# Patient Record
Sex: MALE | Race: BLACK OR AFRICAN AMERICAN | NOT HISPANIC OR LATINO | Employment: FULL TIME | URBAN - METROPOLITAN AREA
[De-identification: names, ages, dates, MRNs, and addresses within clinical notes are randomized per-mention and may not be internally consistent; named-entity substitution may affect disease eponyms.]

---

## 2018-06-05 ENCOUNTER — HOSPITAL ENCOUNTER (EMERGENCY)
Facility: OTHER | Age: 48
Discharge: HOME | End: 2018-06-05
Attending: EMERGENCY MEDICINE
Payer: COMMERCIAL

## 2018-06-05 VITALS
HEART RATE: 82 BPM | SYSTOLIC BLOOD PRESSURE: 139 MMHG | DIASTOLIC BLOOD PRESSURE: 67 MMHG | WEIGHT: 235 LBS | BODY MASS INDEX: 32.9 KG/M2 | OXYGEN SATURATION: 99 % | TEMPERATURE: 98 F | HEIGHT: 71 IN | RESPIRATION RATE: 23 BRPM

## 2018-06-05 DIAGNOSIS — R55 NEAR SYNCOPE: Primary | ICD-10-CM

## 2018-06-05 DIAGNOSIS — R51.9 GENERALIZED HEADACHES: ICD-10-CM

## 2018-06-05 DIAGNOSIS — R79.89 LOW TSH LEVEL: ICD-10-CM

## 2018-06-05 DIAGNOSIS — R31.9 HEMATURIA, UNSPECIFIED TYPE: ICD-10-CM

## 2018-06-05 DIAGNOSIS — R90.89 ABNORMAL BRAIN CT: ICD-10-CM

## 2018-06-05 LAB
ALBUMIN SERPL BCP-MCNC: 3.8 G/DL
ALP SERPL-CCNC: 57 U/L
ALT SERPL W/O P-5'-P-CCNC: 25 U/L
AMPHET+METHAMPHET UR QL: NEGATIVE
ANION GAP SERPL CALC-SCNC: 12 MMOL/L
AST SERPL-CCNC: 14 U/L
BARBITURATES UR QL SCN>200 NG/ML: NEGATIVE
BASOPHILS # BLD AUTO: 0.03 K/UL
BASOPHILS NFR BLD: 0.2 %
BENZODIAZ UR QL SCN>200 NG/ML: NEGATIVE
BILIRUB SERPL-MCNC: 1 MG/DL
BILIRUB UR QL STRIP: NEGATIVE
BNP SERPL-MCNC: 15 PG/ML
BUN SERPL-MCNC: 15 MG/DL
BZE UR QL SCN: NEGATIVE
CALCIUM SERPL-MCNC: 9.4 MG/DL
CANNABINOIDS UR QL SCN: NEGATIVE
CHLORIDE SERPL-SCNC: 101 MMOL/L
CLARITY UR: CLEAR
CO2 SERPL-SCNC: 26 MMOL/L
COLOR UR: YELLOW
CREAT SERPL-MCNC: 1.2 MG/DL
CREAT UR-MCNC: 79.6 MG/DL
DIFFERENTIAL METHOD: ABNORMAL
EOSINOPHIL # BLD AUTO: 0 K/UL
EOSINOPHIL NFR BLD: 0.1 %
ERYTHROCYTE [DISTWIDTH] IN BLOOD BY AUTOMATED COUNT: 13.5 %
EST. GFR  (AFRICAN AMERICAN): >60 ML/MIN/1.73 M^2
EST. GFR  (NON AFRICAN AMERICAN): >60 ML/MIN/1.73 M^2
GLUCOSE SERPL-MCNC: 94 MG/DL
GLUCOSE UR QL STRIP: NEGATIVE
HCT VFR BLD AUTO: 42.6 %
HGB BLD-MCNC: 13.9 G/DL
HGB UR QL STRIP: ABNORMAL
INR PPP: 1
KETONES UR QL STRIP: NEGATIVE
LEUKOCYTE ESTERASE UR QL STRIP: NEGATIVE
LYMPHOCYTES # BLD AUTO: 1.3 K/UL
LYMPHOCYTES NFR BLD: 9.1 %
MCH RBC QN AUTO: 28 PG
MCHC RBC AUTO-ENTMCNC: 32.6 G/DL
MCV RBC AUTO: 86 FL
METHADONE UR QL SCN>300 NG/ML: NEGATIVE
MONOCYTES # BLD AUTO: 1.4 K/UL
MONOCYTES NFR BLD: 10.2 %
NEUTROPHILS # BLD AUTO: 11.3 K/UL
NEUTROPHILS NFR BLD: 80.1 %
NITRITE UR QL STRIP: NEGATIVE
OPIATES UR QL SCN: NEGATIVE
PCP UR QL SCN>25 NG/ML: NEGATIVE
PH UR STRIP: 6 [PH] (ref 5–8)
PLATELET # BLD AUTO: 203 K/UL
PMV BLD AUTO: 10.1 FL
POCT GLUCOSE: 95 MG/DL (ref 70–110)
POTASSIUM SERPL-SCNC: 3.6 MMOL/L
PROT SERPL-MCNC: 7.8 G/DL
PROT UR QL STRIP: NEGATIVE
PROTHROMBIN TIME: 10.7 SEC
RBC # BLD AUTO: 4.96 M/UL
SODIUM SERPL-SCNC: 139 MMOL/L
SP GR UR STRIP: 1.01 (ref 1–1.03)
T4 FREE SERPL-MCNC: 0.94 NG/DL
TOXICOLOGY INFORMATION: NORMAL
TROPONIN I SERPL DL<=0.01 NG/ML-MCNC: <0.006 NG/ML
TSH SERPL DL<=0.005 MIU/L-ACNC: 0.27 UIU/ML
URN SPEC COLLECT METH UR: ABNORMAL
UROBILINOGEN UR STRIP-ACNC: NEGATIVE EU/DL
WBC # BLD AUTO: 14.09 K/UL

## 2018-06-05 PROCEDURE — 96361 HYDRATE IV INFUSION ADD-ON: CPT

## 2018-06-05 PROCEDURE — 85025 COMPLETE CBC W/AUTO DIFF WBC: CPT

## 2018-06-05 PROCEDURE — 96360 HYDRATION IV INFUSION INIT: CPT

## 2018-06-05 PROCEDURE — 80053 COMPREHEN METABOLIC PANEL: CPT

## 2018-06-05 PROCEDURE — 82962 GLUCOSE BLOOD TEST: CPT

## 2018-06-05 PROCEDURE — 85610 PROTHROMBIN TIME: CPT

## 2018-06-05 PROCEDURE — 93010 ELECTROCARDIOGRAM REPORT: CPT | Mod: ,,, | Performed by: INTERNAL MEDICINE

## 2018-06-05 PROCEDURE — 93005 ELECTROCARDIOGRAM TRACING: CPT | Mod: 59

## 2018-06-05 PROCEDURE — 81003 URINALYSIS AUTO W/O SCOPE: CPT | Mod: 59

## 2018-06-05 PROCEDURE — 99284 EMERGENCY DEPT VISIT MOD MDM: CPT | Mod: 25

## 2018-06-05 PROCEDURE — 83880 ASSAY OF NATRIURETIC PEPTIDE: CPT

## 2018-06-05 PROCEDURE — 80307 DRUG TEST PRSMV CHEM ANLYZR: CPT

## 2018-06-05 PROCEDURE — 84439 ASSAY OF FREE THYROXINE: CPT

## 2018-06-05 PROCEDURE — 84443 ASSAY THYROID STIM HORMONE: CPT

## 2018-06-05 PROCEDURE — 25000003 PHARM REV CODE 250: Performed by: EMERGENCY MEDICINE

## 2018-06-05 PROCEDURE — 84484 ASSAY OF TROPONIN QUANT: CPT

## 2018-06-05 RX ORDER — BUTALBITAL, ACETAMINOPHEN AND CAFFEINE 50; 325; 40 MG/1; MG/1; MG/1
1 TABLET ORAL
Status: COMPLETED | OUTPATIENT
Start: 2018-06-05 | End: 2018-06-05

## 2018-06-05 RX ORDER — LISINOPRIL 20 MG/1
20 TABLET ORAL DAILY
COMMUNITY
End: 2018-06-15

## 2018-06-05 RX ORDER — BUTALBITAL, ASPIRIN, AND CAFFEINE 325; 50; 40 MG/1; MG/1; MG/1
1 CAPSULE ORAL EVERY 6 HOURS PRN
Qty: 12 CAPSULE | Refills: 0 | Status: SHIPPED | OUTPATIENT
Start: 2018-06-05 | End: 2018-07-05

## 2018-06-05 RX ADMIN — SODIUM CHLORIDE 1000 ML: 0.9 INJECTION, SOLUTION INTRAVENOUS at 10:06

## 2018-06-05 RX ADMIN — BUTALBITAL, ACETAMINOPHEN AND CAFFEINE 1 TABLET: 50; 325; 40 TABLET ORAL at 11:06

## 2018-06-05 NOTE — ED NOTES
Rounding on the patient is completed. Patient is resting comfortably in stretcher, respirations are even and unlabored. Pt is connected to cardia monitor, pulse ox and NIBP. Patient skin is warm and dry. Patient in no acute distress. The patient has been updated on plan of care and current status. Pain was accessed and is currently a 4/10. Comfort postioning and restroom needs were addressed. Pt personal items placed at bedside. The call bell remains at bedside, bed in lowest position and side up x 2. Will continue to monitor patient.

## 2018-06-05 NOTE — ED NOTES
Pt updated on POC and pt verbalized udnerstanding. Pt ambulated wtia steady gait to room 10. Pt placed on cont cardiac, bp and spo2 monitors. Bed is locked and in lowest position with side rails up x2. Call light is within reach. Visitor at the bedside.

## 2018-06-05 NOTE — ED NOTES
Pt updated on POC and verbalized understanding. Pt is AAOx4. Pt does not appear to be in acute distress. RR are even and unlabored. Bed is locked and in lowest position with side rails up x2. Call light is within reach. Visitor at the bedside. Will continue to monitor.

## 2018-06-05 NOTE — ED NOTES
LOC: The patient is awake, alert and aware of environment with an appropriate affect, the patient is oriented x 3 and speaking appropriately.    APPEARANCE: Patient resting comfortably and in no acute distress, patient is clean and well groomed, patient's clothing properly fastened.    SKIN: The skin is warm and dry, color consistent with ethnicity, patient has normal skin turgor and moist mucus membranes, skin intact, no breakdown or brusing noted.    MUSKULOSKELETAL: Patient moving all extremities well, no obvious swelling or deformities noted.    RESPIRATORY: Airway is open and patent, respirations are spontaneous, patient has a normal effort and rate, no accessory muscle use noted.    CARDIAC: Patient has a normal rate and rhythm, no peripheral edema noted, capillary refill < 3 seconds.    ABDOMEN: Soft and non tender to palpation, no distention noted.    NEUROLOGIC: PERRL, 3mm bilaterally, eyes open spontaneously, behavior appropriate to situation, follows commands, facial expression symmetrical, bilateral hand grasp equal and even, purposeful motor response noted, normal sensation in all extremities when touched with a finger.Headache 6/10

## 2018-06-05 NOTE — ED PROVIDER NOTES
"Encounter Date: 6/5/2018    SCRIBE #1 NOTE: I, Naman Centeno, am scribing for, and in the presence of, Dr. Stahl.       History     Chief Complaint   Patient presents with    Headache     Pt c/o headache and weakness. Pt states he feels like he got overheated on sunday and hasing been drinking gatorade with minimal relief.      Seen by provider: 8:55 AM    Patient is a 47 y.o. male with a history of HTN who presents to the ED with complaint of headache, which began two days ago. Patient reports a near-syncopal episode two days ago with a pesistent headache since. He states "I was at the pool and I got overheated, my whole body felt extremely hot." He reports becoming "really weak and lightheaded" at that time and states he felt close to losing consciousness. He also reports experiencing chest pain at that time. He states "my chest felt unusual, I had never felt that before, it was hurting a little bit." He described the pain as "heaviness." He went into air conditioning and placed cold water on his neck with some improvement at that time and was able to drive home afterwards, but states he has felt fatigued has had a headache and decreased appetite since. He states "I've been out of it and was in bed all day yesterday." He also notes recent sinus congestion. He denies vomiting, abdominal pain, blood in stool, or dark stools. Patient reports experiencing episodes of syncope for several years (>3 years). He states "I can feel it coming on." He reports becoming weak and lightheaded prior to losing consciousness. He states these episodes seem to occur spontaneously. He states "I can't take a hot shower because I will pass out." He reports these episodes have been occurring more frequently recently. He reports being evaluated by his PCP for this, but states a diagnosis has not been found. He reports having a negative stress test a few years ago. He reports taking lisinopril for about 2.5 years, but states the " episodes have been occurring much longer. He denies a family history of CAD, but notes his father  of prostate cancer at 71 and his older brother had a stroke a few years ago. He admits to use of alcohol, but denies use of tobacco or illicit drugs.      The history is provided by the patient and a friend (girlfriend).     Review of patient's allergies indicates:  No Known Allergies  Past Medical History:   Diagnosis Date    Hypertension      History reviewed. No pertinent surgical history.  History reviewed. No pertinent family history.  Social History   Substance Use Topics    Smoking status: Never Smoker    Smokeless tobacco: Not on file    Alcohol use No     Review of Systems   Constitutional: Positive for appetite change (decreased). Negative for fever.   HENT: Positive for congestion. Negative for sore throat.    Eyes: Negative for photophobia and redness.   Respiratory: Negative for cough and shortness of breath.    Cardiovascular: Positive for chest pain.   Gastrointestinal: Negative for abdominal pain, blood in stool, nausea and vomiting.   Genitourinary: Negative for dysuria.   Musculoskeletal: Negative for back pain.   Skin: Negative for rash.   Neurological: Positive for weakness (generalized), light-headedness and headaches. Negative for syncope.   Psychiatric/Behavioral: Negative for confusion.       Physical Exam     Initial Vitals [18 0822]   BP Pulse Resp Temp SpO2   139/77 86 18 98.4 °F (36.9 °C) 96 %      MAP       97.67         Physical Exam    Nursing note and vitals reviewed.  Constitutional: He appears well-developed and well-nourished. He is not diaphoretic. No distress.   HENT:   Head: Normocephalic and atraumatic.   Mouth/Throat: Oropharynx is clear and moist.   Eyes: Conjunctivae and EOM are normal. Pupils are equal, round, and reactive to light.   Neck: Normal range of motion. Neck supple.   Cardiovascular: Normal rate, regular rhythm, S1 normal, S2 normal and normal heart  sounds. Exam reveals no gallop and no friction rub.    No murmur heard.  Pulmonary/Chest: Breath sounds normal. No respiratory distress. He has no wheezes. He has no rhonchi. He has no rales.   Abdominal: Soft. Bowel sounds are normal. There is no tenderness. There is no rebound and no guarding.   Musculoskeletal: Normal range of motion. He exhibits no edema or tenderness.   No lower extremity edema.    Lymphadenopathy:     He has no cervical adenopathy.   Neurological: He is alert and oriented to person, place, and time. He has normal strength. He displays normal reflexes. No cranial nerve deficit or sensory deficit.   Cranial nerves II-XII intact. Strength 5/5 throughout. Reflexes 2+ throughout. Good finger to nose task ability. Negative pronator drift. Neurovascularly intact throughout.   Skin: Skin is warm and dry. Capillary refill takes less than 2 seconds. No rash noted. No pallor.   Psychiatric: He has a normal mood and affect. His behavior is normal.         ED Course   Procedures  Labs Reviewed   CBC W/ AUTO DIFFERENTIAL - Abnormal; Notable for the following:        Result Value    WBC 14.09 (*)     Hemoglobin 13.9 (*)     Gran # (ANC) 11.3 (*)     Mono # 1.4 (*)     Gran% 80.1 (*)     Lymph% 9.1 (*)     All other components within normal limits   URINALYSIS - Abnormal; Notable for the following:     Occult Blood UA Trace (*)     All other components within normal limits   TSH - Abnormal; Notable for the following:     TSH 0.270 (*)     All other components within normal limits   COMPREHENSIVE METABOLIC PANEL   PROTIME-INR   DRUG SCREEN PANEL, URINE EMERGENCY   TROPONIN I   B-TYPE NATRIURETIC PEPTIDE   T4, FREE   POCT GLUCOSE      Imaging Results          MRI Brain Without Contrast (Final result)  Result time 06/05/18 13:07:51    Final result by Mitchell Vaz DO (06/05/18 13:07:51)                 Impression:      Prominent perivascular space right posterior centrum semiovale corresponds to hypodensity  seen on CT.    No evidence for acute or recent infarction.    Innumerable subcentimeter T2 FLAIR signal hyperintensities supratentorial subcortical white matter which are nonspecific and may represent sequela migraine headaches or age advanced chronic ischemic change.    Incidental left maxillary antral mucous retention cyst.      Electronically signed by: Mitchell Vaz DO  Date:    06/05/2018  Time:    13:07             Narrative:    EXAMINATION:  MRI BRAIN WITHOUT CONTRAST    CLINICAL HISTORY:  Syncope Episodes;  Other abnormal findings on diagnostic imaging of central nervous system    TECHNIQUE:  Sagittal and axial T1, axial T2, axial FLAIR, axial gradient and axial diffusion imaging of the whole brain without contrast.    COMPARISON:  None    FINDINGS:  There is a focal fluid signal collection within the right parietal subcortical white matter which corresponds to hypodensity seen on CT within linear internal component most compatible with prominent perivascular space with relative isointensity to CSF on all sequences without diffusion restriction.    There is no restricted diffusion to suggest acute infarction.    Innumerable subcentimeter scattered foci of T2 FLAIR signal hyperintensity throughout the supratentorial white matter which are nonspecific and may represent sequela of migraine headaches with differential to include age advanced chronic ischemic change    The major intracranial T2 flow voids are present.  The ventricles are normal without hydrocephalus.  No abnormal parenchymal susceptibility to suggest parenchymal hemorrhage.                               CT Head Without Contrast (Final result)  Result time 06/05/18 10:15:19    Final result by Mitchell Vaz DO (06/05/18 10:15:19)                 Impression:      8 mm fluid density oval-shaped region within the right posterior centrum semiovale which may represent prominent perivascular space versus age indeterminate lacunar type  infarct.    Otherwise unremarkable noncontrast CT head specifically without evidence for acute intracranial hemorrhage or sulcal effacement to suggest large territory recent infarction.  Clinical correlation and further evaluation with MRI as warranted if patient compatible      Electronically signed by: Mitchell Vaz DO  Date:    06/05/2018  Time:    10:15             Narrative:    EXAMINATION:  CT HEAD WITHOUT CONTRAST    CLINICAL HISTORY:  headaches;    TECHNIQUE:  Multiple sequential 5 mm axial images of the head without contrast.  Coronal and sagittal reformatted imaging from the axial acquisition.    COMPARISON:  None    FINDINGS:  There is no evidence for acute intracranial hemorrhage or sulcal effacement.  The ventricles are normal in size without hydrocephalus.  There is a small oval-shaped hypodensity within the right centrum semiovale posteriorly which is nonspecific and may represent age indeterminate lacunar type infarct with prominent perivascular space to be considered.  There is no midline shift or mass effect.  Visualized paranasal sinuses and mastoid air cells are clear.                               X-Ray Chest PA And Lateral (Final result)  Result time 06/05/18 09:34:27    Final result by Leonardo Pillai Jr., MD (06/05/18 09:34:27)                 Impression:      No acute abnormality identified.      Electronically signed by: Leonardo Pillai MD  Date:    06/05/2018  Time:    09:34             Narrative:    EXAMINATION:  XR CHEST PA AND LATERAL    CLINICAL HISTORY:  Chest Pain;    TECHNIQUE:  PA and lateral views of the chest were performed.    COMPARISON:  None    FINDINGS:  Heart size pulmonary vessels are.  The lungs are well aerated and clear.  No pleural fluid.  Bones are intact.                                EKG Readings: (Independently Interpreted)   Sinus rhythm. Rate of 80. No STEMI.          Medical Decision Making:   Independently Interpreted Test(s):   I have ordered and independently  interpreted EKG Reading(s) - see prior notes  Clinical Tests:   Lab Tests: Ordered and Reviewed  Radiological Study: Reviewed and Ordered  Medical Tests: Reviewed and Ordered            Scribe Attestation:   Scribe #1: I performed the above scribed service and the documentation accurately describes the services I performed. I attest to the accuracy of the note.    Attending Attestation:           Physician Attestation for Scribe:  Physician Attestation Statement for Scribe #1: I, Dr. Stahl, reviewed documentation, as scribed by Naman Centeno in my presence, and it is both accurate and complete.         Attending ED Notes:   Emergent evaluation of 47-year-old male with near syncopal episode of feeling overheated.  Patient is afebrile, nontoxic-appearing with stable vital signs.  No acute findings on drug screen.  No acute findings and urinary analysis except for trace blood.  White blood cell count of 14,000.  H&H 13.9 and 42.6.  No acute findings on CMP.  No elevation in troponin.  TSH is 0.270.  Free T4 0.94.  EKG reveals normal sinus rhythm.  No acute signs on chest x-ray.  CT the head reveals 8 mm fluid density oval-shaped region within the right posterior centrum semiovale.  MRI reveals prominent perivascular'sright posterior centrum semiovale.  No evidence of acute or recent infarction.  Patient has innumerable subcentimeter T2 flare.  The patient is extensive the counseled on his diagnosis and treatment including all diagnostic, laboratory and physical exam findings.  Patient is discharged good condition and directed follow-up with his primary care physician and neurology in the next 24-48 hours.             Clinical Impression:     1. Near syncope    2. Abnormal brain CT    3. Generalized headaches    4. Hematuria, unspecified type    5. Low TSH level               Tawanda Stahl MD  06/14/18 [id number]

## 2018-06-08 DIAGNOSIS — R55 SYNCOPE, UNSPECIFIED SYNCOPE TYPE: Primary | ICD-10-CM

## 2018-06-15 ENCOUNTER — HOSPITAL ENCOUNTER (OUTPATIENT)
Dept: CARDIOLOGY | Facility: CLINIC | Age: 48
Discharge: HOME | End: 2018-06-15
Payer: COMMERCIAL

## 2018-06-15 ENCOUNTER — CLINICAL SUPPORT (OUTPATIENT)
Dept: ELECTROPHYSIOLOGY | Facility: CLINIC | Age: 48
End: 2018-06-15
Attending: INTERNAL MEDICINE
Payer: COMMERCIAL

## 2018-06-15 ENCOUNTER — INITIAL CONSULT (OUTPATIENT)
Dept: ELECTROPHYSIOLOGY | Facility: CLINIC | Age: 48
End: 2018-06-15
Payer: COMMERCIAL

## 2018-06-15 VITALS
HEART RATE: 72 BPM | DIASTOLIC BLOOD PRESSURE: 75 MMHG | BODY MASS INDEX: 32.62 KG/M2 | SYSTOLIC BLOOD PRESSURE: 132 MMHG | HEIGHT: 71 IN | WEIGHT: 233 LBS

## 2018-06-15 DIAGNOSIS — R55 SYNCOPE, UNSPECIFIED SYNCOPE TYPE: ICD-10-CM

## 2018-06-15 DIAGNOSIS — R55 VASOVAGAL EPISODE: ICD-10-CM

## 2018-06-15 PROCEDURE — 93000 ELECTROCARDIOGRAM COMPLETE: CPT | Mod: S$GLB,,, | Performed by: INTERNAL MEDICINE

## 2018-06-15 PROCEDURE — 93268 ECG RECORD/REVIEW: CPT | Mod: S$GLB,,, | Performed by: INTERNAL MEDICINE

## 2018-06-15 PROCEDURE — 99205 OFFICE O/P NEW HI 60 MIN: CPT | Mod: S$GLB,,, | Performed by: INTERNAL MEDICINE

## 2018-06-15 PROCEDURE — 99999 PR PBB SHADOW E&M-EST. PATIENT-LVL III: CPT | Mod: PBBFAC,,, | Performed by: INTERNAL MEDICINE

## 2018-06-15 RX ORDER — AMLODIPINE BESYLATE 10 MG/1
10 TABLET ORAL DAILY
Qty: 30 TABLET | Refills: 11 | Status: SHIPPED | OUTPATIENT
Start: 2018-06-15 | End: 2018-12-20 | Stop reason: SDUPTHER

## 2018-06-15 NOTE — LETTER
Lona 15, 2018      Tawanda Martell MD  [address]  [facility] [address]           [facility]  [address]  [facility] [address]  Phone: [phone]  Fax: [phone]          Patient: Bhaskar Stiles   MR Number: [id number]   YOB: 1970   Date of Visit: 6/15/2018       Dear Dr. Tawanda Martell:    Thank you for referring Bhaskar Stiles to me for evaluation. Attached you will find relevant portions of my assessment and plan of care.    If you have questions, please do not hesitate to call me. I look forward to following Bhaskar Stiles along with you.    Sincerely,    Leonardo Whitfield MD    Enclosure  CC:  No Recipients    If you would like to receive this communication electronically, please contact [email or web address] or [phone] to request more information on [employer] access.    For providers and/or their staff who would like to refer a patient to [facility], please contact us through our one-stop-shop provider referral line, [facility], at [phone].    If you feel you have received this communication in error or would no longer like to receive these types of communications, please e-mail [email or web address]

## 2018-06-15 NOTE — PROGRESS NOTES
Subjective:    Patient ID:  Bhaskar Stiles is a 47 y.o. male who presents for evaluation of Loss of Consciousness      HPI   47 y.o. M  HTN on meds (lisinopril >1 year)    Has been having episodes of presyncope/syncope for years. Occurs most often when overheated (e.g., hot shower or mowing lawn) but also at other times. Gets a long prodrome, to which he responds by getting a cool towel. Feels better after cooling off. After episodes, often feels weak/LH for hours or all day. It's been >1 year since actual syncope.  Episodes have also happened on waking from sleep, as well. Ex-wife observed pt to look like his prodrome; she helped by getting cool towel, etc.  Hot showers are particularly terrible. Able to work out lightly, but feels awful after heavy work (e.g., lifting weights).    Went to ER for such an episode recently. There, CT head showed an 8mm oval abnormality. MRI showed innumerable T2 flare abnormalities; pending neuro c/s for that next week.    My interpretation of today's ECG is NSR    Review of Systems   Constitution: Negative. Negative for weakness and malaise/fatigue.   HENT: Negative.  Negative for ear pain and tinnitus.    Eyes: Negative for blurred vision.   Cardiovascular: Positive for near-syncope. Negative for chest pain, dyspnea on exertion, palpitations and syncope.   Respiratory: Negative.  Negative for shortness of breath.    Endocrine: Negative.  Negative for polyuria.   Hematologic/Lymphatic: Does not bruise/bleed easily.   Skin: Negative.  Negative for rash.   Musculoskeletal: Negative.  Negative for joint pain and muscle weakness.   Gastrointestinal: Negative.  Negative for abdominal pain and change in bowel habit.   Genitourinary: Negative for frequency.   Neurological: Negative.  Negative for dizziness.   Psychiatric/Behavioral: Negative.  Negative for depression. The patient is not nervous/anxious.    Allergic/Immunologic: Negative for environmental allergies.        Objective:     Physical Exam   Constitutional: He is oriented to person, place, and time. He appears well-developed and well-nourished.   HENT:   Head: Normocephalic and atraumatic.   Eyes: Conjunctivae, EOM and lids are normal. No scleral icterus.   Neck: Normal range of motion. No JVD present. No tracheal deviation present. No thyromegaly present.   Cardiovascular: Normal rate, regular rhythm, normal heart sounds and intact distal pulses.   No extrasystoles are present. PMI is not displaced.  Exam reveals no gallop and no friction rub.    No murmur heard.  Pulses:       Radial pulses are 2+ on the right side, and 2+ on the left side.   Pulmonary/Chest: Effort normal and breath sounds normal. No accessory muscle usage. No tachypnea. No respiratory distress. He has no wheezes. He has no rales.   Abdominal: Soft. Bowel sounds are normal. He exhibits no distension. There is no hepatosplenomegaly. There is no tenderness.   Musculoskeletal: Normal range of motion. He exhibits no edema.   Neurological: He is alert and oriented to person, place, and time. He has normal reflexes. He exhibits normal muscle tone.   Skin: Skin is warm and dry. No rash noted.   Psychiatric: He has a normal mood and affect. His behavior is normal.   Nursing note and vitals reviewed.        Assessment:       1. Vasovagal episode    Syncope, presyncope     Plan:       Discussed with patient orthostatic/vasovagal mediated hypotension and the resultant decreased level of consciousness that can result. Discussed counteractive measures.    Change ACE to norvasc (treats HTN but doesn't have orthostasis as KAIN).  Echo  TTT  30 day monitor  Neuro c/s already pending    f/u 6 wk or earlier prn.

## 2018-06-19 NOTE — PROGRESS NOTES
TILT TABLE TEST EDUCATION CHECKLIST    07-09-18 @ 7:30 AM  REPORT TO CARDIOLOGY WAITING ROOM ON 3RD FLOOR OF HOSPITAL  (DO NOT REPORT TO CLINIC)    If you park in the Parking Garage:  Take elevators to the 2nd floor  Walk up ramp and turn right by [employer] Elevators  Take elevator to the 3rd floor  Upon exiting the elevator, turn away from the clinic areas  Walk long lerma around to front of hospital to area with windows overlooking [facility]  Check in at Reception Desk  OR  If family is dropping you off:  Have them drop you off at the front of the Hospital  (Near the ER, where all the flags are hung).  Take the E elevators to the 3rd floor.  Check in at the Reception Desk in the waiting room.        DO NOT EAT OR DRINK ANYTHING AFTER MIDNIGHT ON THE NIGHT BEFORE YOUR TEST    YOU SHOULD TAKE YOUR USUAL MORNING MEDICATIONS WITH A SIP OF WATER    YOU WILL NEED SOMEONE TO DRIVE YOU HOME AFTER YOUR TEST    THE ABOVE INSTRUCTIONS WERE GIVEN TO THE PATIENT VERBALLY AND THEY VERBALIZED UNDERSTANDING. THEY DO NOT REQUIRE ANY SPECIAL NEEDS AND DO NOT HAVE ANY LEARNING BARRIERS.     Any need to reschedule or cancel procedures, or any questions regarding your procedures should be addressed directly with the Arrhythmia Department Nurses at the following phone number: [phone]

## 2018-06-20 ENCOUNTER — OFFICE VISIT (OUTPATIENT)
Dept: NEUROLOGY | Facility: CLINIC | Age: 48
End: 2018-06-20
Payer: COMMERCIAL

## 2018-06-20 VITALS
WEIGHT: 235.88 LBS | BODY MASS INDEX: 33.02 KG/M2 | HEART RATE: 80 BPM | HEIGHT: 71 IN | DIASTOLIC BLOOD PRESSURE: 85 MMHG | SYSTOLIC BLOOD PRESSURE: 150 MMHG

## 2018-06-20 DIAGNOSIS — R90.89 ABNORMAL FINDING ON MRI OF BRAIN: ICD-10-CM

## 2018-06-20 DIAGNOSIS — R55 VASOVAGAL SYNCOPE: Primary | ICD-10-CM

## 2018-06-20 PROCEDURE — 99205 OFFICE O/P NEW HI 60 MIN: CPT | Mod: S$GLB,,, | Performed by: NEUROMUSCULOSKELETAL MEDICINE & OMM

## 2018-06-20 PROCEDURE — 99999 PR PBB SHADOW E&M-EST. PATIENT-LVL III: CPT | Mod: PBBFAC,,, | Performed by: NEUROMUSCULOSKELETAL MEDICINE & OMM

## 2018-06-20 NOTE — PROGRESS NOTES
Bhaskar Stiles  1970  Review of patient's allergies indicates:  No Known Allergies  [unfilled]    Past Medical History:   Diagnosis Date    Hypertension      Social History     Social History    Marital status: Single     Spouse name: N/A    Number of children: N/A    Years of education: N/A     Occupational History    Not on file.     Social History Main Topics    Smoking status: Never Smoker    Smokeless tobacco: Not on file    Alcohol use No    Drug use: No    Sexual activity: Not on file     Other Topics Concern    Not on file     Social History Narrative    No narrative on file     No family history on file.    Review of systems:  Constitutional-negative  Eyes-negative  ENT, mouth-negative  Cardiovascular-negative  Respiratory-negative  GI-negative  - negative  Musculoskeletal-negative  Skin-negative  Neurologic-negative  Psychiatric-negative  Endocrine-negative  Hematology/lymph nodes-negative  Allergies/immunology-negative  Bhaskar Stiles  1970  Review of patient's allergies indicates:  No Known Allergies  [unfilled]    Past Medical History:   Diagnosis Date    Hypertension      Social History     Social History    Marital status: Single     Spouse name: N/A    Number of children: N/A    Years of education: N/A     Occupational History    Not on file.     Social History Main Topics    Smoking status: Never Smoker    Smokeless tobacco: Not on file    Alcohol use No    Drug use: No    Sexual activity: Not on file     Other Topics Concern    Not on file     Social History Narrative    No narrative on file     No family history on file.    Review of systems:  Constitutional-negative  Eyes-negative  ENT, mouth-negative  Cardiovascular-negative  Respiratory-negative  GI-negative  - negative  Musculoskeletal-negative  Skin-negative  Neurologic-negative  Psychiatric-negative  Endocrine-negative  Hematology/lymph nodes-negative  Allergies/immunology-negative  Gen.  Appearance: Well-developed with no obvious deformities  Carotid arteries symmetrical pulses  Peripheral vascular shows symmetrical pulses with no obvious edema or tenderness  Social History : Patient works as a  for [employer]; he is accompanied by his girlfriend.  Present history:   This is a 47-year-old -American male who presents with a history of syncope episodes, abnormal MRI of the brain, and headaches.  Patient was by the pool side about one week ago when he became overheated and went to sit in the air conditioning car.  He has had multiple spells of near blackouts which she relates as far back as 18 years old.  He is presently having a cardiac workup with a cardiac monitor in place.  He has had headaches off and on encompassing the whole head described as a throbbing 8/10 pain.  He has had no significant nausea or vomiting however does state that with the headache he loses his appetite.  He was recently started on hypertensive medicines.  He has a brother that is diabetic.  He has an abnormal MRI scan of the brain: See below    Impression       Prominent perivascular space right posterior centrum semiovale corresponds to hypodensity seen on CT.    No evidence for acute or recent infarction.    Innumerable subcentimeter T2 FLAIR signal hyperintensities supratentorial subcortical white matter which are nonspecific and may represent sequela migraine headaches or age advanced chronic ischemic change.    Incidental left maxillary antral mucous retention cyst.       Neurological Exam:  Mental status-alert and oriented to person, place, and time; attention span and concentration is good. Fund of knowledge-patient is aware of current events and able to give detailed history of the current problem.recent and remote memory seems intact. Language function is normal with no evidence of aphasia  Cranial nerves:Visual acuity to hand chart -normal; visual fields to confrontation normal;pupils were equal  and reactive to light ;no evidence of ptosis ;  funduscopic examination was normal with sharp disc margins. external ocular movements were full with no nystagmus. Facial sensation to pinprick : normal ; corneal reflexes intact; Facial muscles were symmetrical. Hearing is unimpaired symmetrical finger rub; Tongue movements - normal ; palate movements - normal ;Swallowing unimpaired. Shoulder shrug was intact with good strength Speech was normal  Motor examination: Upper : normal                                      Lower extremities - Normal;muscle tone was normal ;                  Right-handed  Sensory examination:   Upper; normal pinprick and soft touch ;   Lower extremities - normal and symmetrical.   Vibration sense: 15-20 seconds @ toes  Deep tendon reflexes: upper extremities :1-2+ symmetrical ;     lower extremities KJ- 1-2 +; AJ - 1-2+ Both plantar responses were flexor  Cerebellar examination upper: Normal finger to nose and rapid alternating movements  Gait: Steady with no ataxia;      heel and toe walk normal  Romberg test: negative       Tandem gait: Normal    Involuntary movements: Negative  TMJ - no tenderness  Cervical examination: Full range of motion with no pain Cervical tenderness :negative  Lumbar examination: Low back tenderness-negative                  Sciatic notchtenderness-negative            Straight leg raising : negative    Impression: Syncopal spells;  Rule out seizure ; abnormal MRI particularly with small vessel ischemic disease versus demyelinating disease    Recommendations/Plan : Long discussion with the patient in reference to differential diagnosis of the abnormal MRI spots.  These spots could be related to untreated hypertension for several years.  He is now on medications.  Would suggest repeating the MRI in about 6 months to see if there is any changes.  EEG sleep deprived to rule out seizure

## 2018-06-22 ENCOUNTER — TELEPHONE (OUTPATIENT)
Dept: NEUROLOGY | Facility: CLINIC | Age: 48
End: 2018-06-22

## 2018-06-22 NOTE — TELEPHONE ENCOUNTER
----- Message from Oliva Espinal sent at 6/22/2018  2:54 PM CDT -----  Contact: Maricel () @ [phone]  Calling to speak with someone in Dr. Krishnamurthy's office regarding scheduling an EEG for the patient. Please call.

## 2018-07-02 ENCOUNTER — DOCUMENTATION ONLY (OUTPATIENT)
Dept: NEUROLOGY | Facility: CLINIC | Age: 48
End: 2018-07-02

## 2018-07-02 NOTE — PROGRESS NOTES
[facility] Info for NAREN Antibody with Index: STRATIFY JCV Antibody (with Index) with Reflex to Inhibition Assay  Test Code  61857    CPT Code(s)  91800

## 2018-07-05 ENCOUNTER — HOSPITAL ENCOUNTER (OUTPATIENT)
Dept: NEUROLOGY | Facility: CLINIC | Age: 48
Discharge: HOME | End: 2018-07-05
Payer: COMMERCIAL

## 2018-07-05 DIAGNOSIS — R55 VASOVAGAL SYNCOPE: ICD-10-CM

## 2018-07-05 PROCEDURE — 95816 PR EEG,W/AWAKE & DROWSY RECORD: ICD-10-PCS | Mod: S$GLB,,, | Performed by: PSYCHIATRY & NEUROLOGY

## 2018-07-05 PROCEDURE — 95816 EEG AWAKE AND DROWSY: CPT | Mod: S$GLB,,, | Performed by: PSYCHIATRY & NEUROLOGY

## 2018-07-05 NOTE — PROCEDURES
DATE OF SERVICE:  07/05/2018    EEG NUMBER:  OC     REQUESTING PHYSICIAN:  Dr. Krishnamurthy.    ELECTROENCEPHALOGRAM REPORT     METHODOLOGY:  Electroencephalographic (EEG) recording is recorded with   electrodes placed according to the International 10-20 placement system.  Thirty   two (32) channels of digital signal (sampling rate of 512/sec), including T1   and T2, were simultaneously recorded from the scalp and may include EKG, EMG,   and/or eye monitors.  Recording band pass was 0.1 to 512 Hz.  Digital video   recording of the patient is simultaneously recorded with the EEG.  The patient   is instructed to report clinical symptoms which may occur during the recording   session.  EEG and video recording are stored and archived in digital format.    Activation procedures, which include photic stimulation, hyperventilation and   instructing patients to perform simple tasks, are done in selected patients.    The EEG is displayed on a monitor screen and can be reviewed using different   montages.  Computer assisted-analysis is employed to detect spike and   electrographic seizure activity.   The entire record is submitted for computer   analysis.  The entire recording is visually reviewed, and the times identified   by computer analysis as being spikes or seizures are reviewed again.    Compressed spectral analysis (CSA) is also performed on the activity recorded   from each individual channel.  This is displayed as a power display of   frequencies from 0 to 30 Hz over time.   The CSA is reviewed looking for   asymmetries in power between homologous areas of the scalp, then compared with   the original EEG recording.    My[employer] software was also utilized in the review of this study.  This software   suite analyzes the EEG recording in multiple domains.  Coherence and rhythmicity   are computed to identify EEG sections which may contain organized seizures.    Each channel undergoes analysis to detect the presence  of spike and sharp waves   which have special and morphological characteristics of epileptic activity.  The   routine EEG recording is converted from special into frequency domain.  This is   then displayed comparing homologous areas to identify areas of significant   asymmetry.  Algorithm to identify non-cortically generated artifact is used to   separate artifact from the EEG.      EEG FINDINGS:  The patient was awake at the beginning of the recording.    Background in general was low amplitude.  There is rhythmic 11 Hz posterior   dominant rhythm seen in the occipital, parietal, and posterior temporal regions   bilaterally.  Low voltage midrange beta was seen diffusely.  During sleep, the   posterior dominant rhythm was replaced by generalized mixture of midrange theta   with superimposed beta.  Sleep spindles and V waves were recorded and stage II   sleep was achieved.  Waking and sleeping background was symmetrical and there   were no lateralized or focal changes and no spike or sharp wave activity seen.    After awakening, the patient was asked questions and correctly answered her   location, the date, the year, name of the president and the sum of a nickel dime   and irene.  Intermittent photic stimulation was then carried out, which   produced a mild driving response without provoking pathological discharges.    Hyperventilation was then carried out for 3 minutes, which did not significantly   alter the recording.    IMPRESSION:  Normal waking and sleeping EEG.    CLINICAL CORRELATION:  The patient is a 47-year-old right-handed male who has   episodes of losing consciousness.  Clinical question whether he is having   syncopal episodes or seizures.  This recording, however, does not show any   evidence of cortical dysfunction nor an irritative process.      RR/HN  dd: 07/05/2018 14:00:21 (CDT)  td: 07/05/2018 14:28:05 (CDT)  Doc ID   #[id number]  Job ID #[id number]    CC:

## 2018-07-06 ENCOUNTER — TELEPHONE (OUTPATIENT)
Dept: ELECTROPHYSIOLOGY | Facility: CLINIC | Age: 48
End: 2018-07-06

## 2018-07-06 NOTE — TELEPHONE ENCOUNTER
Contacted Pt to confirm procedure for Monday. Reviewed pre op instructions with Pt. NPO after midnight and take morning medications with small sip of water. Pt voiced understanding and stated he would be here.

## 2018-07-09 ENCOUNTER — SURGERY (OUTPATIENT)
Age: 48
End: 2018-07-09

## 2018-07-09 ENCOUNTER — HOSPITAL ENCOUNTER (OUTPATIENT)
Facility: HOSPITAL | Age: 48
Discharge: HOME | End: 2018-07-09
Attending: INTERNAL MEDICINE | Admitting: INTERNAL MEDICINE
Payer: COMMERCIAL

## 2018-07-09 DIAGNOSIS — R55 SYNCOPE: ICD-10-CM

## 2018-07-09 DIAGNOSIS — R55 VASOVAGAL EPISODE: Primary | ICD-10-CM

## 2018-07-09 PROCEDURE — 93660 TILT TABLE EVALUATION: CPT

## 2018-07-09 PROCEDURE — 93660 TILT TABLE EVALUATION: CPT | Mod: 26,,, | Performed by: INTERNAL MEDICINE

## 2018-07-09 RX ORDER — SODIUM CHLORIDE 9 MG/ML
INJECTION, SOLUTION INTRAVENOUS CONTINUOUS
Status: DISCONTINUED | OUTPATIENT
Start: 2018-07-09 | End: 2018-07-09 | Stop reason: HOSPADM

## 2018-07-10 ENCOUNTER — TELEPHONE (OUTPATIENT)
Dept: NEUROLOGY | Facility: CLINIC | Age: 48
End: 2018-07-10

## 2018-07-10 NOTE — TELEPHONE ENCOUNTER
----- Message from Leo Krishnamurthy MD sent at 7/9/2018  5:29 PM CDT -----  EEG normal  ----- Message -----  From: Interface, Transcription Incoming  Sent: 7/5/2018   2:28 PM  To: Leo Krishnamutrhy MD

## 2018-07-10 NOTE — TELEPHONE ENCOUNTER
----- Message from Mulugeta Baxter sent at 7/10/2018 11:38 AM CDT -----  Patient Returning Call from [facility]    Who Left Message for Patient: Margarette  Communication Preference: Maricel @ [phone]  Additional Information:

## 2018-07-10 NOTE — TELEPHONE ENCOUNTER
----- Message from Abrahan Mcfarland sent at 7/10/2018 11:17 AM CDT -----  Contact: Patient @  [phone]  Patient is returning a missed call, pls return call

## 2018-07-13 ENCOUNTER — HOSPITAL ENCOUNTER (OUTPATIENT)
Dept: CARDIOLOGY | Facility: CLINIC | Age: 48
Discharge: HOME | End: 2018-07-13
Attending: INTERNAL MEDICINE
Payer: COMMERCIAL

## 2018-07-13 LAB
DIASTOLIC DYSFUNCTION: NO
ESTIMATED PA SYSTOLIC PRESSURE: 22.54
RETIRED EF AND QEF - SEE NOTES: 65 (ref 55–65)
TRICUSPID VALVE REGURGITATION: NORMAL

## 2018-07-13 PROCEDURE — 93306 TTE W/DOPPLER COMPLETE: CPT | Mod: S$GLB,,, | Performed by: INTERNAL MEDICINE

## 2018-07-31 ENCOUNTER — PATIENT MESSAGE (OUTPATIENT)
Dept: NEUROLOGY | Facility: CLINIC | Age: 48
End: 2018-07-31

## 2018-08-01 ENCOUNTER — HOSPITAL ENCOUNTER (OUTPATIENT)
Dept: CARDIOLOGY | Facility: CLINIC | Age: 48
Discharge: HOME | End: 2018-08-01
Payer: COMMERCIAL

## 2018-08-01 ENCOUNTER — OFFICE VISIT (OUTPATIENT)
Dept: ELECTROPHYSIOLOGY | Facility: CLINIC | Age: 48
End: 2018-08-01
Payer: COMMERCIAL

## 2018-08-01 VITALS
HEART RATE: 68 BPM | HEIGHT: 71 IN | OXYGEN SATURATION: 95 % | DIASTOLIC BLOOD PRESSURE: 62 MMHG | WEIGHT: 242.31 LBS | BODY MASS INDEX: 33.92 KG/M2 | SYSTOLIC BLOOD PRESSURE: 138 MMHG

## 2018-08-01 DIAGNOSIS — R55 SYNCOPE, UNSPECIFIED SYNCOPE TYPE: ICD-10-CM

## 2018-08-01 DIAGNOSIS — R90.89 ABNORMAL FINDING ON MRI OF BRAIN: Primary | ICD-10-CM

## 2018-08-01 DIAGNOSIS — R55 VASOVAGAL SYNCOPE: ICD-10-CM

## 2018-08-01 PROCEDURE — 99215 OFFICE O/P EST HI 40 MIN: CPT | Mod: S$GLB,,, | Performed by: INTERNAL MEDICINE

## 2018-08-01 PROCEDURE — 99999 PR PBB SHADOW E&M-EST. PATIENT-LVL III: CPT | Mod: PBBFAC,,, | Performed by: INTERNAL MEDICINE

## 2018-08-01 PROCEDURE — 93000 ELECTROCARDIOGRAM COMPLETE: CPT | Mod: S$GLB,,, | Performed by: INTERNAL MEDICINE

## 2018-08-01 NOTE — PROGRESS NOTES
Subjective:    Patient ID:  Bhaskar Stiles is a 47 y.o. male who presents for evaluation of Loss of Consciousness      Loss of Consciousness   Pertinent negatives include no abdominal pain, chest pain, dizziness, malaise/fatigue, palpitations or weakness.      47 y.o. M  HTN on meds (lisinopril >1 year -> norvasc in mid 2018)    Has been having episodes of presyncope/syncope for years. Occurs most often when overheated (e.g., hot shower or mowing lawn) but also at other times. Gets a long prodrome, to which he responds by getting a cool towel. Feels better after cooling off. After episodes, often feels weak/LH for hours or all day. It's been >1 year since actual syncope.  Episodes have also happened on waking from sleep, as well. Ex-wife observed pt to look like his prodrome; she helped by getting cool towel, etc.  Hot showers are particularly terrible. Able to work out lightly, but feels awful after heavy work (e.g., lifting weights).    Went to ER for such an episode recently. There, CT head showed an 8mm oval abnormality. MRI showed innumerable T2 flare abnormalities; pending neuro c/s for that next week.    Following first visit with me 6/2018, echo showed normal LVEF, TTT was normal, and 30-day monitor showed SR/ST (however there was no syncope event).  Neuro opinion was that brain lesions may be due to HTN. EEG was normal. Recommended repeat MRI in  6mos.    My interpretation of today's ECG is NSR 61 bpm    Review of Systems   Constitution: Negative. Negative for weakness and malaise/fatigue.   HENT: Negative.  Negative for ear pain and tinnitus.    Eyes: Negative for blurred vision.   Cardiovascular: Positive for near-syncope and syncope. Negative for chest pain, dyspnea on exertion and palpitations.   Respiratory: Negative.  Negative for shortness of breath.    Endocrine: Negative.  Negative for polyuria.   Hematologic/Lymphatic: Does not bruise/bleed easily.   Skin: Negative.  Negative for rash.    Musculoskeletal: Negative.  Negative for joint pain and muscle weakness.   Gastrointestinal: Negative.  Negative for abdominal pain and change in bowel habit.   Genitourinary: Negative for frequency.   Neurological: Negative for dizziness.   Psychiatric/Behavioral: Negative.  Negative for depression. The patient is not nervous/anxious.    Allergic/Immunologic: Negative for environmental allergies.        Objective:    Physical Exam   Constitutional: He is oriented to person, place, and time. He appears well-developed and well-nourished.   HENT:   Head: Normocephalic and atraumatic.   Eyes: Conjunctivae, EOM and lids are normal. No scleral icterus.   Neck: Normal range of motion. No JVD present. No tracheal deviation present. No thyromegaly present.   Cardiovascular: Normal rate, regular rhythm, normal heart sounds and intact distal pulses.   No extrasystoles are present. PMI is not displaced.  Exam reveals no gallop and no friction rub.    No murmur heard.  Pulses:       Radial pulses are 2+ on the right side, and 2+ on the left side.   Pulmonary/Chest: Effort normal and breath sounds normal. No accessory muscle usage. No tachypnea. No respiratory distress. He has no wheezes. He has no rales.   Abdominal: Soft. Bowel sounds are normal. He exhibits no distension. There is no hepatosplenomegaly. There is no tenderness.   Musculoskeletal: Normal range of motion. He exhibits no edema.   Neurological: He is alert and oriented to person, place, and time. He has normal reflexes. He exhibits normal muscle tone.   Skin: Skin is warm and dry. No rash noted.   Psychiatric: He has a normal mood and affect. His behavior is normal.   Nursing note and vitals reviewed.        Assessment:       1. Abnormal finding on MRI of brain    2. Vasovagal syncope    Syncope, presyncope     Plan:       Discussed with patient orthostatic/vasovagal mediated hypotension and the resultant decreased level of consciousness that can result.  Discussed counteractive measures.    Pt and significant other describe heavy breathing, snoring during sleep. ? LAMONT. Will refer to sleep clinic for evaluation.  Given no episode during 30-day monitor, offered ILR placement for longer monitoring.  I discussed with the patient the risks and benefits of ILR placement. Our discussion of risks included (but was not limited to) the possibility of infection, bleeding, medication reaction, scar.  Patient will think about whether he wants an ILR. We'll call him later this week.    If ILR, f/u after that. If no ILR, f/u prn.

## 2018-08-17 ENCOUNTER — TELEPHONE (OUTPATIENT)
Dept: SLEEP MEDICINE | Facility: OTHER | Age: 48
End: 2018-08-17

## 2018-09-05 ENCOUNTER — TELEPHONE (OUTPATIENT)
Dept: SLEEP MEDICINE | Facility: CLINIC | Age: 48
End: 2018-09-05

## 2018-09-06 ENCOUNTER — OFFICE VISIT (OUTPATIENT)
Dept: SLEEP MEDICINE | Facility: CLINIC | Age: 48
End: 2018-09-06
Payer: COMMERCIAL

## 2018-09-06 VITALS
WEIGHT: 243.94 LBS | HEART RATE: 66 BPM | DIASTOLIC BLOOD PRESSURE: 77 MMHG | BODY MASS INDEX: 34.15 KG/M2 | HEIGHT: 71 IN | SYSTOLIC BLOOD PRESSURE: 120 MMHG

## 2018-09-06 DIAGNOSIS — G47.30 SLEEP APNEA, UNSPECIFIED TYPE: Primary | ICD-10-CM

## 2018-09-06 DIAGNOSIS — E66.9 OBESITY (BMI 30.0-34.9): ICD-10-CM

## 2018-09-06 DIAGNOSIS — G47.26 SHIFT WORK SLEEP DISORDER: ICD-10-CM

## 2018-09-06 PROCEDURE — 99999 PR PBB SHADOW E&M-EST. PATIENT-LVL III: CPT | Mod: PBBFAC,,, | Performed by: INTERNAL MEDICINE

## 2018-09-06 PROCEDURE — 99213 OFFICE O/P EST LOW 20 MIN: CPT | Mod: S$GLB,,, | Performed by: INTERNAL MEDICINE

## 2018-09-06 NOTE — PROGRESS NOTES
This 47 y.o. male patient presents for the evaluation of possible obstructive sleep apnea.    Pt does snore for the past 10 years and reported by his GF and daughter and possible apnea. He does have morning headache on and off and not lasting long. He feels tired on waking up, he does wake up in the night to the urinate x 2 times a night . He goes back to sleep right away. He does drink tea and does work night three times a week  SAT, Monday and Tues  Works 11 PM- 7 AM. (7: 30 11- Am sleep time  on over night shift days).   He does nap twice a week for an hour. He feels some time naps are refreshing. He denies any leg kicking, he sleeps on the side and on the stomach. He denies creepy crawling sensation, night mare, night terrors, dream enactment behavior. He denies any cataplexy. He feels the mode is good. He is sleeping 5 hrs.    EPWORTH SLEEPINESS SCALE 9/6/2018   Sitting and reading 3   Watching TV 3   Sitting, inactive in a public place (e.g. a theatre or a meeting) 1   As a passenger in a car for an hour without a break 2   Lying down to rest in the afternoon when circumstances permit 3   Sitting and talking to someone 0   Sitting quietly after a lunch without alcohol 3   In a car, while stopped for a few minutes in traffic 0   Total score 15     Sleep Clinic New Patient 9/6/2018   What time do you go to bed on a week day? (Give a range) 10pm- 11pm   What time do you go to bed on a day off? (Give a range) 10pm - 11pm   How long does it take you to fall asleep? (Give a range) 5-10 minutes   On average, how many times per night do you wake up? 1   How long does it take you to fall back into sleep? (Give a range) couple of minutes   What time do you wake up to start your day on a week day? (Give a range) 5:30am- 6:30am   What time do you wake up to start your day on a day off? (Give a range) 5:30am - 6:30am   What time do you get out of bed? (Give a range) as soon as I get up   On average, how many hours do you  "sleep? on a good time may get 5 hours   On average, how many naps do you take per day? 0   Rate your sleep quality from 0 to 5 (0-poor, 5-great). 3   Have you experienced:  Weight gain?   How much weight have you lost or gained (in lbs.) in the last year? 6lbs   On average, how many times per night do you go to the bathroom?  maybe 2-3 time per night   Have you ever had a sleep study/CPAP machine/surgery for sleep apnea? No   Have you ever had a CPAP machine for sleep apnea? No   Have you ever had surgery for sleep apnea? No     Sleep Clinic ROS  9/6/2018   Difficulty breathing through the nose?  No   Sore throat or dry mouth in the morning? Yes   Irregular or very fast heart beat?  No   Shortness of breath?  No   Acid reflux? No   Body aches and pains?  No   Morning headaches? Sometimes   Dizziness? No   Mood changes?  No   Do you exercise?  Sometimes   Do you feel like moving your legs a lot?  No       SLEEP ROUTINE:  Bed partner: yes with a partner Daytime naps: yes     Past Medical History:   Diagnosis Date    Hypertension     Syncope and collapse        History reviewed. No pertinent surgical history.    History reviewed. No pertinent family history.    Social History     Tobacco Use    Smoking status: Never Smoker   Substance Use Topics    Alcohol use: No    Drug use: No       ALLERGIES: Reviewed in EPIC    CURRENT MEDICATIONS: Reviewed in EPIC    REVIEW OF SYSTEMS:  Sleep related symptoms as per HPI;    Denies weight gain;    Denies sinus problems;    Denies dyspnea;    Denies palpitations;    Denies acid reflux;    Denies polyuria;    Denies headaches;    Denies mood disturbance;    Denies anemia;    Otherwise, a balance of systems reviewed is negative.    PHYSICAL EXAM:  /77   Pulse 66   Ht 5' 11" (1.803 m)   Wt 110.6 kg (243 lb 15 oz)   BMI 34.02 kg/m²   GENERAL: Well groomed; Normally developed;  HEENT: Conjunctivae are non-erythematous; Pupils equal, round, and reactive to light;    Nose " is symmetrical; Nasal mucosa is pink and moist; Septum is midline;    Turbinates are normal; Nasal airflow is normal;    Posterior pharynx is pink; Posterior palate is normal; Modified Mallampati: IV   Uvula is normal; Tongue is large; Tonsils +1;    Dentition is fair; No TMJ tenderness;    Jaw opening and protrusion without click and without discomfort.  NECK: Supple. No thyromegaly. No palpable nodes. Neck circumference in inches is 18.5 inch  SKIN: On face and neck: No abrasions, no rashes, no lesions.     No subcutaneous nodules are palpable.  RESPIRATORY: Chest is clear to auscultation.     Normal chest expansion and non-labored breathing at rest.  CARDIOVASCULAR: Normal S1, S2.  No murmurs, gallops or rubs.   No carotid bruits bilaterally.  EXTREMITIES: No clubbing. No cyanosis. Edema is absent.   NEURO: Oriented to time, place and person.    Normal attention span and concentration.  Station normal. Gait normal.  PSYCH: Affect is full. Mood is normal.        ASSESSMENT:    1. Sleep Apnea, unspecified. The patient symptomatically has snoring and excessive daytime sleepiness with exam findings of a crowded oral airway with medical co-morbidities of hypertension and obesity. This warrants further investigation for possible obstructive sleep apnea.    Sleep apnea, unspecified type  -     Home Sleep Studies; Future    Shift work sleep disorder    Obesity (BMI 30.0-34.9)           PLAN:    Diagnostic: Home sleep study. The nature of this procedure and its indication was discussed with the patient.    Education: During our discussion today, we talked about the etiology of obstructive sleep apnea as well as the potential ramifications of untreated sleep apnea, which could include daytime sleepiness, hypertension, heart disease and/or stroke.  We discussed potential treatment options, which could include weight loss, body positioning, use of an oral appliance, continuous positive airway pressure (CPAP), EPAP, or  referral for surgical consideration.    Shift work sleep disorder:    Sleep scheduling -- A regular sleep schedule that can be followed even during off-work periods is encouraged in order to promote stability in circadian entrainment.     While a consolidated seven to nine hours of sleep is recommended, daytime sleep can be  into two bouts in order to accommodate the need for flexibility:  better sleep (ie, sleep hygiene).Sleep hygiene -- The sleep environment should be modified to promote consolidated daytime sleep, with particular attention to light, temperature, and noise level  Light-blocking shades should be used to reduce as much sunlight as possible, with cool ambient temperature (generally between 60 to 75°F). If ambient noise cannot be controlled, a white noise machine may help reduce arousals that are due to variability in environmental noise.    Obesity:    General weight loss/lifestyle modification strategies discussed (elicit support from others; identify saboteurs; non-food rewards, etc).  Diet interventions: low calorie (1000 kCal/d) deficit diet.  Informal exercise measures discussed, e.g. taking stairs instead of elevator.    Precautions: The patient was advised to abstain from driving should they feel sleepy or drowsy.    Follow up: I will see the patient back after the sleep study has been completed. At this time, we can review the data and discuss potential treatment options. MD/NP

## 2018-09-06 NOTE — PATIENT INSTRUCTIONS
Yaquelin or Fredy will contact you to schedule your sleep study. Their number is [phone] (ext 2). The [facility] is located on 7th floor of the [facility].    We will call you when the sleep study results are ready - if you have not heard from us by 2 weeks from the date of the study, please call [phone] (ext 1).    You are advised to abstain from driving should you feel sleepy or drowsy.    Sleep Hygiene Practices    1. Try going to bed only when you are drowsy.    ?    2. If you are unable to fall asleep or stay asleep, leave your bedroom and engage in a quiet activity elsewhere. Do not permit yourself to fall asleep outside the bedroom. Return to bed when and only when you are sleepy. Repeat this process as often as necessary throughout night.    3. Maintain regular wake-up time, even on days off work & weekends    4. Use your bedroom for sleep and sex    5. Avoid napping during the daytime. If daytime sleepiness becomes overwhelming, limit nap time to a single nap of less than 1hr, no later than 3pm.    6. Distract your mind. Avoid clock watching. Lying in bed unable to sleep and frustrated needs to be avoided. Try reading or watching a videotape or listening to books on tape. It may be necessary to go into another room to do these.    7. Avoid caffeine within 4-6hrs of bedtime    8. Avoid use of nicotine close to bedtime    9. do not drink alcoholic beverages within 4-6hrs of bedtime    10. While a light snack before bedtime can help promote sound sleep, avoid large meals.    11. Obtain regular exercise, but avoid strenuous exercise within 4hrs of bedtime    12. Minimize light, noise, and extremes in temperature in the bedroom.    13. Precautions: The patient was advised to abstain from driving should they feel sleepy or drowsy.      Please use nasal saline every day one - two squirt each nostril if that does not work use Flonase  Over the counter at bed time one squirt to each nostril.      * This information is provided had been directly obtained from the American Academy of Sleep Medicine wellness booklet on sleep hygiene. ([facility], [address] [place] [address]

## 2018-09-06 NOTE — LETTER
September 6, 2018      Leonardo Whitfield MD  [address]  [facility] [address]           [facility], [place], operated by [facility]  [address]  [facility] [address]  Phone: [phone]          Patient: Bhaskar Stiles   MR Number: [id number]   YOB: 1970   Date of Visit: 9/6/2018       Dear Dr. Leonardo Whitfield:    Thank you for referring Bhaskar Stiles to me for evaluation. Attached you will find relevant portions of my assessment and plan of care.    If you have questions, please do not hesitate to call me. I look forward to following Bhaskar Stiles along with you.    Sincerely,    Grecia Graham MD    Enclosure  CC:  No Recipients    If you would like to receive this communication electronically, please contact [email or web address] or [phone] to request more information on [employer] Link access.    For providers and/or their staff who would like to refer a patient to [facility], please contact us through our one-stop-shop provider referral line, [facility], at [phone].    If you feel you have received this communication in error or would no longer like to receive these types of communications, please e-mail [email or web address]

## 2018-10-01 ENCOUNTER — TELEPHONE (OUTPATIENT)
Dept: SLEEP MEDICINE | Facility: OTHER | Age: 48
End: 2018-10-01

## 2018-10-04 ENCOUNTER — TELEPHONE (OUTPATIENT)
Dept: SLEEP MEDICINE | Facility: OTHER | Age: 48
End: 2018-10-04

## 2018-10-05 ENCOUNTER — HOSPITAL ENCOUNTER (OUTPATIENT)
Dept: SLEEP MEDICINE | Facility: OTHER | Age: 48
Discharge: HOME | End: 2018-10-05
Attending: INTERNAL MEDICINE
Payer: COMMERCIAL

## 2018-10-05 DIAGNOSIS — G47.30 SLEEP APNEA, UNSPECIFIED TYPE: ICD-10-CM

## 2018-10-05 DIAGNOSIS — G47.33 OSA (OBSTRUCTIVE SLEEP APNEA): ICD-10-CM

## 2018-10-05 PROCEDURE — 95806 SLEEP STUDY UNATT&RESP EFFT: CPT | Mod: 26,,, | Performed by: INTERNAL MEDICINE

## 2018-10-05 PROCEDURE — 95800 SLP STDY UNATTENDED: CPT

## 2018-10-15 ENCOUNTER — TELEPHONE (OUTPATIENT)
Dept: SLEEP MEDICINE | Facility: CLINIC | Age: 48
End: 2018-10-15

## 2018-10-15 NOTE — TELEPHONE ENCOUNTER
----- Message from Poppy Ku MA sent at 10/12/2018  5:13 PM CDT -----  Call and offer patient earlier appointment

## 2018-10-15 NOTE — TELEPHONE ENCOUNTER
Santos wanted to see the patient earlier so an earlier appointment slot was offered. Patient scheduled for 10/18.

## 2018-10-18 ENCOUNTER — OFFICE VISIT (OUTPATIENT)
Dept: SLEEP MEDICINE | Facility: CLINIC | Age: 48
End: 2018-10-18
Payer: COMMERCIAL

## 2018-10-18 VITALS
WEIGHT: 246.69 LBS | DIASTOLIC BLOOD PRESSURE: 73 MMHG | BODY MASS INDEX: 34.41 KG/M2 | SYSTOLIC BLOOD PRESSURE: 120 MMHG | HEART RATE: 76 BPM

## 2018-10-18 DIAGNOSIS — G47.33 OSA (OBSTRUCTIVE SLEEP APNEA): Primary | ICD-10-CM

## 2018-10-18 DIAGNOSIS — G47.26 SHIFT WORK SLEEP DISORDER: ICD-10-CM

## 2018-10-18 DIAGNOSIS — E66.9 OBESITY, UNSPECIFIED CLASSIFICATION, UNSPECIFIED OBESITY TYPE, UNSPECIFIED WHETHER SERIOUS COMORBIDITY PRESENT: ICD-10-CM

## 2018-10-18 PROCEDURE — 99214 OFFICE O/P EST MOD 30 MIN: CPT | Mod: S$GLB,,, | Performed by: INTERNAL MEDICINE

## 2018-10-18 PROCEDURE — 99999 PR PBB SHADOW E&M-EST. PATIENT-LVL III: CPT | Mod: PBBFAC,,, | Performed by: INTERNAL MEDICINE

## 2018-10-18 NOTE — PROGRESS NOTES
10/18/2018      Pt is coming here for the follow up for the shantal eval in view of his HST showing mild SHANTAL AHI 10. He still snore and has morning headache, denies  drowsy driving, he has gained weight  And continues to work shift work and sleeping only 4 hrs on days he does shift work. He is taking a nap during the day and goes back normal schedule on weekends sleeping 9- 10 pm and waking up 6 AM.     10/05/2018 HST  Mild SHANTAL AHI 10,RDI: 15, < 90 Spo2: 0.2%, Mean Spo2: 94.8%, time snorin.2%      EPWORTH SLEEPINESS SCALE 10/18/2018   Sitting and reading 0   Watching TV 3   Sitting, inactive in a public place (e.g. a theatre or a meeting) 2   As a passenger in a car for an hour without a break 3   Lying down to rest in the afternoon when circumstances permit 3   Sitting and talking to someone 1   Sitting quietly after a lunch without alcohol 2   In a car, while stopped for a few minutes in traffic 0   Total score 14                 2018    This 48 y.o. male patient presents for the evaluation of possible obstructive sleep apnea.    Pt does snore for the past 10 years and reported by his GF and daughter and possible apnea. He does have morning headache on and off and not lasting long. He feels tired on waking up, he does wake up in the night to the urinate x 2 times a night . He goes back to sleep right away. He does drink tea and does work night three times a week  SAT, Monday and Tues  Works 11 PM- 7 AM. (7: 30 11- Am sleep time  on over night shift days).   He does nap twice a week for an hour. He feels some time naps are refreshing. He denies any leg kicking, he sleeps on the side and on the stomach. He denies creepy crawling sensation, night mare, night terrors, dream enactment behavior. He denies any cataplexy. He feels the mode is good. He is sleeping 5 hrs.    EPWORTH SLEEPINESS SCALE 2018   Sitting and reading 3   Watching TV 3   Sitting, inactive in a public place (e.g. a theatre or a meeting) 1    As a passenger in a car for an hour without a break 2   Lying down to rest in the afternoon when circumstances permit 3   Sitting and talking to someone 0   Sitting quietly after a lunch without alcohol 3   In a car, while stopped for a few minutes in traffic 0   Total score 15     Sleep Clinic New Patient 9/6/2018   What time do you go to bed on a week day? (Give a range) 10pm- 11pm   What time do you go to bed on a day off? (Give a range) 10pm - 11pm   How long does it take you to fall asleep? (Give a range) 5-10 minutes   On average, how many times per night do you wake up? 1   How long does it take you to fall back into sleep? (Give a range) couple of minutes   What time do you wake up to start your day on a week day? (Give a range) 5:30am- 6:30am   What time do you wake up to start your day on a day off? (Give a range) 5:30am - 6:30am   What time do you get out of bed? (Give a range) as soon as I get up   On average, how many hours do you sleep? on a good time may get 5 hours   On average, how many naps do you take per day? 0   Rate your sleep quality from 0 to 5 (0-poor, 5-great). 3   Have you experienced:  Weight gain?   How much weight have you lost or gained (in lbs.) in the last year? 6lbs   On average, how many times per night do you go to the bathroom?  maybe 2-3 time per night   Have you ever had a sleep study/CPAP machine/surgery for sleep apnea? No   Have you ever had a CPAP machine for sleep apnea? No   Have you ever had surgery for sleep apnea? No     Sleep Clinic ROS  9/6/2018   Difficulty breathing through the nose?  No   Sore throat or dry mouth in the morning? Yes   Irregular or very fast heart beat?  No   Shortness of breath?  No   Acid reflux? No   Body aches and pains?  No   Morning headaches? Sometimes   Dizziness? No   Mood changes?  No   Do you exercise?  Sometimes   Do you feel like moving your legs a lot?  No       SLEEP ROUTINE:  Bed partner: yes with a partner Daytime naps: yes      Past Medical History:   Diagnosis Date    Hypertension     Syncope and collapse        Past Surgical History:   Procedure Laterality Date    TILT TABLE TEST N/A 7/9/2018    Procedure: TILT TABLE TEST;  Surgeon: Leonardo Whitfield MD;  Location: [facility] CATH LAB;  Service: Cardiology;  Laterality: N/A;  Syncope, HUT, DM, 3 PREP    TILT TABLE TEST N/A 7/9/2018    Performed by Leonardo Whitfield MD at [facility] CATH LAB       No family history on file.    Social History     Tobacco Use    Smoking status: Never Smoker   Substance Use Topics    Alcohol use: No    Drug use: No       ALLERGIES: Reviewed in EPIC    CURRENT MEDICATIONS: Reviewed in EPIC        PHYSICAL EXAM:  /73 (BP Location: Right arm, Patient Position: Sitting)   Pulse 76   Wt 111.9 kg (246 lb 11.1 oz)   BMI 34.41 kg/m²      GENERAL: Well groomed; Normally developed;  Physical Exam  Neck size: 18 inch  Oral: jones IV, high arch, mild over bite.  Nose: Significant  nasal congestion b/l  CVS: S1+S2 ,negative murmur/ gallop, regularly regular  Lungs: CTA B/L  Abdomen: BS+, no guarding, - rigidity.  Ext: negative pedal edema B/L LE           ASSESSMENT:    1. Sleep Apnea, The patient symptomatically has snoring and excessive daytime sleepiness with exam findings of a crowded oral airway with medical co-morbidities of hypertension and obesity underwent HST and found to have AHI 10 . This warrants further investigation for possible obstructive sleep apnea.    LAMONT (obstructive sleep apnea)  -     CPAP FOR HOME USE    Shift work sleep disorder    Obesity, unspecified classification, unspecified obesity type, unspecified whether serious comorbidity present           PLAN:  LAMONT:  PAP therapy: Discussed the result of the PSG and started on Auto CPAP 5- 20 cm H20, referred to the [facility] for th formal mask fitting and advised avoid drowsy driving. . Suggested the importance of compliance > 4 hrs  Informed about the opportunity to change mask  With in first 30  days .      Will advise to start using nasal saline every day one - two squirt each nostril if that does not work  Use Flonase 1-2 sprays in each nostril at bedtime to help with nasal congestion. You need to use every day for at least a month to determine if it will be beneficial for you. Watch for nose bleeds. If this occurs, stop the medication and call the office.      Education: During our discussion today, we talked about the etiology of obstructive sleep apnea as well as the potential ramifications of untreated sleep apnea, which could include daytime sleepiness, hypertension, heart disease and/or stroke.  We discussed potential treatment options, which could include weight loss, body positioning, use of an oral appliance, continuous positive airway pressure (CPAP), EPAP, or referral for surgical consideration.    Shift work sleep disorder:  - Suggested to have fixed schedule and sleep at least 8 hrs a day.  - Suggested to have good Sleep hygiene  - Suggested to avoid any bright light requested to wear dark shades on returning from work in AM.     Obesity:    General weight loss/lifestyle modification strategies discussed (elicit support from others; identify saboteurs; non-food rewards, etc).  Diet interventions: low calorie (1000 kCal/d) deficit diet.  Informal exercise measures discussed, e.g. taking stairs instead of elevator.    Precautions: The patient was advised to abstain from driving should they feel sleepy or drowsy.    Follow up: 3 months . MD/NP

## 2018-12-10 ENCOUNTER — TELEPHONE (OUTPATIENT)
Dept: SLEEP MEDICINE | Facility: CLINIC | Age: 48
End: 2018-12-10

## 2018-12-10 ENCOUNTER — PATIENT MESSAGE (OUTPATIENT)
Dept: SLEEP MEDICINE | Facility: CLINIC | Age: 48
End: 2018-12-10

## 2018-12-11 ENCOUNTER — OFFICE VISIT (OUTPATIENT)
Dept: SLEEP MEDICINE | Facility: CLINIC | Age: 48
End: 2018-12-11
Payer: COMMERCIAL

## 2018-12-11 VITALS
HEIGHT: 71 IN | SYSTOLIC BLOOD PRESSURE: 127 MMHG | BODY MASS INDEX: 44.1 KG/M2 | HEART RATE: 62 BPM | WEIGHT: 315 LBS | DIASTOLIC BLOOD PRESSURE: 71 MMHG

## 2018-12-11 DIAGNOSIS — G47.33 OSA (OBSTRUCTIVE SLEEP APNEA): Primary | ICD-10-CM

## 2018-12-11 PROCEDURE — 99213 OFFICE O/P EST LOW 20 MIN: CPT | Mod: S$GLB,,, | Performed by: NURSE PRACTITIONER

## 2018-12-11 PROCEDURE — 99999 PR PBB SHADOW E&M-EST. PATIENT-LVL III: CPT | Mod: PBBFAC,,, | Performed by: NURSE PRACTITIONER

## 2018-12-11 RX ORDER — ATORVASTATIN CALCIUM 20 MG/1
20 TABLET, FILM COATED ORAL DAILY
Refills: 5 | COMMUNITY
Start: 2018-11-10 | End: 2021-06-16

## 2018-12-11 NOTE — PROGRESS NOTES
"Bhaskar Stiles returns for management of obstructive sleep apnea and CPAP equipment check. Last seen in clinic by Dr. Graham 10/18/2018. This is his initial visit with me.     Since last clinic visit, pt has gotten set up with APAP on 10/25/2018 at [facility].   Symptoms of snoring, witnessed apneas, morning headaches, unrefreshing sleep, and excessive daytime sleepiness continues  Sleep is worse with CPAP due to uncomfortable interface and pressure intolerance " I feel like my chest hurts from the air"   Despite little CPAP use pt reports nasal congestion when CPAP used.   He does not like FFM. Did not bring today.  Feels like FFM fits well, "it's just the feeling of it on my face"    ESS 14    CPAP Interrogation: Dreamstation APAP 5 - 20 cm  Compliance Summary   Days with Device Usage: 19 days Percentage of Days >=4 Hours: 26.7% Average Usage (Days Used): 4 hrs. 5 mins. 36 secs. Average Usage (All Days): 2 hrs. 35 mins. 32 secs.  Apnea Indices Average AHI: 1.2 Average OA Index: 0.2 Average CA Index: 0.0   Large Leak Average Time in Large Leak: 28 mins. 32 secs. Average % of Night in Large Leak: 11.6%   Periodic Breathing Average % of Night in PB: 0.2%  90%tile pressure: 9.6 cm      Baseline Sleep Study: 10/05/2018  lb. The overall AHI was 10 and overall RDI was 15. The oxygen max was 88.7 % and % time < 90% SpO2 was 0.2 %.      Review of Systems:   Sleep related symptoms as per HPI.  Otherwise, a balance of 10 systems reviewed is negative.    Physical Exam:   /71   Pulse 62   Ht 5' 11" (1.803 m)   Wt (!) 155.4 kg (342 lb 9.5 oz)   BMI 47.78 kg/m²     GENERAL: Well groomed    Assessment:     Obstructive sleep apnea, mild by AHI criteria,  with prior symptoms of snoring, witnessed apneas, morning headaches, unrefreshing sleep, and excessive daytime sleepiness. The patient has not yet met 70% compliance immediately after 10/25/2018 set up due to uncomfortable FFM, nasal congestion, and pressure " intolerance.  . Medical co-mobidities: obesity    Shift work    Plan:     Treatment:  -continue APAP 5 - 20 cm  -Live demo Ramp and adjusting humidity  -Trial OTC Flonase 2 sprays in each nostril once daily  -Discussed paying out of pocket for nasal mask but must be coupled with chinstrap, for option for smaller mask. Pt not interested in this this.  -Alternatives discussed : BPAP vs OA discussed in detail, pt would like to continue APAP for now  - Reviewed insurance guidelines for adherence to therapy defined as use of PAP = 4 hours per night on 70% of nights during a consecutive 30-day period anytime during the first 3 months of initial usage.   -RTC 4 - 5 weeks, no later than 01/24/2019 for compliance f/u    Education: During our discussion today, we talked about the etiology of obstructive sleep apnea as well as the potential ramifications of untreated sleep apnea, which could include daytime sleepiness, hypertension, heart disease and/or stroke. We discussed potential treatment options, which could include weight loss, body positioning, continuous positive airway pressure (CPAP), or referral for surgical consideration.     Behavior modification which includes losing weight, exercising, changing the sleep position, abstaining from alcohol, and avoiding certain medications    Precautions: The patient was advised to abstain from driving should they feel sleepy or drowsy

## 2018-12-20 RX ORDER — AMLODIPINE BESYLATE 10 MG/1
10 TABLET ORAL DAILY
Qty: 90 TABLET | Refills: 3 | Status: SHIPPED | OUTPATIENT
Start: 2018-12-20 | End: 2020-01-31

## 2019-01-18 ENCOUNTER — PATIENT MESSAGE (OUTPATIENT)
Dept: SLEEP MEDICINE | Facility: CLINIC | Age: 49
End: 2019-01-18

## 2019-01-21 ENCOUNTER — OFFICE VISIT (OUTPATIENT)
Dept: SLEEP MEDICINE | Facility: CLINIC | Age: 49
End: 2019-01-21
Payer: COMMERCIAL

## 2019-01-21 VITALS
BODY MASS INDEX: 35.39 KG/M2 | SYSTOLIC BLOOD PRESSURE: 128 MMHG | DIASTOLIC BLOOD PRESSURE: 78 MMHG | WEIGHT: 253.75 LBS

## 2019-01-21 DIAGNOSIS — G47.33 OSA (OBSTRUCTIVE SLEEP APNEA): Primary | ICD-10-CM

## 2019-01-21 DIAGNOSIS — G47.26 SHIFT WORK SLEEP DISORDER: ICD-10-CM

## 2019-01-21 PROCEDURE — 99999 PR PBB SHADOW E&M-EST. PATIENT-LVL III: CPT | Mod: PBBFAC,,, | Performed by: INTERNAL MEDICINE

## 2019-01-21 PROCEDURE — 99214 PR OFFICE/OUTPT VISIT, EST, LEVL IV, 30-39 MIN: ICD-10-PCS | Mod: S$GLB,,, | Performed by: INTERNAL MEDICINE

## 2019-01-21 PROCEDURE — 99999 PR PBB SHADOW E&M-EST. PATIENT-LVL III: ICD-10-PCS | Mod: PBBFAC,,, | Performed by: INTERNAL MEDICINE

## 2019-01-21 PROCEDURE — 99214 OFFICE O/P EST MOD 30 MIN: CPT | Mod: S$GLB,,, | Performed by: INTERNAL MEDICINE

## 2019-01-21 NOTE — PROGRESS NOTES
"1/21/2019    Pt is following in regards to the LAMONT and the shift work disorder. He feels the pressure over his chest when using and feels the nasal congestion and feels some one is really smothering him. He does feels sinus issues. He still working night shift work keeping the sleep pattern and not sticking to the same schedule every day. Pt has stopped the PAP therapy last 2 weeks due to the nasal issues and pressure sensation over the chest.         EPWORTH SLEEPINESS SCALE 12/11/2018   Sitting and reading 2   Watching TV 1   Sitting, inactive in a public place (e.g. a theatre or a meeting) 3   As a passenger in a car for an hour without a break 3   Lying down to rest in the afternoon when circumstances permit 3   Sitting and talking to someone 1   Sitting quietly after a lunch without alcohol 1   In a car, while stopped for a few minutes in traffic 0   Total score 14       12/11/2018  Bhaskar Stiles returns for management of obstructive sleep apnea and CPAP equipment check. Last seen in clinic by Dr. Graham 10/18/2018. This is his initial visit with me.     Since last clinic visit, pt has gotten set up with APAP on 10/25/2018 at [facility].   Symptoms of snoring, witnessed apneas, morning headaches, unrefreshing sleep, and excessive daytime sleepiness continues  Sleep is worse with CPAP due to uncomfortable interface and pressure intolerance " I feel like my chest hurts from the air"   Despite little CPAP use pt reports nasal congestion when CPAP used.   He does not like FFM. Did not bring today.  Feels like FFM fits well, "it's just the feeling of it on my face"    ESS 14    CPAP Interrogation: Dreamstation APAP 5 - 20 cm  Compliance Summary   Days with Device Usage: 19 days Percentage of Days >=4 Hours: 26.7% Average Usage (Days Used): 4 hrs. 5 mins. 36 secs. Average Usage (All Days): 2 hrs. 35 mins. 32 secs.  Apnea Indices Average AHI: 1.2 Average OA Index: 0.2 Average CA Index: 0.0   Large Leak Average Time in Large " Leak: 28 mins. 32 secs. Average % of Night in Large Leak: 11.6%   Periodic Breathing Average % of Night in PB: 0.2%  90%tile pressure: 9.6 cm      Baseline Sleep Study: 10/05/2018  lb. The overall AHI was 10 and overall RDI was 15. The oxygen max was 88.7 % and % time < 90% SpO2 was 0.2 %.      Review of Systems:   Sleep related symptoms as per HPI.  Otherwise, a balance of 10 systems reviewed is negative.    Physical Exam:   There were no vitals taken for this visit.     GENERAL: Well groomed; Normally developed;  Physical Exam  Neck size: 18 inch  Oral: jones IV, high arch, mild over bite.  Nose: mild nasal congestion b/l  CVS: S1+S2 ,negative murmur/ gallop, regularly regular  Lungs: CTA B/L  Abdomen: BS+, no guarding, - rigidity.  Ext: negative pedal edema B/L LE         Assessment:     Obstructive sleep apnea,:   Mild by AHI criteria,  with prior symptoms of snoring, witnessed apneas, morning headaches, unrefreshing sleep, and excessive daytime sleepiness. Having issues with the mask  Due to nasal congestion, and pressure intolerance. Leading to non compliance will suggest following: . Medical co-mobidities: obesity      Plan:   LAMONT:  Discussed with the patient about the pressure intolerance reduced  the APAP to 5- 12 cm H20 and suggested to switch the mask to nasal with chin strap and suggested to have the acclimation for the mask and to use the nasal saline and Flonase.      Education: During our discussion today, we talked about the etiology of obstructive sleep apnea as well as the potential ramifications of untreated sleep apnea, which could include daytime sleepiness, hypertension, heart disease and/or stroke. We discussed potential treatment options, which could include weight loss, body positioning, continuous positive airway pressure (CPAP), or referral for surgical consideration.     Behavior modification which includes losing weight, exercising, changing the sleep position, abstaining  from alcohol, and avoiding certain medications    Shift work disorder:  Sleep scheduling -- A regular sleep schedule that can be followed even during off-work periods is encouraged in order to promote stability in circadian entrainment.   While a consolidated seven to nine hours of sleep is recommended, daytime sleep can be  into two bouts in order to accommodate the need for flexibility:   better sleep (ie, sleep hygiene). Sleep hygiene -- The sleep environment should be modified to promote consolidated daytime sleep, with particular attention to light, temperature, and noise level.  Light-blocking shades should be used to reduce as much sunlight as possible, with cool ambient temperature (generally between 60 to 75°F). If ambient noise cannot be controlled, a white noise machine may help reduce arousals that are due to variability in environmental noise.  If despite of high compliance of the PAP therapy and if sleep schedule becomes fixed and  patient continues to have excessive day time sleepiness will start on stimulant for the shift work disorder.         Precautions: The patient was advised to abstain from driving should they feel sleepy or drowsy      Follow up : 4 week with me.

## 2019-01-21 NOTE — PATIENT INSTRUCTIONS
Sleep Hygiene Practices    1. Try going to bed only when you are drowsy.    2. If you are unable to fall asleep or stay asleep, leave your bedroom and engage in a quiet activity elsewhere. Do not permit yourself to fall asleep outside the bedroom. Return to bed when and only when you are sleepy. Repeat this process as often as necessary throughout night.    3. Maintain regular wake-up time, even on days off work & weekends    4. Use your bedroom for sleep and sex    5. Avoid napping during the daytime. If daytime sleepiness becomes overwhelming, limit nap time to a single nap of less than 1hr, no later than 3pm.    6. Distract your mind. Avoid clock watching. Lying in bed unable to sleep and frustrated needs to be avoided. Try reading or watching a videotape or listening to books on tape. It may be necessary to go into another room to do these.    7. Avoid caffeine within 4-6hrs of bedtime    8. Avoid use of nicotine close to bedtime    9. do not drink alcoholic beverages within 4-6hrs of bedtime    10. While a light snack before bedtime can help promote sound sleep, avoid large meals.    11. Obtain regular exercise, but avoid strenuous exercise within 4hrs of bedtime    12. Minimize light, noise, and extremes in temperature in the bedroom.    13. Precautions: The patient was advised to abstain from driving should they feel sleepy or drowsy.  ?      Will advise to start using nasal saline every day one - two squirt each nostril if that does not work  Use Flonase 1-2 sprays in each nostril at bedtime to help with nasal congestion. You need to use every day for at least a month to determine if it will be beneficial for you. Watch for nose bleeds. If this occurs, stop the medication and call the office.      * This information is provided had been directly obtained from the American Academy of Sleep Medicine wellness booklet on sleep hygiene. ([facility], [address] [place] [address]

## 2019-02-21 NOTE — PROGRESS NOTES
Bhaskar Stiles, a 48 y.o. male, presents today for evaluation of his left knee.      History of Present Illness (HPI)  Location: anterior medial knee, patellofemoral  Onset: insidious onset x 1 month , chronic  Palliative:    Relative rest   Copper sleeve   Bathing with rubbing alcohol  Provocative:    ADLS   Prolonged ambulation   Laying on left side at night  Prior: none  Progression: discomfort unchanged  Quality:    Constant, aching pain  Radiation: none  Severity: per nursing documentation  Timing: intermittent w/ use  Trauma: none    Review of Systems (ROS)  A 10+ review of systems was performed with pertinent positives and negatives noted above in the history of present illness. Other systems were negative unless otherwise specified.    Physical Examination (PE)  General:  The patient is alert and oriented x 3. Mood is pleasant. Observation of ears, eyes and nose reveal no gross abnormalities. HEENT: NCAT, sclera anicteric.   Lungs: Respirations are equal and unlabored.  Gait is coordinated. Patient can toe walk and heel walk without difficulty.    LEFT KNEE EXAMINATION    Observation/Inspection  Gait:   Nonantalgic   Alignment:  Neutral   Scars:   None   Muscle atrophy: Mild  Effusion:  None   Warmth:  None   Discoloration:   none     Tenderness / Crepitus (T / C):         T / C      T / C  Patella   - / -   Lateral joint line   - / -     Peripatellar medial  -  Medial joint line    + / -  Peripatellar lateral -  Medial plica   - / -  Patellar tendon -   Popliteal fossa   - / -  Quad tendon   -   Gastrocnemius   -  Prepatellar Bursa - / -   Quadricep   -  Tibial tubercle  -  Thigh/hamstring  -  Pes anserine/HS -  Fibula    -  ITB   - / -  Tibia     -  Tib/fib joint  - / -  LCL    -    MFC   - / -   MCL: Proximal  -    LFC   - / -   Distal    -          ROM: (* = pain)  PASSIVE   ACTIVE    Left :   5 / 0 / 145   5 / 0 / 145     Right :    5 / 0 / 145   5 / 0 / 145    Patellofemoral examination:  See above  noted areas of tenderness.   Patella position    Subluxation / dislocation: Centered        Sup. / Inf;   Normal   Crepitus (PF):    Absent   Patellar Mobility:       Medial-lateral:   Normal    Superior-inferior:  Normal    Inferior tilt   Normal    Patellar tendon:  Normal   Lateral tilt:    Normal   J-sign:     None   Patellofemoral grind:   No pain     Meniscal Signs:     Pain on terminal extension:  +  Pain on terminal flexion:  +  Rays maneuver:  +*  Squat     NT    Ligament Examination:  ACL / Lachman:  WNL  PCL-Post.  drawer: normal 0 to 2mm  MCL- Valgus:  normal 0 to 2mm  LCL- Varus:    normal 0 to 2mm  Pivot shift:  guarding   Dial Test:   difference c/w other side   At 30° flexion: normal (< 5°)    At 90° flexion: normal (< 5°)   Reverse Pivot Shift:   normal (Equal)     Strength: (* = with pain) Painful Side  Quadriceps   5/5  Hamstrin/5    Extremity Neuro-vascular Examination:   Sensation:  Grossly intact to light touch all dermatomal regions.   Motor Function:  Fully intact motor function at hip, knee, foot and ankle    DTRs;  quadriceps and  achilles 2+.  No clonus and downgoing Babinski.    Vascular status:  DP and PT pulses 2+, brisk capillary refill, symmetric.     Other Findings:    ASSESSMENT & PLAN  Assessment:   #1 Kellgren-Anthony Grade II osteoarthritis of knee, juan carlos ant compartment, left    No evidence of neurologic pathology  No evidence of vascular pathology    Imaging studies reviewed:   X-ray knee, bilateral 18.07    Plan:    We discussed the importance of appropriate diet, weight, and regular exercise including quadriceps strengthening     We discussed options including:  #1 watchful waiting  #2 physical therapy aimed at:   Core stability   RoM knee   Strengthening quadriceps   Gait training   #3 injection therapy:   CSI iaknee     Right,     Left,    VSI iaknee    Right,     Left,    Orthobiologics   #4 consultation      The patient chooses #2 and #3 csi iaknee  left    Pain management: handout given  Bracing:   Physical therapy: fPT, @ [facility], begin as above   Activity (e.g. sports, work) restrictions: as tolerated   school/vocation: [employer]     Follow up in 12 w  How is L ankle pain?  A/e fPT, a/e csi iaknee   Ineffective-->consider MRI knee vs. vsi  Should symptoms worsen or fail to resolve, consider:  Revisiting the above options

## 2019-02-22 ENCOUNTER — HOSPITAL ENCOUNTER (OUTPATIENT)
Dept: RADIOLOGY | Facility: HOSPITAL | Age: 49
Discharge: HOME | End: 2019-02-22
Attending: FAMILY MEDICINE
Payer: COMMERCIAL

## 2019-02-22 ENCOUNTER — OFFICE VISIT (OUTPATIENT)
Dept: SPORTS MEDICINE | Facility: CLINIC | Age: 49
End: 2019-02-22
Payer: COMMERCIAL

## 2019-02-22 VITALS — BODY MASS INDEX: 35.42 KG/M2 | HEIGHT: 71 IN | TEMPERATURE: 98 F | WEIGHT: 253 LBS

## 2019-02-22 DIAGNOSIS — M25.562 LEFT KNEE PAIN, UNSPECIFIED CHRONICITY: ICD-10-CM

## 2019-02-22 DIAGNOSIS — M17.12 PRIMARY OSTEOARTHRITIS OF LEFT KNEE: Primary | ICD-10-CM

## 2019-02-22 PROCEDURE — 73562 X-RAY EXAM OF KNEE 3: CPT | Mod: TC,FY,PO,LT

## 2019-02-22 PROCEDURE — 73562 XR KNEE ORTHO LEFT WITH FLEXION: ICD-10-PCS | Mod: 26,59,RT, | Performed by: RADIOLOGY

## 2019-02-22 PROCEDURE — 99204 OFFICE O/P NEW MOD 45 MIN: CPT | Mod: 25,S$GLB,, | Performed by: FAMILY MEDICINE

## 2019-02-22 PROCEDURE — 99999 PR PBB SHADOW E&M-EST. PATIENT-LVL III: ICD-10-PCS | Mod: PBBFAC,,, | Performed by: FAMILY MEDICINE

## 2019-02-22 PROCEDURE — 73564 X-RAY EXAM KNEE 4 OR MORE: CPT | Mod: 26,LT,, | Performed by: RADIOLOGY

## 2019-02-22 PROCEDURE — 99999 PR PBB SHADOW E&M-EST. PATIENT-LVL III: CPT | Mod: PBBFAC,,, | Performed by: FAMILY MEDICINE

## 2019-02-22 PROCEDURE — 20611 DRAIN/INJ JOINT/BURSA W/US: CPT | Mod: LT,S$GLB,, | Performed by: FAMILY MEDICINE

## 2019-02-22 PROCEDURE — 73564 XR KNEE ORTHO LEFT WITH FLEXION: ICD-10-PCS | Mod: 26,LT,, | Performed by: RADIOLOGY

## 2019-02-22 PROCEDURE — 20611 LARGE JOINT ASPIRATION/INJECTION: L KNEE: ICD-10-PCS | Mod: LT,S$GLB,, | Performed by: FAMILY MEDICINE

## 2019-02-22 PROCEDURE — 73562 X-RAY EXAM OF KNEE 3: CPT | Mod: 26,59,RT, | Performed by: RADIOLOGY

## 2019-02-22 PROCEDURE — 99204 PR OFFICE/OUTPT VISIT, NEW, LEVL IV, 45-59 MIN: ICD-10-PCS | Mod: 25,S$GLB,, | Performed by: FAMILY MEDICINE

## 2019-02-22 RX ORDER — TRIAMCINOLONE ACETONIDE 40 MG/ML
40 INJECTION, SUSPENSION INTRA-ARTICULAR; INTRAMUSCULAR
Status: DISCONTINUED | OUTPATIENT
Start: 2019-02-22 | End: 2019-02-22 | Stop reason: HOSPADM

## 2019-02-22 RX ADMIN — TRIAMCINOLONE ACETONIDE 40 MG: 40 INJECTION, SUSPENSION INTRA-ARTICULAR; INTRAMUSCULAR at 09:02

## 2019-02-22 NOTE — PROCEDURES
"Large Joint Aspiration/Injection: L knee  Date/Time: 2/22/2019 9:21 AM  Performed by: Chinedu Wells MD  Authorized by: Chinedu Wells MD     Consent Done?:  Yes (Verbal)  Indications:  Pain  Procedure site marked: Yes    Timeout: Prior to procedure the correct patient, procedure, and site was verified      Location:  Knee  Site:  L knee  Prep: Patient was prepped and draped in usual sterile fashion    Ultrasonic Guidance for needle placement: Yes  Images are saved and documented.  Needle size:  20 G  Approach:  Lateral  Medications:  40 mg triamcinolone acetonide 40 mg/mL  Patient tolerance:  Patient tolerated the procedure well with no immediate complications    Additional Comments: Description of ultrasound utilization for needle guidance:   Ultrasound guidance used for needle localization. Images saved and stored for documentation. The knee joint was visualized. Dynamic visualization of the 20g x 3.5" needle was continuous throughout the procedure.      "

## 2019-02-25 ENCOUNTER — PATIENT MESSAGE (OUTPATIENT)
Dept: SLEEP MEDICINE | Facility: CLINIC | Age: 49
End: 2019-02-25

## 2019-03-27 ENCOUNTER — CLINICAL SUPPORT (OUTPATIENT)
Dept: REHABILITATION | Facility: HOSPITAL | Age: 49
End: 2019-03-27
Attending: FAMILY MEDICINE
Payer: COMMERCIAL

## 2019-03-27 DIAGNOSIS — M25.562 CHRONIC PAIN OF LEFT KNEE: ICD-10-CM

## 2019-03-27 DIAGNOSIS — G89.29 CHRONIC PAIN OF LEFT KNEE: ICD-10-CM

## 2019-03-27 PROCEDURE — 97161 PT EVAL LOW COMPLEX 20 MIN: CPT

## 2019-03-27 PROCEDURE — 97110 THERAPEUTIC EXERCISES: CPT

## 2019-03-27 NOTE — PLAN OF CARE
[facility]  Physical Therapy Initial Evaluation    Name: Bhaskar Stiles  Clinic Number: [id number]    Therapy Diagnosis:   Encounter Diagnosis   Name Primary?    Chronic pain of left knee      Physician: Chinedu Wells, *    Physician Orders: PT Eval and Treat   Medical Diagnosis from Referral: M17.12 (ICD-10-CM) - Primary osteoarthritis of left knee  Evaluation Date: 3/27/2019  Authorization Period Expiration: 12/31/19  Plan of Care Expiration: 5/22/19  Visit # / Visits authorized: 1/ 60    Time In: 900 am  Time Out: 1000 am  Total Billable Time: 60 minutes    Precautions: Standard    Subjective   Date of onset: 2 mos prior  History of current condition - Bhaskar reports: he has been noticing L knee pain with insidious onset. His pain is better with more movement and mobility and normally in the morning he feels more tight and stiff. He recently had a knee injection and states he feels much better. Patient has been referred to PT to improve knee strength and ROM.       Past Medical History:   Diagnosis Date    Hypertension     Syncope and collapse      Bhaskar Stiles  has a past surgical history that includes Tilt table test (N/A, 7/9/2018).    Bhaskar has a current medication list which includes the following prescription(s): amlodipine and atorvastatin.    Review of patient's allergies indicates:  No Known Allergies     Imaging, X-rays: arthritis    Prior Therapy: yes  Social History: he lives with their family  Occupation: [employer]   Prior Level of Function: independent  Current Level of Function: limited with knee strength    Pain:  Current 0/10, worst 8/10, best 0/10   Location: left knee   Description: Aching, Dull and Sharp  Aggravating Factors: Morning  Easing Factors: movement    Pts goals: to get stronger in his knee    Objective       Observation: patient appears stated age, pleasant      Posture: B pronated feet, increased lumbar lordosis      Gait: increased pronation,  hip drop B during swing    Range of Motion: (PROM):    Knee Left Right   Extension  (0) degrees  (0) degrees   Flexion  (120) degrees  (120) degrees           Strength:  Hip Left Right   Flexion 5/5 5/5   Abduction 3+/5 4/5   Adduction 3+/5 4/5   Extension 4+/5 4+/5   External Rot 5/5 5/5     Knee Left Right   Extension 5/5 5/5   Flexion 5/5 5/5         Special Tests:  Hip Left Right   BASILIA Positive Positive   FADIR Negative Negative   Jeovanny Positive Positive   Bridge test Positive Positive   SLR Positive Positive   Scour Negative Negative         Joint Mobility: will assess next session in L patella    Palpation: no tenderness noted upon palpation    Sensation: WNL BLE    Flexibility: tight HS, hip flexors and piriformis B      CMS Impairment/Limitation/Restriction for FOTO Knee Survey    Therapist reviewed FOTO scores for Bhaskar Stiles on 3/27/2019.   FOTO documents entered into Group Therapy Records - see Media section.    Limitation Score: 11%  Category: Mobility    Current : CI = at least 1% but < 20% impaired, limited or restricted  Goal: CI = at least 1% but < 20% impaired, limited or restricted  Discharge: NA         TREATMENT   Treatment Time In: 940 am  Treatment Time Out: 955 am  Total Treatment time separate from Evaluation: 15 minutes    Bhaskar received therapeutic exercises to develop strength, endurance and core stabilization for 15 minutes including:  Clamshells gtb 2x15 B  Lateral walking ytb x1 lap  PPT on ball x30  Piriformis stretch x30 sec B      Home Exercises and Patient Education Provided    Education provided re: HEP to Go    Written Home Exercises Provided: yes.  Exercises were reviewed and Bhaskar was able to demonstrate them prior to the end of the session.   Pt received a written copy of exercises to perform at home. Bhaskar demonstrated good  understanding of the education provided.     See EMR under patient instructions for exercises given.   Assessment   Bhaskar is a 48 y.o. male referred to  outpatient Physical Therapy with a medical diagnosis of chronic left knee pain. Pt presents with decreased core and hip strength and stability increasing compressive forces through B knees with functional activities such as stairs, walking and squatting.    Pt prognosis is Good.   Pt will benefit from skilled outpatient Physical Therapy to address the deficits stated above and in the chart below, provide pt/family education, and to maximize pt's level of independence.     Plan of care discussed with patient: Yes  Pt's spiritual, cultural and educational needs considered and patient is agreeable to the plan of care and goals as stated below:     Anticipated Barriers for therapy: none    Medical Necessity is demonstrated by the following  History  Co-morbidities and personal factors that may impact the plan of care Co-morbidities:   HTN    Personal Factors:   no deficits     low   Examination  Body Structures and Functions, activity limitations and participation restrictions that may impact the plan of care Body Regions:   lower extremities    Body Systems:    ROM  strength  motor control    Participation Restrictions:   none    Activity limitations:   Learning and applying knowledge  no deficits    General Tasks and Commands  no deficits    Communication  no deficits    Mobility  walking    Self care  no deficits    Domestic Life  no deficits    Interactions/Relationships  no deficits    Life Areas  no deficits    Community and Social Life  no deficits         low   Clinical Presentation stable and uncomplicated low   Decision Making/ Complexity Score: low     Goals:  Short Term Goals: 4 weeks   - Pt will increase strength to 4/5 B hip abd to decrease compressive force through B knees  - Decrease Pain to 2/10 as worst with ascending 1 flight of stairs  - Pt to self correct posture with minimal cues to decreased lumbar lordosis  - Pt independent with HEP with progressions.     Long Term Goals (8 Weeks):  - Pt will  demonstrate a negative Bridge TEst B  - Pt will increase strength to 5/5 BLE to tolerate descending stairs painfree  - Decrease Pain to 0/10 with 1 full day of work duties  - Pt to return to 95% PLOF      Plan   Plan of care Certification: 3/27/2019 to 5/22/19.    Outpatient Physical Therapy 2 times weekly for 8 weeks to include the following interventions: Manual Therapy, Moist Heat/ Ice, Neuromuscular Re-ed and Therapeutic Exercise.     Gabbie Jarrett, PT, DPT, COMT

## 2019-04-01 ENCOUNTER — CLINICAL SUPPORT (OUTPATIENT)
Dept: REHABILITATION | Facility: HOSPITAL | Age: 49
End: 2019-04-01
Attending: FAMILY MEDICINE
Payer: COMMERCIAL

## 2019-04-01 DIAGNOSIS — M25.562 CHRONIC PAIN OF LEFT KNEE: ICD-10-CM

## 2019-04-01 DIAGNOSIS — G89.29 CHRONIC PAIN OF LEFT KNEE: ICD-10-CM

## 2019-04-01 PROCEDURE — 97110 THERAPEUTIC EXERCISES: CPT

## 2019-04-01 NOTE — PROGRESS NOTES
"                          Physical Therapy Daily Treatment Note     Name: Bhaskar Stiles  Clinic Number: [id number]    Therapy Diagnosis:   Encounter Diagnosis   Name Primary?    Chronic pain of left knee      Physician: Chinedu Wells, *    Visit Date: 4/1/2019  Physician Orders: PT Eval and Treat   Medical Diagnosis from Referral: M17.12 (ICD-10-CM) - Primary osteoarthritis of left knee  Evaluation Date: 3/27/2019  Authorization Period Expiration: 12/31/19  Plan of Care Expiration: 5/22/19  Visit # / Visits authorized: 2/ 60     Time In: 1000 am  Time Out: 1100 am  Total Billable Time: 60 minutes     Precautions: Standard        Subjective      Pt reports:he was noticing a sharp pain while he was walking around over the weekend, but once he was moving it went away.   he was compliant with home exercise program given last session.   Response to previous treatment:fair  Functional change: unable to assess this early    Pain: 0/10  Location: left knee      Objective     Bhaskar received therapeutic exercises to develop strength, endurance, ROM, flexibility and core stabilization for 55 minutes including:  Bike x10 min  Clamshells gtb 2x15 B  Bridges with PPT gtb 2x15  Lateral walking otb x1 lap  Monster walk otb x1 lap  PPT on ball 2x30  Heel taps 2" 2x10  Leg press dbl 140# 3x10  Ecc quad leg press 100# 2x15 L  Piriformis stretch x30 sec B  HS stretch x1 min B  GSS x2 min on wedge          Home Exercises Provided and Patient Education Provided     Education provided:   - HEP to Go    Written Home Exercises Provided: Patient instructed to cont prior HEP.  Exercises were reviewed and Bhaskar was able to demonstrate them prior to the end of the session.  Bhaskar demonstrated good  understanding of the education provided.     See EMR under Media for exercises provided {Blank single:80810::"4/1/2019","prior visit"    Assessment     Patient tolerated all new exercises well and demonstrated a good understanding of HEP " "review. He did not having any of the "sharp" symptoms he mentioned during his subjective today during exercises. He would benefit from continued PT intervention to progress toward functional goals.  Bhaskar is progressing well towards his goals.   Pt prognosis is Good.     Pt will continue to benefit from skilled outpatient physical therapy to address the deficits listed in the problem list box on initial evaluation, provide pt/family education and to maximize pt's level of independence in the home and community environment.     Pt's spiritual, cultural and educational needs considered and pt agreeable to plan of care and goals.    Anticipated barriers to physical therapy: none    Goals:   Short Term Goals: 4 weeks   - Pt will increase strength to 4/5 B hip abd to decrease compressive force through B knees (Progressing, not met)  - Decrease Pain to 2/10 as worst with ascending 1 flight of stairs (Progressing, not met)  - Pt to self correct posture with minimal cues to decreased lumbar lordosis (Progressing, not met)  - Pt independent with HEP with progressions. (Progressing, not met)     Long Term Goals (8 Weeks):  - Pt will demonstrate a negative Bridge TEst B (Progressing, not met)  - Pt will increase strength to 5/5 BLE to tolerate descending stairs painfree (Progressing, not met)  - Decrease Pain to 0/10 with 1 full day of work duties (Progressing, not met)  - Pt to return to 95% PLOF (Progressing, not met)      Plan     Continue POC per patient tolerance progressing with hip and quad strengthening and stability.    Gabbie Jarrett, PT     "

## 2019-04-11 ENCOUNTER — PATIENT MESSAGE (OUTPATIENT)
Dept: SPORTS MEDICINE | Facility: CLINIC | Age: 49
End: 2019-04-11

## 2019-04-11 ENCOUNTER — OFFICE VISIT (OUTPATIENT)
Dept: SPORTS MEDICINE | Facility: CLINIC | Age: 49
End: 2019-04-11
Payer: COMMERCIAL

## 2019-04-11 VITALS
DIASTOLIC BLOOD PRESSURE: 77 MMHG | BODY MASS INDEX: 35.42 KG/M2 | HEIGHT: 71 IN | HEART RATE: 91 BPM | WEIGHT: 253 LBS | SYSTOLIC BLOOD PRESSURE: 134 MMHG

## 2019-04-11 DIAGNOSIS — G89.29 CHRONIC PAIN OF LEFT KNEE: Primary | ICD-10-CM

## 2019-04-11 DIAGNOSIS — M17.12 PRIMARY OSTEOARTHRITIS OF LEFT KNEE: ICD-10-CM

## 2019-04-11 DIAGNOSIS — M25.562 CHRONIC PAIN OF LEFT KNEE: Primary | ICD-10-CM

## 2019-04-11 PROCEDURE — 99214 OFFICE O/P EST MOD 30 MIN: CPT | Mod: S$GLB,,, | Performed by: PHYSICIAN ASSISTANT

## 2019-04-11 PROCEDURE — 99214 PR OFFICE/OUTPT VISIT, EST, LEVL IV, 30-39 MIN: ICD-10-PCS | Mod: S$GLB,,, | Performed by: PHYSICIAN ASSISTANT

## 2019-04-11 PROCEDURE — 99999 PR PBB SHADOW E&M-EST. PATIENT-LVL III: CPT | Mod: PBBFAC,,, | Performed by: PHYSICIAN ASSISTANT

## 2019-04-11 PROCEDURE — 99999 PR PBB SHADOW E&M-EST. PATIENT-LVL III: ICD-10-PCS | Mod: PBBFAC,,, | Performed by: PHYSICIAN ASSISTANT

## 2019-04-11 RX ORDER — MELOXICAM 15 MG/1
15 TABLET ORAL DAILY
Qty: 20 TABLET | Refills: 0 | Status: SHIPPED | OUTPATIENT
Start: 2019-04-11 | End: 2019-09-22

## 2019-04-12 NOTE — PROGRESS NOTES
CC: Left knee pain    Bhaskar Stiles is a 48 y.o. male, who works for [employer] who presents today for evaluation of his left knee that has continued bothering him for the last few months and worsening.     Pain is worse with weightbearing and walking. He has failed steroid injection previous which has only given him 4-6 weeks of pain relief. He has failed OTC NSAIDs and activity modifications x2 months.        History of Present Illness (HPI)  Location: anterior medial knee, patellofemoral  Onset: insidious onset x 3 month , chronic  Palliative:    Relative rest   Copper sleeve   Bathing with rubbing alcohol  Provocative:    ADLS   Prolonged ambulation   Laying on left side at night  Prior: none  Progression: discomfort unchanged  Quality:    Constant, aching pain  Radiation: none  Severity: per nursing documentation  Timing: intermittent w/ use  Trauma: none    Review of Systems (ROS)  A 10+ review of systems was performed with pertinent positives and negatives noted above in the history of present illness. Other systems were negative unless otherwise specified.    Physical Examination (PE)  General:  The patient is alert and oriented x 3. Mood is pleasant. Observation of ears, eyes and nose reveal no gross abnormalities. HEENT: NCAT, sclera anicteric.   Lungs: Respirations are equal and unlabored.  Gait is coordinated. Patient can toe walk and heel walk without difficulty.    LEFT KNEE EXAMINATION    Observation/Inspection  Gait:   Nonantalgic   Alignment:  Neutral   Scars:   None   Muscle atrophy: Mild  Effusion:  mild   Warmth:  None   Discoloration:   none     Tenderness / Crepitus (T / C):         T / C      T / C  Patella   - / -   Lateral joint line   - / -     Peripatellar medial  -  Medial joint line    + / -  Peripatellar lateral -  Medial plica   - / -  Patellar tendon -   Popliteal fossa   - / -  Quad tendon   -   Gastrocnemius   -  Prepatellar Bursa - / -   Quadricep   -  Tibial  tubercle  -  Thigh/hamstring  -  Pes anserine/HS -  Fibula    -  ITB   - / -  Tibia     -  Tib/fib joint  - / -  LCL    -    MFC   + anterior / -   MCL: Proximal  -    LFC   - / -   Distal    -          ROM: (* = pain)  PASSIVE   ACTIVE    Left :   0/ 0 / 145   0/ 0 / 145     Right :    2 / 0 / 140   2 / 0 / 140    Patellofemoral examination:  See above noted areas of tenderness.   Patella position    Subluxation / dislocation: Centered        Sup. / Inf;   Normal   Crepitus (PF):    Absent   Patellar Mobility:       Medial-lateral:   Normal    Superior-inferior:  Normal    Inferior tilt   Normal    Patellar tendon:  Normal   Lateral tilt:    Normal   J-sign:     None   Patellofemoral grind:   No pain     Meniscal Signs:     Pain on terminal extension:  +  Pain on terminal flexion:  +  Rays maneuver:  +*  Squat     NT    Ligament Examination:  ACL / Lachman:  WNL  PCL-Post.  drawer: normal 0 to 2mm  MCL- Valgus:  normal 0 to 2mm  LCL- Varus:    normal 0 to 2mm  Pivot shift:  guarding   Dial Test:   difference c/w other side   At 30° flexion: normal (< 5°)    At 90° flexion: normal (< 5°)   Reverse Pivot Shift:   normal (Equal)     Strength: (* = with pain) Painful Side  Quadriceps   5/5  Hamstrin/5    Extremity Neuro-vascular Examination:   Sensation:  Grossly intact to light touch all dermatomal regions.   Motor Function:  Fully intact motor function at hip, knee, foot and ankle    DTRs;  quadriceps and  achilles 2+.  No clonus and downgoing Babinski.    Vascular status:  DP and PT pulses 2+, brisk capillary refill, symmetric.     Other Findings:    ASSESSMENT & PLAN  Assessment:   #1 Kellgren-Anthony Grade II osteoarthritis of knee, juan carlos ant compartment, left but also noted tricompartmental    No evidence of neurologic pathology  No evidence of vascular pathology    Imaging studies reviewed:   X-ray knee, bilateral 18.07    Plan:    We discussed the importance of appropriate diet, weight, and  regular exercise including quadriceps strengthening     -MRI of left knee to evaluate for likely medial meniscus tear.  -Mobic daily. Do not take with OTC NSAIDs as discussed.   -Given knee compression sleeve today.   -ice compresses to knee.   -RTC to discuss MRI results and determine treatment plan.     All patients questions were answered. Patient was advised to call us with any concerns or questions.

## 2019-04-18 ENCOUNTER — HOSPITAL ENCOUNTER (OUTPATIENT)
Dept: RADIOLOGY | Facility: HOSPITAL | Age: 49
Discharge: HOME | End: 2019-04-18
Attending: PHYSICIAN ASSISTANT
Payer: COMMERCIAL

## 2019-04-18 DIAGNOSIS — G89.29 CHRONIC PAIN OF LEFT KNEE: ICD-10-CM

## 2019-04-18 DIAGNOSIS — M25.562 CHRONIC PAIN OF LEFT KNEE: ICD-10-CM

## 2019-04-18 PROCEDURE — 73721 MRI JNT OF LWR EXTRE W/O DYE: CPT | Mod: 26,LT,, | Performed by: RADIOLOGY

## 2019-04-18 PROCEDURE — 73721 MRI JNT OF LWR EXTRE W/O DYE: CPT | Mod: TC,LT

## 2019-04-18 PROCEDURE — 73721 MRI KNEE WITHOUT CONTRAST LEFT: ICD-10-PCS | Mod: 26,LT,, | Performed by: RADIOLOGY

## 2019-04-23 ENCOUNTER — OFFICE VISIT (OUTPATIENT)
Dept: SPORTS MEDICINE | Facility: CLINIC | Age: 49
End: 2019-04-23
Payer: COMMERCIAL

## 2019-04-23 VITALS
HEIGHT: 71 IN | WEIGHT: 253 LBS | DIASTOLIC BLOOD PRESSURE: 77 MMHG | SYSTOLIC BLOOD PRESSURE: 135 MMHG | BODY MASS INDEX: 35.42 KG/M2 | HEART RATE: 102 BPM

## 2019-04-23 DIAGNOSIS — G89.29 CHRONIC PAIN OF LEFT KNEE: ICD-10-CM

## 2019-04-23 DIAGNOSIS — M17.12 OSTEOARTHRITIS OF LEFT KNEE, UNSPECIFIED OSTEOARTHRITIS TYPE: ICD-10-CM

## 2019-04-23 DIAGNOSIS — M25.562 CHRONIC PAIN OF LEFT KNEE: ICD-10-CM

## 2019-04-23 DIAGNOSIS — S83.242A TEAR OF MEDIAL MENISCUS OF LEFT KNEE, UNSPECIFIED TEAR TYPE, UNSPECIFIED WHETHER OLD OR CURRENT TEAR, INITIAL ENCOUNTER: Primary | ICD-10-CM

## 2019-04-23 PROCEDURE — 99214 OFFICE O/P EST MOD 30 MIN: CPT | Mod: S$GLB,,, | Performed by: PHYSICIAN ASSISTANT

## 2019-04-23 PROCEDURE — 99999 PR PBB SHADOW E&M-EST. PATIENT-LVL III: CPT | Mod: PBBFAC,,, | Performed by: PHYSICIAN ASSISTANT

## 2019-04-23 PROCEDURE — 99999 PR PBB SHADOW E&M-EST. PATIENT-LVL III: ICD-10-PCS | Mod: PBBFAC,,, | Performed by: PHYSICIAN ASSISTANT

## 2019-04-23 PROCEDURE — 99214 PR OFFICE/OUTPT VISIT, EST, LEVL IV, 30-39 MIN: ICD-10-PCS | Mod: S$GLB,,, | Performed by: PHYSICIAN ASSISTANT

## 2019-04-23 NOTE — PATIENT INSTRUCTIONS
Understanding Meniscal Tears    The meniscus is a tough cushion of fibrous tissue called cartilage in the knee joint. It cushions the knee. It absorbs shock and helps spread weight across the knee joint. It also works with other parts of the knee to help keep the joint stable. Injury or aging can cause the meniscus to tear and lead to pain and problems using the knee.   What causes meniscal tears?  A sudden injury can tear the meniscus. This is often because of planting the foot and twisting the knee. Sports such as soccer, football, and basketball are often involved. Repeated actions, such as squatting, may also lead to a tear. Breakdown of the meniscus because of aging can also lead to tears.  Symptoms of meniscal tears  These can include:  · Knee pain  · Knee swelling  · Catching of the knee or inability to straighten the knee  · Unstable feeling in the knee  Treatment for meniscal tears  A tear is unlikely to heal on its own. You often will need surgery to repair a tear. In many cases, your healthcare provider will first try treatments to help relieve symptoms. These may include:  · Rest the knee. This means avoiding any activity that puts stress on the knee joint. These include kneeling, squatting, jogging, and climbing stairs. In some cases, you may need to use crutches for a time to keep body weight off of the knee joint.  · Cold pack. Putting a cold pack on the knee helps reduce pain and swelling.  · Knee brace. Bracing the knee helps support it.  · Medicine. Prescription and over-the-counter pain medicines can help relieve swelling and pain.  · Exercises. Exercises help strengthen the muscles of the leg to help support the knee joint.  If these treatments dont help relieve symptoms or the injury is severe, you may need surgery. This can repair the meniscus to relieve symptoms and restore movement.     When to call your healthcare provider  Call your healthcare provider right away if you have any of  these:  · Fever of 100.4°F (38°C) or higher, or as directed  · Pain or swelling that gets worse, including pain in the calf  · Numbness or tingling in leg or foot  · You suddenly cant put any weight on your leg  · Your knee locks   Date Last Reviewed: 3/10/2016  © 8738-4795 [employer]. [address] [place] [address]. All rights reserved. This information is not intended as a substitute for professional medical care. Always follow your healthcare professional's instructions.      Understanding Osteoarthritis of the Knee    A joint is a place where two bones meet. The knee is called a hinge joint. This joint is formed where the thighbone (femur) meets the shinbone (tibia). A healthy knee joint bends freely. Knee osteoarthritis is a condition where parts of the knee joint wear out. This can lead to pain, stiffness, and limited movement.   What is osteoarthritis?  Every joint contains a smooth tissue called cartilage. Cartilage cushions the ends of bones and helps bones in a joint glide smoothly against each other. Knee osteoarthritis occurs when cartilage in the knee joint begins to break down and wear away. Bones may become exposed and rub together. The cartilage may become irritated and rough. This prevents smooth movement of the joint and can lead to pain.  Causes of osteoarthritis of the knee  Causes can include:  · Wear and tear from normal use over time  · Overuse of the knee during sports or work activities  · Being overweight. This increases stress on the knee joint.  · Misalignment of the knee joint  · Injury to the knee  Symptoms of osteoarthritis of the knee  Common symptoms include:  · Pain and swelling around the joint. The pain and swelling get worse with activity and better with rest.  · Grinding sound when moving the knee  · Reduced knee movement  · Knee stiffness. This is often worse first thing in the morning.  Treating osteoarthritis of the knee  Osteoarthritis is a  long-term condition. Treatment usually focuses on managing symptoms. Treatment may include:  · Over-the-counter or prescription medicines taken by mouth to help relieve pain and swelling  · Injections of medicine into the joint to help relieve symptoms for a time  · A weight-loss plan for people who are overweight  · A plan of physical therapy and exercises to improve the strength and flexibility of the muscles around the knee  · Heat or cold therapy to help relieve pain and stiffness  · Assistive devices that help with movement, such as a cane or a walker  · Assistive devices that make activities of daily life easier, such as raised toilet seats or shower bars  If other treatments dont do enough to relieve symptoms, you may need surgery to replace the joint. During this surgery, the damaged joint is removed. An artificial knee joint is then put into place. This can help relieve pain and stiffness and restore movement of the knee.     When to call your healthcare provider  Call your healthcare provider right away if you have any of these:  · Fever of 100.4°F (38°C) or higher, or as directed  · Symptoms that dont get better with prescribed medicines or get worse  · New symptoms   Date Last Reviewed: 3/10/2016  © 0370-9246 [employer]. [address] [place] [address], [place] [address]. All rights reserved. This information is not intended as a substitute for professional medical care. Always follow your healthcare professional's instructions.

## 2019-04-24 NOTE — PROGRESS NOTES
CC: Left knee pain    Bhaskar Stiles is a 48 y.o. male, who works for [employer] who presents today for evaluation of his left knee that has continued bothering him for the last few months and worsening.     Pain is worse with weightbearing and walking. He has failed steroid injection previous which has only given him 4-6 weeks of pain relief. He has failed OTC NSAIDs and activity modifications x2 months.        History of Present Illness (HPI)  Location: anterior medial knee, patellofemoral  Onset: insidious onset x 3 month , chronic  Palliative:    Relative rest   Copper sleeve   Bathing with rubbing alcohol  Provocative:    ADLS   Prolonged ambulation   Laying on left side at night  Prior: none  Progression: discomfort unchanged  Quality:    Constant, aching pain  Radiation: none  Severity: per nursing documentation  Timing: intermittent w/ use  Trauma: none    Review of Systems (ROS)  A 10+ review of systems was performed with pertinent positives and negatives noted above in the history of present illness. Other systems were negative unless otherwise specified.    Physical Examination (PE)  General:  The patient is alert and oriented x 3. Mood is pleasant. Observation of ears, eyes and nose reveal no gross abnormalities. HEENT: NCAT, sclera anicteric.   Lungs: Respirations are equal and unlabored.  Gait is coordinated. Patient can toe walk and heel walk without difficulty.    LEFT KNEE EXAMINATION    Observation/Inspection  Gait:   Nonantalgic   Alignment:  Neutral   Scars:   None   Muscle atrophy: Mild  Effusion:  mild   Warmth:  None   Discoloration:   none     Tenderness / Crepitus (T / C):         T / C      T / C  Patella   - / -   Lateral joint line   - / -     Peripatellar medial  -  Medial joint line    + / -  Peripatellar lateral -  Medial plica   - / -  Patellar tendon -   Popliteal fossa   - / -  Quad tendon   -   Gastrocnemius   -  Prepatellar Bursa - / -   Quadricep   -  Tibial  tubercle  -  Thigh/hamstring  -  Pes anserine/HS -  Fibula    -  ITB   - / -  Tibia     -  Tib/fib joint  - / -  LCL    -    MFC   + anterior / -   MCL: Proximal  -    LFC   - / -   Distal    -          ROM: (* = pain)  PASSIVE   ACTIVE    Left :   0/ 0 / 145   0/ 0 / 145     Right :    2 / 0 / 140   2 / 0 / 140    Patellofemoral examination:  See above noted areas of tenderness.   Patella position    Subluxation / dislocation: Centered        Sup. / Inf;   Normal   Crepitus (PF):    Absent   Patellar Mobility:       Medial-lateral:   Normal    Superior-inferior:  Normal    Inferior tilt   Normal    Patellar tendon:  Normal   Lateral tilt:    Normal   J-sign:     None   Patellofemoral grind:   No pain     Meniscal Signs:     Pain on terminal extension:  +  Pain on terminal flexion:  +  Rays maneuver:  +*  Squat     NT    Ligament Examination:  ACL / Lachman:  WNL  PCL-Post.  drawer: normal 0 to 2mm  MCL- Valgus:  normal 0 to 2mm  LCL- Varus:    normal 0 to 2mm  Pivot shift:  guarding   Dial Test:   difference c/w other side   At 30° flexion: normal (< 5°)    At 90° flexion: normal (< 5°)   Reverse Pivot Shift:   normal (Equal)     Strength: (* = with pain) Painful Side  Quadriceps   5/5  Hamstrin/5    Extremity Neuro-vascular Examination:   Sensation:  Grossly intact to light touch all dermatomal regions.   Motor Function:  Fully intact motor function at hip, knee, foot and ankle    DTRs;  quadriceps and  achilles 2+.  No clonus and downgoing Babinski.    Vascular status:  DP and PT pulses 2+, brisk capillary refill, symmetric.     Other Findings:    MRI left knee from 19:  -Cartilaginous loss weight-bearing aspect medial femoral condyle approximately 1.1 x 0.9 cm with some subchondral edema of MFC.  - posterior horn medial meniscus irregularity/fraying, peripheral undersurface region.  -mild cartilage fissuring of the medial and lateral patella facets    ASSESSMENT & PLAN  Assessment:   #1  Kellgren-Anthony Grade II osteoarthritis of knee, juan carlos ant compartment, left but also noted tricompartmental    No evidence of neurologic pathology  No evidence of vascular pathology    Imaging studies reviewed:   X-ray knee, bilateral 18.07    Plan:    We discussed the importance of appropriate diet, weight, and regular exercise including quadriceps strengthening     -MRI results discussed at length. Due to patient not having mechanical symptoms or large meniscus tear, I feel pain may be more from osteoarthritis and edema.    -Mobic prn. Do not take with OTC NSAIDs as discussed.   -ice compresses to knee.         Treatment options were discussed with the patient about their injury today.  I reviewed the MRI images and relevant anatomy with the patient and what this means for the left knee     We discussed both non-operative and operative options for the patient's left knee and the risks and benefits of each.    I feel like he would benefit from trying euflexxa injection series first to see if this helps resolve his symptoms. Also discussed with his that a knee arthroscopy, chondroplasty, and menisectomy may help his symptoms also but me cannot guarantee the amount of pain relief he will get due to his underlying osteoarthritis.     After a discussion of specific risks and benefits, he has made the decision to think about things at home and will let us know what he wants to proceed with.    These risks include but are not limited to bleeding, infection, scarring, damage to neurovascular structures, damage to cartilage, stiffness, blood clots, pulmonary embolism, swelling, compartment syndrome, need for further surgery, and the risks of anesthesia when doing surgery.     We also had a detailed discussion of her expectation level for this procedure as well as any limitations at home or work and with exercising following surgery.         All patients questions were answered. Patient was advised to call us with any  concerns or questions.

## 2019-04-29 ENCOUNTER — TELEPHONE (OUTPATIENT)
Dept: SLEEP MEDICINE | Facility: CLINIC | Age: 49
End: 2019-04-29

## 2019-04-30 ENCOUNTER — TELEPHONE (OUTPATIENT)
Dept: SPORTS MEDICINE | Facility: CLINIC | Age: 49
End: 2019-04-30

## 2019-04-30 DIAGNOSIS — G89.29 CHRONIC PAIN OF LEFT KNEE: ICD-10-CM

## 2019-04-30 DIAGNOSIS — M25.562 CHRONIC PAIN OF LEFT KNEE: ICD-10-CM

## 2019-04-30 DIAGNOSIS — M17.12 OSTEOARTHRITIS OF LEFT KNEE, UNSPECIFIED OSTEOARTHRITIS TYPE: Primary | ICD-10-CM

## 2019-04-30 NOTE — TELEPHONE ENCOUNTER
----- Message from Deborah Mckeon MA sent at 4/29/2019  9:02 AM CDT -----  Patient needs a referral placed for Euflexxa injections. Left knee

## 2019-05-13 ENCOUNTER — OFFICE VISIT (OUTPATIENT)
Dept: SPORTS MEDICINE | Facility: CLINIC | Age: 49
End: 2019-05-13
Payer: COMMERCIAL

## 2019-05-13 VITALS
HEIGHT: 71 IN | BODY MASS INDEX: 35.42 KG/M2 | DIASTOLIC BLOOD PRESSURE: 76 MMHG | WEIGHT: 253 LBS | HEART RATE: 98 BPM | SYSTOLIC BLOOD PRESSURE: 128 MMHG

## 2019-05-13 DIAGNOSIS — M17.12 OSTEOARTHRITIS OF LEFT KNEE, UNSPECIFIED OSTEOARTHRITIS TYPE: Primary | ICD-10-CM

## 2019-05-13 DIAGNOSIS — M25.562 CHRONIC PAIN OF LEFT KNEE: ICD-10-CM

## 2019-05-13 DIAGNOSIS — G89.29 CHRONIC PAIN OF LEFT KNEE: ICD-10-CM

## 2019-05-13 PROCEDURE — 99999 PR PBB SHADOW E&M-EST. PATIENT-LVL III: CPT | Mod: PBBFAC,,, | Performed by: PHYSICIAN ASSISTANT

## 2019-05-13 PROCEDURE — 20610 PR DRAIN/INJECT LARGE JOINT/BURSA: ICD-10-PCS | Mod: LT,S$GLB,, | Performed by: PHYSICIAN ASSISTANT

## 2019-05-13 PROCEDURE — 99999 PR PBB SHADOW E&M-EST. PATIENT-LVL III: ICD-10-PCS | Mod: PBBFAC,,, | Performed by: PHYSICIAN ASSISTANT

## 2019-05-13 PROCEDURE — 99499 NO LOS: ICD-10-PCS | Mod: S$GLB,,, | Performed by: PHYSICIAN ASSISTANT

## 2019-05-13 PROCEDURE — 99499 UNLISTED E&M SERVICE: CPT | Mod: S$GLB,,, | Performed by: PHYSICIAN ASSISTANT

## 2019-05-13 PROCEDURE — 20610 DRAIN/INJ JOINT/BURSA W/O US: CPT | Mod: LT,S$GLB,, | Performed by: PHYSICIAN ASSISTANT

## 2019-05-14 NOTE — PROGRESS NOTES
Patient is here for follow up of knee arthritis. Pt is requesting Euflexxa injection #1.  PMFH reviewed per encounter record. Has failed other conservative modalities including NSAIDS, activity modification, weight loss.    He has not received these injections in the past.     The prior shots were tolerated well.    PHYSICAL EXAMINATION:     General: The patient is alert and oriented x 3. Mood is pleasant.   Observation of ears, eyes and nose reveals no gross abnormalities. No   labored breathing observed.     No signs of infection or adverse reaction to knee.        Euflexxa Injection Procedure #1    A time out was performed, including verification of patient ID, procedure, site and side, availability of information and equipment, review of safety issues, and agreement with consent, the procedure site was marked.    The patient was prepped aseptically with povidone-iodine swabsticks. A diagnostic and therapeutic injection of 2cc Euflexxa was given under sterile technique using a 21.5g x 1.5 needle from the anterolateral aspect of the left knee Joint with the patient in the seated position.     Bhaskar Stiles had no adverse reactions to the medication. Pain decreased. male was instructed to apply ice to the joint for 20 minutes and avoid strenuous activities for 24-36 hours following the injection. male was warned of possible blood pressure changes during that time. Following that time, male can resume activities as prior to the injection.    male was reminded to call the clinic immediately for any adverse side effects as explained in clinic today.    RTC in 1 week for injection #2  All patients questions were answered. Patient was advised to call us with any concerns or questions.

## 2019-05-20 ENCOUNTER — OFFICE VISIT (OUTPATIENT)
Dept: SPORTS MEDICINE | Facility: CLINIC | Age: 49
End: 2019-05-20
Payer: COMMERCIAL

## 2019-05-20 VITALS
HEART RATE: 103 BPM | DIASTOLIC BLOOD PRESSURE: 86 MMHG | WEIGHT: 253 LBS | BODY MASS INDEX: 35.42 KG/M2 | HEIGHT: 71 IN | SYSTOLIC BLOOD PRESSURE: 152 MMHG

## 2019-05-20 DIAGNOSIS — M17.12 OSTEOARTHRITIS OF LEFT KNEE, UNSPECIFIED OSTEOARTHRITIS TYPE: Primary | ICD-10-CM

## 2019-05-20 DIAGNOSIS — G89.29 CHRONIC PAIN OF LEFT KNEE: ICD-10-CM

## 2019-05-20 DIAGNOSIS — M25.562 CHRONIC PAIN OF LEFT KNEE: ICD-10-CM

## 2019-05-20 PROCEDURE — 99499 UNLISTED E&M SERVICE: CPT | Mod: S$GLB,,, | Performed by: PHYSICIAN ASSISTANT

## 2019-05-20 PROCEDURE — 99499 NO LOS: ICD-10-PCS | Mod: S$GLB,,, | Performed by: PHYSICIAN ASSISTANT

## 2019-05-20 PROCEDURE — 20610 PR DRAIN/INJECT LARGE JOINT/BURSA: ICD-10-PCS | Mod: LT,S$GLB,, | Performed by: PHYSICIAN ASSISTANT

## 2019-05-20 PROCEDURE — 99999 PR PBB SHADOW E&M-EST. PATIENT-LVL III: CPT | Mod: PBBFAC,,, | Performed by: PHYSICIAN ASSISTANT

## 2019-05-20 PROCEDURE — 99999 PR PBB SHADOW E&M-EST. PATIENT-LVL III: ICD-10-PCS | Mod: PBBFAC,,, | Performed by: PHYSICIAN ASSISTANT

## 2019-05-20 PROCEDURE — 20610 DRAIN/INJ JOINT/BURSA W/O US: CPT | Mod: LT,S$GLB,, | Performed by: PHYSICIAN ASSISTANT

## 2019-05-20 NOTE — PROGRESS NOTES
Patient is here for follow up of knee arthritis. Pt is requesting Euflexxa injection #2.  PMFH reviewed per encounter record. Has failed other conservative modalities including NSAIDS, activity modification, weight loss.    He has not received these injections in the past.     The prior shots were tolerated well.    PHYSICAL EXAMINATION:     General: The patient is alert and oriented x 3. Mood is pleasant.   Observation of ears, eyes and nose reveals no gross abnormalities. No   labored breathing observed.     No signs of infection or adverse reaction to knee.        Euflexxa Injection Procedure #2    A time out was performed, including verification of patient ID, procedure, site and side, availability of information and equipment, review of safety issues, and agreement with consent, the procedure site was marked.    The patient was prepped aseptically with povidone-iodine swabsticks. A diagnostic and therapeutic injection of 2cc Euflexxa was given under sterile technique using a 21.5g x 1.5 needle from the anterolateral aspect of the left knee Joint with the patient in the seated position.     Bhaskar Stiles had no adverse reactions to the medication. Pain decreased. male was instructed to apply ice to the joint for 20 minutes and avoid strenuous activities for 24-36 hours following the injection. male was warned of possible blood pressure changes during that time. Following that time, male can resume activities as prior to the injection.    male was reminded to call the clinic immediately for any adverse side effects as explained in clinic today.    RTC in 1 week for injection #3  All patients questions were answered. Patient was advised to call us with any concerns or questions.

## 2019-05-27 ENCOUNTER — OFFICE VISIT (OUTPATIENT)
Dept: SPORTS MEDICINE | Facility: CLINIC | Age: 49
End: 2019-05-27
Payer: COMMERCIAL

## 2019-05-27 VITALS
BODY MASS INDEX: 35.42 KG/M2 | HEART RATE: 103 BPM | DIASTOLIC BLOOD PRESSURE: 95 MMHG | SYSTOLIC BLOOD PRESSURE: 143 MMHG | HEIGHT: 71 IN | WEIGHT: 253 LBS

## 2019-05-27 DIAGNOSIS — M17.12 OSTEOARTHRITIS OF LEFT KNEE, UNSPECIFIED OSTEOARTHRITIS TYPE: Primary | ICD-10-CM

## 2019-05-27 PROCEDURE — 99499 UNLISTED E&M SERVICE: CPT | Mod: S$GLB,,, | Performed by: PHYSICIAN ASSISTANT

## 2019-05-27 PROCEDURE — 20610 DRAIN/INJ JOINT/BURSA W/O US: CPT | Mod: LT,S$GLB,, | Performed by: PHYSICIAN ASSISTANT

## 2019-05-27 PROCEDURE — 99999 PR PBB SHADOW E&M-EST. PATIENT-LVL III: CPT | Mod: PBBFAC,,, | Performed by: PHYSICIAN ASSISTANT

## 2019-05-27 PROCEDURE — 20610 PR DRAIN/INJECT LARGE JOINT/BURSA: ICD-10-PCS | Mod: LT,S$GLB,, | Performed by: PHYSICIAN ASSISTANT

## 2019-05-27 PROCEDURE — 99499 NO LOS: ICD-10-PCS | Mod: S$GLB,,, | Performed by: PHYSICIAN ASSISTANT

## 2019-05-27 PROCEDURE — 99999 PR PBB SHADOW E&M-EST. PATIENT-LVL III: ICD-10-PCS | Mod: PBBFAC,,, | Performed by: PHYSICIAN ASSISTANT

## 2019-05-27 NOTE — PROGRESS NOTES
Patient is here for follow up of knee arthritis. Pt is requesting Euflexxa injection #3.  PMFH reviewed per encounter record. Has failed other conservative modalities including NSAIDS, activity modification, weight loss.    He has not received these injections in the past.     The prior shots were tolerated well.    PHYSICAL EXAMINATION:     General: The patient is alert and oriented x 3. Mood is pleasant.   Observation of ears, eyes and nose reveals no gross abnormalities. No   labored breathing observed.     No signs of infection or adverse reaction to knee.        Euflexxa Injection Procedure #3    A time out was performed, including verification of patient ID, procedure, site and side, availability of information and equipment, review of safety issues, and agreement with consent, the procedure site was marked.    The patient was prepped aseptically with povidone-iodine swabsticks. A diagnostic and therapeutic injection of 2cc Euflexxa was given under sterile technique using a 21.5g x 1.5 needle from the anterolateral aspect of the left knee Joint with the patient in the seated position.     Bhaskar Stiles had no adverse reactions to the medication. Pain decreased. male was instructed to apply ice to the joint for 20 minutes and avoid strenuous activities for 24-36 hours following the injection. male was warned of possible blood pressure changes during that time. Following that time, male can resume activities as prior to the injection.    male was reminded to call the clinic immediately for any adverse side effects as explained in clinic today.    RTC in 6 weeks for recheck    Consider viscoskin knee brace if no significant improvement at that time.   Make sure he is doing HEP to stay strong.     All patients questions were answered. Patient was advised to call us with any concerns or questions.

## 2019-07-29 ENCOUNTER — OFFICE VISIT (OUTPATIENT)
Dept: SPORTS MEDICINE | Facility: CLINIC | Age: 49
End: 2019-07-29
Payer: COMMERCIAL

## 2019-07-29 VITALS
DIASTOLIC BLOOD PRESSURE: 87 MMHG | HEIGHT: 71 IN | BODY MASS INDEX: 35.42 KG/M2 | HEART RATE: 80 BPM | SYSTOLIC BLOOD PRESSURE: 138 MMHG | WEIGHT: 253 LBS

## 2019-07-29 DIAGNOSIS — M25.562 LEFT KNEE PAIN, UNSPECIFIED CHRONICITY: Primary | ICD-10-CM

## 2019-07-29 DIAGNOSIS — M25.562 CHRONIC PATELLOFEMORAL PAIN OF LEFT KNEE: ICD-10-CM

## 2019-07-29 DIAGNOSIS — G89.29 CHRONIC PATELLOFEMORAL PAIN OF LEFT KNEE: ICD-10-CM

## 2019-07-29 DIAGNOSIS — M17.12 OSTEOARTHRITIS OF LEFT KNEE, UNSPECIFIED OSTEOARTHRITIS TYPE: ICD-10-CM

## 2019-07-29 PROCEDURE — 99999 PR PBB SHADOW E&M-EST. PATIENT-LVL III: CPT | Mod: PBBFAC,,, | Performed by: PHYSICIAN ASSISTANT

## 2019-07-29 PROCEDURE — 99214 OFFICE O/P EST MOD 30 MIN: CPT | Mod: S$GLB,,, | Performed by: PHYSICIAN ASSISTANT

## 2019-07-29 PROCEDURE — 99214 PR OFFICE/OUTPT VISIT, EST, LEVL IV, 30-39 MIN: ICD-10-PCS | Mod: S$GLB,,, | Performed by: PHYSICIAN ASSISTANT

## 2019-07-29 PROCEDURE — 99999 PR PBB SHADOW E&M-EST. PATIENT-LVL III: ICD-10-PCS | Mod: PBBFAC,,, | Performed by: PHYSICIAN ASSISTANT

## 2019-07-31 NOTE — PROGRESS NOTES
CC: Left knee pain    Bhaskar Stiles is a 48 y.o. male, who works for [employer] who presents today for evaluation of his left knee that has continued bothering him for the 3-4 few months and worsening.     Pain is worse with weightbearing and walking. He reports today that his pain mostly occurs with bending his knee and it is located of the anterior medial knee next to the patella. He has failed steroid injection previous which has only given him 4-6 weeks of pain relief. He has failed OTC NSAIDs and activity modifications x2 months. He has failed euflexxa knee injection series.        History of Present Illness (HPI)  Location: anterior medial knee, patellofemoral  Onset: insidious onset x 4 month , chronic  Palliative:    Relative rest   Copper sleeve   Bathing with rubbing alcohol  Provocative:    ADLS   Prolonged ambulation   Laying on left side at night  Prior: none  Progression: discomfort unchanged  Quality:    Constant, aching pain  Radiation: none  Severity: per nursing documentation  Timing: intermittent w/ use  Trauma: none    Review of Systems (ROS)  A 10+ review of systems was performed with pertinent positives and negatives noted above in the history of present illness. Other systems were negative unless otherwise specified.    Physical Examination (PE)  General:  The patient is alert and oriented x 3. Mood is pleasant. Observation of ears, eyes and nose reveal no gross abnormalities. HEENT: NCAT, sclera anicteric.   Lungs: Respirations are equal and unlabored.  Gait is coordinated. Patient can toe walk and heel walk without difficulty.    LEFT KNEE EXAMINATION    Observation/Inspection  Gait:   Nonantalgic   Alignment:  Neutral   Scars:   None   Muscle atrophy: Mild  Effusion:  mild   Warmth:  None   Discoloration:   none     Tenderness / Crepitus (T / C):         T / C      T / C  Patella   - / -   Lateral joint line   - / -     Peripatellar medial  +  Medial joint line    Negative today and no  pain in area of MFC inflammation on MRI / -  Peripatellar lateral -  Medial plica   + / -  Patellar tendon -   Popliteal fossa   - / -  Quad tendon   -   Gastrocnemius   -  Prepatellar Bursa - / -   Quadricep   -  Tibial tubercle  -  Thigh/hamstring  -  Pes anserine/HS -  Fibula    -  ITB   - / -  Tibia     -  Tib/fib joint  - / -  LCL    -    MFC   - / -   MCL: Proximal  -    LFC   - / -   Distal    -          ROM: (* = pain)  PASSIVE   ACTIVE    Left :   0/ 0 / 145   0/ 0 / 145     Right :    2 / 0 / 140   2 / 0 / 140    Patellofemoral examination:  See above noted areas of tenderness.   Patella position    Subluxation / dislocation: Centered        Sup. / Inf;   Normal   Crepitus (PF):    Absent   Patellar Mobility:       Medial-lateral:   Normal    Superior-inferior:  Normal    Inferior tilt   Normal    Patellar tendon:  Normal   Lateral tilt:    Normal   J-sign:     None   Patellofemoral grind:   No pain     Meniscal Signs:     Pain on terminal extension:  -  Pain on terminal flexion:  +  Rays maneuver:  -  Squat     NT    Ligament Examination:  ACL / Lachman:  WNL  PCL-Post.  drawer: normal 0 to 2mm  MCL- Valgus:  normal 0 to 2mm  LCL- Varus:    normal 0 to 2mm  Pivot shift:  guarding   Dial Test:   difference c/w other side   At 30° flexion: normal (< 5°)    At 90° flexion: normal (< 5°)   Reverse Pivot Shift:   normal (Equal)     Strength: (* = with pain) Painful Side  Quadriceps   5/5  Hamstrin/5    Extremity Neuro-vascular Examination:   Sensation:  Grossly intact to light touch all dermatomal regions.   Motor Function:  Fully intact motor function at hip, knee, foot and ankle    DTRs;  quadriceps and  achilles 2+.  No clonus and downgoing Babinski.    Vascular status:  DP and PT pulses 2+, brisk capillary refill, symmetric.     Other Findings:    MRI left knee from 19:  -Cartilaginous loss weight-bearing aspect medial femoral condyle approximately 1.1 x 0.9 cm with some subchondral  edema of MFC.  - posterior horn medial meniscus irregularity/fraying, peripheral undersurface region.  -mild cartilage fissuring of the medial and lateral patella facets  -medial plica noted on MRI per Dr. Hughes    ASSESSMENT & PLAN  Assessment:   #1 Kellgren-Anthony Grade II osteoarthritis of knee, juan carlos ant compartment, left but also noted tricompartmental    No evidence of neurologic pathology  No evidence of vascular pathology    Imaging studies reviewed:   X-ray knee, bilateral 18.07    Plan:    We discussed the importance of appropriate diet, weight, and regular exercise including quadriceps strengthening     -MRI results discussed at length. Patient symptoms appear to be coming from medial knee patella area and plica.     -Mobic prn. Do not take with OTC NSAIDs as discussed.   -ice compresses to knee.     Dr. Hughes saw and discussed things with patient today.   Treatment options were discussed with the patient about their injury today.  I reviewed the MRI images and relevant anatomy with the patient and what this means for the left knee    We discussed both non-operative and operative options for the patient's left knee and the risks and benefits of each.    Dr. Hughes and I feel like he would benefit from Knee arthroscopy.   After a discussion of specific risks and benefits, he has made the decision to proceed with surgery.      These risks include but are not limited to bleeding, infection, scarring, damage to neurovascular structures, damage to cartilage, stiffness, blood clots, pulmonary embolism, swelling, compartment syndrome, need for further surgery, and the risks of anesthesia.    he verbalized her understanding of these risks and wished to proceed with surgery.    We also had a detailed discussion of her expectation level for this procedure as well as any limitations at home or work and with exercising following surgery.     The spectrum of treatment options were discussed with the patient, including  nonoperative and operative options.  After thorough discussion, the patient has elected to undergo surgical treatment to include:    Left knee arthroscopy with medial plica excision, possible loose body removal, possible chondroplasty, possible menisectomy, possibly loose body removal.     We will get the patient scheduled for this at the patient's convenience around their work schedule.    The patient was given the opportunity to ask questions and all questions were answered, pt will contact us for questions or concerns in the interim.    Patient has chondral damage to knee.  Therefore, i can offer no guarantee whatsoever to the results of a knee arthroscopic surgery.  I believe that arthroscopic surgery will benefit the patient.  I explained this in detail today and patient acknowledged understanding and wishes to proceed.      He will call us if he decides to proceed with surgery.         All patients questions were answered. Patient was advised to call us with any concerns or questions.

## 2019-08-08 ENCOUNTER — TELEPHONE (OUTPATIENT)
Dept: SPORTS MEDICINE | Facility: CLINIC | Age: 49
End: 2019-08-08

## 2019-08-08 NOTE — TELEPHONE ENCOUNTER
----- Message from Enedelia Sumner MA sent at 8/7/2019  2:50 PM CDT -----  Contact: patient   Please see below    Enedelia Sumner   Clinical assistant to Dr. Shelbi Hughes    ----- Message -----  From: Deborah Mckeon MA  Sent: 8/7/2019   2:10 PM  To: Ellen Mario Staff        ----- Message -----  From: Dillon Pineda  Sent: 8/7/2019   1:24 PM  To: Brien Rodriguez Staff    Please call pt at [phone]    Patient is ready to schedule his future knee scope procedure    Thank you

## 2019-08-13 DIAGNOSIS — S83.242A ACUTE MEDIAL MENISCUS TEAR OF LEFT KNEE, INITIAL ENCOUNTER: ICD-10-CM

## 2019-08-13 DIAGNOSIS — M67.50 PLICA SYNDROME: Primary | ICD-10-CM

## 2019-08-15 ENCOUNTER — TELEPHONE (OUTPATIENT)
Dept: SPORTS MEDICINE | Facility: CLINIC | Age: 49
End: 2019-08-15

## 2019-08-15 NOTE — TELEPHONE ENCOUNTER
----- Message from María Garcia sent at 8/15/2019  8:47 AM CDT -----  Contact: [email or web address]  Needs Advice    Reason for call:patient has questions about upcoming appointments please call patient.        Communication Preference:Home#    Additional Information:

## 2019-08-21 ENCOUNTER — ANESTHESIA EVENT (OUTPATIENT)
Dept: SURGERY | Facility: OTHER | Age: 49
End: 2019-08-21
Payer: COMMERCIAL

## 2019-08-21 ENCOUNTER — OFFICE VISIT (OUTPATIENT)
Dept: SPORTS MEDICINE | Facility: CLINIC | Age: 49
End: 2019-08-21
Payer: COMMERCIAL

## 2019-08-21 ENCOUNTER — HOSPITAL ENCOUNTER (OUTPATIENT)
Dept: PREADMISSION TESTING | Facility: OTHER | Age: 49
Discharge: HOME | End: 2019-08-21
Attending: ORTHOPAEDIC SURGERY
Payer: COMMERCIAL

## 2019-08-21 VITALS
HEART RATE: 68 BPM | SYSTOLIC BLOOD PRESSURE: 142 MMHG | HEIGHT: 71 IN | TEMPERATURE: 98 F | WEIGHT: 253 LBS | OXYGEN SATURATION: 96 % | BODY MASS INDEX: 35.42 KG/M2 | DIASTOLIC BLOOD PRESSURE: 84 MMHG

## 2019-08-21 VITALS
BODY MASS INDEX: 35.42 KG/M2 | HEART RATE: 88 BPM | SYSTOLIC BLOOD PRESSURE: 138 MMHG | DIASTOLIC BLOOD PRESSURE: 85 MMHG | WEIGHT: 253 LBS | HEIGHT: 71 IN

## 2019-08-21 DIAGNOSIS — M67.52 PLICA OF KNEE, LEFT: ICD-10-CM

## 2019-08-21 DIAGNOSIS — M17.12 OSTEOARTHRITIS OF LEFT KNEE, UNSPECIFIED OSTEOARTHRITIS TYPE: ICD-10-CM

## 2019-08-21 DIAGNOSIS — M67.50 PLICA SYNDROME: Primary | ICD-10-CM

## 2019-08-21 PROCEDURE — 99499 NO LOS: ICD-10-PCS | Mod: S$GLB,,, | Performed by: PHYSICIAN ASSISTANT

## 2019-08-21 PROCEDURE — 99499 UNLISTED E&M SERVICE: CPT | Mod: S$GLB,,, | Performed by: PHYSICIAN ASSISTANT

## 2019-08-21 PROCEDURE — 99999 PR PBB SHADOW E&M-EST. PATIENT-LVL III: CPT | Mod: PBBFAC,,, | Performed by: PHYSICIAN ASSISTANT

## 2019-08-21 PROCEDURE — 99999 PR PBB SHADOW E&M-EST. PATIENT-LVL III: ICD-10-PCS | Mod: PBBFAC,,, | Performed by: PHYSICIAN ASSISTANT

## 2019-08-21 RX ORDER — SODIUM CHLORIDE, SODIUM LACTATE, POTASSIUM CHLORIDE, CALCIUM CHLORIDE 600; 310; 30; 20 MG/100ML; MG/100ML; MG/100ML; MG/100ML
INJECTION, SOLUTION INTRAVENOUS CONTINUOUS
Status: CANCELLED | OUTPATIENT
Start: 2019-08-21

## 2019-08-21 RX ORDER — LIDOCAINE HYDROCHLORIDE 10 MG/ML
0.5 INJECTION, SOLUTION EPIDURAL; INFILTRATION; INTRACAUDAL; PERINEURAL ONCE
Status: CANCELLED | OUTPATIENT
Start: 2019-08-21 | End: 2019-08-21

## 2019-08-21 RX ORDER — CELECOXIB 200 MG/1
400 CAPSULE ORAL
Status: CANCELLED | OUTPATIENT
Start: 2019-08-21 | End: 2019-08-21

## 2019-08-21 RX ORDER — MIDAZOLAM HYDROCHLORIDE 1 MG/ML
2 INJECTION INTRAMUSCULAR; INTRAVENOUS
Status: CANCELLED | OUTPATIENT
Start: 2019-08-21 | End: 2019-08-21

## 2019-08-21 RX ORDER — HYDROCODONE BITARTRATE AND ACETAMINOPHEN 10; 325 MG/1; MG/1
TABLET ORAL
Qty: 20 TABLET | Refills: 0 | Status: SHIPPED | OUTPATIENT
Start: 2019-08-21 | End: 2020-12-04

## 2019-08-21 RX ORDER — ACETAMINOPHEN 500 MG
1000 TABLET ORAL
Status: CANCELLED | OUTPATIENT
Start: 2019-08-21 | End: 2019-08-21

## 2019-08-21 RX ORDER — PREGABALIN 75 MG/1
75 CAPSULE ORAL
Status: CANCELLED | OUTPATIENT
Start: 2019-08-21 | End: 2019-08-21

## 2019-08-21 RX ORDER — SODIUM CHLORIDE 9 MG/ML
INJECTION, SOLUTION INTRAVENOUS CONTINUOUS
Status: CANCELLED | OUTPATIENT
Start: 2019-08-21

## 2019-08-21 RX ORDER — PROMETHAZINE HYDROCHLORIDE 25 MG/1
25 TABLET ORAL EVERY 6 HOURS PRN
Qty: 14 TABLET | Refills: 0 | Status: SHIPPED | OUTPATIENT
Start: 2019-08-21 | End: 2020-12-04

## 2019-08-21 NOTE — DISCHARGE INSTRUCTIONS
PRE-ADMIT TESTING -  [phone]    [address]  [place] [facility]          Your surgery has been scheduled at [facility]. We are pleased to have the opportunity to serve you. For Further Information please call [phone].    On the day of surgery please report to the Information Desk on the 1st floor.    · CONTACT YOUR PHYSICIAN'S OFFICE THE DAY PRIOR TO YOUR SURGERY TO OBTAIN YOUR ARRIVAL TIME.     · The evening before surgery do not eat anything after 9 p.m. ( this includes hard candy, chewing gum and mints).  You may only have GATORADE, POWERADE AND WATER  from 9 p.m. until you leave your home.   DO NOT DRINK ANY LIQUIDS ON THE WAY TO THE HOSPITAL.      SPECIAL MEDICATION INSTRUCTIONS: TAKE medications checked off by the Anesthesiologist on your Medication List.    Angiogram Patients: Take medications as instructed by your physician, including aspirin.     Surgery Patients:    If you take ASPIRIN - Your PHYSICIAN/SURGEON will need to inform you IF/OR when you need to stop taking aspirin prior to your surgery.     Do Not take any medications containing IBUPROFEN.  Do Not Wear any make-up or dark nail polish   (especially eye make-up) to surgery. If you come to surgery with makeup on you will be required to remove the makeup or nail polish.    Do not shave your surgical area at least 5 days prior to your surgery. The surgical prep will be performed at the hospital according to Infection Control regulations.    Leave all valuables at home.   Do Not wear any jewelry or watches, including any metal in body piercings. Jewelry must be removed prior to coming to the hospital.  There is a possibility that rings that are unable to be removed may be cut off if they are on the surgical extremity.    Contact Lens must be removed before surgery. Either do not wear the contact lens or bring a case and solution for storage.  Please bring a container for eyeglasses or dentures as required.  Bring  any paperwork your physician has provided, such as consent forms,  history and physicals, doctor's orders, etc.   Bring comfortable clothes that are loose fitting to wear upon discharge. Take into consideration the type of surgery being performed.  Maintain your diet as advised per your physician the day prior to surgery.      Adequate rest the night before surgery is advised.   Park in the Parking lot behind the hospital or in the [facility] across the street from the parking lot. Parking is complimentary.  If you will be discharged the same day as your procedure, please arrange for a responsible adult to drive you home or to accompany you if traveling by taxi.   YOU WILL NOT BE PERMITTED TO DRIVE OR TO LEAVE THE HOSPITAL ALONE AFTER SURGERY.   It is strongly recommended that you arrange for someone to remain with you for the first 24 hrs following your surgery.    The Surgeon will speak to your family/visitor after your surgery regarding the outcome of your surgery and post op care.  The Surgeon may speak to you after your surgery, but there is a possibility you may not remember the details.  Please check with your family members regarding the conversation with the Surgeon.    We strongly recommend whoever is bringing you home be present for discharge instructions.  This will ensure a thorough understanding for your post op home care.      Thank you for your cooperation.  The Staff of [facility].                Bathing Instructions with Hibiclens     Shower the evening before and morning of your procedure with Hibiclens:   Wash your face with water and your regular face wash/soap   Apply Hibiclens directly on your skin or on a wet washcloth and wash gently. When showering: Move away from the shower stream when applying Hibiclens to avoid rinsing off too soon.   Rinse thoroughly with warm water   Do not dilute Hibiclens         Dry off as usual, do not use any deodorant,  powder, body lotions, perfume, after shave or cologne.

## 2019-08-21 NOTE — H&P (VIEW-ONLY)
Bhaskar Stiles  is here for a completion of his perioperative paperwork. he  Is scheduled to undergo      Left knee arthroscopy with medial plica excision, possible loose body removal, possible chondroplasty, possible menisectomy, possibly loose body removal on 8/22/19.      He is a healthy individual but does need clearance for this procedure from Dr. Fernandez.     PAST MEDICAL HISTORY:   Past Medical History:   Diagnosis Date    Hypertension     Syncope and collapse      PAST SURGICAL HISTORY:   Past Surgical History:   Procedure Laterality Date    TILT TABLE TEST N/A 7/9/2018    Performed by Leonardo Whitfield MD at [facility] CATH LAB     FAMILY HISTORY: History reviewed. No pertinent family history.  SOCIAL HISTORY:   Social History     Socioeconomic History    Marital status: Single     Spouse name: Not on file    Number of children: Not on file    Years of education: Not on file    Highest education level: Not on file   Occupational History    Not on file   Social Needs    Financial resource strain: Not on file    Food insecurity:     Worry: Not on file     Inability: Not on file    Transportation needs:     Medical: Not on file     Non-medical: Not on file   Tobacco Use    Smoking status: Never Smoker    Smokeless tobacco: Never Used   Substance and Sexual Activity    Alcohol use: No    Drug use: No    Sexual activity: Not on file   Lifestyle    Physical activity:     Days per week: Not on file     Minutes per session: Not on file    Stress: Not on file   Relationships    Social connections:     Talks on phone: Not on file     Gets together: Not on file     Attends Protestant service: Not on file     Active member of club or organization: Not on file     Attends meetings of clubs or organizations: Not on file     Relationship status: Not on file   Other Topics Concern    Not on file   Social History Narrative    Not on file       MEDICATIONS:   Current Outpatient Medications:     amLODIPine  "(NORVASC) 10 MG tablet, Take 1 tablet (10 mg total) by mouth once daily., Disp: 90 tablet, Rfl: 3    atorvastatin (LIPITOR) 20 MG tablet, Take 20 mg by mouth once daily., Disp: , Rfl: 5    meloxicam (MOBIC) 15 MG tablet, Take 1 tablet (15 mg total) by mouth once daily. With food., Disp: 20 tablet, Rfl: 0  ALLERGIES: Review of patient's allergies indicates:  No Known Allergies    VITAL SIGNS: /85   Pulse 88   Ht 5' 11" (1.803 m)   Wt 114.8 kg (253 lb)   BMI 35.29 kg/m²      Risks, indications and benefits of the surgical procedure were discussed with the patient. All questions with regard to surgery, rehab, expected return to functional activities, activities of daily living and recreational endeavors were answered to his satisfaction.    It was explained to the patient that there may be an increase in surgical risks if the patient has certain co-morbidities such as but not limited to: Obesity, Cardiovascular issues (CHF, CAD, Arrhythmias), chronic pulmonary issues, previous or current neurovascular/neurological issues, previous strokes, diabetes mellitus, previous wound healing issues, previous wound or skin infections, PVD, clotting disorders, if the patient uses chronic steroids, if the patient takes or has immune compromising medications or diseases, or has previously or currently used tobacco products.     The patient verbalized that he/she does not have any additional clotting, bleeding, or blood disorders, other than what is list in her chart on today's review.     Then a brief history and physical exam were performed.    Review of Systems   Constitution: Negative. Negative for chills, fever and night sweats.   HENT: Negative for congestion and headaches.    Eyes: Negative for blurred vision, left vision loss and right vision loss.   Cardiovascular: Negative for chest pain and syncope.   Respiratory: Negative for cough and shortness of breath.    Endocrine: Negative for polydipsia, polyphagia and " polyuria.   Hematologic/Lymphatic: Negative for bleeding problem. Does not bruise/bleed easily.   Skin: Negative for dry skin, itching and rash.   Musculoskeletal: Negative for falls and muscle weakness.   Gastrointestinal: Negative for abdominal pain and bowel incontinence.   Genitourinary: Negative for bladder incontinence and nocturia.   Neurological: Negative for disturbances in coordination, loss of balance and seizures.   Psychiatric/Behavioral: Negative for depression. The patient does not have insomnia.    Allergic/Immunologic: Negative for hives and persistent infections.     PHYSICAL EXAM:  GEN: A&Ox3, WD WN NAD  HEENT: WNL  CHEST: CTAB, no W/R/R  HEART: RRR, no M/R/G  ABD: Soft, NT ND, BS x4 QUADS  MS; See Epic  NEURO: CN II-XII intact       The surgical consent was then reviewed with the patient, who agreed with all the contents of the consent form and it was signed. he was then given the [facility] surgery packet to bring with him to [facility] for the anesthesia portion of his perioperative paperwork.   For all physicians except for Dr. Epps, we will email and possibly fax the consent forms and booking sheets to [facility] pre-admit.    The patient was given the opportunity to ask questions about the surgical plan and consent form, and once no other questions were asked, I proceeded with the pre-op appointment.    PHYSICAL THERAPY:  He was also instructed regarding physical therapy and will begin on  POD1. He was given a copy of the original prescription to schedule. Another copy of this prescription was also faxed to [facility].    POST OP CARE:instructions were reviewed including care of the wound and dressing after surgery and when he can shower.     PAIN MANAGEMENT: Bhaskar Stiles was also given his pain management regime, which includes the TENS unit given to him by geo along with the education required for its use. He was also instructed regarding the Polar ice unit that will be in  place after surgery and his postoperative pain medications.     PAIN MEDICATION:  Percocet 10/325mg 1 po q 4-6 hours prn pain  Ultram 50 mg Take 1-2 p.o. q.6 hours p.r.n. breakthrough pain,   Phenergan 25 mg one p.o. q.6 hours p.r.n. nausea and vomiting.    DVT prophylaxis was discussed with the patient today including risk factors for developing DVTs and history of DVTs. The patient was asked if any specific recommendations were given from the doctor/s that did pre-operative surgical clearance. The patient was then given an education sheet about DVTs and PE with warning signs and symptoms of both and steps to take if they suspect either of these.    This along with the Modified Caprini risk assessment model for VTE in general surgical patients was used to determine the patient's DVT risk.     From: Srinivas ARNOLD, Mu DA, Jennifer LYON, et al. Prevention of VTE in nonorthopedic surgical patients: antithrombotic therapy and prevention of thrombosis, 9th ed: American College of Chest Physicians evidence-based clinical practical guidelines. Chest 2012; 141:e227S. Copyright © 2012. Reproduced with permission from the American College of Chest Physicians.    The below listed DVT prophylaxis regimen along with bilateral SOPHIE compression stockings will be used post-op. Length of treatment has been determined to be 10-42 days post-op by the above noted Caprini assessment model.     The patient was instructed to buy and take:  Aspirin 81mg QD x 4 weeks for DVT prophylaxis starting on the morning after surgery.  Patient will also use bilateral TEDs on lower extremities, SCDs during surgery, and early ambulation post-op. If the patient was previously taking 81mg baby aspirin, they were told to not take it starting 5 days prior to surgery and to restart the 81mg aspirin after surgery.       Patient was also told to buy over the counter Prilosec medication if needed and take it once daily for GI protection as long as they are taking  NSAIDs or Aspirin.    Patient denies history of seizures.       The patient was told that narcotic pain medications may make them drowsy and instructions were given to not sign legal documents, drive or operate heavy machinery, cars, or equipment while under the influence of narcotic medications. The patient was told and understands that narcotic pain medications should only be used as needed to control pain and that other options of pain control include TENs unit and ice packs/unit.     As there were no other questions to be asked, he was given my business card along with Shelbi Hughes MD business card if he has any questions or concerns prior to surgery or in the postop period.     At the end of our encounter today, the patient was given the option and asked if they would like to see the surgeon regarding questions or concerns that they have about the consent form or the surgical procedure. The patient has seen Dr. Hughes previously to discuss surgery.

## 2019-08-21 NOTE — H&P
Bhaskar Stiles  is here for a completion of his perioperative paperwork. he  Is scheduled to undergo      Left knee arthroscopy with medial plica excision, possible loose body removal, possible chondroplasty, possible menisectomy, possibly loose body removal on 8/22/19.      He is a healthy individual but does need clearance for this procedure from Dr. Fernandez.     PAST MEDICAL HISTORY:   Past Medical History:   Diagnosis Date    Hypertension     Syncope and collapse      PAST SURGICAL HISTORY:   Past Surgical History:   Procedure Laterality Date    TILT TABLE TEST N/A 7/9/2018    Performed by Leonardo Whitfield MD at [facility] CATH LAB     FAMILY HISTORY: History reviewed. No pertinent family history.  SOCIAL HISTORY:   Social History     Socioeconomic History    Marital status: Single     Spouse name: Not on file    Number of children: Not on file    Years of education: Not on file    Highest education level: Not on file   Occupational History    Not on file   Social Needs    Financial resource strain: Not on file    Food insecurity:     Worry: Not on file     Inability: Not on file    Transportation needs:     Medical: Not on file     Non-medical: Not on file   Tobacco Use    Smoking status: Never Smoker    Smokeless tobacco: Never Used   Substance and Sexual Activity    Alcohol use: No    Drug use: No    Sexual activity: Not on file   Lifestyle    Physical activity:     Days per week: Not on file     Minutes per session: Not on file    Stress: Not on file   Relationships    Social connections:     Talks on phone: Not on file     Gets together: Not on file     Attends Evangelical service: Not on file     Active member of club or organization: Not on file     Attends meetings of clubs or organizations: Not on file     Relationship status: Not on file   Other Topics Concern    Not on file   Social History Narrative    Not on file       MEDICATIONS:   Current Outpatient Medications:     amLODIPine  "(NORVASC) 10 MG tablet, Take 1 tablet (10 mg total) by mouth once daily., Disp: 90 tablet, Rfl: 3    atorvastatin (LIPITOR) 20 MG tablet, Take 20 mg by mouth once daily., Disp: , Rfl: 5    meloxicam (MOBIC) 15 MG tablet, Take 1 tablet (15 mg total) by mouth once daily. With food., Disp: 20 tablet, Rfl: 0  ALLERGIES: Review of patient's allergies indicates:  No Known Allergies    VITAL SIGNS: /85   Pulse 88   Ht 5' 11" (1.803 m)   Wt 114.8 kg (253 lb)   BMI 35.29 kg/m²      Risks, indications and benefits of the surgical procedure were discussed with the patient. All questions with regard to surgery, rehab, expected return to functional activities, activities of daily living and recreational endeavors were answered to his satisfaction.    It was explained to the patient that there may be an increase in surgical risks if the patient has certain co-morbidities such as but not limited to: Obesity, Cardiovascular issues (CHF, CAD, Arrhythmias), chronic pulmonary issues, previous or current neurovascular/neurological issues, previous strokes, diabetes mellitus, previous wound healing issues, previous wound or skin infections, PVD, clotting disorders, if the patient uses chronic steroids, if the patient takes or has immune compromising medications or diseases, or has previously or currently used tobacco products.     The patient verbalized that he/she does not have any additional clotting, bleeding, or blood disorders, other than what is list in her chart on today's review.     Then a brief history and physical exam were performed.    Review of Systems   Constitution: Negative. Negative for chills, fever and night sweats.   HENT: Negative for congestion and headaches.    Eyes: Negative for blurred vision, left vision loss and right vision loss.   Cardiovascular: Negative for chest pain and syncope.   Respiratory: Negative for cough and shortness of breath.    Endocrine: Negative for polydipsia, polyphagia and " polyuria.   Hematologic/Lymphatic: Negative for bleeding problem. Does not bruise/bleed easily.   Skin: Negative for dry skin, itching and rash.   Musculoskeletal: Negative for falls and muscle weakness.   Gastrointestinal: Negative for abdominal pain and bowel incontinence.   Genitourinary: Negative for bladder incontinence and nocturia.   Neurological: Negative for disturbances in coordination, loss of balance and seizures.   Psychiatric/Behavioral: Negative for depression. The patient does not have insomnia.    Allergic/Immunologic: Negative for hives and persistent infections.     PHYSICAL EXAM:  GEN: A&Ox3, WD WN NAD  HEENT: WNL  CHEST: CTAB, no W/R/R  HEART: RRR, no M/R/G  ABD: Soft, NT ND, BS x4 QUADS  MS; See Epic  NEURO: CN II-XII intact       The surgical consent was then reviewed with the patient, who agreed with all the contents of the consent form and it was signed. he was then given the [facility] surgery packet to bring with him to [facility] for the anesthesia portion of his perioperative paperwork.   For all physicians except for Dr. Epps, we will email and possibly fax the consent forms and booking sheets to [facility] pre-admit.    The patient was given the opportunity to ask questions about the surgical plan and consent form, and once no other questions were asked, I proceeded with the pre-op appointment.    PHYSICAL THERAPY:  He was also instructed regarding physical therapy and will begin on  POD1. He was given a copy of the original prescription to schedule. Another copy of this prescription was also faxed to [facility].    POST OP CARE:instructions were reviewed including care of the wound and dressing after surgery and when he can shower.     PAIN MANAGEMENT: Bhaskar Stiles was also given his pain management regime, which includes the TENS unit given to him by geo along with the education required for its use. He was also instructed regarding the Polar ice unit that will be in  place after surgery and his postoperative pain medications.     PAIN MEDICATION:  Percocet 10/325mg 1 po q 4-6 hours prn pain  Ultram 50 mg Take 1-2 p.o. q.6 hours p.r.n. breakthrough pain,   Phenergan 25 mg one p.o. q.6 hours p.r.n. nausea and vomiting.    DVT prophylaxis was discussed with the patient today including risk factors for developing DVTs and history of DVTs. The patient was asked if any specific recommendations were given from the doctor/s that did pre-operative surgical clearance. The patient was then given an education sheet about DVTs and PE with warning signs and symptoms of both and steps to take if they suspect either of these.    This along with the Modified Caprini risk assessment model for VTE in general surgical patients was used to determine the patient's DVT risk.     From: Srinivas ARNOLD, Mu DA, Jennifer LYON, et al. Prevention of VTE in nonorthopedic surgical patients: antithrombotic therapy and prevention of thrombosis, 9th ed: American College of Chest Physicians evidence-based clinical practical guidelines. Chest 2012; 141:e227S. Copyright © 2012. Reproduced with permission from the American College of Chest Physicians.    The below listed DVT prophylaxis regimen along with bilateral SOPHIE compression stockings will be used post-op. Length of treatment has been determined to be 10-42 days post-op by the above noted Caprini assessment model.     The patient was instructed to buy and take:  Aspirin 81mg QD x 4 weeks for DVT prophylaxis starting on the morning after surgery.  Patient will also use bilateral TEDs on lower extremities, SCDs during surgery, and early ambulation post-op. If the patient was previously taking 81mg baby aspirin, they were told to not take it starting 5 days prior to surgery and to restart the 81mg aspirin after surgery.       Patient was also told to buy over the counter Prilosec medication if needed and take it once daily for GI protection as long as they are taking  NSAIDs or Aspirin.    Patient denies history of seizures.       The patient was told that narcotic pain medications may make them drowsy and instructions were given to not sign legal documents, drive or operate heavy machinery, cars, or equipment while under the influence of narcotic medications. The patient was told and understands that narcotic pain medications should only be used as needed to control pain and that other options of pain control include TENs unit and ice packs/unit.     As there were no other questions to be asked, he was given my business card along with Shelbi Hughes MD business card if he has any questions or concerns prior to surgery or in the postop period.     At the end of our encounter today, the patient was given the option and asked if they would like to see the surgeon regarding questions or concerns that they have about the consent form or the surgical procedure. The patient has seen Dr. Hughes previously to discuss surgery.

## 2019-08-21 NOTE — ANESTHESIA PREPROCEDURE EVALUATION
08/21/2019  Bhaskar Stiles is a 48 y.o., male.    Anesthesia Evaluation    I have reviewed the Patient Summary Reports.    I have reviewed the Nursing Notes.   I have reviewed the Medications.     Review of Systems  Anesthesia Hx:  No problems with previous Anesthesia  History of prior surgery of interest to airway management or planning: Denies Family Hx of Anesthesia complications.   Denies Personal Hx of Anesthesia complications.   Social:  Non-Smoker    Hematology/Oncology:  Hematology Normal   Oncology Normal     EENT/Dental:EENT/Dental Normal   Cardiovascular:   Hypertension, well controlled    Pulmonary:  Pulmonary Normal    Renal/:  Renal/ Normal     Hepatic/GI:  Hepatic/GI Normal    Musculoskeletal:  Musculoskeletal Normal    Neurological:  Neurology Normal    Endocrine:  Endocrine Normal    Dermatological:  Skin Normal    Psych:  Psychiatric Normal           Physical Exam  General:  Well nourished    Airway/Jaw/Neck:  Airway Findings: Mouth Opening: Normal Tongue: Normal  General Airway Assessment: Adult  Mallampati: II      Dental:  Dental Findings: In tact        Mental Status:  Mental Status Findings:  Cooperative         Anesthesia Plan  Type of Anesthesia, risks & benefits discussed:  Anesthesia Type:  general  Patient's Preference:   Intra-op Monitoring Plan: standard ASA monitors  Intra-op Monitoring Plan Comments:   Post Op Pain Control Plan: per primary service following discharge from PACU and multimodal analgesia  Post Op Pain Control Plan Comments:   Induction:   IV  Beta Blocker:         Informed Consent: Patient understands risks and agrees with Anesthesia plan.  Questions answered. Anesthesia consent signed with patient.  ASA Score: 2     Day of Surgery Review of History & Physical:    H&P update referred to the surgeon.         Ready For Surgery From Anesthesia Perspective.

## 2019-08-22 ENCOUNTER — ANESTHESIA (OUTPATIENT)
Dept: SURGERY | Facility: OTHER | Age: 49
End: 2019-08-22
Payer: COMMERCIAL

## 2019-08-22 ENCOUNTER — HOSPITAL ENCOUNTER (OUTPATIENT)
Facility: OTHER | Age: 49
Discharge: HOME | End: 2019-08-22
Attending: ORTHOPAEDIC SURGERY | Admitting: ORTHOPAEDIC SURGERY
Payer: COMMERCIAL

## 2019-08-22 VITALS
BODY MASS INDEX: 35.42 KG/M2 | DIASTOLIC BLOOD PRESSURE: 74 MMHG | SYSTOLIC BLOOD PRESSURE: 118 MMHG | HEART RATE: 78 BPM | TEMPERATURE: 98 F | WEIGHT: 253 LBS | HEIGHT: 71 IN | OXYGEN SATURATION: 97 % | RESPIRATION RATE: 18 BRPM

## 2019-08-22 DIAGNOSIS — M67.52 PLICA OF KNEE, LEFT: ICD-10-CM

## 2019-08-22 DIAGNOSIS — M67.50 PLICA SYNDROME: ICD-10-CM

## 2019-08-22 DIAGNOSIS — M17.12 OSTEOARTHRITIS OF LEFT KNEE, UNSPECIFIED OSTEOARTHRITIS TYPE: ICD-10-CM

## 2019-08-22 PROCEDURE — 63600175 PHARM REV CODE 636 W HCPCS: Performed by: ANESTHESIOLOGY

## 2019-08-22 PROCEDURE — 71000039 HC RECOVERY, EACH ADD'L HOUR: Performed by: ORTHOPAEDIC SURGERY

## 2019-08-22 PROCEDURE — 37000008 HC ANESTHESIA 1ST 15 MINUTES: Performed by: ORTHOPAEDIC SURGERY

## 2019-08-22 PROCEDURE — 25000003 PHARM REV CODE 250: Performed by: ORTHOPAEDIC SURGERY

## 2019-08-22 PROCEDURE — 25000003 PHARM REV CODE 250: Performed by: ANESTHESIOLOGY

## 2019-08-22 PROCEDURE — 36000711: Performed by: ORTHOPAEDIC SURGERY

## 2019-08-22 PROCEDURE — 29880 PR KNEE SCOPE MED/LAT MENISCECTOMY: ICD-10-PCS | Mod: LT,,, | Performed by: ORTHOPAEDIC SURGERY

## 2019-08-22 PROCEDURE — 71000015 HC POSTOP RECOV 1ST HR: Performed by: ORTHOPAEDIC SURGERY

## 2019-08-22 PROCEDURE — 71000033 HC RECOVERY, INTIAL HOUR: Performed by: ORTHOPAEDIC SURGERY

## 2019-08-22 PROCEDURE — 36000710: Performed by: ORTHOPAEDIC SURGERY

## 2019-08-22 PROCEDURE — 71000016 HC POSTOP RECOV ADDL HR: Performed by: ORTHOPAEDIC SURGERY

## 2019-08-22 PROCEDURE — 63600175 PHARM REV CODE 636 W HCPCS: Performed by: ORTHOPAEDIC SURGERY

## 2019-08-22 PROCEDURE — 25000003 PHARM REV CODE 250: Performed by: NURSE ANESTHETIST, CERTIFIED REGISTERED

## 2019-08-22 PROCEDURE — 63600175 PHARM REV CODE 636 W HCPCS: Performed by: PHYSICIAN ASSISTANT

## 2019-08-22 PROCEDURE — 27201423 OPTIME MED/SURG SUP & DEVICES STERILE SUPPLY: Performed by: ORTHOPAEDIC SURGERY

## 2019-08-22 PROCEDURE — 37000009 HC ANESTHESIA EA ADD 15 MINS: Performed by: ORTHOPAEDIC SURGERY

## 2019-08-22 PROCEDURE — 63600175 PHARM REV CODE 636 W HCPCS: Performed by: NURSE ANESTHETIST, CERTIFIED REGISTERED

## 2019-08-22 PROCEDURE — 29880 ARTHRS KNE SRG MNISECTMY M&L: CPT | Mod: LT,,, | Performed by: ORTHOPAEDIC SURGERY

## 2019-08-22 RX ORDER — SODIUM CHLORIDE, SODIUM LACTATE, POTASSIUM CHLORIDE, CALCIUM CHLORIDE 600; 310; 30; 20 MG/100ML; MG/100ML; MG/100ML; MG/100ML
INJECTION, SOLUTION INTRAVENOUS CONTINUOUS
Status: DISCONTINUED | OUTPATIENT
Start: 2019-08-22 | End: 2019-08-22 | Stop reason: HOSPADM

## 2019-08-22 RX ORDER — HYDROMORPHONE HYDROCHLORIDE 2 MG/ML
0.4 INJECTION, SOLUTION INTRAMUSCULAR; INTRAVENOUS; SUBCUTANEOUS EVERY 5 MIN PRN
Status: DISCONTINUED | OUTPATIENT
Start: 2019-08-22 | End: 2019-08-22 | Stop reason: HOSPADM

## 2019-08-22 RX ORDER — LIDOCAINE HCL/PF 100 MG/5ML
SYRINGE (ML) INTRAVENOUS
Status: DISCONTINUED | OUTPATIENT
Start: 2019-08-22 | End: 2019-08-22

## 2019-08-22 RX ORDER — MIDAZOLAM HYDROCHLORIDE 1 MG/ML
INJECTION, SOLUTION INTRAMUSCULAR; INTRAVENOUS
Status: DISCONTINUED | OUTPATIENT
Start: 2019-08-22 | End: 2019-08-22

## 2019-08-22 RX ORDER — KETAMINE HCL IN 0.9 % NACL 50 MG/5 ML
SYRINGE (ML) INTRAVENOUS
Status: DISCONTINUED | OUTPATIENT
Start: 2019-08-22 | End: 2019-08-22

## 2019-08-22 RX ORDER — OXYCODONE HYDROCHLORIDE 5 MG/1
5 TABLET ORAL
Status: DISCONTINUED | OUTPATIENT
Start: 2019-08-22 | End: 2019-08-22 | Stop reason: HOSPADM

## 2019-08-22 RX ORDER — OXYCODONE HYDROCHLORIDE 5 MG/1
10 TABLET ORAL EVERY 4 HOURS PRN
Status: CANCELLED | OUTPATIENT
Start: 2019-08-22

## 2019-08-22 RX ORDER — MEPERIDINE HYDROCHLORIDE 25 MG/ML
12.5 INJECTION INTRAMUSCULAR; INTRAVENOUS; SUBCUTANEOUS ONCE AS NEEDED
Status: DISCONTINUED | OUTPATIENT
Start: 2019-08-22 | End: 2019-08-22 | Stop reason: HOSPADM

## 2019-08-22 RX ORDER — ATROPINE SULFATE 0.1 MG/ML
0.4 INJECTION INTRAVENOUS ONCE
Status: COMPLETED | OUTPATIENT
Start: 2019-08-22 | End: 2019-08-22

## 2019-08-22 RX ORDER — KETOROLAC TROMETHAMINE 30 MG/ML
INJECTION, SOLUTION INTRAMUSCULAR; INTRAVENOUS
Status: DISCONTINUED | OUTPATIENT
Start: 2019-08-22 | End: 2019-08-22 | Stop reason: HOSPADM

## 2019-08-22 RX ORDER — PREGABALIN 75 MG/1
75 CAPSULE ORAL
Status: COMPLETED | OUTPATIENT
Start: 2019-08-22 | End: 2019-08-22

## 2019-08-22 RX ORDER — SODIUM CHLORIDE 9 MG/ML
INJECTION, SOLUTION INTRAVENOUS CONTINUOUS
Status: DISCONTINUED | OUTPATIENT
Start: 2019-08-22 | End: 2019-08-22 | Stop reason: HOSPADM

## 2019-08-22 RX ORDER — DEXAMETHASONE SODIUM PHOSPHATE 4 MG/ML
INJECTION, SOLUTION INTRA-ARTICULAR; INTRALESIONAL; INTRAMUSCULAR; INTRAVENOUS; SOFT TISSUE
Status: DISCONTINUED | OUTPATIENT
Start: 2019-08-22 | End: 2019-08-22

## 2019-08-22 RX ORDER — EPINEPHRINE 1 MG/ML
INJECTION, SOLUTION INTRACARDIAC; INTRAMUSCULAR; INTRAVENOUS; SUBCUTANEOUS
Status: DISCONTINUED | OUTPATIENT
Start: 2019-08-22 | End: 2019-08-22 | Stop reason: HOSPADM

## 2019-08-22 RX ORDER — CEFAZOLIN SODIUM 1 G/3ML
2 INJECTION, POWDER, FOR SOLUTION INTRAMUSCULAR; INTRAVENOUS
Status: COMPLETED | OUTPATIENT
Start: 2019-08-22 | End: 2019-08-22

## 2019-08-22 RX ORDER — ROPIVACAINE HYDROCHLORIDE 5 MG/ML
INJECTION, SOLUTION EPIDURAL; INFILTRATION; PERINEURAL
Status: DISCONTINUED | OUTPATIENT
Start: 2019-08-22 | End: 2019-08-22 | Stop reason: HOSPADM

## 2019-08-22 RX ORDER — FENTANYL CITRATE 50 UG/ML
INJECTION, SOLUTION INTRAMUSCULAR; INTRAVENOUS
Status: DISCONTINUED | OUTPATIENT
Start: 2019-08-22 | End: 2019-08-22

## 2019-08-22 RX ORDER — CELECOXIB 200 MG/1
400 CAPSULE ORAL
Status: COMPLETED | OUTPATIENT
Start: 2019-08-22 | End: 2019-08-22

## 2019-08-22 RX ORDER — PROMETHAZINE HYDROCHLORIDE 25 MG/1
25 TABLET ORAL EVERY 6 HOURS PRN
Status: CANCELLED | OUTPATIENT
Start: 2019-08-22

## 2019-08-22 RX ORDER — ONDANSETRON 2 MG/ML
4 INJECTION INTRAMUSCULAR; INTRAVENOUS DAILY PRN
Status: DISCONTINUED | OUTPATIENT
Start: 2019-08-22 | End: 2019-08-22 | Stop reason: HOSPADM

## 2019-08-22 RX ORDER — ONDANSETRON 2 MG/ML
4 INJECTION INTRAMUSCULAR; INTRAVENOUS EVERY 12 HOURS PRN
Status: CANCELLED | OUTPATIENT
Start: 2019-08-22

## 2019-08-22 RX ORDER — LIDOCAINE HYDROCHLORIDE 10 MG/ML
0.5 INJECTION, SOLUTION EPIDURAL; INFILTRATION; INTRACAUDAL; PERINEURAL ONCE
Status: DISCONTINUED | OUTPATIENT
Start: 2019-08-22 | End: 2019-08-22 | Stop reason: HOSPADM

## 2019-08-22 RX ORDER — ACETAMINOPHEN 500 MG
1000 TABLET ORAL
Status: COMPLETED | OUTPATIENT
Start: 2019-08-22 | End: 2019-08-22

## 2019-08-22 RX ORDER — PROPOFOL 10 MG/ML
VIAL (ML) INTRAVENOUS
Status: DISCONTINUED | OUTPATIENT
Start: 2019-08-22 | End: 2019-08-22

## 2019-08-22 RX ORDER — MORPHINE SULFATE 10 MG/ML
2 INJECTION INTRAMUSCULAR; INTRAVENOUS; SUBCUTANEOUS EVERY 10 MIN PRN
Status: CANCELLED | OUTPATIENT
Start: 2019-08-22

## 2019-08-22 RX ORDER — TRAMADOL HYDROCHLORIDE 50 MG/1
100 TABLET ORAL EVERY 6 HOURS PRN
Status: DISCONTINUED | OUTPATIENT
Start: 2019-08-22 | End: 2019-08-22 | Stop reason: HOSPADM

## 2019-08-22 RX ORDER — ONDANSETRON 2 MG/ML
INJECTION INTRAMUSCULAR; INTRAVENOUS
Status: DISCONTINUED | OUTPATIENT
Start: 2019-08-22 | End: 2019-08-22

## 2019-08-22 RX ORDER — ATROPINE SULFATE 0.1 MG/ML
INJECTION INTRAVENOUS
Status: DISCONTINUED
Start: 2019-08-22 | End: 2019-08-22 | Stop reason: HOSPADM

## 2019-08-22 RX ORDER — GLYCOPYRROLATE 0.2 MG/ML
INJECTION INTRAMUSCULAR; INTRAVENOUS
Status: DISCONTINUED | OUTPATIENT
Start: 2019-08-22 | End: 2019-08-22

## 2019-08-22 RX ORDER — SODIUM CHLORIDE 0.9 % (FLUSH) 0.9 %
3 SYRINGE (ML) INJECTION
Status: DISCONTINUED | OUTPATIENT
Start: 2019-08-22 | End: 2019-08-22 | Stop reason: HOSPADM

## 2019-08-22 RX ORDER — MIDAZOLAM HYDROCHLORIDE 1 MG/ML
2 INJECTION INTRAMUSCULAR; INTRAVENOUS
Status: COMPLETED | OUTPATIENT
Start: 2019-08-22 | End: 2019-08-22

## 2019-08-22 RX ORDER — KETAMINE HYDROCHLORIDE 50 MG/ML
INJECTION, SOLUTION INTRAMUSCULAR; INTRAVENOUS
Status: DISCONTINUED | OUTPATIENT
Start: 2019-08-22 | End: 2019-08-22 | Stop reason: HOSPADM

## 2019-08-22 RX ADMIN — PROPOFOL 250 MG: 10 INJECTION, EMULSION INTRAVENOUS at 07:08

## 2019-08-22 RX ADMIN — FENTANYL CITRATE 100 MCG: 50 INJECTION, SOLUTION INTRAMUSCULAR; INTRAVENOUS at 07:08

## 2019-08-22 RX ADMIN — GLYCOPYRROLATE 0.2 MG: 0.2 INJECTION, SOLUTION INTRAMUSCULAR; INTRAVENOUS at 07:08

## 2019-08-22 RX ADMIN — LIDOCAINE HYDROCHLORIDE 100 MG: 20 INJECTION, SOLUTION INTRAVENOUS at 07:08

## 2019-08-22 RX ADMIN — MIDAZOLAM 2 MG: 1 INJECTION INTRAMUSCULAR; INTRAVENOUS at 06:08

## 2019-08-22 RX ADMIN — DEXAMETHASONE SODIUM PHOSPHATE 8 MG: 4 INJECTION, SOLUTION INTRAMUSCULAR; INTRAVENOUS at 07:08

## 2019-08-22 RX ADMIN — PROPOFOL 40 MG: 10 INJECTION, EMULSION INTRAVENOUS at 08:08

## 2019-08-22 RX ADMIN — ONDANSETRON 4 MG: 2 INJECTION INTRAMUSCULAR; INTRAVENOUS at 08:08

## 2019-08-22 RX ADMIN — Medication 10 MG: at 07:08

## 2019-08-22 RX ADMIN — SODIUM CHLORIDE, SODIUM LACTATE, POTASSIUM CHLORIDE, AND CALCIUM CHLORIDE: 600; 310; 30; 20 INJECTION, SOLUTION INTRAVENOUS at 06:08

## 2019-08-22 RX ADMIN — MIDAZOLAM HYDROCHLORIDE 2 MG: 1 INJECTION, SOLUTION INTRAMUSCULAR; INTRAVENOUS at 05:08

## 2019-08-22 RX ADMIN — Medication 30 MG: at 07:08

## 2019-08-22 RX ADMIN — PREGABALIN 75 MG: 75 CAPSULE ORAL at 05:08

## 2019-08-22 RX ADMIN — FENTANYL CITRATE 50 MCG: 50 INJECTION, SOLUTION INTRAMUSCULAR; INTRAVENOUS at 07:08

## 2019-08-22 RX ADMIN — CEFAZOLIN 2 G: 330 INJECTION, POWDER, FOR SOLUTION INTRAMUSCULAR; INTRAVENOUS at 07:08

## 2019-08-22 RX ADMIN — CELECOXIB 400 MG: 200 CAPSULE ORAL at 05:08

## 2019-08-22 RX ADMIN — ATROPINE SULFATE 0.4 MG: 0.1 INJECTION PARENTERAL at 09:08

## 2019-08-22 RX ADMIN — ACETAMINOPHEN 1000 MG: 500 TABLET, FILM COATED ORAL at 05:08

## 2019-08-22 NOTE — OR NURSING
Pt blood pressure 79/46 pulse down to 38. Anesthesia Chandu notified. See new order rec'd/ med given.

## 2019-08-22 NOTE — OR NURSING
Discharge instructions, polar care instructions  and crutch instructions reviewed with pt and spouse.   Assisted to dress for discharge, tolerated well.

## 2019-08-22 NOTE — DISCHARGE SUMMARY
[facility]  Brief Operative Note     SUMMARY     Surgery Date: 8/22/2019     Surgeon(s) and Role:     * Shelbi Hughes MD - Primary    Assisting Surgeon: Dr. Elkin Rollins MD, PGY6    Pre-op Diagnosis:  Plica syndrome [M67.50]  Acute medial meniscus tear of left knee, initial encounter [S83.242A]    Post-op Diagnosis:  Post-Op Diagnosis Codes:     * Plica syndrome [M67.50]     * Acute medial meniscus tear of left knee, initial encounter [S83.242A]    Procedure(s) (LRB):  PARTIAL SYNOVECTOMY, KNEE (Left)  ARTHROSCOPY, KNEE, WITH MEDIAL AND LATERAL MENISCECTOMY (Left)  CHONDROPLASTY, KNEE (Left)  REMOVAL, FOREIGN BODY, LOOSE BODY,  LOWER EXTREMITY (Left)  EXCISION, MEDIAL PLICA, KNEE, ARTHROSCOPIC (Left)  [id number] (Left)    Anesthesia: General    Description of the findings of the procedure: left knee arthroscopy    Findings/Key Components: left knee arthroscopy    Estimated Blood Loss: minimal         Specimens:   Specimen (12h ago, onward)    None          Discharge Note    SUMMARY     Admit Date: 8/22/2019    Discharge Date and Time:  08/22/2019 8:34 AM    Hospital Course (synopsis of major diagnoses, care, treatment, and services provided during the course of the hospital stay): Patient underwent outpatient knee surgery and was transferred to PACU in stable condition.  In PACU, patient received appropriate post-operative care and discharged home with plans for physical therapy and follow-up with the operative surgeon.    Diet: Regular     Final Diagnosis: Post-Op Diagnosis Codes:     * Plica syndrome [M67.50]     * Acute medial meniscus tear of left knee, initial encounter [S83.242A]    Disposition: Home or Self Care    Follow Up/Patient Instructions:     Medications:  Reconciled Home Medications:      Medication List      CONTINUE taking these medications    amLODIPine 10 MG tablet  Commonly known as:  NORVASC  Take 1 tablet (10 mg total) by mouth once daily.     atorvastatin  "20 MG tablet  Commonly known as:  LIPITOR  Take 20 mg by mouth once daily.     HYDROcodone-acetaminophen  mg per tablet  Commonly known as:  NORCO  Take 1 tablet by mouth every 4-6 hours for pain.     meloxicam 15 MG tablet  Commonly known as:  MOBIC  Take 1 tablet (15 mg total) by mouth once daily. With food.     promethazine 25 MG tablet  Commonly known as:  PHENERGAN  Take 1 tablet (25 mg total) by mouth every 6 (six) hours as needed for Nausea.          Discharge Procedure Orders   CRUTCHES FOR HOME USE   Order Comments: Provide if needed     Order Specific Question Answer Comments   Type: Axillary    Height: 5' 11" (1.803 m)    Weight: 114.8 kg (253 lb)    Does patient have medical equipment at home? none    Length of need (1-99 months): 24      Diet general     Call MD for:  temperature >100.4     Call MD for:  persistent nausea and vomiting     Call MD for:  severe uncontrolled pain     Call MD for:  difficulty breathing, headache or visual disturbances     Call MD for:  redness, tenderness, or signs of infection (pain, swelling, redness, odor or green/yellow discharge around incision site)     Call MD for:  hives     Call MD for:  persistent dizziness or light-headedness     Ice to affected area     Keep surgical extremity elevated     No driving, operating heavy equipment or signing legal documents while taking pain medication     Remove dressing in 72 hours     Shower on day dressing removed (No bath)     Weight bearing as tolerated     Follow-up Information     Shelbi Hughes MD. Go in 2 weeks.    Specialties:  Sports Medicine, Orthopedic Surgery  Why:  For wound re-check  Contact information:  [address] [place] [address]  [phone]                 "

## 2019-08-22 NOTE — OR NURSING
C/o feeling slightly  dizzy after assisting to dress , assisted to lie back in bed for now, vs stable, wife at bedside

## 2019-08-22 NOTE — OR NURSING
States dizziness has subsided, assisted up to sit on bedside, tolerated well, iv discontinued, states ready for discharge.

## 2019-08-22 NOTE — INTERVAL H&P NOTE
The patient has been examined and the H&P has been reviewed:    I concur with the findings and no changes have occurred since H&P was written.    Anesthesia/Surgery risks, benefits and alternative options discussed and understood by patient/family.          Active Hospital Problems    Diagnosis  POA    Plica of knee, left [M67.52]  Yes      Resolved Hospital Problems   No resolved problems to display.

## 2019-08-22 NOTE — PLAN OF CARE
Bhaskar Stiles has met all discharge criteria from Phase II. Vital Signs are stable, ambulating  without difficulty. Discharge instructions given, patient verbalized understanding. Discharged from facility via wheelchair in stable condition.

## 2019-08-22 NOTE — OR NURSING
Pt arousable. Oriented sleepy. Pulse sustained in 70's for approx 50 minutes blood pressure sustained. Dr Schofield at bedside to evaluate patient. States ok to go to ACU. Pt has been off of oxygen for >30 minutes and has maintains O2 Sats >94% throughout that time. Pt without change from previous assessment. Continues without c/o pain at this time. prpepared for transfer to acu.

## 2019-08-22 NOTE — ANESTHESIA POSTPROCEDURE EVALUATION
Anesthesia Post Evaluation    Patient: Bhaskar Stiles    Procedure(s) Performed: Procedure(s) (LRB):  PARTIAL SYNOVECTOMY, KNEE (Left)  ARTHROSCOPY, KNEE, WITH MEDIAL AND LATERAL MENISCECTOMY (Left)  CHONDROPLASTY, KNEE (Left)  REMOVAL, FOREIGN BODY, LOOSE BODY,  LOWER EXTREMITY (Left)  EXCISION, MEDIAL PLICA, KNEE, ARTHROSCOPIC (Left)  [id number] (Left)    Final Anesthesia Type: general  Patient location during evaluation: PACU  Patient participation: Yes- Able to Participate  Level of consciousness: awake and alert  Post-procedure vital signs: reviewed and stable  Pain management: adequate  Airway patency: patent  PONV status at discharge: No PONV  Anesthetic complications: no      Cardiovascular status: blood pressure returned to baseline and stable (rec'd single dose atropine for bradycardia and relative hypotention (HR 40, SBP 79), good response )  Respiratory status: unassisted, spontaneous ventilation and room air  Hydration status: euvolemic  Follow-up not needed.          Vitals Value Taken Time   /58 8/22/2019 10:04 AM   Temp 36.4 °C (97.5 °F) 8/22/2019  8:30 AM   Pulse 75 8/22/2019 10:07 AM   Resp 16 8/22/2019  8:30 AM   SpO2 98 % 8/22/2019 10:07 AM   Vitals shown include unvalidated device data.      No case tracking events are documented in the log.      Pain/Betty Score: Pain Rating Prior to Med Admin: 0 (8/22/2019  5:51 AM)  Betty Score: 9 (8/22/2019 10:00 AM)

## 2019-08-22 NOTE — OP NOTE
DATE OF PROCEDURE: 08/22/2019    SURGEON:  Shelbi Hughes M.D    ASSISTANT:  DREW Rollins MD, PGY-6  ASSISTANT: ZULMA Tesfaye PA-C      PREOPERATIVE DIAGNOSES:   left  1. knee medial and lateral meniscus tear   2. knee plica.   3. knee possible chondromalacia  4. knee synovitis  5. Knee adhesions  6. Knee loose body    POSTOPERATIVE DIAGNOSES:   left  1. knee medial and lateral meniscus tear   2. knee plica.   3. knee chondromalacia  4. knee synovitis.   5. Knee adhesions  6. Knee loose body    PROCEDURE PERFORMED:   left  1. knee arthroscopic chondroplasty (CPT 14537)  2. knee arthroscopic medial and lateral (CPT 21818) meniscectomy   3. knee arthroscopic partial synovectomy/debridement (CPT 51595).   4. knee arthroscopic plica excision(CPT 76389).    5. Knee arthroscopic lysis of adhesions (CPT 54179)  7. Knee arthroscopic loose body removal (CPT 58223)    ANESTHESIA: General with local 0.5% ropivicaine 30ml (5mg/ml), 60 mg ketamine, 60mg toradol (2ml toradol (30mg/ml))    BLOOD LOSS: Minimal.     DRAINS: None.     TOURNIQUET TIME: None.     COMPLICATIONS: None.     CONDITION ON TRANSFER: The patient was extubated and moved to the recovery room in stable condition, with compartments soft and capillary refill less than a   second in all digits.     BRIEF INDICATION OF MEDICAL NECESSITY: The patient is a 48 y.o. year-old male who has history and physical examination findings consistent with the above. Nonoperative versus operative options were discussed. The risks and benefits were discussed with the patient. The patient acknowledged understanding and wished to proceed with operative intervention. Informed consent was obtained prior to the procedure. Details of the surgical procedure were explained, including incisions and probable rehabilitation course. The patient understands the likely length of convalescence after surgery; and we have explained the risks, benefits, and alternatives of surgery. Reasonable expectations  and potential complications were discussed and acknowledged, including but not limited to infection, bleeding, blood clots, (DVT and/or PE), nerve injury, retear, instability, continued pain and stiffness. It was also explained that there was a chance of failure which may require further management. The patient agreed and understood and wished to proceed.     EXAMINATION UNDER ANESTHESIA of the OPERATIVE left KNEE: ROM 0-135 degrees, negative Lachman, negative pivot shift, stable to varus-valgus stress testing, negative effusion.     EXAMINATION UNDER ANESTHESIA of the NON-OPERATED right KNEE: ROM 0-135 degrees, negative Lachman, negative pivot shift, stable to varus-valgus stress testing, negative effusion.     PROCEDURE IN DETAIL: The correct operative site was marked by the operative surgeon.  The patient was then taken to the operating room and placed supine on the operating room table. General anesthesia was administered by the anesthesia team. All pressure points were carefully padded and checked. Preoperative antibiotics were administered. A well-padded tourniquet was placed high on the operative thigh. Examination was begun with the above findings. The non-operative leg was then placed a well-padded well-leg suresh, in a comfortable position. The operative leg was placed in an arthroscopic leg suresh at the level of the tourniquet. The operative leg was prepped and draped in the usual sterile fashion. After prepping and draping, the appropriate landmarks were noted on the skin.  2cc skin and sub-cutaneous tissue was infilttrated with local anesthetic mixture superolaterally at needle insufflation site. The knee was insufflated supero-laterally with saline. 9cc skin wheal and sub-cutaneous tissue and fat pad was infilttrated with local anesthetic mixture at both portal sites; mid-lateral followed by infero-medial portals were created, and a systematic examination of the joint revealed the following:    There  was no evidence of any suprapatellar pouch adhesions or compartmentalization.  There was no evidence of any loose bodies in the medial or lateral gutters.     In the patellofemoral compartment, there was chondral damage to:  Trochlea 15 x 10 mm grade 3  Chondroplasty was performed using arthroscopic shaver.    There was chondral damage to:  Medial femoral condyle 15 x 20 mm grade 3  Chondroplasty was performed using arthroscopic shaver.    In the medial compartment there was no evidence of meniscal instability.   Posterior horn medial meniscus tear,  complex was debrided with arthroscopic shaver and biter.  33% was debrided over an area of 2 cm.  Root and hoop fibers remained intact.    Attention was then turned to the notch. The ACL and PCL were probed carefully and found to be stable.     There was a hypertrophic infrapatellar and medial plica.  This was debrided using arthroscopic biter and shaver.    There was some scar about the ACL.  This was debrided with thermal device and lysis of adhesions was performed.    In the lateral compartment there was no evidence of chondral damage.   In the lateral compartment there was no evidence of meniscal instability.   Posterior near root horn lateral meniscus tear,  parrot-beak was debrided with arthroscopic shaver and biter.  10% was debrided over an area of .5 cm.  Root and hoop fibers remained intact.    Loose body measuring 5 x 5 x 7 mm was removed from lateral compartment using arthroscopic grasper.    Synovitis was debrided in the knee as needed to the areas of concern in medial and lateral compartments.      The knee and incisions were then copiously irrigated and fluid was extravasated using suction.  The arthroscopic portal incisions were closed using inverted 4-0 Monocryl suture.  5cc skin and sub-cutaneous tissue and around portals were infilttrated with local anesthetic mixture at both portal sites.  Steri-Strips were placed with Mastisol. Sterile TENS unit  pads were placed which were medically necessary for pain relief. Wounds were dressed with Xeroform, 4x4s, and cast padding. SOPHIE hose was placed on the operative leg to match that of the SOPHIE hose placed preoperatively on the non-operative leg. Iceman was secured.  The patient was extubated and moved to the recovery room in stable condition with compartments soft and capillary refill less than a second in all digits.     POSTOPERATIVE PLAN: We will be following the arthroscopic partial meniscectomy guidelines with emphasis on patellar mobility.  This was discussed this with the patient's family after surgery.

## 2019-08-23 ENCOUNTER — CLINICAL SUPPORT (OUTPATIENT)
Dept: REHABILITATION | Facility: HOSPITAL | Age: 49
End: 2019-08-23
Payer: COMMERCIAL

## 2019-08-23 DIAGNOSIS — R26.2 DIFFICULTY WALKING: ICD-10-CM

## 2019-08-23 DIAGNOSIS — M25.662 STIFFNESS OF LEFT KNEE: ICD-10-CM

## 2019-08-23 PROCEDURE — 97112 NEUROMUSCULAR REEDUCATION: CPT | Performed by: PHYSICAL THERAPIST

## 2019-08-23 PROCEDURE — 97161 PT EVAL LOW COMPLEX 20 MIN: CPT | Performed by: PHYSICAL THERAPIST

## 2019-08-23 NOTE — PROGRESS NOTES
[facility]  Physical Therapy Initial Evaluation    Name: Bhaskar Stiles  Clinic Number: [id number]    Therapy Diagnosis:   Encounter Diagnoses   Name Primary?    Difficulty walking     Stiffness of left knee      Physician: Shelbi Hughes MD    Physician Orders: PT Eval and Treat   Medical Diagnosis from Referral:   M67.50 (ICD-10-CM) - Plica syndrome   M17.12 (ICD-10-CM) - Osteoarthritis of left knee, unspecified osteoarthritis type   M67.52 (ICD-10-CM) - Plica of knee, left     Evaluation Date: 8/23/2019  Authorization Period Expiration: 12/31/2019  Plan of Care Expiration: 11/29/2019  Visit # / Visits authorized: 1/ 20    Time In: 1100  Time Out: 1155  Total Billable Time: 55 minutes    Precautions: Standard,       POSTOPERATIVE PLAN: We will be following the arthroscopic partial meniscectomy guidelines with emphasis on patellar mobility.  This was discussed this with the patient's family after surgery.    Subjective   Date of onset: 8/22/2019  History of current condition - Bhaskar reports: He underwent a L knee partial meniscectomy yesterday. He was having trouble with chronic swelling in the knee causing pain. He is unsure the original cause of pain in the knee. He notes wanting to lose about 40 lbs due to inability to exercise. He is currently having difficulty walking with crutches due to lacking weightbearing and coordinating movements.        Past Medical History:   Diagnosis Date    Hyperlipidemia     Hypertension     Syncope and collapse      Bhaskar Stiles  has a past surgical history that includes Tilt table test (N/A, 7/9/2018); Hernia repair; Shoulder surgery; Lipoma resection; Synovectomy of knee (Left, 8/22/2019); Knee arthroscopy w/ meniscectomy (Left, 8/22/2019); Chondroplasty of knee (Left, 8/22/2019); Removal of foreign body from lower extremity (Left, 8/22/2019); and Knee arthroscopy w/ plica excision (Left, 8/22/2019).    Bhaskar has a current medication list which  includes the following prescription(s): amlodipine, atorvastatin, hydrocodone-acetaminophen, meloxicam, and promethazine.    Review of patient's allergies indicates:  No Known Allergies     Imaging, MRI studies: xray    Prior Therapy: Yes on the L knee   Social History: He lives with their spouse  Occupation: [employer]  Prior Level of Function: Independent  Current Level of Function: Modified independent with crutches    Pain:  Current 0/10, worst 8/10, best 0/10   Location: left knee   Description: Aching and Throbbing  Aggravating Factors: Standing, Bending, Walking, Extension, Flexing and Getting out of bed/chair  Easing Factors: pain medication, ice, rest and elevation    Pts goals: Lose weight, return to gym    Objective     Observation: Patient presents with the knee bandaged following surgery POD#1, ambulating with step to gait on crutches     Posture: Forward lean with crutches    Functional Tests:  Gait: 10% of weight through LLE per patient report, using bilateral crutches     Knee Passive Range of Motion:   Right  Left    Flexion 135 126   Extension 0 -4     Limited motion due to bandaging    Quad Set: Improved activation following Kittitian stem. No lag with SLR     Joint Mobility: Limited patellar mobility due to bandaging. Pain free with sup/inf glides, limited sensation felt     Palpation: No tenderness or pain reported with palpation    Sensation: Numb over incisions     Edema: Measurements limited due to bandaging     Girth Measurement Joint line 15 cm below 10 cm above   Right 39.8 cm 39.6 cm 51.6 cm   Left 47 cm 42 cm 55 cm       CMS Impairment/Limitation/Restriction for FOTO Knee Survey    Therapist reviewed FOTO scores for Bhaskar SMITH Go on 8/23/2019.   FOTO documents entered into [employer] - see Media section.    Limitation Score: 76%  Category: Mobility         TREATMENT   Treatment Time In: 1130  Treatment Time Out: 1155  Total Treatment time separate from Evaluation: 25 minutes    Bhaskar received  therapeutic exercises to develop ROM and flexibility for 10 minutes including:  Heel slides 5' with strap overpressure  SLR 2 x 10    Bhaskar participated in neuromuscular re-education activities to improve: Coordination and Sense for 15 minutes. The following activities were included:  Quad sets with russian stem 15'    Home Exercises and Patient Education Provided    Education provided re: Importance of improving quad activation, goals for therapy, role of therapy for care, exercises/HEP    Written Home Exercises Provided: .  Exercises were reviewed and Bhaskar was able to demonstrate them prior to the end of the session.   Pt received a written copy of exercises to perform at home. Bhaskar demonstrated good  understanding of the education provided.     See EMR under patient instructions for exercises given.   Assessment   Bhaskar is a 48 y.o. male referred to outpatient Physical Therapy with a medical diagnosis of partial meniscectomy. Pt presents with limitations in ROM, strength to LE, poor weightbearing tolerance, balance impairments, and limited ability to complete ADLs or exercise for health     Pt prognosis is Good.   Pt will benefit from skilled outpatient Physical Therapy to address the deficits stated above and in the chart below, provide pt/family education, and to maximize pt's level of independence.     Plan of care discussed with patient: Yes  Pt's spiritual, cultural and educational needs considered and patient is agreeable to the plan of care and goals as stated below:     Anticipated Barriers for therapy: Work schedule    Medical Necessity is demonstrated by the following  History  Co-morbidities and personal factors that may impact the plan of care Co-morbidities:   difficulty sleeping and high BMI    Personal Factors:   no deficits     low   Examination  Body Structures and Functions, activity limitations and participation restrictions that may impact the plan of care Body Regions:   lower  extremities    Body Systems:    ROM  strength  balance  gait  transfers  transitions  motor control  motor learning  edema  scar formation  skin integrity    Participation Restrictions:   Work schedule    Activity limitations:   Learning and applying knowledge  no deficits    General Tasks and Commands  no deficits    Communication  no deficits    Mobility  walking    Self care  no deficits    Domestic Life  shopping  cooking  doing house work (cleaning house, washing dishes, laundry)  assisting others    Interactions/Relationships  no deficits    Life Areas  no deficits    Community and Social Life  no deficits         moderate   Clinical Presentation stable and uncomplicated low   Decision Making/ Complexity Score: low     GOALS: Short Term Goals:  4 weeks  1.Report decreased L knee pain  < / =  5/10 worst  to increase tolerance for ambulation without assistive device   2. Increase knee ROM to equal L knee in order to be able to perform ADLs without difficulty.  3. Increase strength by 1/3 MMT grade in quads  to increase tolerance for ADL and work activities.  4. Pt to tolerate HEP to improve ROM and independence with ADL's    Long Term Goals: 12 weeks  1.Report decreased L knee pain < / = 0/10  to increase tolerance for working as [employer]  2.Patient goal: Return to the gym without knee pain  3.Increase strength to >/= 4+/5 in quads and gluts  to increase tolerance for ADL and work activities.  4. Pt will report at 42% limitation on FOTO knee to demonstrate increase in LE function with every day tasks.     Plan   Plan of care Certification: 8/23/2019 to 11/29/2019.    Outpatient Physical Therapy 2 times weekly for 10 weeks to include the following interventions: Electrical Stimulation Russian stem, Gait Training, Manual Therapy, Moist Heat/ Ice, Neuromuscular Re-ed, Patient Education, Self Care, Therapeutic Activites and Therapeutic Exercise.     Godwin Ortiz, PT DPT

## 2019-08-26 ENCOUNTER — CLINICAL SUPPORT (OUTPATIENT)
Dept: REHABILITATION | Facility: HOSPITAL | Age: 49
End: 2019-08-26
Payer: COMMERCIAL

## 2019-08-26 DIAGNOSIS — M25.662 STIFFNESS OF LEFT KNEE: ICD-10-CM

## 2019-08-26 DIAGNOSIS — G89.29 CHRONIC PAIN OF LEFT KNEE: ICD-10-CM

## 2019-08-26 DIAGNOSIS — R26.2 DIFFICULTY WALKING: ICD-10-CM

## 2019-08-26 DIAGNOSIS — M25.562 CHRONIC PAIN OF LEFT KNEE: ICD-10-CM

## 2019-08-26 PROCEDURE — 97110 THERAPEUTIC EXERCISES: CPT | Performed by: PHYSICAL THERAPIST

## 2019-08-26 NOTE — PROGRESS NOTES
Physical Therapy Daily Treatment Note     Name: Bhaskar Stiles  Clinic Number: [id number]    Therapy Diagnosis:   Encounter Diagnoses   Name Primary?   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  This was discussed this with the patient's family after surgery.    Subjective     Pt reports: He presents with 1 crutch but states he does not even need it to walk anymore. He notes being pain free in the knee, just missing some of the motion  He was compliant with home exercise program.  Response to previous treatment: Decrease knee pain  Functional change: Improved walking with 1 crutch    Pain: 0/10  Location: left knee      Objective     Bhaskar received therapeutic exercises to develop strength, endurance, ROM and flexibility for 10 minutes including:  Heel slides with strap 5'  SL Clams GTB 2 x 15    Bhaskar received the following manual therapy techniques: Joint mobilizations were applied to the: L knee for 6 minutes, including:  Patellar mobs all planes  Gr IV extension mobs  Gr III flexion mobs    Bhaskar participated in neuromuscular re-education activities to improve: Balance, Coordination and Proprioception for 15 minutes. The following activities were included:  Quad sets 7' with Russian stem  SLR 8' with Russian  "stem    Bhaskar participated in dynamic functional therapeutic activities to improve functional performance for 14  minutes, including:  GTB above the knee mini squats 2 x 15  Step up 3" 2  20    Bhaskar participated in gait training to improve functional mobility and safety for 15  minutes, including:  Aliya gait training 6 laps  Heel toe walking     Home Exercises Provided and Patient Education Provided     Education provided:   - Gait training without assistive device    Written Home Exercises Provided: Patient instructed to cont prior HEP.  Exercises were reviewed and Bhaskar was able to demonstrate them prior to the end of the session.  Bhaskar demonstrated good  understanding of the education provided.     See EMR under Patient Instructions for exercises provided prior visit.    Assessment     Bhaskar progressed with weightbearing tolerance and gait cycle. Measured at 108 deg of flexion with slide board. Remained pain free and improved quad strength with exercises. Cues in gait cycle for toe off and heel strike, lacking terminal knee extension without cues. Poor squat form with emphasis on hip hinge at the start  Bhaskar is progressing well towards his goals.   Pt prognosis is Excellent.     Pt will continue to benefit from skilled outpatient physical therapy to address the deficits listed in the problem list box on initial evaluation, provide pt/family education and to maximize pt's level of independence in the home and community environment.     Pt's spiritual, cultural and educational needs considered and pt agreeable to plan of care and goals.     Anticipated barriers to physical therapy: None    Progressing, has not met his goals    GOALS: Short Term Goals:  4 weeks  1.Report decreased L knee pain  < / =  5/10 worst  to increase tolerance for ambulation without assistive device   2. Increase knee ROM to equal L knee in order to be able to perform ADLs without difficulty.  3. Increase strength by 1/3 MMT grade " in quads  to increase tolerance for ADL and work activities.  4. Pt to tolerate HEP to improve ROM and independence with ADL's     Long Term Goals: 12 weeks  1.Report decreased L knee pain < / = 0/10  to increase tolerance for working as [employer]  2.Patient goal: Return to the gym without knee pain  3.Increase strength to >/= 4+/5 in quads and gluts  to increase tolerance for ADL and work activities.  4. Pt will report at 42% limitation on FOTO knee to demonstrate increase in LE function with every day tasks.     Plan     Plan of care Certification: 8/23/2019 to 11/29/2019.     Outpatient Physical Therapy 2 times weekly for 10 weeks to include the following interventions: Electrical Stimulation Russian stem, Gait Training, Manual Therapy, Moist Heat/ Ice, Neuromuscular Re-ed, Patient Education, Self Care, Therapeutic Activites and Therapeutic Exercise.     Gait training and quad strengthening    Godwin Ortiz, PT

## 2019-08-28 ENCOUNTER — CLINICAL SUPPORT (OUTPATIENT)
Dept: REHABILITATION | Facility: HOSPITAL | Age: 49
End: 2019-08-28
Payer: COMMERCIAL

## 2019-08-28 DIAGNOSIS — R26.2 DIFFICULTY WALKING: ICD-10-CM

## 2019-08-28 DIAGNOSIS — M25.662 STIFFNESS OF LEFT KNEE: ICD-10-CM

## 2019-08-28 DIAGNOSIS — M25.562 CHRONIC PAIN OF LEFT KNEE: ICD-10-CM

## 2019-08-28 DIAGNOSIS — G89.29 CHRONIC PAIN OF LEFT KNEE: ICD-10-CM

## 2019-08-28 PROCEDURE — 97116 GAIT TRAINING THERAPY: CPT

## 2019-08-28 PROCEDURE — 97110 THERAPEUTIC EXERCISES: CPT

## 2019-08-28 PROCEDURE — 97112 NEUROMUSCULAR REEDUCATION: CPT

## 2019-08-28 NOTE — PROGRESS NOTES
Physical Therapy Daily Treatment Note     Name: Bhaskar Stiles  Clinic Number: [id number]    Therapy Diagnosis:   Encounter Diagnoses   Name Primary?   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  This was discussed this with the patient's family after surgery.    Subjective     Pt reports: his knee is feeling great. He wants to get back to work 9/3- given note from Niels MURO for return at full duty as  for [employer]- walking, driving, carrying 5-70 lbs, but able to avoid heavy lifts/carries. Can take elevator over stairs. He states he can moderate his tasks at work. Next post-op f/u 9/4.     He was compliant with home exercise program.  Response to previous treatment: Decrease knee pain  Functional change: Improved walking no AD    Pain: 0/10  Location: left knee      Objective     Bhaskar received therapeutic exercises to develop strength, endurance, ROM and flexibility for 25 minutes including:  Heel slides with strap 3'  SL Clams GTB 2 x 15-NP  TKE purple pull up band x 30 x 5 sec  TKE ball on wall for HEP 20 x  Bike-upright x 8 min for ROM; lv 1  Quad sets heel prop 10 x 10 sec  ASLR with quad set from heel prop 2 x 10    Bhaskar received the following manual therapy techniques: Joint mobilizations  "were applied to the: L knee for 5 minutes, including:  Patellar mobs all planes gr 3  Gr IV extension mobs   Gr III flexion mobs     Bhaskar participated in neuromuscular re-education activities to improve: Balance, Coordination and Proprioception for 8 minutes. The following activities were included:  SLR 8' with Russian stim    Bhaskar participated in dynamic functional therapeutic activities to improve functional performance for 3  minutes, including:  GTB above the knee mini squats 2 x 15-NP  Step up 6" x20    Bhaskar participated in gait training to improve functional mobility and safety for 8  minutes, including:  Aliya gait training 6 laps- large  Heel toe walking     ICE x 10 min post session    Home Exercises Provided and Patient Education Provided     Education provided:   - Gait training without assistive device, return to work considerations/precautions    Written Home Exercises Provided: Patient instructed to cont prior HEP.  Exercises were reviewed and Bhaskar was able to demonstrate them prior to the end of the session.  Bhaskar demonstrated good  understanding of the education provided.     See EMR under Patient Instructions for exercises provided prior visit.    Assessment     Bhaskar progressed gait mechanics, terminal knee extension improving, but continues to have deficit. Knee ROM progressing to approx 120 deg flexion. Denies any pain with exercises, moved into TKE today. Squat mechanics improving. Pt reported/demonstrated no adverse response or exacerbation of symptoms during today's session.     Bhaskar is progressing well towards his goals.   Pt prognosis is Excellent.     Pt will continue to benefit from skilled outpatient physical therapy to address the deficits listed in the problem list box on initial evaluation, provide pt/family education and to maximize pt's level of independence in the home and community environment.     Pt's spiritual, cultural and educational needs considered and pt " agreeable to plan of care and goals.     Anticipated barriers to physical therapy: None    Progressing, has not met his goals    GOALS: Short Term Goals:  4 weeks  1.Report decreased L knee pain  < / =  5/10 worst  to increase tolerance for ambulation without assistive device   2. Increase knee ROM to equal L knee in order to be able to perform ADLs without difficulty.  3. Increase strength by 1/3 MMT grade in quads  to increase tolerance for ADL and work activities.  4. Pt to tolerate HEP to improve ROM and independence with ADL's     Long Term Goals: 12 weeks  1.Report decreased L knee pain < / = 0/10  to increase tolerance for working as [employer]  2.Patient goal: Return to the gym without knee pain  3.Increase strength to >/= 4+/5 in quads and gluts  to increase tolerance for ADL and work activities.  4. Pt will report at 42% limitation on FOTO knee to demonstrate increase in LE function with every day tasks.     Plan     Plan of care Certification: 8/23/2019 to 11/29/2019.     Outpatient Physical Therapy 2 times weekly for 10 weeks to include the following interventions: Electrical Stimulation Russian stem, Gait Training, Manual Therapy, Moist Heat/ Ice, Neuromuscular Re-ed, Patient Education, Self Care, Therapeutic Activites and Therapeutic Exercise.     Gait training and quad strengthening    Naman Recio, PT

## 2019-09-04 ENCOUNTER — OFFICE VISIT (OUTPATIENT)
Dept: SPORTS MEDICINE | Facility: CLINIC | Age: 49
End: 2019-09-04
Payer: COMMERCIAL

## 2019-09-04 VITALS
WEIGHT: 250 LBS | HEIGHT: 71 IN | BODY MASS INDEX: 35 KG/M2 | SYSTOLIC BLOOD PRESSURE: 137 MMHG | HEART RATE: 87 BPM | DIASTOLIC BLOOD PRESSURE: 78 MMHG

## 2019-09-04 DIAGNOSIS — M67.52 PLICA OF KNEE, LEFT: ICD-10-CM

## 2019-09-04 DIAGNOSIS — M17.12 OSTEOARTHRITIS OF LEFT KNEE, UNSPECIFIED OSTEOARTHRITIS TYPE: ICD-10-CM

## 2019-09-04 DIAGNOSIS — Z98.890 S/P ARTHROSCOPY OF LEFT KNEE: Primary | ICD-10-CM

## 2019-09-04 PROCEDURE — 99999 PR PBB SHADOW E&M-EST. PATIENT-LVL III: ICD-10-PCS | Mod: PBBFAC,,, | Performed by: PHYSICIAN ASSISTANT

## 2019-09-04 PROCEDURE — 99024 POSTOP FOLLOW-UP VISIT: CPT | Mod: S$GLB,,, | Performed by: PHYSICIAN ASSISTANT

## 2019-09-04 PROCEDURE — 99999 PR PBB SHADOW E&M-EST. PATIENT-LVL III: CPT | Mod: PBBFAC,,, | Performed by: PHYSICIAN ASSISTANT

## 2019-09-04 PROCEDURE — 99024 PR POST-OP FOLLOW-UP VISIT: ICD-10-PCS | Mod: S$GLB,,, | Performed by: PHYSICIAN ASSISTANT

## 2019-09-04 NOTE — PROGRESS NOTES
HISTORY OF PRESENT ILLNESS:   Pt is here today for first post-operative followup of knee arthroscopy.  he is doing well.  We have reviewed his findings and discussed plan of care and future treatment options.        Patient is feeling great with no pain and no longer taking pain medications. Doing PT and HEP.      DATE OF PROCEDURE: 08/22/2019     SURGEON:  Shelbi Hughes M.D     PROCEDURE PERFORMED:   left  1. knee arthroscopic chondroplasty (CPT 64359)  2. knee arthroscopic medial and lateral (CPT 95751) meniscectomy   3. knee arthroscopic partial synovectomy/debridement (CPT 97919).   4. knee arthroscopic plica excision(CPT 87898).    5. Knee arthroscopic lysis of adhesions (CPT 77438)  7. Knee arthroscopic loose body removal (CPT 42288)        In the patellofemoral compartment, there was chondral damage to:  Trochlea 15 x 10 mm grade 3  Chondroplasty was performed using arthroscopic shaver.     There was chondral damage to:  Medial femoral condyle 15 x 20 mm grade 3  Chondroplasty was performed using arthroscopic shaver.     Loose body measuring 5 x 5 x 7 mm was removed from lateral compartment using arthroscopic grasper.                                                                     PHYSICAL EXAMINATION:     Incision sites healed well  No evidence of any erythema, infection or induration  Range of motion 0-115 degrees compared to 130-135 on contralateral side.   Minimal effusion  2+ DP pulse  No swelling, no calf tenderness  - Urban's sign  Negative medial joint line tendernes  Moderate quad atrophy                                                                                 ASSESSMENT:                                                                                                                                               1. Status post above, doing well.                                                                                                                               PLAN:                                                                                                                                                      1. Continue with PT  2. Emphasized quad function.  3. I have discussed return to activity in detail.  4.Patient will see us back at 6 week post-op della.                                    5. All questions were answered and patient should contact us if he  has any questions or concerns in the interim.

## 2019-09-05 ENCOUNTER — CLINICAL SUPPORT (OUTPATIENT)
Dept: REHABILITATION | Facility: HOSPITAL | Age: 49
End: 2019-09-05
Payer: COMMERCIAL

## 2019-09-05 DIAGNOSIS — G89.29 CHRONIC PAIN OF LEFT KNEE: ICD-10-CM

## 2019-09-05 DIAGNOSIS — R26.2 DIFFICULTY WALKING: ICD-10-CM

## 2019-09-05 DIAGNOSIS — M25.562 CHRONIC PAIN OF LEFT KNEE: ICD-10-CM

## 2019-09-05 DIAGNOSIS — M25.662 STIFFNESS OF LEFT KNEE: ICD-10-CM

## 2019-09-05 PROCEDURE — 97530 THERAPEUTIC ACTIVITIES: CPT

## 2019-09-05 PROCEDURE — 97116 GAIT TRAINING THERAPY: CPT | Mod: 59

## 2019-09-05 PROCEDURE — 97150 GROUP THERAPEUTIC PROCEDURES: CPT

## 2019-09-05 PROCEDURE — 97110 THERAPEUTIC EXERCISES: CPT

## 2019-09-05 NOTE — PROGRESS NOTES
Physical Therapy Daily Treatment Note     Name: Bhaskar Stiles  Clinic Number: [id number]    Therapy Diagnosis:   Encounter Diagnoses   Name Primary?   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  This was discussed this with the patient's family after surgery.    Subjective     Pt reports: he is feeling good and is noticing improvements with his knee strength and ROM.  He was compliant with home exercise program.  Response to previous treatment: Decrease knee pain  Functional change: Improved walking no AD    Pain: 0/10  Location: left knee      Objective   Measurements this visit: 9/5/19  L Knee ROM  Flex: 120 deg  Ext: 0 deg      Bhaskar received therapeutic exercises to develop strength, endurance, ROM and flexibility for 35 minutes including:  Heel slides with strap 2x10 with 3 sec hold  SL Clams GTB 2 x 15-NP  TKE purple pull up band x 30 x 5 sec  TKE ball on wall for HEP 20 x-np  Bike-upright x 8 min for ROM; lv 4  Quad sets heel prop 3 x1 min  SLR with quad set 3x10  SL hip abd 3x10    Bhaskar received the following manual therapy techniques: Joint mobilizations were applied to the: L knee for 5 minutes, including:  Patellar mobs all planes gr 3  Gr IV extension mobs   Gr III flexion mobs     Bhaskar  "participated in neuromuscular re-education activities to improve: Balance, Coordination and Proprioception for 0 minutes. The following activities were included:  SLR 8' with Russian stim-np    Bhaskar participated in dynamic functional therapeutic activities to improve functional performance for 10 minutes, including:  GTB above the knee mini squats 2 x 15-NP  Step up 6" x30  Sit to stand red box 2x15    Bhaskar participated in gait training to improve functional mobility and safety for 10 minutes, including:  Aliya gait training 6 laps- large (forward and lateral) ea  Heel toe walking -np    ICE x 10 min post session    Home Exercises Provided and Patient Education Provided     Education provided:   - Gait training without assistive device, return to work considerations/precautions    Written Home Exercises Provided: Patient instructed to cont prior HEP.  Exercises were reviewed and Bhaskar was able to demonstrate them prior to the end of the session.  Bhaskar demonstrated good  understanding of the education provided.     See EMR under Patient Instructions for exercises provided prior visit.    Assessment     Bhaskar demonstrated improved endurance during quad sets by tolerating increased duration well. Patient performed good functional movement patterns with letting cuing for equal WB during sit to stand.  Pt reported/demonstrated no adverse response or exacerbation of symptoms during today's session.     Bhaskar is progressing well towards his goals.   Pt prognosis is Excellent.     Pt will continue to benefit from skilled outpatient physical therapy to address the deficits listed in the problem list box on initial evaluation, provide pt/family education and to maximize pt's level of independence in the home and community environment.     Pt's spiritual, cultural and educational needs considered and pt agreeable to plan of care and goals.     Anticipated barriers to physical therapy: None    Progressing, has not met " his goals    GOALS: Short Term Goals:  4 weeks  1.Report decreased L knee pain  < / =  5/10 worst  to increase tolerance for ambulation without assistive device   2. Increase knee ROM to equal L knee in order to be able to perform ADLs without difficulty.  3. Increase strength by 1/3 MMT grade in quads  to increase tolerance for ADL and work activities.  4. Pt to tolerate HEP to improve ROM and independence with ADL's     Long Term Goals: 12 weeks  1.Report decreased L knee pain < / = 0/10  to increase tolerance for working as [employer]  2.Patient goal: Return to the gym without knee pain  3.Increase strength to >/= 4+/5 in quads and gluts  to increase tolerance for ADL and work activities.  4. Pt will report at 42% limitation on FOTO knee to demonstrate increase in LE function with every day tasks.     Plan     Plan of care Certification: 8/23/2019 to 11/29/2019.     Outpatient Physical Therapy 2 times weekly for 10 weeks to include the following interventions: Electrical Stimulation Russian stem, Gait Training, Manual Therapy, Moist Heat/ Ice, Neuromuscular Re-ed, Patient Education, Self Care, Therapeutic Activites and Therapeutic Exercise.     Gait training and quad strengthening    Gabbie Jarrett, PT

## 2019-09-09 ENCOUNTER — CLINICAL SUPPORT (OUTPATIENT)
Dept: REHABILITATION | Facility: HOSPITAL | Age: 49
End: 2019-09-09
Payer: COMMERCIAL

## 2019-09-09 DIAGNOSIS — R26.2 DIFFICULTY WALKING: ICD-10-CM

## 2019-09-09 DIAGNOSIS — M25.562 CHRONIC PAIN OF LEFT KNEE: ICD-10-CM

## 2019-09-09 DIAGNOSIS — M25.662 STIFFNESS OF LEFT KNEE: ICD-10-CM

## 2019-09-09 DIAGNOSIS — G89.29 CHRONIC PAIN OF LEFT KNEE: ICD-10-CM

## 2019-09-09 PROCEDURE — 97110 THERAPEUTIC EXERCISES: CPT | Performed by: PHYSICAL THERAPIST

## 2019-09-09 PROCEDURE — 97112 NEUROMUSCULAR REEDUCATION: CPT | Performed by: PHYSICAL THERAPIST

## 2019-09-09 PROCEDURE — 97530 THERAPEUTIC ACTIVITIES: CPT | Mod: 59 | Performed by: PHYSICAL THERAPIST

## 2019-09-09 PROCEDURE — 97140 MANUAL THERAPY 1/> REGIONS: CPT | Performed by: PHYSICAL THERAPIST

## 2019-09-09 NOTE — PROGRESS NOTES
Physical Therapy Daily Treatment Note     Name: Bhaskar Stiles  Clinic Number: [id number]    Therapy Diagnosis:   Encounter Diagnoses   Name Primary?   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  This was discussed this with the patient's family after surgery.    Subjective     Pt reports:He has no complaints at all. So far so good at work. Notes stiff in the morning but once he gets going he feels better  He was compliant with home exercise program.  Response to previous treatment: Decrease knee pain  Functional change: Working no difficulty    Pain: 0/10  Location: left knee      Objective   Measurements this visit: 9/9/19  L Knee ROM  Flex: 125 deg  Ext: 0 deg    FOTO score 99      Bhaskar received therapeutic exercises to develop strength, endurance, ROM and flexibility for 25 minutes including:    TKE thick GTB x 30 x 5 sec  Bike-upright x 10 min for ROM; lv 4  Lateral walk with 8# med ball press 3 laps    Not today:  Quad sets heel prop 3 x1 min  SLR with quad set 3x10  SL hip abd 3x10  Heel slides with strap 2x10 with 3 sec hold  SL Clams GTB 2 x 15-NP  TKE ball on wall for HEP 20 x-np    Bhaskar received the following manual therapy techniques: Joint mobilizations were applied to the: L knee for 10  "minutes, including:  Patellar mobs all planes gr 3  Gr IV extension mobs   Gr III flexion mobs     Bhaskar participated in neuromuscular re-education activities to improve: Balance, Coordination and Proprioception for 10 minutes. The following activities were included:  SLR 8' with Russian stim-np  Rebounder med ball toss 3 x 15 B     Bhaskar participated in dynamic functional therapeutic activities to improve functional performance for 15 minutes, including:  Mini squat behind mat 2 x 15  Step up 6" x30-NP  Sit to stand blue box on L 2x15  TRX squats SL 15x    Bhaskar participated in gait training to improve functional mobility and safety for 0 minutes, including:  Aliya gait training 6 laps- large (forward and lateral) ea-NP  Heel toe walking -np    ICE x 10 min post session    Home Exercises Provided and Patient Education Provided     Education provided:   - Continue strengthen quads    Written Home Exercises Provided: Patient instructed to cont prior HEP.  Exercises were reviewed and Bhaskar was able to demonstrate them prior to the end of the session.  Bhaskar demonstrated good  understanding of the education provided.     See EMR under Patient Instructions for exercises provided prior visit.    Assessment     Bhaskar was progressed with strengthening to the quads in clinic. TRX was difficult for patient to perform correctly, increased trunk flexion. Rebounder very difficult for patient, notes the hardest thing he's done thus far.      Bhaskar is progressing well towards his goals.   Pt prognosis is Excellent.     Pt will continue to benefit from skilled outpatient physical therapy to address the deficits listed in the problem list box on initial evaluation, provide pt/family education and to maximize pt's level of independence in the home and community environment.     Pt's spiritual, cultural and educational needs considered and pt agreeable to plan of care and goals.     Anticipated barriers to physical therapy: " None    Progressing, has not met his goals    GOALS: Short Term Goals:  4 weeks  1.Report decreased L knee pain  < / =  5/10 worst  to increase tolerance for ambulation without assistive device   2. Increase knee ROM to equal L knee in order to be able to perform ADLs without difficulty.  3. Increase strength by 1/3 MMT grade in quads  to increase tolerance for ADL and work activities.  4. Pt to tolerate HEP to improve ROM and independence with ADL's     Long Term Goals: 12 weeks  1.Report decreased L knee pain < / = 0/10  to increase tolerance for working as [employer]  2.Patient goal: Return to the gym without knee pain  3.Increase strength to >/= 4+/5 in quads and gluts  to increase tolerance for ADL and work activities.  4. Pt will report at 42% limitation on FOTO knee to demonstrate increase in LE function with every day tasks.     Plan     Plan of care Certification: 8/23/2019 to 11/29/2019.     Outpatient Physical Therapy 2 times weekly for 10 weeks to include the following interventions: Electrical Stimulation Russian stem, Gait Training, Manual Therapy, Moist Heat/ Ice, Neuromuscular Re-ed, Patient Education, Self Care, Therapeutic Activites and Therapeutic Exercise.     Progress quad strengthening in closed chain    Godwin Ortiz, PT

## 2019-09-12 ENCOUNTER — CLINICAL SUPPORT (OUTPATIENT)
Dept: REHABILITATION | Facility: HOSPITAL | Age: 49
End: 2019-09-12
Payer: COMMERCIAL

## 2019-09-12 DIAGNOSIS — G89.29 CHRONIC PAIN OF LEFT KNEE: ICD-10-CM

## 2019-09-12 DIAGNOSIS — R26.2 DIFFICULTY WALKING: ICD-10-CM

## 2019-09-12 DIAGNOSIS — M25.562 CHRONIC PAIN OF LEFT KNEE: ICD-10-CM

## 2019-09-12 DIAGNOSIS — M25.662 STIFFNESS OF LEFT KNEE: ICD-10-CM

## 2019-09-12 PROCEDURE — 97110 THERAPEUTIC EXERCISES: CPT

## 2019-09-12 NOTE — PROGRESS NOTES
Physical Therapy Daily Treatment Note     Name: Bhaskar Stiles  Clinic Number: [id number]    Therapy Diagnosis:   Encounter Diagnoses   Name Primary?   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  This was discussed this with the patient's family after surgery.    Subjective     Pt reports:he has been feeling pretty good with no new pain or symptoms.  He was compliant with home exercise program.  Response to previous treatment: Decrease knee pain  Functional change: Working no difficulty    Pain: 0/10  Location: left knee      Objective   Measurements this visit: 9/9/19  L Knee ROM  Flex: 125 deg  Ext: 0 deg    FOTO score 99      Bhaskar received therapeutic exercises to develop strength, endurance, ROM and flexibility for 60 minutes including:    TKE thick GTB x 30 x 5 sec-np  Elliptical- x 10 min for ROM; lv 4  Lateral walk with 8# med ball press 3 laps-np  SLR with quad set 3x10 1#  SL hip abd 3x10 1#  Heel slides with strap 2x10 with 3 sec hold-np  SL Clams YTB 2 x 15  Dbl leg press 100# 3x10    Bhaskar received the following manual therapy techniques: Joint mobilizations were applied to the: L knee for 0 minutes, including:  Patellar mobs all planes gr 3  Gr IV extension mobs   Gr III flexion mobs     Bhaskar  "participated in neuromuscular re-education activities to improve: Balance, Coordination and Proprioception for 0 minutes. The following activities were included:  SLR 8' with Russian stim-np  Rebounder med ball toss 3 x 15 B     Bhaskar participated in dynamic functional therapeutic activities to improve functional performance for 0 minutes, including:  Mini squat behind mat 2 x 15  Step up 6" x30-NP  Sit to stand blue box on L 2x15  TRX squats SL 15x    Bhaskar participated in gait training to improve functional mobility and safety for 0 minutes, including:  Aliya gait training 6 laps- large (forward and lateral) ea-NP  Heel toe walking -np    ICE x 10 min post session    Home Exercises Provided and Patient Education Provided     Education provided:   - Continue strengthen quads    Written Home Exercises Provided: Patient instructed to cont prior HEP.  Exercises were reviewed and Bhaskar was able to demonstrate them prior to the end of the session.  Bhaskar demonstrated good  understanding of the education provided.     See EMR under Patient Instructions for exercises provided prior visit.    Assessment     Bhaskar was progressed with strengthening in quads on leg press this session. He tolerated exercise well and showed good quad control especially with eccentric lowering. Patient progressing well to functional goals.     Bhaskar is progressing well towards his goals.   Pt prognosis is Excellent.     Pt will continue to benefit from skilled outpatient physical therapy to address the deficits listed in the problem list box on initial evaluation, provide pt/family education and to maximize pt's level of independence in the home and community environment.     Pt's spiritual, cultural and educational needs considered and pt agreeable to plan of care and goals.     Anticipated barriers to physical therapy: None    Progressing, has not met his goals    GOALS: Short Term Goals:  4 weeks  1.Report decreased L knee pain  < / =  " 5/10 worst  to increase tolerance for ambulation without assistive device   2. Increase knee ROM to equal L knee in order to be able to perform ADLs without difficulty.  3. Increase strength by 1/3 MMT grade in quads  to increase tolerance for ADL and work activities.  4. Pt to tolerate HEP to improve ROM and independence with ADL's     Long Term Goals: 12 weeks  1.Report decreased L knee pain < / = 0/10  to increase tolerance for working as [employer]  2.Patient goal: Return to the gym without knee pain  3.Increase strength to >/= 4+/5 in quads and gluts  to increase tolerance for ADL and work activities.  4. Pt will report at 42% limitation on FOTO knee to demonstrate increase in LE function with every day tasks.     Plan     Plan of care Certification: 8/23/2019 to 11/29/2019.     Outpatient Physical Therapy 2 times weekly for 10 weeks to include the following interventions: Electrical Stimulation Russian stem, Gait Training, Manual Therapy, Moist Heat/ Ice, Neuromuscular Re-ed, Patient Education, Self Care, Therapeutic Activites and Therapeutic Exercise.     Progress quad strengthening in closed chain    Gabbie Jarrett, PT

## 2019-09-19 ENCOUNTER — CLINICAL SUPPORT (OUTPATIENT)
Dept: REHABILITATION | Facility: HOSPITAL | Age: 49
End: 2019-09-19
Payer: COMMERCIAL

## 2019-09-19 DIAGNOSIS — M25.662 STIFFNESS OF LEFT KNEE: ICD-10-CM

## 2019-09-19 DIAGNOSIS — R26.2 DIFFICULTY WALKING: ICD-10-CM

## 2019-09-19 DIAGNOSIS — M25.562 CHRONIC PAIN OF LEFT KNEE: ICD-10-CM

## 2019-09-19 DIAGNOSIS — G89.29 CHRONIC PAIN OF LEFT KNEE: ICD-10-CM

## 2019-09-19 PROCEDURE — 97110 THERAPEUTIC EXERCISES: CPT

## 2019-09-19 NOTE — PROGRESS NOTES
"  Physical Therapy Daily Treatment Note     Name: Bhaskar Stiles  Clinic Number: [id number]    Therapy Diagnosis:   Encounter Diagnoses   Name Primary?   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  This was discussed this with the patient's family after surgery.    Subjective     Pt reports:he has some "cramping" in his L hip flexor.  He was compliant with home exercise program.  Response to previous treatment: Decrease knee pain  Functional change: Working no difficulty    Pain: 0/10  Location: left knee      Objective   Measurements this visit: 9/9/19  L Knee ROM  Flex: 125 deg  Ext: 0 deg    FOTO score 99      Bhaskar received therapeutic exercises to develop strength, endurance, ROM and flexibility for 50 minutes including:    TKE thick PTB x 30 x 5 sec-  Elliptical- x 10 min for ROM; lv 4  Lateral walk with 8# med ball press 3 laps-np  SLR with quad set 3x10 2#  SL hip abd 3x10 2#  Heel slides with strap 2x10 with 3 sec hold-np  SL Clams YTB 2 x 15-np  Dbl leg press 105# 3x10  Leg press ecc quad 85# 2x15  Prone hip flexor stretch with strap x2 min x2  GSS x2 min on wedge  HS on steps x1 min B    Bhaskar received the following manual therapy techniques: Joint mobilizations were applied to the: L knee for 5 " "minutes, including:  Patellar mobs all planes gr 3  Gr IV extension mobs -np  Gr III flexion mobs -np  Hyper volt hip flexor x5 min KEYONNA Muro participated in neuromuscular re-education activities to improve: Balance, Coordination and Proprioception for 0 minutes. The following activities were included:  SLR 8' with Russian stim-np  Rebounder med ball toss 3 x 15 B     Bhaskar participated in dynamic functional therapeutic activities to improve functional performance for 5 minutes, including:  Mini squat behind mat 2 x 15-np  Step up 6" x30-NP  Sit to stand red  box 2x15  TRX squats SL 15x-np      ICE x 10 min post session-np    Home Exercises Provided and Patient Education Provided     Education provided:   - Continue strengthen quads    Written Home Exercises Provided: Patient instructed to cont prior HEP.  Exercises were reviewed and Bhaskar was able to demonstrate them prior to the end of the session.  Bhaskar demonstrated good  understanding of the education provided.     See EMR under Patient Instructions for exercises provided prior visit.    Assessment     Bhaskar was a little limited secondary to some pain in L hip flexor. He responded well to some manual therapy and stretching applied to decrease symptoms. He is progressing eccentric loading better to improve descending stairs. Patient progressing well to functional goals.     Bhaskar is progressing well towards his goals.   Pt prognosis is Excellent.     Pt will continue to benefit from skilled outpatient physical therapy to address the deficits listed in the problem list box on initial evaluation, provide pt/family education and to maximize pt's level of independence in the home and community environment.     Pt's spiritual, cultural and educational needs considered and pt agreeable to plan of care and goals.     Anticipated barriers to physical therapy: None    Progressing, has not met his goals    GOALS: Short Term Goals:  4 weeks  1.Report decreased L " knee pain  < / =  5/10 worst  to increase tolerance for ambulation without assistive device (Progressing, not met)  2. Increase knee ROM to equal L knee in order to be able to perform ADLs without difficulty. (Progressing, not met)  3. Increase strength by 1/3 MMT grade in quads  to increase tolerance for ADL and work activities. (Progressing, not met)  4. Pt to tolerate HEP to improve ROM and independence with ADL's (Progressing, not met)     Long Term Goals: 12 weeks  1.Report decreased L knee pain < / = 0/10  to increase tolerance for working as [employer] (Progressing, not met)  2.Patient goal: Return to the gym without knee pain (Progressing, not met)  3.Increase strength to >/= 4+/5 in quads and gluts  to increase tolerance for ADL and work activities. (Progressing, not met)  4. Pt will report at 42% limitation on FOTO knee to demonstrate increase in LE function with every day tasks. (Progressing, not met)    Plan     Plan of care Certification: 8/23/2019 to 11/29/2019.     Outpatient Physical Therapy 2 times weekly for 10 weeks to include the following interventions: Electrical Stimulation Russian stem, Gait Training, Manual Therapy, Moist Heat/ Ice, Neuromuscular Re-ed, Patient Education, Self Care, Therapeutic Activites and Therapeutic Exercise.     Progress quad strengthening in closed chain    Gabbie Jarrett, PT

## 2019-09-22 ENCOUNTER — HOSPITAL ENCOUNTER (EMERGENCY)
Facility: OTHER | Age: 49
Discharge: HOME | End: 2019-09-22
Attending: EMERGENCY MEDICINE
Payer: COMMERCIAL

## 2019-09-22 VITALS
HEART RATE: 71 BPM | DIASTOLIC BLOOD PRESSURE: 95 MMHG | WEIGHT: 264.75 LBS | HEIGHT: 71 IN | OXYGEN SATURATION: 95 % | BODY MASS INDEX: 37.06 KG/M2 | SYSTOLIC BLOOD PRESSURE: 161 MMHG | RESPIRATION RATE: 18 BRPM | TEMPERATURE: 98 F

## 2019-09-22 DIAGNOSIS — M79.606 LEG PAIN: ICD-10-CM

## 2019-09-22 PROCEDURE — 63600175 PHARM REV CODE 636 W HCPCS: Performed by: PHYSICIAN ASSISTANT

## 2019-09-22 PROCEDURE — 96372 THER/PROPH/DIAG INJ SC/IM: CPT

## 2019-09-22 PROCEDURE — 25000003 PHARM REV CODE 250: Performed by: PHYSICIAN ASSISTANT

## 2019-09-22 PROCEDURE — 99284 EMERGENCY DEPT VISIT MOD MDM: CPT | Mod: 25

## 2019-09-22 RX ORDER — KETOROLAC TROMETHAMINE 30 MG/ML
15 INJECTION, SOLUTION INTRAMUSCULAR; INTRAVENOUS
Status: COMPLETED | OUTPATIENT
Start: 2019-09-22 | End: 2019-09-22

## 2019-09-22 RX ORDER — MELOXICAM 15 MG/1
15 TABLET ORAL DAILY
Qty: 10 TABLET | Refills: 0 | Status: SHIPPED | OUTPATIENT
Start: 2019-09-22 | End: 2019-10-01

## 2019-09-22 RX ORDER — METHOCARBAMOL 500 MG/1
1000 TABLET, FILM COATED ORAL 3 TIMES DAILY
Qty: 30 TABLET | Refills: 0 | Status: SHIPPED | OUTPATIENT
Start: 2019-09-22 | End: 2019-09-27

## 2019-09-22 RX ORDER — DIAZEPAM 5 MG/1
5 TABLET ORAL
Status: COMPLETED | OUTPATIENT
Start: 2019-09-22 | End: 2019-09-22

## 2019-09-22 RX ADMIN — DIAZEPAM 5 MG: 5 TABLET ORAL at 09:09

## 2019-09-22 RX ADMIN — KETOROLAC TROMETHAMINE 15 MG: 30 INJECTION, SOLUTION INTRAMUSCULAR at 09:09

## 2019-09-23 ENCOUNTER — TELEPHONE (OUTPATIENT)
Dept: SPORTS MEDICINE | Facility: CLINIC | Age: 49
End: 2019-09-23

## 2019-09-23 NOTE — TELEPHONE ENCOUNTER
----- Message from Dillon Pineda sent at 9/23/2019  9:02 AM CDT -----  Contact: patient   Please call pt at [phone]    Patient has medical questions (refused to give details)    Thank you

## 2019-09-23 NOTE — ED PROVIDER NOTES
Encounter Date: 9/22/2019       History     Chief Complaint   Patient presents with    R thigh pain     since orthoscopic surgery on L knee and was on crutches since 8/22     Patient is 49-year-old male who presents with complaints of right thigh pain has been present for 2 days prior to arrival.  He reports approximately 3 weeks ago he underwent left laparoscopic knee surgery.  He reports that about 4 days ago he began having right thigh pain and admits that he underwent some extensive stretching at physical therapy on Thursday.  He reports starting Friday morning he had a dull aching sensation in his right thigh.  He reports that he went to an urgent care and was given Ultram with no relief in symptoms.  He reports that the pain is only progressively worsened and that there is some tingling sensation to the top of his thigh as well as the posterior aspect of his thigh.  He denies any weakness in his leg.  Reports a could be some swelling but he is not sure.  There is no overlying skin changes.  He reports no associated back pain. He denies bladder or bowel incontinence or saddle anesthesia.  He has not taken any medications currently to help with his symptoms as he admits Ultram is not working.  He reports his left knee is unremarkable at this time and feeling great.  He is currently accompanied by his wife who is at bedside.        Review of patient's allergies indicates:  No Known Allergies  Past Medical History:   Diagnosis Date    Hyperlipidemia     Hypertension     Syncope and collapse      Past Surgical History:   Procedure Laterality Date    ARTHROSCOPY, KNEE, WITH MEDIAL AND LATERAL MENISCECTOMY Left 8/22/2019    Performed [facility] at [facility] OR    CHONDROPLASTY, KNEE Left 8/22/2019    [facility]    EXCISION, MEDIAL PLICA, KNEE, ARTHROSCOPIC Left 8/22/2019    [facility] OR    HERNIA REPAIR      LIPOMA RESECTION      abdomen     [id number] Left 8/22/2019    Per[facility] OR    PARTIAL SYNOVECTOMY, KNEE Left 8/22/2019    [facility] OR    REMOVAL, FOREIGN BODY, LOOSE BODY,  LOWER EXTREMITY Left 8/22/2019    Performed by [facility] OR    SHOULDER SURGERY      left    TILT TABLE TEST N/A 7/9/2018    Performed [facility] at [facility] CATH LAB     History reviewed. No pertinent family history.  Social History     Tobacco Use    Smoking status: Never Smoker    Smokeless tobacco: Never Used   Substance Use Topics    Alcohol use: No    Drug use: No     Review of Systems   Constitutional: Negative for fever.   HENT: Negative for sore throat.    Respiratory: Negative for shortness of breath.    Cardiovascular: Negative for chest pain.   Gastrointestinal: Negative for nausea.   Genitourinary: Negative for dysuria.   Musculoskeletal: Negative for back pain.        Right thigh pain   Skin: Negative for rash.   Neurological: Negative for weakness.   Hematological: Does not bruise/bleed easily.       Physical Exam     Initial Vitals [09/22/19 1958]   BP Pulse Resp Temp SpO2   (!) 161/95 71 18 98 °F (36.7 °C) 95 %      MAP       --         Physical Exam    Nursing note and vitals reviewed.  Constitutional: He appears well-developed and well-nourished. He is not diaphoretic. No distress.   Healthy-appearing male in no acute distress or apparent pain. He makes good eye contact, speaks in clear full sentences and is seated in upright position on exam stretcher.  He is able to transition to upright position but has slightly antalgic gait 2nd to pain.   HENT:   Head: Normocephalic and atraumatic.   Eyes: Conjunctivae and EOM are normal. Pupils are equal, round, and reactive to light. Right eye exhibits no discharge. Left eye exhibits no discharge. No scleral icterus.   Neck: Normal range of motion.   Cardiovascular: Normal rate, regular rhythm, normal heart sounds and  intact distal pulses. Exam reveals no gallop and no friction rub.    No murmur heard.  Pulmonary/Chest: Breath sounds normal. He has no wheezes. He has no rhonchi. He has no rales.   Abdominal: Soft. Bowel sounds are normal. There is no tenderness. There is no rebound and no guarding.   Musculoskeletal: Normal range of motion. He exhibits tenderness. He exhibits no edema.   Lymphadenopathy:     He has no cervical adenopathy.   Neurological: He is alert and oriented to person, place, and time. He has normal strength. No cranial nerve deficit or sensory deficit. GCS score is 15. GCS eye subscore is 4. GCS verbal subscore is 5. GCS motor subscore is 6.   Right knee has no bony landmark abnormalities or range of motion deficits.  Right ankle is unremarkable  Normal DP and PT pulse on right lower extremity  No reproducible soft tissue tenderness to palpation  Somewhat worsening of symptoms with right straight leg raise    No C, T, L midline bony tenderness crepitus or step-offs    Left lower extremity unremarkable with well-healed scars over left knee   Skin: Skin is warm. Capillary refill takes less than 2 seconds. No rash and no abscess noted. No erythema.   Psychiatric: He has a normal mood and affect. His behavior is normal. Thought content normal.         ED Course   Procedures  Labs Reviewed - No data to display        Imaging Results          US Lower Extremity Veins Right (Final result)  Result time 09/22/19 22:34:12    Final result by Suellen Chairez MD (09/22/19 22:34:12)                 Impression:      No evidence of deep venous thrombosis in the right lower extremity.      Electronically signed by: Suellen Chairez  Date:    09/22/2019  Time:    22:34             Narrative:    EXAMINATION:  US LOWER EXTREMITY VEINS RIGHT    CLINICAL HISTORY:  Pain in leg, unspecified    TECHNIQUE:  Duplex and color flow Doppler evaluation and graded compression of the right lower extremity veins was  performed.    COMPARISON:  None    FINDINGS:  Right thigh veins: The common femoral, femoral, popliteal, upper greater saphenous, and deep femoral veins are patent and free of thrombus. The veins are normally compressible and have normal phasic flow and augmentation response.    Right calf veins: The visualized calf veins are patent.    Contralateral CFV: The contralateral (left) common femoral vein is patent and free of thrombus.    Miscellaneous: No fluid collection or mass.                                   Medical Decision Making:   ED Management:  Urgent evaluation of 49-year-old male who presents with complaints of right side pain likely related to sciatica.  He is afebrile, nontoxic appearing, hemodynamically stable. Physical exam outlined above and reveals no evidence of cellulitis or acute neurovascular compromise to the right lower extremity.  No bony landmark abnormality or range of motion deficit.  Venous ultrasound is unremarkable. Will give nonsteroidal anti-inflammatory and muscle relaxer for symptom management and will encourage follow-up with orthopedist in the outpatient setting.  He is educated on ED return precautions verbalized understanding.  He is stable for discharge.                      Clinical Impression:       ICD-10-CM ICD-9-CM   1. Leg pain M79.606 729.5                                Tierra Luevano PA-C  09/23/19 0024

## 2019-09-23 NOTE — TELEPHONE ENCOUNTER
Patient wanted to inform Niels of his visit to Urgent Care on Friday 9/20/19  Due to pain in his right thigh, he received injection to reduce pain also was given Tramadol.  Patient then visited  ER on yesterday 9/22/19 for same pain and was told he was having Muscle Spasms is right thigh, Pt stated he will keep scheduled appointment for surgical area which is the left knee, that he just wanted to inform Niels Tesfaye PA-C of this issue. Patient had no falls , no new injuries.

## 2019-09-23 NOTE — ED NOTES
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 Pt AAOx4 and appropriate at this time. Respirations even and unlabored. No acute distress noted.

## 2019-09-24 ENCOUNTER — TELEPHONE (OUTPATIENT)
Dept: SPORTS MEDICINE | Facility: CLINIC | Age: 49
End: 2019-09-24

## 2019-09-24 NOTE — TELEPHONE ENCOUNTER
----- Message from Deborah Mckeon MA sent at 9/23/2019  9:51 AM CDT -----  Contact: patient   Patient wanted to inform Niels of his visit to Urgent Care on Friday 9/20/19  Due to pain in his right thigh, he received injection to reduce pain also was given Tramadol.  Patient then visited  ER on yesterday 9/22/19 for same pain and was told he was having Muscle Spasms is right thigh, Pt stated he will keep scheduled appointment for surgical area which is the left knee, that he just wanted to inform Niels Tesfaye PA-C of this issue. Patient had no falls , no new injuries.    just sending this to you as a heads up   ----- Message -----  From: Dillon Pineda  Sent: 9/23/2019   9:02 AM CDT  To: [facility] Staff    Please call pt at [phone]    Patient has medical questions (refused to give details)    Thank you

## 2019-09-25 ENCOUNTER — OFFICE VISIT (OUTPATIENT)
Dept: SPORTS MEDICINE | Facility: CLINIC | Age: 49
End: 2019-09-25
Payer: COMMERCIAL

## 2019-09-25 VITALS
HEIGHT: 71 IN | BODY MASS INDEX: 36.96 KG/M2 | DIASTOLIC BLOOD PRESSURE: 91 MMHG | HEART RATE: 101 BPM | WEIGHT: 264 LBS | SYSTOLIC BLOOD PRESSURE: 158 MMHG

## 2019-09-25 DIAGNOSIS — M79.651 ACUTE PAIN OF RIGHT THIGH: Primary | ICD-10-CM

## 2019-09-25 DIAGNOSIS — M54.16 ACUTE RIGHT LUMBAR RADICULOPATHY: ICD-10-CM

## 2019-09-25 PROCEDURE — 99214 OFFICE O/P EST MOD 30 MIN: CPT | Mod: 24,S$GLB,, | Performed by: PHYSICIAN ASSISTANT

## 2019-09-25 PROCEDURE — 99214 PR OFFICE/OUTPT VISIT, EST, LEVL IV, 30-39 MIN: ICD-10-PCS | Mod: 24,S$GLB,, | Performed by: PHYSICIAN ASSISTANT

## 2019-09-25 PROCEDURE — 99999 PR PBB SHADOW E&M-EST. PATIENT-LVL III: CPT | Mod: PBBFAC,,, | Performed by: PHYSICIAN ASSISTANT

## 2019-09-25 PROCEDURE — 99999 PR PBB SHADOW E&M-EST. PATIENT-LVL III: ICD-10-PCS | Mod: PBBFAC,,, | Performed by: PHYSICIAN ASSISTANT

## 2019-09-25 RX ORDER — METHYLPREDNISOLONE 4 MG/1
TABLET ORAL
Qty: 1 PACKAGE | Refills: 0 | Status: SHIPPED | OUTPATIENT
Start: 2019-09-25 | End: 2020-12-04

## 2019-09-25 RX ORDER — GABAPENTIN 300 MG/1
300 CAPSULE ORAL EVERY 8 HOURS
Qty: 60 CAPSULE | Refills: 0 | Status: SHIPPED | OUTPATIENT
Start: 2019-09-25 | End: 2020-12-04

## 2019-09-26 NOTE — PROGRESS NOTES
"CC: right thigh pain    HPI:   Bhaskar Stiles is a pleasant 49 y.o. patient who reports to clinic with right thigh pain. No trauma, no mech sxs/instabilty. He reports severe medial, lateral, and posterior thigh pain that began around 9/11/19 and has continue since with no improvement. He describes the pain as sharp "bone aching" pain that is constant but fluctuates in intensity depending on his position. He cannot find a comfortable position to be in that helps his pain. The pain is worse with laying flat. His pain is so bad that he went to the ED on 9/22 with negative right lower extremity ultrasound that did not show DVT. He has tried ice compresses, heat compresses, muscle relaxers, pain medication, NSAIDs, tylenol with no pain relief. His pain has left him unable to work as a . He denies bowel or bladders changes, genital paresthesias, weakness, numbness, tingling, fever, chills, extremity color change.     Today the patient rates pain at a 10/10 on visual analog scale.      Affecting ADLs and exercising    Laying flat and walking activity makes pain worse    Review of Systems   Constitution: Negative. Negative for chills, fever and night sweats.   HENT: Negative for congestion and headaches.    Eyes: Negative for blurred vision, left vision loss and right vision loss.   Cardiovascular: Negative for chest pain and syncope.   Respiratory: Negative for cough and shortness of breath.    Endocrine: Negative for polydipsia, polyphagia and polyuria.   Hematologic/Lymphatic: Negative for bleeding problem. Does not bruise/bleed easily.   Skin: Negative for dry skin, itching and rash.   Musculoskeletal: Negative for falls and muscle weakness.   Gastrointestinal: Negative for abdominal pain and bowel incontinence.   Genitourinary: Negative for bladder incontinence and nocturia.   Neurological: Negative for disturbances in coordination, loss of balance and seizures.   Psychiatric/Behavioral: Negative for depression. " The patient does not have insomnia.    Allergic/Immunologic: Negative for hives and persistent infections.   All other systems negative.    PAST MEDICAL HISTORY:   Past Medical History:   Diagnosis Date    Hyperlipidemia     Hypertension     Syncope and collapse      PAST SURGICAL HISTORY:   Past Surgical History:   Procedure Laterality Date    CHONDROPLASTY OF KNEE Left 8/22/2019    Procedure: CHONDROPLASTY, KNEE;  Surgeon: Shelbi Hughes MD;  Location: [facility];  Service: Orthopedics;  Laterality: Left;    HERNIA REPAIR      KNEE ARTHROSCOPY W/ MENISCECTOMY Left 8/22/2019    Procedure: ARTHROSCOPY, KNEE, WITH MEDIAL AND LATERAL MENISCECTOMY;  Surgeon: Shelbi Hughes MD;  Location: [facility];  Service: Orthopedics;  Laterality: Left;    KNEE ARTHROSCOPY W/ PLICA EXCISION Left 8/22/2019    Procedure: EXCISION, MEDIAL PLICA, KNEE, ARTHROSCOPIC;  Surgeon: Shelbi Hughes MD;  Location: [facility];  Service: Orthopedics;  Laterality: Left;    LIPOMA RESECTION      abdomen    REMOVAL OF FOREIGN BODY FROM LOWER EXTREMITY Left 8/22/2019    Procedure: REMOVAL, FOREIGN BODY, LOOSE BODY,  LOWER EXTREMITY;  Surgeon: Shelbi Hughes MD;  Location: [facility];  Service: Orthopedics;  Laterality: Left;    SHOULDER SURGERY      left    SYNOVECTOMY OF KNEE Left 8/22/2019    Procedure: PARTIAL SYNOVECTOMY, KNEE;  Surgeon: Shelbi Hughes MD;  Location: [facility];  Service: Orthopedics;  Laterality: Left;    TILT TABLE TEST N/A 7/9/2018    Procedure: TILT TABLE TEST;  Surgeon: Leonardo Whitfield MD;  Location: [facility] CATH LAB;  Service: Cardiology;  Laterality: N/A;  Syncope, HUT, DM, 3 PREP     FAMILY HISTORY: History reviewed. No pertinent family history.  SOCIAL HISTORY:   Social History     Socioeconomic History    Marital status: Single     Spouse name: Not on file    Number of children: Not on file    Years of education: Not on file    Highest education level: Not on file   Occupational History    Not on file   Social Needs    Financial  resource strain: Not on file    Food insecurity:     Worry: Not on file     Inability: Not on file    Transportation needs:     Medical: Not on file     Non-medical: Not on file   Tobacco Use    Smoking status: Never Smoker    Smokeless tobacco: Never Used   Substance and Sexual Activity    Alcohol use: No    Drug use: No    Sexual activity: Not on file   Lifestyle    Physical activity:     Days per week: Not on file     Minutes per session: Not on file    Stress: Not on file   Relationships    Social connections:     Talks on phone: Not on file     Gets together: Not on file     Attends Islam service: Not on file     Active member of club or organization: Not on file     Attends meetings of clubs or organizations: Not on file     Relationship status: Not on file   Other Topics Concern    Not on file   Social History Narrative    Not on file       MEDICATIONS:   Current Outpatient Medications:     amLODIPine (NORVASC) 10 MG tablet, Take 1 tablet (10 mg total) by mouth once daily., Disp: 90 tablet, Rfl: 3    atorvastatin (LIPITOR) 20 MG tablet, Take 20 mg by mouth once daily., Disp: , Rfl: 5    gabapentin (NEURONTIN) 300 MG capsule, Take 1 capsule (300 mg total) by mouth every 8 (eight) hours., Disp: 60 capsule, Rfl: 0    HYDROcodone-acetaminophen (NORCO)  mg per tablet, Take 1 tablet by mouth every 4-6 hours for pain., Disp: 20 tablet, Rfl: 0    meloxicam (MOBIC) 15 MG tablet, Take 1 tablet (15 mg total) by mouth once daily. for 10 days, Disp: 10 tablet, Rfl: 0    methocarbamol (ROBAXIN) 500 MG Tab, Take 2 tablets (1,000 mg total) by mouth 3 (three) times daily. for 5 days, Disp: 30 tablet, Rfl: 0    methylPREDNISolone (MEDROL DOSEPACK) 4 mg tablet, Use as directed per package directions., Disp: 1 Package, Rfl: 0    promethazine (PHENERGAN) 25 MG tablet, Take 1 tablet (25 mg total) by mouth every 6 (six) hours as needed for Nausea., Disp: 14 tablet, Rfl: 0  ALLERGIES: Review of  "patient's allergies indicates:  No Known Allergies    VITAL SIGNS: BP (!) 158/91   Pulse 101   Ht 5' 11" (1.803 m)   Wt 119.7 kg (264 lb)   BMI 36.82 kg/m²        PHYSICAL EXAM /  HIP  PHYSICAL EXAMINATION  General:  The patient is alert and oriented x 3.  Mood is pleasant.  Observation of ears, eyes and nose reveal no gross abnormalities.  HEENT: NCAT, sclera nonicteric  Lungs: Respirations are equal and unlabored..    right HIP EXAMINATION     OBSERVATION / INSPECTION  Gait:   Nonantalgic   Alignment:  Neutral   Scars:   None   Muscle atrophy: None   Effusion:  None   Warmth:  None   Discoloration:   None   Leg lengths:   Equal   Pelvis:   Level     TENDERNESS / CREPITUS (T/C):      T / C  Trochanteric bursa   - / -  Piriformis    - / -  SI joint    - / -  Psoas tendon   - / -  Rectus insertion  - / -  Adductor insertion  - / -  Pubic symphysis  - / -  IT band                                   - / -  Gluteus tendons                     - / -    ROM: (* = pain)    Flexion:    120 degrees  External rotation: 40 degrees  Internal rotation with axial load: 30 degrees  Internal rotation without axial load: 40 degrees  Abduction:  45 degrees  Adduction:   20 degrees    SPECIAL TESTS:  Pain w/ forced internal rotation (FADIR): -   Pain w/ forced external rotation (BASILIA): Absent   Circumduction test:    -  Stinchfield test:    Negative   Log roll:      Negative   Snapping hip (internal):   Negative   Sit-up pain:     Negative   Resisted sit-up pain:    Negative   Resisted sit-up with adductor contraction pain:  Negative       Back EXAM:      General: The patient is a healthy 49 y.o. male in no apparent distress, the patient is oriented to person, place and time.  Psych: Normal mood and affect  HEENT: Vision grossly intact, hearing intact to the spoken word.  Lungs: Respirations unlabored.  Gait: Normal station and gait, no difficulty with toe or heel walk.   Skin: Dorsal lumbar skin negative for rashes, lesions, " hairy patches and surgical scars. There is no lumbar tenderness to palpation.  Range of motion: Lumbar range of motion is acceptable.  Spinal Balance: Global saggital and coronal spinal balance acceptable, not significant for scoliosis and kyphosis.  Musculoskeletal: No pain with the range of motion of the bilateral hips. No trochanteric tenderness to palpation.  Vascular: Bilateral lower extremities warm and well perfused, dorsalis pedis pulses 2+ bilaterally.  Neurological: Normal strength and tone in all major motor groups in the bilateral lower extremities. Normal sensation to light touch in the L2-S1 dermatomes bilaterally.  Deep tendon reflexes symmetric intact in the bilateral lower extremities.  Negative Babinski bilaterally. Straight leg raise negative bilaterally.    Other:  Bilateral straight leg raise test positive today at 40 degrees on the right    Normal knee exam and lower leg exam today. No TTP.    EXTREMITY NEURO-VASCULAR EXAMINATION:   Sensation:  Grossly intact to light touch all dermatomal regions.   Motor Function:  Fully intact motor function at hip, knee, foot and ankle    DTRs;  quadriceps and  achilles 2+.  No clonus and downgoing Babinski.    Vascular status:  DP and PT pulses 2+, brisk capillary refill, symmetric.    Skin:  intact, compartments soft.        ASSESSMENT:    1. right thigh pain, acute  2. Lumbar radiculopathy   hip abd/core weakness    PLAN:  1. No signs of post-op complication, dvt or compartment syndrome. No signs of emergent conditions  2. Start medrol dose margarita  3. Gabapentin 300mg TID. Gave patient instructions on how to titrate up medication.   4. Take Norco 10mg night to help pain to sleep  5. Lumbar spine MRI due to severe pain and most likely cause of symptoms being radicular nerve pain.   6. Go to ED if things worsen or new symptoms occur.     All questions were answered, pt will contact us for questions or concerns in the interim.

## 2019-09-28 ENCOUNTER — HOSPITAL ENCOUNTER (OUTPATIENT)
Dept: RADIOLOGY | Facility: HOSPITAL | Age: 49
Discharge: HOME | End: 2019-09-28
Attending: PHYSICIAN ASSISTANT
Payer: COMMERCIAL

## 2019-09-28 DIAGNOSIS — M54.16 ACUTE RIGHT LUMBAR RADICULOPATHY: ICD-10-CM

## 2019-09-28 DIAGNOSIS — M79.651 ACUTE PAIN OF RIGHT THIGH: ICD-10-CM

## 2019-09-28 PROCEDURE — 72148 MRI LUMBAR SPINE W/O DYE: CPT | Mod: 26,,, | Performed by: RADIOLOGY

## 2019-09-28 PROCEDURE — 72148 MRI LUMBAR SPINE W/O DYE: CPT | Mod: TC

## 2019-09-28 PROCEDURE — 72148 MRI LUMBAR SPINE WITHOUT CONTRAST: ICD-10-PCS | Mod: 26,,, | Performed by: RADIOLOGY

## 2019-10-01 ENCOUNTER — TELEPHONE (OUTPATIENT)
Dept: ORTHOPEDICS | Facility: CLINIC | Age: 49
End: 2019-10-01

## 2019-10-01 ENCOUNTER — INITIAL CONSULT (OUTPATIENT)
Dept: SPINE | Facility: CLINIC | Age: 49
End: 2019-10-01
Payer: COMMERCIAL

## 2019-10-01 ENCOUNTER — TELEPHONE (OUTPATIENT)
Dept: SPORTS MEDICINE | Facility: CLINIC | Age: 49
End: 2019-10-01

## 2019-10-01 ENCOUNTER — APPOINTMENT (OUTPATIENT)
Dept: RADIOLOGY | Facility: OTHER | Age: 49
End: 2019-10-01
Attending: ORTHOPAEDIC SURGERY
Payer: COMMERCIAL

## 2019-10-01 VITALS — WEIGHT: 264 LBS | HEIGHT: 71 IN | BODY MASS INDEX: 36.96 KG/M2

## 2019-10-01 DIAGNOSIS — M79.651 ACUTE PAIN OF RIGHT THIGH: ICD-10-CM

## 2019-10-01 DIAGNOSIS — M54.16 LUMBAR RADICULOPATHY: Primary | ICD-10-CM

## 2019-10-01 DIAGNOSIS — M54.16 ACUTE RIGHT LUMBAR RADICULOPATHY: Primary | ICD-10-CM

## 2019-10-01 DIAGNOSIS — M54.16 LUMBAR RADICULOPATHY: ICD-10-CM

## 2019-10-01 PROCEDURE — 99244 OFF/OP CNSLTJ NEW/EST MOD 40: CPT | Mod: S$GLB,,, | Performed by: PHYSICIAN ASSISTANT

## 2019-10-01 PROCEDURE — 72120 X-RAY BEND ONLY L-S SPINE: CPT | Mod: 26,,, | Performed by: RADIOLOGY

## 2019-10-01 PROCEDURE — 72100 X-RAY EXAM L-S SPINE 2/3 VWS: CPT | Mod: 26,,, | Performed by: RADIOLOGY

## 2019-10-01 PROCEDURE — 72100 X-RAY EXAM L-S SPINE 2/3 VWS: CPT | Mod: TC

## 2019-10-01 PROCEDURE — 99999 PR PBB SHADOW E&M-EST. PATIENT-LVL III: ICD-10-PCS | Mod: PBBFAC,,, | Performed by: PHYSICIAN ASSISTANT

## 2019-10-01 PROCEDURE — 72100 XR LUMBAR SPINE AP AND LAT WITH FLEX/EXT: ICD-10-PCS | Mod: 26,,, | Performed by: RADIOLOGY

## 2019-10-01 PROCEDURE — 99999 PR PBB SHADOW E&M-EST. PATIENT-LVL III: CPT | Mod: PBBFAC,,, | Performed by: PHYSICIAN ASSISTANT

## 2019-10-01 PROCEDURE — 99244 PR OFFICE CONSULTATION,LEVEL IV: ICD-10-PCS | Mod: S$GLB,,, | Performed by: PHYSICIAN ASSISTANT

## 2019-10-01 PROCEDURE — 72120 XR LUMBAR SPINE AP AND LAT WITH FLEX/EXT: ICD-10-PCS | Mod: 26,,, | Performed by: RADIOLOGY

## 2019-10-01 RX ORDER — DICLOFENAC SODIUM 75 MG/1
75 TABLET, DELAYED RELEASE ORAL 2 TIMES DAILY
Qty: 60 TABLET | Refills: 0 | Status: SHIPPED | OUTPATIENT
Start: 2019-10-01 | End: 2019-10-28 | Stop reason: SDUPTHER

## 2019-10-01 RX ORDER — METHOCARBAMOL 750 MG/1
750 TABLET, FILM COATED ORAL 3 TIMES DAILY
Qty: 60 TABLET | Refills: 0 | Status: SHIPPED | OUTPATIENT
Start: 2019-10-01 | End: 2019-12-30

## 2019-10-01 NOTE — TELEPHONE ENCOUNTER
Spoke to patient about his MRI results of his lumbar back after he presented to clinic with right radicular pain down his leg that was severe.     MRI was limited due to him not being able to lay flat due to pain.     He was started on medrol dose margarita and gabapentin which has helped his pain some but not resolved it.     MRI lumbar spine shows finding that could be contributing to his symptoms.     Will try to get him into back and spine clinic with ALEX Valencia this week due to his continued pain limiting work.     MRI lumbar spine :  Limited examination shows multiple mild diffuse disc bulges from L2 through S1.  Lumbar vertebral body heights are maintained.  No acute fractures or traumatic subluxations.  Mild bilateral neural foraminal narrowing at L3-L4 and L4-L5.  No greater than mild spinal canal stenosis at L4-L5.  Moderate to severe bilateral neural foraminal narrowing at L5-S1.  Consider repeat examination when clinically appropriate.    Explained all of the above to him.   Will place referral now.

## 2019-10-01 NOTE — H&P (VIEW-ONLY)
DATE: 10/1/2019  PATIENT: Bhaskar Stiles    Supervising Physician: Antwan Dugan M.D.    CHIEF COMPLAINT: right leg pain    HISTORY:  Bhaskar Stiles is a 49 y.o. male referred by Niels Tesfaye PA-C here for initial evaluation of right leg pain (Back - 0, Leg - 9).  The pain has been present for 2 weeks.  He went to the ED 9/22 and saw Niels Dias PA-C 9/25 for the same symptoms.  The patient describes the pain as sharp.  The pain is worse with lying down flat and walking and improved by heat. There is associated numbness and tingling. There is no subjective weakness. Prior treatments have included ibuprofen, a medrol dose pack and PT, but no ESIs or surgery.    The patient denies myelopathic symptoms such as handwriting changes or difficulty with buttons/coins/keys. Denies perineal paresthesias, bowel/bladder dysfunction.    PAST MEDICAL/SURGICAL HISTORY:  Past Medical History:   Diagnosis Date    Hyperlipidemia     Hypertension     Syncope and collapse      Past Surgical History:   Procedure Laterality Date    CHONDROPLASTY OF KNEE Left 8/22/2019    Procedure: CHONDROPLASTY, KNEE;  Surgeon: Shelbi Hughes MD;  Location: [facility];  Service: Orthopedics;  Laterality: Left;    HERNIA REPAIR      KNEE ARTHROSCOPY W/ MENISCECTOMY Left 8/22/2019    Procedure: ARTHROSCOPY, KNEE, WITH MEDIAL AND LATERAL MENISCECTOMY;  Surgeon: Shelbi Hughes MD;  Location: [facility] OR;  Service: Orthopedics;  Laterality: Left;    KNEE ARTHROSCOPY W/ PLICA EXCISION Left 8/22/2019    Procedure: EXCISION, MEDIAL PLICA, KNEE, ARTHROSCOPIC;  Surgeon: Shelbi Hughes MD;  Location: [facility] OR;  Service: Orthopedics;  Laterality: Left;    LIPOMA RESECTION      abdomen    REMOVAL OF FOREIGN BODY FROM LOWER EXTREMITY Left 8/22/2019    Procedure: REMOVAL, FOREIGN BODY, LOOSE BODY,  LOWER EXTREMITY;  Surgeon: Shelbi Hughes MD;  Location: [facility] OR;  Service: Orthopedics;  Laterality: Left;    SHOULDER SURGERY      left    SYNOVECTOMY OF KNEE Left  8/22/2019    Procedure: PARTIAL SYNOVECTOMY, KNEE;  Surgeon: Shelbi Hughes MD;  Location: [facility] OR;  Service: Orthopedics;  Laterality: Left;    TILT TABLE TEST N/A 7/9/2018    Procedure: TILT TABLE TEST;  Surgeon: Leonardo Whitfield MD;  [facility] CATH LAB;  Service: Cardiology;  Laterality: N/A;  Syncope, HUT, DM, 3 PREP       Medications:   Current Outpatient Medications on File Prior to Visit   Medication Sig Dispense Refill    amLODIPine (NORVASC) 10 MG tablet Take 1 tablet (10 mg total) by mouth once daily. 90 tablet 3    atorvastatin (LIPITOR) 20 MG tablet Take 20 mg by mouth once daily.  5    gabapentin (NEURONTIN) 300 MG capsule Take 1 capsule (300 mg total) by mouth every 8 (eight) hours. 60 capsule 0    HYDROcodone-acetaminophen (NORCO)  mg per tablet Take 1 tablet by mouth every 4-6 hours for pain. 20 tablet 0    promethazine (PHENERGAN) 25 MG tablet Take 1 tablet (25 mg total) by mouth every 6 (six) hours as needed for Nausea. 14 tablet 0    methylPREDNISolone (MEDROL DOSEPACK) 4 mg tablet Use as directed per package directions. 1 Package 0    [DISCONTINUED] meloxicam (MOBIC) 15 MG tablet Take 1 tablet (15 mg total) by mouth once daily. for 10 days 10 tablet 0     No current facility-administered medications on file prior to visit.        Social History:   Social History     Socioeconomic History    Marital status: Single     Spouse name: Not on file    Number of children: Not on file    Years of education: Not on file    Highest education level: Not on file   Occupational History    Not on file   Social Needs    Financial resource strain: Not on file    Food insecurity:     Worry: Not on file     Inability: Not on file    Transportation needs:     Medical: Not on file     Non-medical: Not on file   Tobacco Use    Smoking status: Never Smoker    Smokeless tobacco: Never Used   Substance and Sexual Activity    Alcohol use: No    Drug use: No    Sexual activity: Not on file  "  Lifestyle    Physical activity:     Days per week: Not on file     Minutes per session: Not on file    Stress: Not on file   Relationships    Social connections:     Talks on phone: Not on file     Gets together: Not on file     Attends Tenriism service: Not on file     Active member of club or organization: Not on file     Attends meetings of clubs or organizations: Not on file     Relationship status: Not on file   Other Topics Concern    Not on file   Social History Narrative    Not on file       REVIEW OF SYSTEMS:  Constitution: Negative. Negative for chills, fever and night sweats.   Cardiovascular: Negative for chest pain and syncope.   Respiratory: Negative for cough and shortness of breath.   Gastrointestinal: See HPI. Negative for nausea/vomiting. Negative for abdominal pain.  Genitourinary: See HPI. Negative for discoloration or dysuria.  Skin: Negative for dry skin, itching and rash.   Hematologic/Lymphatic: Negative for bleeding problem. Does not bruise/bleed easily.   Musculoskeletal: Negative for falls and muscle weakness.   Neurological: See HPI. No seizures.   Endocrine: Negative for polydipsia, polyphagia and polyuria.   Allergic/Immunologic: Negative for hives and persistent infections.     EXAM:  Ht 5' 11" (1.803 m)   Wt 119.7 kg (264 lb)   BMI 36.82 kg/m²     General: The patient is a very pleasant 49 y.o. male in no apparent distress, the patient is oriented to person, place and time.  Psych: Normal mood and affect  HEENT: Vision grossly intact, hearing intact to the spoken word.  Lungs: Respirations unlabored.  Gait: Normal station and gait, no difficulty with toe or heel walk.   Skin: Dorsal lumbar skin negative for rashes, lesions, hairy patches and surgical scars. There is mild lumbar tenderness to palpation.  Range of motion: Lumbar range of motion is acceptable.  Spinal Balance: Global saggital and coronal spinal balance acceptable, not significant for scoliosis and " kyphosis.  Musculoskeletal: No pain with the range of motion of the bilateral hips. No trochanteric tenderness to palpation.  Vascular: Bilateral lower extremities warm and well perfused, dorsalis pedis pulses 2+ bilaterally.  Neurological: Normal strength and tone in all major motor groups in the bilateral lower extremities. Normal sensation to light touch in the L2-S1 dermatomes bilaterally with the exception of mildly decreased sensation in the L5 dermatome on the right.  Deep tendon reflexes symmetric 2+ in the bilateral lower extremities.  Negative Babinski bilaterally. Straight leg raise negative bilaterally.    IMAGING:      Today I personally reviewed AP, Lat and Flex/Ex  upright L-spine films that demonstrate mild L4/5 disc space narrowing.     MRI lumbar spine from 9/28/2019 shows neural foraminal narrowing at L5/S1.  Exam is limited due to patient's pain, he had to terminate the exam.        Body mass index is 36.82 kg/m².    No results found for: HGBA1C        ASSESSMENT/PLAN:    Diagnoses and all orders for this visit:    Lumbar radiculopathy  -     Procedure Order to [facility]; Future    Other orders  -     methocarbamol (ROBAXIN) 750 MG Tab; Take 1 tablet (750 mg total) by mouth 3 (three) times daily. As needed for muscle spasms for 60 doses  -     diclofenac (VOLTAREN) 75 MG EC tablet; Take 1 tablet (75 mg total) by mouth 2 (two) times daily.         The patient is having right lower extremity radiculopathy that has been present for about 2 weeks.  He did not have significant relief with a medrol dose pack.     Today we discussed at length all of the different treatment options including anti-inflammatories, acetaminophen, rest, ice, heat, physical therapy including strengthening and stretching exercises, home exercises, ROM, aerobic conditioning, aqua therapy, other modalities including ultrasound, massage, and dry needling, epidural steroid injections and finally surgical  intervention.      The patient would like to try an injection.  Orders placed today for an NEIDA at [facility].  Follow up 2 weeks after injection if symptoms persist.     Follow up if symptoms worsen or fail to improve.     This patient was referred by Michael Tesfaye PA-C for consult.  A copy of this report will be sent electronically.

## 2019-10-01 NOTE — PROGRESS NOTES
DATE: 10/1/2019  PATIENT: Bhaskar Stiles    Supervising Physician: Antwan Dugan M.D.    CHIEF COMPLAINT: right leg pain    HISTORY:  Bhaskar Stiles is a 49 y.o. male referred by Niels Tesfaye PA-C here for initial evaluation of right leg pain (Back - 0, Leg - 9).  The pain has been present for 2 weeks.  He went to the ED 9/22 and saw Niels Dias PA-C 9/25 for the same symptoms.  The patient describes the pain as sharp.  The pain is worse with lying down flat and walking and improved by heat. There is associated numbness and tingling. There is no subjective weakness. Prior treatments have included ibuprofen, a medrol dose pack and PT, but no ESIs or surgery.    The patient denies myelopathic symptoms such as handwriting changes or difficulty with buttons/coins/keys. Denies perineal paresthesias, bowel/bladder dysfunction.    PAST MEDICAL/SURGICAL HISTORY:  Past Medical History:   Diagnosis Date    Hyperlipidemia     Hypertension     Syncope and collapse      Past Surgical History:   Procedure Laterality Date    CHONDROPLASTY OF KNEE Left 8/22/2019    Procedure: CHONDROPLASTY, KNEE;  Surgeon: Shelbi Hughes MD;  Location: [facility];  Service: Orthopedics;  Laterality: Left;    HERNIA REPAIR      KNEE ARTHROSCOPY W/ MENISCECTOMY Left 8/22/2019    Procedure: ARTHROSCOPY, KNEE, WITH MEDIAL AND LATERAL MENISCECTOMY;  Surgeon: Shelbi Hughes MD;  Location: [facility] OR;  Service: Orthopedics;  Laterality: Left;    KNEE ARTHROSCOPY W/ PLICA EXCISION Left 8/22/2019    Procedure: EXCISION, MEDIAL PLICA, KNEE, ARTHROSCOPIC;  Surgeon: Shelbi Hughes MD;  Location: [facility] OR;  Service: Orthopedics;  Laterality: Left;    LIPOMA RESECTION      abdomen    REMOVAL OF FOREIGN BODY FROM LOWER EXTREMITY Left 8/22/2019    Procedure: REMOVAL, FOREIGN BODY, LOOSE BODY,  LOWER EXTREMITY;  Surgeon: Shelbi Hughes MD;  Location: [facility] OR;  Service: Orthopedics;  Laterality: Left;    SHOULDER SURGERY      left    SYNOVECTOMY OF KNEE Left  8/22/2019    Procedure: PARTIAL SYNOVECTOMY, KNEE;  Surgeon: Shelbi Hughes MD;  Location: [facility] OR;  Service: Orthopedics;  Laterality: Left;    TILT TABLE TEST N/A 7/9/2018    Procedure: TILT TABLE TEST;  Surgeon: Leonardo Whitfield MD;  Location: [facility] CATH LAB;  Service: Cardiology;  Laterality: N/A;  Syncope, HUT, DM, 3 PREP       Medications:   Current Outpatient Medications on File Prior to Visit   Medication Sig Dispense Refill    amLODIPine (NORVASC) 10 MG tablet Take 1 tablet (10 mg total) by mouth once daily. 90 tablet 3    atorvastatin (LIPITOR) 20 MG tablet Take 20 mg by mouth once daily.  5    gabapentin (NEURONTIN) 300 MG capsule Take 1 capsule (300 mg total) by mouth every 8 (eight) hours. 60 capsule 0    HYDROcodone-acetaminophen (NORCO)  mg per tablet Take 1 tablet by mouth every 4-6 hours for pain. 20 tablet 0    promethazine (PHENERGAN) 25 MG tablet Take 1 tablet (25 mg total) by mouth every 6 (six) hours as needed for Nausea. 14 tablet 0    methylPREDNISolone (MEDROL DOSEPACK) 4 mg tablet Use as directed per package directions. 1 Package 0    [DISCONTINUED] meloxicam (MOBIC) 15 MG tablet Take 1 tablet (15 mg total) by mouth once daily. for 10 days 10 tablet 0     No current facility-administered medications on file prior to visit.        Social History:   Social History     Socioeconomic History    Marital status: Single     Spouse name: Not on file    Number of children: Not on file    Years of education: Not on file    Highest education level: Not on file   Occupational History    Not on file   Social Needs    Financial resource strain: Not on file    Food insecurity:     Worry: Not on file     Inability: Not on file    Transportation needs:     Medical: Not on file     Non-medical: Not on file   Tobacco Use    Smoking status: Never Smoker    Smokeless tobacco: Never Used   Substance and Sexual Activity    Alcohol use: No    Drug use: No    Sexual activity: Not on file  "  Lifestyle    Physical activity:     Days per week: Not on file     Minutes per session: Not on file    Stress: Not on file   Relationships    Social connections:     Talks on phone: Not on file     Gets together: Not on file     Attends [employer] service: Not on file     Active member of club or organization: Not on file     Attends meetings of clubs or organizations: Not on file     Relationship status: Not on file   Other Topics Concern    Not on file   Social History Narrative    Not on file       REVIEW OF SYSTEMS:  Constitution: Negative. Negative for chills, fever and night sweats.   Cardiovascular: Negative for chest pain and syncope.   Respiratory: Negative for cough and shortness of breath.   Gastrointestinal: See HPI. Negative for nausea/vomiting. Negative for abdominal pain.  Genitourinary: See HPI. Negative for discoloration or dysuria.  Skin: Negative for dry skin, itching and rash.   Hematologic/Lymphatic: Negative for bleeding problem. Does not bruise/bleed easily.   Musculoskeletal: Negative for falls and muscle weakness.   Neurological: See HPI. No seizures.   Endocrine: Negative for polydipsia, polyphagia and polyuria.   Allergic/Immunologic: Negative for hives and persistent infections.     EXAM:  Ht 5' 11" (1.803 m)   Wt 119.7 kg (264 lb)   BMI 36.82 kg/m²     General: The patient is a very pleasant 49 y.o. male in no apparent distress, the patient is oriented to person, place and time.  Psych: Normal mood and affect  HEENT: Vision grossly intact, hearing intact to the spoken word.  Lungs: Respirations unlabored.  Gait: Normal station and gait, no difficulty with toe or heel walk.   Skin: Dorsal lumbar skin negative for rashes, lesions, hairy patches and surgical scars. There is mild lumbar tenderness to palpation.  Range of motion: Lumbar range of motion is acceptable.  Spinal Balance: Global saggital and coronal spinal balance acceptable, not significant for scoliosis and " kyphosis.  Musculoskeletal: No pain with the range of motion of the bilateral hips. No trochanteric tenderness to palpation.  Vascular: Bilateral lower extremities warm and well perfused, dorsalis pedis pulses 2+ bilaterally.  Neurological: Normal strength and tone in all major motor groups in the bilateral lower extremities. Normal sensation to light touch in the L2-S1 dermatomes bilaterally with the exception of mildly decreased sensation in the L5 dermatome on the right.  Deep tendon reflexes symmetric 2+ in the bilateral lower extremities.  Negative Babinski bilaterally. Straight leg raise negative bilaterally.    IMAGING:      Today I personally reviewed AP, Lat and Flex/Ex  upright L-spine films that demonstrate mild L4/5 disc space narrowing.     MRI lumbar spine from 9/28/2019 shows neural foraminal narrowing at L5/S1.  Exam is limited due to patient's pain, he had to terminate the exam.        Body mass index is 36.82 kg/m².    No results found for: HGBA1C        ASSESSMENT/PLAN:    Diagnoses and all orders for this visit:    Lumbar radiculopathy  -     Procedure Order to [facility]; Future    Other orders  -     methocarbamol (ROBAXIN) 750 MG Tab; Take 1 tablet (750 mg total) by mouth 3 (three) times daily. As needed for muscle spasms for 60 doses  -     diclofenac (VOLTAREN) 75 MG EC tablet; Take 1 tablet (75 mg total) by mouth 2 (two) times daily.         The patient is having right lower extremity radiculopathy that has been present for about 2 weeks.  He did not have significant relief with a medrol dose pack.     Today we discussed at length all of the different treatment options including anti-inflammatories, acetaminophen, rest, ice, heat, physical therapy including strengthening and stretching exercises, home exercises, ROM, aerobic conditioning, aqua therapy, other modalities including ultrasound, massage, and dry needling, epidural steroid injections and finally surgical  intervention.      The patient would like to try an injection.  Orders placed today for an NEIDA at [facility], [place].  Follow up 2 weeks after injection if symptoms persist.     Follow up if symptoms worsen or fail to improve.     This patient was referred by Michael Tesfaye PA-C for consult.  A copy of this report will be sent electronically.

## 2019-10-01 NOTE — LETTER
October 1, 2019      Michael Tesfaye III, PA-C  [address]  [facility] [address]           [place] [facility]  [address] [address]  Phone: [phone]  Fax: [phone]          Patient: Bhaskar Stiles   MR Number: [id number]   YOB: 1970   Date of Visit: 10/1/2019       Dear Michael Tesfaye III:    Thank you for referring Bhaskar Stiles to me for evaluation. Attached you will find relevant portions of my assessment and plan of care.    If you have questions, please do not hesitate to call me. I look forward to following Bhaskar Stiles along with you.    Sincerely,    Shauna Valencia PA-C    Enclosure  CC:  No Recipients    If you would like to receive this communication electronically, please contact [email or web address] or [phone] to request more information on [employer] access.    For providers and/or their staff who would like to refer a patient to [facility], please contact us through our one-stop-shop provider referral line, [facility], at [phone].    If you feel you have received this communication in error or would no longer like to receive these types of communications, please e-mail [email or web address]

## 2019-10-02 ENCOUNTER — TELEPHONE (OUTPATIENT)
Dept: PAIN MEDICINE | Facility: CLINIC | Age: 49
End: 2019-10-02

## 2019-10-02 DIAGNOSIS — M54.16 LUMBAR RADICULOPATHY: Primary | ICD-10-CM

## 2019-10-09 ENCOUNTER — HOSPITAL ENCOUNTER (OUTPATIENT)
Facility: OTHER | Age: 49
Discharge: HOME | End: 2019-10-09
Attending: ANESTHESIOLOGY | Admitting: ANESTHESIOLOGY
Payer: COMMERCIAL

## 2019-10-09 VITALS
SYSTOLIC BLOOD PRESSURE: 146 MMHG | TEMPERATURE: 99 F | HEIGHT: 71 IN | DIASTOLIC BLOOD PRESSURE: 92 MMHG | BODY MASS INDEX: 35 KG/M2 | WEIGHT: 250 LBS | RESPIRATION RATE: 18 BRPM | HEART RATE: 86 BPM | OXYGEN SATURATION: 96 %

## 2019-10-09 DIAGNOSIS — G89.29 CHRONIC PAIN: ICD-10-CM

## 2019-10-09 DIAGNOSIS — G89.4 CHRONIC PAIN SYNDROME: Primary | ICD-10-CM

## 2019-10-09 DIAGNOSIS — M51.36 DDD (DEGENERATIVE DISC DISEASE), LUMBAR: ICD-10-CM

## 2019-10-09 PROCEDURE — 25000003 PHARM REV CODE 250: Performed by: ANESTHESIOLOGY

## 2019-10-09 PROCEDURE — 99152 MOD SED SAME PHYS/QHP 5/>YRS: CPT | Mod: ,,, | Performed by: ANESTHESIOLOGY

## 2019-10-09 PROCEDURE — 25500020 PHARM REV CODE 255: Performed by: ANESTHESIOLOGY

## 2019-10-09 PROCEDURE — 64483 NJX AA&/STRD TFRM EPI L/S 1: CPT | Performed by: ANESTHESIOLOGY

## 2019-10-09 PROCEDURE — 64483 NJX AA&/STRD TFRM EPI L/S 1: CPT | Mod: RT,,, | Performed by: ANESTHESIOLOGY

## 2019-10-09 PROCEDURE — 63600175 PHARM REV CODE 636 W HCPCS: Performed by: STUDENT IN AN ORGANIZED HEALTH CARE EDUCATION/TRAINING PROGRAM

## 2019-10-09 PROCEDURE — 99152 PR MOD CONSCIOUS SEDATION, SAME PHYS, 5+ YRS, FIRST 15 MIN: ICD-10-PCS | Mod: ,,, | Performed by: ANESTHESIOLOGY

## 2019-10-09 PROCEDURE — 64483 PR EPIDURAL INJ, ANES/STEROID, TRANSFORAMINAL, LUMB/SACR, SNGL LEVL: ICD-10-PCS | Mod: RT,,, | Performed by: ANESTHESIOLOGY

## 2019-10-09 PROCEDURE — 63600175 PHARM REV CODE 636 W HCPCS: Performed by: ANESTHESIOLOGY

## 2019-10-09 RX ORDER — LIDOCAINE HYDROCHLORIDE 10 MG/ML
INJECTION INFILTRATION; PERINEURAL
Status: DISCONTINUED | OUTPATIENT
Start: 2019-10-09 | End: 2019-10-09 | Stop reason: HOSPADM

## 2019-10-09 RX ORDER — DEXAMETHASONE SODIUM PHOSPHATE 4 MG/ML
INJECTION, SOLUTION INTRA-ARTICULAR; INTRALESIONAL; INTRAMUSCULAR; INTRAVENOUS; SOFT TISSUE
Status: DISCONTINUED | OUTPATIENT
Start: 2019-10-09 | End: 2019-10-09 | Stop reason: HOSPADM

## 2019-10-09 RX ORDER — SODIUM CHLORIDE 9 MG/ML
500 INJECTION, SOLUTION INTRAVENOUS CONTINUOUS
Status: DISCONTINUED | OUTPATIENT
Start: 2019-10-09 | End: 2019-10-09 | Stop reason: HOSPADM

## 2019-10-09 RX ORDER — MIDAZOLAM HYDROCHLORIDE 1 MG/ML
INJECTION INTRAMUSCULAR; INTRAVENOUS
Status: DISCONTINUED | OUTPATIENT
Start: 2019-10-09 | End: 2019-10-09 | Stop reason: HOSPADM

## 2019-10-09 RX ORDER — LIDOCAINE HYDROCHLORIDE 5 MG/ML
INJECTION, SOLUTION INFILTRATION; INTRAVENOUS
Status: DISCONTINUED | OUTPATIENT
Start: 2019-10-09 | End: 2019-10-09 | Stop reason: HOSPADM

## 2019-10-09 RX ORDER — FENTANYL CITRATE 50 UG/ML
INJECTION, SOLUTION INTRAMUSCULAR; INTRAVENOUS
Status: DISCONTINUED | OUTPATIENT
Start: 2019-10-09 | End: 2019-10-09 | Stop reason: HOSPADM

## 2019-10-09 RX ADMIN — SODIUM CHLORIDE 500 ML: 0.9 INJECTION, SOLUTION INTRAVENOUS at 02:10

## 2019-10-09 NOTE — DISCHARGE SUMMARY
Discharge Note  Short Stay      SUMMARY     Admit Date: 10/9/2019    Attending Physician: Anthony Schaefer      Discharge Physician: Anthony Schaefer      Discharge Date: 10/9/2019 3:02 PM    Procedure(s) (LRB):  LUMBAR TRANSFORAMINAL RIGHT L5/S1 (Right)    Final Diagnosis: Lumbar radiculopathy [M54.16]    Disposition: Home or self care    Patient Instructions:   Current Discharge Medication List      CONTINUE these medications which have NOT CHANGED    Details   amLODIPine (NORVASC) 10 MG tablet Take 1 tablet (10 mg total) by mouth once daily.  Qty: 90 tablet, Refills: 3      atorvastatin (LIPITOR) 20 MG tablet Take 20 mg by mouth once daily.  Refills: 5      diclofenac (VOLTAREN) 75 MG EC tablet Take 1 tablet (75 mg total) by mouth 2 (two) times daily.  Qty: 60 tablet, Refills: 0      gabapentin (NEURONTIN) 300 MG capsule Take 1 capsule (300 mg total) by mouth every 8 (eight) hours.  Qty: 60 capsule, Refills: 0    Associated Diagnoses: Acute pain of right thigh; Acute right lumbar radiculopathy      HYDROcodone-acetaminophen (NORCO)  mg per tablet Take 1 tablet by mouth every 4-6 hours for pain.  Qty: 20 tablet, Refills: 0    Associated Diagnoses: Plica syndrome; Osteoarthritis of left knee, unspecified osteoarthritis type; Plica of knee, left      methocarbamol (ROBAXIN) 750 MG Tab Take 1 tablet (750 mg total) by mouth 3 (three) times daily. As needed for muscle spasms for 60 doses  Qty: 60 tablet, Refills: 0      methylPREDNISolone (MEDROL DOSEPACK) 4 mg tablet Use as directed per package directions.  Qty: 1 Package, Refills: 0    Associated Diagnoses: Acute pain of right thigh; Acute right lumbar radiculopathy      promethazine (PHENERGAN) 25 MG tablet Take 1 tablet (25 mg total) by mouth every 6 (six) hours as needed for Nausea.  Qty: 14 tablet, Refills: 0    Associated Diagnoses: Plica syndrome; Osteoarthritis of left knee, unspecified osteoarthritis type; Plica of knee, left                 Discharge  Diagnosis: Lumbar radiculopathy [M54.16]  Condition on Discharge: Stable with no complications to procedure   Diet on Discharge: Same as before.  Activity: as per instruction sheet.  Discharge to: Home with a responsible adult.  Follow up: 2-4 weeks    Please call my office or pager at [phone] if experienced any weakness or loss of sensation, fever > 101.5, pain uncontrolled with oral medications, persistent nausea/vomiting/or diarrhea, redness or drainage from the incisions, or any other worrisome concerns. If physician on call was not reached or could not communicate with our office for any reason please go to the nearest emergency department

## 2019-10-09 NOTE — OP NOTE
Patient Name: Bhaskar Stiles  MRN: [id number]    INFORMED CONSENT: The procedure, risks, benefits and options were discussed with patient. There are no contraindications to the procedure. The patient expressed understanding and agreed to proceed. The personnel performing the procedure was discussed. I verify that I personally obtained Bhaskar's consent prior to the start of the procedure and the signed consent can be found on the patient's chart.    Procedure Date: 10/09/2019    Anesthesia: Topical    Pre Procedure diagnosis: Lumbar radiculopathy [M54.16]  1. Chronic pain syndrome    2. DDD (degenerative disc disease), lumbar    3. Chronic pain      Post-Procedure diagnosis: SAME      Sedation: Yes - Fentanyl 100 mcg and Midazolam 2 mg    PROCEDURE:Right L5/S1 TRANSFORAMINAL EPIDURAL STEROID INJECTION        DESCRIPTION OF PROCEDURE: The patient was brought to the procedure room. After performing time out IV access was obtained prior to the procedure. The patient was positioned prone on the fluoroscopy table. Continuous hemodynamic monitoring was initiated including blood pressure, EKG, and pulse oximetry. . The skin was prepped with chlorhexidine three times and draped in a sterile fashion. Skin anesthesia was achieved using 3 mL of lidocaine 1% over the respective injection site.     An oblique fluoroscopic view was obtained, with the superior articular process of the inferior vertebral body aligned with the pedicle. The tip of a 22-gauge 3.5-inch Quincke-type spinal needle was advanced toward the 6 oclock position of the pedicle under intermittent fluoroscopic guidance. Confirmation of proper needle position was made with AP, oblique, and lateral fluoroscopic views. Negative aspiration for blood or CSF was confirmed. 2 mL of Omnipaque 300 was injected. Live fluoroscopic imaging revealed a clear outline of the spinal nerve with proximal spread of agent through the neural foramen into the anterior epidural space. A  total combination of 3 mL of Lidocaine 0.5% and 10 mg decadron was injected at each level. Contrast spread was noted from L5 to S1 level. There was no pain on injection. The needle was removed and bleeding was nil.  A sterile dressing was applied. Bhaskar was taken back to the recovery room for further observation.     Blood Loss: Nill  Specimen: None    Anthony Schaefer MD

## 2019-10-09 NOTE — DISCHARGE INSTRUCTIONS

## 2019-10-14 ENCOUNTER — OFFICE VISIT (OUTPATIENT)
Dept: ORTHOPEDICS | Facility: CLINIC | Age: 49
End: 2019-10-14
Payer: COMMERCIAL

## 2019-10-14 VITALS — WEIGHT: 250 LBS | BODY MASS INDEX: 35 KG/M2 | HEIGHT: 71 IN

## 2019-10-14 DIAGNOSIS — M54.16 LUMBAR RADICULOPATHY: Primary | ICD-10-CM

## 2019-10-14 PROCEDURE — 99999 PR PBB SHADOW E&M-EST. PATIENT-LVL III: CPT | Mod: PBBFAC,,, | Performed by: PHYSICIAN ASSISTANT

## 2019-10-14 PROCEDURE — 99999 PR PBB SHADOW E&M-EST. PATIENT-LVL III: ICD-10-PCS | Mod: PBBFAC,,, | Performed by: PHYSICIAN ASSISTANT

## 2019-10-14 PROCEDURE — 99213 PR OFFICE/OUTPT VISIT, EST, LEVL III, 20-29 MIN: ICD-10-PCS | Mod: S$GLB,,, | Performed by: PHYSICIAN ASSISTANT

## 2019-10-14 PROCEDURE — 99213 OFFICE O/P EST LOW 20 MIN: CPT | Mod: S$GLB,,, | Performed by: PHYSICIAN ASSISTANT

## 2019-10-14 NOTE — PROGRESS NOTES
"DATE: 10/14/2019  PATIENT: Bhaskar Stiles    Attending Physician: Antwan Dugan M.D.    HISTORY:  Bhaskar Stiles is a 49 y.o. male who returns to me today for follow up of low back and right leg pain.  He was last seen by me 10/1/2019.  Today he is doing well but notes he had an NEIDA 10/9 and feels much better.  He rates his pain 3/10.  He is pleased.    The Patient denies myelopathic symptoms such as handwriting changes or difficulty with buttons/coins/keys. Denies perineal paresthesias, bowel/bladder dysfunction.    PMH/PSH/FamHx/SocHx:  Unchanged from prior visit    ROS:  REVIEW OF SYSTEMS:  Constitution: Negative. Negative for chills, fever and night sweats.   HENT: Negative for congestion and headaches.    Eyes: Negative for blurred vision, left vision loss and right vision loss.   Cardiovascular: Negative for chest pain and syncope.   Respiratory: Negative for cough and shortness of breath.    Endocrine: Negative for polydipsia, polyphagia and polyuria.   Hematologic/Lymphatic: Negative for bleeding problem. Does not bruise/bleed easily.   Skin: Negative for dry skin, itching and rash.   Musculoskeletal: Negative for falls and muscle weakness.   Gastrointestinal: Negative for abdominal pain and bowel incontinence.   Allergic/Immunologic: Negative for hives and persistent infections.  Genitourinary: Negative for urinary retention/incontinence and nocturia.   Neurological: Negative for disturbances in coordination, no myelopathic symptoms such as handwriting changes or difficulty with buttons, coins, keys or small objects. No loss of balance and seizures.   Psychiatric/Behavioral: Negative for depression. The patient does not have insomnia.   Denies perineal paresthesias, bowel or bladder incontinence    EXAM:  Ht 5' 11" (1.803 m)   Wt 113.4 kg (250 lb)   BMI 34.87 kg/m²     Physical exam stable.  Neuro exam stable.       IMAGING:  No new imaging today.    Today I personally re- reviewed AP, Lat and Flex/Ex "  upright L-spine that demonstrate mild L4/5 disc space narrowing.      MRI lumbar spine from 9/28/2019 shows neural foraminal narrowing at L5/S1.  Exam is limited due to patient's pain, he had to terminate the exam.       Body mass index is 34.87 kg/m².    No results found for: HGBA1C      ASSESSMENT/PLAN:    Bhaskar was seen today for follow-up.    Diagnoses and all orders for this visit:    Lumbar radiculopathy  -     Ambulatory Referral to Physical/Occupational Therapy        The patient's pain is improved since having an injection last week.  We will get him into some physical therapy as well.  Referral for PT placed at [place].  Follow up as needed.     Follow up if symptoms worsen or fail to improve.     18

## 2019-10-28 RX ORDER — DICLOFENAC SODIUM 75 MG/1
TABLET, DELAYED RELEASE ORAL
Qty: 60 TABLET | Refills: 0 | Status: SHIPPED | OUTPATIENT
Start: 2019-10-28 | End: 2020-10-09

## 2019-12-30 RX ORDER — METHOCARBAMOL 750 MG/1
TABLET, FILM COATED ORAL
Qty: 60 TABLET | Refills: 0 | Status: SHIPPED | OUTPATIENT
Start: 2019-12-30 | End: 2020-12-04

## 2020-01-31 RX ORDER — AMLODIPINE BESYLATE 10 MG/1
TABLET ORAL
Qty: 90 TABLET | Refills: 3 | Status: SHIPPED | OUTPATIENT
Start: 2020-01-31 | End: 2021-02-15

## 2020-03-27 ENCOUNTER — OFFICE VISIT (OUTPATIENT)
Dept: SLEEP MEDICINE | Facility: CLINIC | Age: 50
End: 2020-03-27
Payer: COMMERCIAL

## 2020-03-27 VITALS
DIASTOLIC BLOOD PRESSURE: 87 MMHG | BODY MASS INDEX: 36.73 KG/M2 | HEART RATE: 78 BPM | HEIGHT: 71 IN | SYSTOLIC BLOOD PRESSURE: 144 MMHG | WEIGHT: 262.38 LBS

## 2020-03-27 DIAGNOSIS — G47.33 OSA (OBSTRUCTIVE SLEEP APNEA): Primary | ICD-10-CM

## 2020-03-27 PROCEDURE — 99213 OFFICE O/P EST LOW 20 MIN: CPT | Mod: S$GLB,,, | Performed by: INTERNAL MEDICINE

## 2020-03-27 PROCEDURE — 99213 PR OFFICE/OUTPT VISIT, EST, LEVL III, 20-29 MIN: ICD-10-PCS | Mod: S$GLB,,, | Performed by: INTERNAL MEDICINE

## 2020-03-27 PROCEDURE — 99999 PR PBB SHADOW E&M-EST. PATIENT-LVL III: CPT | Mod: PBBFAC,,, | Performed by: INTERNAL MEDICINE

## 2020-03-27 PROCEDURE — 99999 PR PBB SHADOW E&M-EST. PATIENT-LVL III: ICD-10-PCS | Mod: PBBFAC,,, | Performed by: INTERNAL MEDICINE

## 2020-03-30 ENCOUNTER — PATIENT MESSAGE (OUTPATIENT)
Dept: SLEEP MEDICINE | Facility: CLINIC | Age: 50
End: 2020-03-30

## 2020-03-31 DIAGNOSIS — G47.33 OSA (OBSTRUCTIVE SLEEP APNEA): Primary | ICD-10-CM

## 2020-04-01 NOTE — PROGRESS NOTES
Subjective:       Patient ID: Bhaskar Stiles is a 49 y.o. male.    Chief Complaint: No chief complaint on file.    HPI     Patient has not used CPAP for some time.   He needs supplies.     Review of Systems    Objective:      Physical Exam    Assessment:       1. LAMONT (obstructive sleep apnea)        Plan:       Reviewed risks of untreated LAMONT at length.   Reviewed prior issues with CPAP.   Replacement supplies ordered.   Follow up 3 months.   16-minute visit. >50% spent counseling patient and coordination of care.

## 2020-10-01 ENCOUNTER — TELEPHONE (OUTPATIENT)
Dept: SLEEP MEDICINE | Facility: CLINIC | Age: 50
End: 2020-10-01

## 2020-10-01 NOTE — TELEPHONE ENCOUNTER
Patient's supply prescription is good until 4/2021.   LM for patient.   Follow up with PCP for sinus issues.

## 2020-10-05 ENCOUNTER — HOSPITAL ENCOUNTER (OUTPATIENT)
Dept: RADIOLOGY | Facility: HOSPITAL | Age: 50
Discharge: HOME | End: 2020-10-05
Attending: PHYSICIAN ASSISTANT
Payer: COMMERCIAL

## 2020-10-05 ENCOUNTER — OFFICE VISIT (OUTPATIENT)
Dept: SPORTS MEDICINE | Facility: CLINIC | Age: 50
End: 2020-10-05
Payer: COMMERCIAL

## 2020-10-05 VITALS
HEIGHT: 71 IN | HEART RATE: 95 BPM | BODY MASS INDEX: 36.54 KG/M2 | WEIGHT: 261 LBS | DIASTOLIC BLOOD PRESSURE: 75 MMHG | SYSTOLIC BLOOD PRESSURE: 120 MMHG

## 2020-10-05 DIAGNOSIS — M25.561 ACUTE PAIN OF RIGHT KNEE: Primary | ICD-10-CM

## 2020-10-05 DIAGNOSIS — M17.11 OSTEOARTHRITIS OF RIGHT KNEE, UNSPECIFIED OSTEOARTHRITIS TYPE: ICD-10-CM

## 2020-10-05 DIAGNOSIS — M25.562 PAIN IN BOTH KNEES, UNSPECIFIED CHRONICITY: ICD-10-CM

## 2020-10-05 DIAGNOSIS — M25.561 PAIN IN BOTH KNEES, UNSPECIFIED CHRONICITY: ICD-10-CM

## 2020-10-05 PROCEDURE — 99999 PR PBB SHADOW E&M-EST. PATIENT-LVL IV: ICD-10-PCS | Mod: PBBFAC,,, | Performed by: PHYSICIAN ASSISTANT

## 2020-10-05 PROCEDURE — 99214 OFFICE O/P EST MOD 30 MIN: CPT | Mod: 25,S$GLB,, | Performed by: PHYSICIAN ASSISTANT

## 2020-10-05 PROCEDURE — 73564 XR KNEE ORTHO BILAT WITH FLEXION: ICD-10-PCS | Mod: 26,50,, | Performed by: RADIOLOGY

## 2020-10-05 PROCEDURE — 20610 DRAIN/INJ JOINT/BURSA W/O US: CPT | Mod: RT,S$GLB,, | Performed by: PHYSICIAN ASSISTANT

## 2020-10-05 PROCEDURE — 20610 PR DRAIN/INJECT LARGE JOINT/BURSA: ICD-10-PCS | Mod: RT,S$GLB,, | Performed by: PHYSICIAN ASSISTANT

## 2020-10-05 PROCEDURE — 99214 PR OFFICE/OUTPT VISIT, EST, LEVL IV, 30-39 MIN: ICD-10-PCS | Mod: 25,S$GLB,, | Performed by: PHYSICIAN ASSISTANT

## 2020-10-05 PROCEDURE — 99999 PR PBB SHADOW E&M-EST. PATIENT-LVL IV: CPT | Mod: PBBFAC,,, | Performed by: PHYSICIAN ASSISTANT

## 2020-10-05 PROCEDURE — 73564 X-RAY EXAM KNEE 4 OR MORE: CPT | Mod: 26,50,, | Performed by: RADIOLOGY

## 2020-10-05 PROCEDURE — 73564 X-RAY EXAM KNEE 4 OR MORE: CPT | Mod: TC,50

## 2020-10-05 RX ORDER — BUPIVACAINE HYDROCHLORIDE 2.5 MG/ML
2 INJECTION, SOLUTION INFILTRATION; PERINEURAL
Status: COMPLETED | OUTPATIENT
Start: 2020-10-05 | End: 2020-10-05

## 2020-10-05 RX ORDER — TRIAMCINOLONE ACETONIDE 40 MG/ML
80 INJECTION, SUSPENSION INTRA-ARTICULAR; INTRAMUSCULAR
Status: COMPLETED | OUTPATIENT
Start: 2020-10-05 | End: 2020-10-05

## 2020-10-05 RX ORDER — LIDOCAINE HYDROCHLORIDE 10 MG/ML
2 INJECTION INFILTRATION; PERINEURAL
Status: COMPLETED | OUTPATIENT
Start: 2020-10-05 | End: 2020-10-05

## 2020-10-05 RX ADMIN — LIDOCAINE HYDROCHLORIDE 2 ML: 10 INJECTION INFILTRATION; PERINEURAL at 03:10

## 2020-10-05 RX ADMIN — TRIAMCINOLONE ACETONIDE 80 MG: 40 INJECTION, SUSPENSION INTRA-ARTICULAR; INTRAMUSCULAR at 03:10

## 2020-10-05 RX ADMIN — BUPIVACAINE HYDROCHLORIDE 5 MG: 2.5 INJECTION, SOLUTION INFILTRATION; PERINEURAL at 03:10

## 2020-10-06 NOTE — PROGRESS NOTES
CC: right knee pain    50 y.o. Male Kristan Penn, who reports mostly posterior but also anterior knee pain refractory to conservative mgmt.    Pain began 2 days ago with no inciting injury or trauma. Pain is aching and mostly bothers him when getting up from sitting for long periods. Pain is improved some with moving around and walking.     Rates his pain at a 7/10 on VAS and SANE of 80    Is affecting ADLs.     No mechanical symptoms, no instability    Previous seen him for right lumbar radiculopathy treated and resolved by back injection with pain management.     Left knee pain. Resolved with knee arthroscopy. He reports that knee still feels great.  DATE OF PROCEDURE: 08/22/2019     SURGEON:  Shelbi Hughes M.D     PROCEDURE PERFORMED:   left  1. knee arthroscopic chondroplasty (CPT 69089)  2. knee arthroscopic medial and lateral (CPT 69224) meniscectomy   3. knee arthroscopic partial synovectomy/debridement (CPT 03167).   4. knee arthroscopic plica excision(CPT 93850).    5. Knee arthroscopic lysis of adhesions (CPT 84918)  7. Knee arthroscopic loose body removal (CPT 07915)       Review of Systems   Constitution: Negative. Negative for chills, fever and night sweats.   HENT: Negative for congestion and headaches.    Eyes: Negative for blurred vision, left vision loss and right vision loss.   Cardiovascular: Negative for chest pain and syncope.   Respiratory: Negative for cough and shortness of breath.    Endocrine: Negative for polydipsia, polyphagia and polyuria.   Hematologic/Lymphatic: Negative for bleeding problem. Does not bruise/bleed easily.   Skin: Negative for dry skin, itching and rash.   Musculoskeletal: Negative for falls and muscle weakness.   Gastrointestinal: Negative for abdominal pain and bowel incontinence.   Genitourinary: Negative for bladder incontinence and nocturia.   Neurological: Negative for disturbances in coordination, loss of balance and seizures.    Psychiatric/Behavioral: Negative for depression. The patient does not have insomnia.    Allergic/Immunologic: Negative for hives and persistent infections.   All other systems negative      PAST MEDICAL HISTORY:   Past Medical History:   Diagnosis Date    Hyperlipidemia     Hypertension     Syncope and collapse      PAST SURGICAL HISTORY:   Past Surgical History:   Procedure Laterality Date    CHONDROPLASTY OF KNEE Left 8/22/2019    Procedure: CHONDROPLASTY, KNEE;  Surgeon: Shelbi Hughes MD;  Location: [facility];  Service: Orthopedics;  Laterality: Left;    HERNIA REPAIR      KNEE ARTHROSCOPY W/ MENISCECTOMY Left 8/22/2019    Procedure: ARTHROSCOPY, KNEE, WITH MEDIAL AND LATERAL MENISCECTOMY;  Surgeon: Shelbi Hughes MD;  Location: [facility];  Service: Orthopedics;  Laterality: Left;    KNEE ARTHROSCOPY W/ PLICA EXCISION Left 8/22/2019    Procedure: EXCISION, MEDIAL PLICA, KNEE, ARTHROSCOPIC;  Surgeon: Shelbi Hughes MD;  Location: [facility];  Service: Orthopedics;  Laterality: Left;    LIPOMA RESECTION      abdomen    REMOVAL OF FOREIGN BODY FROM LOWER EXTREMITY Left 8/22/2019    Procedure: REMOVAL, FOREIGN BODY, LOOSE BODY,  LOWER EXTREMITY;  Surgeon: Shelbi Hughes MD;  Location: [facility] OR;  Service: Orthopedics;  Laterality: Left;    SHOULDER SURGERY      left    SYNOVECTOMY OF KNEE Left 8/22/2019    Procedure: PARTIAL SYNOVECTOMY, KNEE;  Surgeon: Shelbi Hughes MD;  Location: [facility] OR;  Service: Orthopedics;  Laterality: Left;    TILT TABLE TEST N/A 7/9/2018    Procedure: TILT TABLE TEST;  Surgeon: Leonardo Whitfield MD;  Location: [facility] CATH LAB;  Service: Cardiology;  Laterality: N/A;  Syncope, HUT, DM, 3 PREP    TRANSFORAMINAL EPIDURAL INJECTION OF STEROID Right 10/9/2019    Procedure: LUMBAR TRANSFORAMINAL RIGHT L5/S1;  Surgeon: Anthony Schaefer MD;  Location: [facility] PAIN MGT;  Service: Pain Management;  Laterality: Right;  NEEDS CONSENT     FAMILY HISTORY: History reviewed. No pertinent family history.  SOCIAL  HISTORY:   Social History     Socioeconomic History    Marital status: Single     Spouse name: Not on file    Number of children: Not on file    Years of education: Not on file    Highest education level: Not on file   Occupational History    Not on file   Social Needs    Financial resource strain: Not on file    Food insecurity     Worry: Not on file     Inability: Not on file    Transportation needs     Medical: Not on file     Non-medical: Not on file   Tobacco Use    Smoking status: Never Smoker    Smokeless tobacco: Never Used   Substance and Sexual Activity    Alcohol use: No    Drug use: No    Sexual activity: Not on file   Lifestyle    Physical activity     Days per week: Not on file     Minutes per session: Not on file    Stress: Not on file   Relationships    Social connections     Talks on phone: Not on file     Gets together: Not on file     Attends Anabaptism service: Not on file     Active member of club or organization: Not on file     Attends meetings of clubs or organizations: Not on file     Relationship status: Not on file   Other Topics Concern    Not on file   Social History Narrative    Not on file       MEDICATIONS:   Current Outpatient Medications:     amLODIPine (NORVASC) 10 MG tablet, TAKE 1 TABLET BY MOUTH EVERY DAY, Disp: 90 tablet, Rfl: 3    atorvastatin (LIPITOR) 20 MG tablet, Take 20 mg by mouth once daily., Disp: , Rfl: 5    diclofenac (VOLTAREN) 75 MG EC tablet, TAKE 1 TABLET BY MOUTH TWICE A DAY, Disp: 60 tablet, Rfl: 0    HYDROcodone-acetaminophen (NORCO)  mg per tablet, Take 1 tablet by mouth every 4-6 hours for pain., Disp: 20 tablet, Rfl: 0    methocarbamol (ROBAXIN) 750 MG Tab, PLEASE SEE ATTACHED FOR DETAILED DIRECTIONS, Disp: 60 tablet, Rfl: 0    methylPREDNISolone (MEDROL DOSEPACK) 4 mg tablet, Use as directed per package directions., Disp: 1 Package, Rfl: 0    promethazine (PHENERGAN) 25 MG tablet, Take 1 tablet (25 mg total) by mouth every 6  "(six) hours as needed for Nausea., Disp: 14 tablet, Rfl: 0    gabapentin (NEURONTIN) 300 MG capsule, Take 1 capsule (300 mg total) by mouth every 8 (eight) hours. (Patient not taking: Reported on 3/27/2020), Disp: 60 capsule, Rfl: 0  No current facility-administered medications for this visit.   ALLERGIES: Review of patient's allergies indicates:  No Known Allergies    VITAL SIGNS: /75   Pulse 95   Ht 5' 11" (1.803 m)   Wt 118.4 kg (261 lb)   BMI 36.40 kg/m²      PHYSICAL EXAMINATION    General:  The patient is alert and oriented x 3.  Mood is pleasant.  Observation of ears, eyes and nose reveal no gross abnormalities.  HEENT: NCAT, sclera nonicteric  Lungs: Respirations are equal and unlabored.    right  KNEE EXAMINATION     OBSERVATION / INSPECTION   Gait:   Nonantalgic   Alignment:  Neutral   Scars:   None   Muscle atrophy: Mild  Effusion:  None   Warmth:  None   Discoloration:   none     TENDERNESS / CREPITUS (T / C):          T / C      T / C   Patella   - / -   Lateral joint line   - / -      Peripatellar medial  -  Medial joint line    - / -   Peripatellar lateral -  Medial plica   - / -   Patellar tendon -   Popliteal fossa  - / -   Quad tendon   -   Gastrocnemius   -   Prepatellar Bursa - / -   Quadricep   -   Tibial tubercle  -  Thigh/hamstring  -   Pes anserine/HS -  Fibula    -   ITB   - / -  Tibia     -   Tib/fib joint  - / -  LCL    -     MFC   - / -   MCL: Proximal  -    LFC   - / -    Distal   -          ROM: (* = pain)  PASSIVE   ACTIVE    Left :   0 / 0 / 140   0 / 0 / 140     Right :    0 / 0 / 135   0 / 0 / 130    Patellofemoral examination:  See above noted areas of tenderness.   Patella position    Subluxation / dislocation: Centered           Sup. / Inf;   Normal   Crepitus (PF):    Absent   Patellar Mobility:       Medial-lateral:   Normal    Superior-inferior:  Normal    Inferior tilt   Normal    Patellar tendon:  Normal   Lateral tilt:    Normal   J-sign:     None "   Patellofemoral grind:   neg      MENISCAL SIGNS:     Pain on terminal extension:  +  Pain on terminal flexion:  -  Rays maneuver:  -  Squat     NT    LIGAMENT EXAMINATION:  ACL / Lachman:  normal (-1 to 2mm)    PCL-Post.  drawer: normal 0 to 2mm  MCL- Valgus:  normal 0 to 2mm  LCL- Varus:  normal 0 to 2mm  Pivot shift: normal (Equal)   Dial Test: difference c/w other side   At 30° flexion: normal (< 5°)    At 90° flexion: normal (< 5°)   Reverse Pivot Shift:   normal (Equal)     STRENGTH: (* = with pain) PAINFUL SIDE   Quadricep   5/5   Hamstrin/5    EXTREMITY NEURO-VASCULAR EXAMINATION:   Sensation:  Grossly intact to light touch all dermatomal regions.   Motor Function:  Fully intact motor function at hip, knee, foot and ankle    DTRs;  quadriceps and  achilles 2+.  No clonus and downgoing Babinski.    Vascular status:  DP and PT pulses 2+, brisk capillary refill, symmetric.     Other Findings:  + step down bilat  + bridge test     Xrays:  Xrays of the bilateral knees with flexion were ordered and reviewed by me today. No fracture, subluxation noted. Mild to moderate DJD with medial joint space narrowing of bilateral knees.      ASSESSMENT:    1. right Knee pain  2. Right knee osteoarthritis   Bilateral hip abd/core weakness    PLAN:    1. PROCEDURE NOTE: right KNEE INJECTION  After time out was performed, including verification of patient ID, procedure, site and side, availability of information and equipment, review of safety issues, and agreement with consent, the procedure site was marked and the patient was prepped aseptically. A diagnostic and therapeutic injection of 2cc 40 mg kenalog and 1% lidocaine/0.25% bupivacaine was given under sterile technique using a 22g x 1.5 needle into the anterior lateral aspect of the right knee in seated position.   The patient had no adverse reactions to the medication. Pain decreased. The patient was instructed to apply ice to the joint for 20 minutes and  avoid strenuous activities for 24-36 hours following the injection. Patient was warned of possible blood sugar and/or blood pressure changes during that time. Following that time, patient can resume regular activities.    2. Ice compresses prn pain.     3. Closed chained exercises discussed to preserve knee cartilage over open chained exercises     4. Weight loss and the importance of this for overall joint health was discussed with the patient today. He currently weighs 260 lbs. He declined diet services today.     5. He has chosen to f/u prn knee pain.      All questions were answered, pt will contact us for questions or concerns in the interim.

## 2020-10-09 ENCOUNTER — TELEPHONE (OUTPATIENT)
Dept: SPORTS MEDICINE | Facility: CLINIC | Age: 50
End: 2020-10-09

## 2020-10-09 DIAGNOSIS — M25.561 ACUTE PAIN OF RIGHT KNEE: Primary | ICD-10-CM

## 2020-10-09 RX ORDER — MELOXICAM 15 MG/1
TABLET ORAL
Qty: 21 TABLET | Refills: 0 | Status: SHIPPED | OUTPATIENT
Start: 2020-10-09 | End: 2020-10-23

## 2020-10-09 NOTE — TELEPHONE ENCOUNTER
Patient called today after receiving right knee CSI on 10/5. He reports that today his knee has greatly worsened. Pain is severe and he is unable to bear weight on it. He reports that today he stepped down 2 steps and began having severe pain and almost fell. Will order MRI for evaluation of possible insufficiency fracture or pathology in his knee causing this severe pain and not responding to steroid injection. Recommend RICE and will call in mobic for pain.

## 2020-10-14 ENCOUNTER — HOSPITAL ENCOUNTER (OUTPATIENT)
Dept: RADIOLOGY | Facility: HOSPITAL | Age: 50
Discharge: HOME | End: 2020-10-14
Attending: PHYSICIAN ASSISTANT
Payer: COMMERCIAL

## 2020-10-14 DIAGNOSIS — M25.561 ACUTE PAIN OF RIGHT KNEE: ICD-10-CM

## 2020-10-14 PROCEDURE — 73721 MRI JNT OF LWR EXTRE W/O DYE: CPT | Mod: TC,RT

## 2020-10-14 PROCEDURE — 73721 MRI KNEE WITHOUT CONTRAST RIGHT: ICD-10-PCS | Mod: 26,RT,, | Performed by: RADIOLOGY

## 2020-10-14 PROCEDURE — 73721 MRI JNT OF LWR EXTRE W/O DYE: CPT | Mod: 26,RT,, | Performed by: RADIOLOGY

## 2020-10-16 ENCOUNTER — OFFICE VISIT (OUTPATIENT)
Dept: SPORTS MEDICINE | Facility: CLINIC | Age: 50
End: 2020-10-16
Payer: COMMERCIAL

## 2020-10-16 VITALS
HEART RATE: 88 BPM | BODY MASS INDEX: 36.85 KG/M2 | WEIGHT: 263.19 LBS | HEIGHT: 71 IN | SYSTOLIC BLOOD PRESSURE: 138 MMHG | DIASTOLIC BLOOD PRESSURE: 75 MMHG

## 2020-10-16 DIAGNOSIS — M17.11 OSTEOARTHRITIS OF RIGHT KNEE, UNSPECIFIED OSTEOARTHRITIS TYPE: ICD-10-CM

## 2020-10-16 DIAGNOSIS — M25.561 ACUTE PAIN OF RIGHT KNEE: Primary | ICD-10-CM

## 2020-10-16 DIAGNOSIS — M66.0 BAKER'S CYST, RUPTURED: ICD-10-CM

## 2020-10-16 DIAGNOSIS — M71.21 BAKER'S CYST OF KNEE, RIGHT: ICD-10-CM

## 2020-10-16 DIAGNOSIS — M54.32 LEFT SCIATIC NERVE PAIN: ICD-10-CM

## 2020-10-16 PROCEDURE — 99999 PR PBB SHADOW E&M-EST. PATIENT-LVL IV: ICD-10-PCS | Mod: PBBFAC,,, | Performed by: PHYSICIAN ASSISTANT

## 2020-10-16 PROCEDURE — 99214 OFFICE O/P EST MOD 30 MIN: CPT | Mod: S$GLB,,, | Performed by: PHYSICIAN ASSISTANT

## 2020-10-16 PROCEDURE — 99214 PR OFFICE/OUTPT VISIT, EST, LEVL IV, 30-39 MIN: ICD-10-PCS | Mod: S$GLB,,, | Performed by: PHYSICIAN ASSISTANT

## 2020-10-16 PROCEDURE — 99999 PR PBB SHADOW E&M-EST. PATIENT-LVL IV: CPT | Mod: PBBFAC,,, | Performed by: PHYSICIAN ASSISTANT

## 2020-10-19 NOTE — PROGRESS NOTES
CC: right knee pain    50 y.o. Male Kristan Penn, who reports mostly posterior knee pain refractory to conservative mgmt.    Pain began on 10/3/20 with no inciting injury or trauma. Pain was aching and mostly bothers him when getting up from sitting for long periods. Pain is improved some with moving around and walking.     Last saw him about 10 days ago and he was given a CSI knee injection. He knee was feeling improved until 2 days after when he stepped down off a step and felt a pop with severe pain worse then previously. MRI was order and he is here for results.     He continues to report posterior knee pain that is no longer approved with CSI injection.     Today rates his pain at a 3/10 on VAS and SANE of 80    Is affecting ADLs.     No mechanical symptoms, no instability    Previous seen him for right lumbar radiculopathy treated and resolved by back injection with pain management.     Left knee pain. Resolved with knee arthroscopy. He reports that knee still feels great.  DATE OF PROCEDURE: 08/22/2019     SURGEON:  Shelbi Hughes M.D     PROCEDURE PERFORMED:   left  1. knee arthroscopic chondroplasty (CPT 09103)  2. knee arthroscopic medial and lateral (CPT 57050) meniscectomy   3. knee arthroscopic partial synovectomy/debridement (CPT 48433).   4. knee arthroscopic plica excision(CPT 71318).    5. Knee arthroscopic lysis of adhesions (CPT 51821)  7. Knee arthroscopic loose body removal (CPT 34229)       Review of Systems   Constitution: Negative. Negative for chills, fever and night sweats.   HENT: Negative for congestion and headaches.    Eyes: Negative for blurred vision, left vision loss and right vision loss.   Cardiovascular: Negative for chest pain and syncope.   Respiratory: Negative for cough and shortness of breath.    Endocrine: Negative for polydipsia, polyphagia and polyuria.   Hematologic/Lymphatic: Negative for bleeding problem. Does not bruise/bleed easily.   Skin: Negative for dry  skin, itching and rash.   Musculoskeletal: Negative for falls and muscle weakness.   Gastrointestinal: Negative for abdominal pain and bowel incontinence.   Genitourinary: Negative for bladder incontinence and nocturia.   Neurological: Negative for disturbances in coordination, loss of balance and seizures.   Psychiatric/Behavioral: Negative for depression. The patient does not have insomnia.    Allergic/Immunologic: Negative for hives and persistent infections.   All other systems negative      PAST MEDICAL HISTORY:   Past Medical History:   Diagnosis Date    Hyperlipidemia     Hypertension     Syncope and collapse      PAST SURGICAL HISTORY:   Past Surgical History:   Procedure Laterality Date    CHONDROPLASTY OF KNEE Left 8/22/2019    Procedure: CHONDROPLASTY, KNEE;  Surgeon: Shelbi Hughes MD;  Location: [facility];  Service: Orthopedics;  Laterality: Left;    HERNIA REPAIR      KNEE ARTHROSCOPY W/ MENISCECTOMY Left 8/22/2019    Procedure: ARTHROSCOPY, KNEE, WITH MEDIAL AND LATERAL MENISCECTOMY;  Surgeon: Shelbi Hughes MD;  Location: [facility];  Service: Orthopedics;  Laterality: Left;    KNEE ARTHROSCOPY W/ PLICA EXCISION Left 8/22/2019    Procedure: EXCISION, MEDIAL PLICA, KNEE, ARTHROSCOPIC;  Surgeon: Shelbi Hughes MD;  Location: [facility];  Service: Orthopedics;  Laterality: Left;    LIPOMA RESECTION      abdomen    REMOVAL OF FOREIGN BODY FROM LOWER EXTREMITY Left 8/22/2019    Procedure: REMOVAL, FOREIGN BODY, LOOSE BODY,  LOWER EXTREMITY;  Surgeon: Shelbi Hughes MD;  Location: [facility];  Service: Orthopedics;  Laterality: Left;    SHOULDER SURGERY      left    SYNOVECTOMY OF KNEE Left 8/22/2019    Procedure: PARTIAL SYNOVECTOMY, KNEE;  Surgeon: Shelbi Hughes MD;  [facility];  Service: Orthopedics;  Laterality: Left;    TILT TABLE TEST N/A 7/9/2018    Procedure: TILT TABLE TEST;  Surgeon: Leonardo Whitfield MD;  Location: [facility] CATH LAB;  Service: Cardiology;  Laterality: N/A;  Syncope, HUT, DM, 3 PREP     TRANSFORAMINAL EPIDURAL INJECTION OF STEROID Right 10/9/2019    Procedure: LUMBAR TRANSFORAMINAL RIGHT L5/S1;  Surgeon: Anthony Schaefer MD;  Location: [facility];  Service: Pain Management;  Laterality: Right;  NEEDS CONSENT     FAMILY HISTORY: History reviewed. No pertinent family history.  SOCIAL HISTORY:   Social History     Socioeconomic History    Marital status: Single     Spouse name: Not on file    Number of children: Not on file    Years of education: Not on file    Highest education level: Not on file   Occupational History    Not on file   Social Needs    Financial resource strain: Not on file    Food insecurity     Worry: Not on file     Inability: Not on file    Transportation needs     Medical: Not on file     Non-medical: Not on file   Tobacco Use    Smoking status: Never Smoker    Smokeless tobacco: Never Used   Substance and Sexual Activity    Alcohol use: No    Drug use: No    Sexual activity: Not on file   Lifestyle    Physical activity     Days per week: Not on file     Minutes per session: Not on file    Stress: Not on file   Relationships    Social connections     Talks on phone: Not on file     Gets together: Not on file     Attends [employer] service: Not on file     Active member of club or organization: Not on file     Attends meetings of clubs or organizations: Not on file     Relationship status: Not on file   Other Topics Concern    Not on file   Social History Narrative    Not on file       MEDICATIONS:   Current Outpatient Medications:     amLODIPine (NORVASC) 10 MG tablet, TAKE 1 TABLET BY MOUTH EVERY DAY, Disp: 90 tablet, Rfl: 3    atorvastatin (LIPITOR) 20 MG tablet, Take 20 mg by mouth once daily., Disp: , Rfl: 5    HYDROcodone-acetaminophen (NORCO)  mg per tablet, Take 1 tablet by mouth every 4-6 hours for pain., Disp: 20 tablet, Rfl: 0    meloxicam (MOBIC) 15 MG tablet, Take 1 tablet by mouth once daily with food. May need 20mg prilosec once daily  "on days that you are taking mobic to protect the stomach., Disp: 21 tablet, Rfl: 0    methocarbamol (ROBAXIN) 750 MG Tab, PLEASE SEE ATTACHED FOR DETAILED DIRECTIONS, Disp: 60 tablet, Rfl: 0    methylPREDNISolone (MEDROL DOSEPACK) 4 mg tablet, Use as directed per package directions., Disp: 1 Package, Rfl: 0    promethazine (PHENERGAN) 25 MG tablet, Take 1 tablet (25 mg total) by mouth every 6 (six) hours as needed for Nausea., Disp: 14 tablet, Rfl: 0    gabapentin (NEURONTIN) 300 MG capsule, Take 1 capsule (300 mg total) by mouth every 8 (eight) hours. (Patient not taking: Reported on 3/27/2020), Disp: 60 capsule, Rfl: 0  ALLERGIES: Review of patient's allergies indicates:  No Known Allergies    VITAL SIGNS: /75   Pulse 88   Ht 5' 11" (1.803 m)   Wt 119.4 kg (263 lb 3.2 oz)   BMI 36.71 kg/m²      PHYSICAL EXAMINATION    General:  The patient is alert and oriented x 3.  Mood is pleasant.  Observation of ears, eyes and nose reveal no gross abnormalities.  HEENT: NCAT, sclera nonicteric  Lungs: Respirations are equal and unlabored.    right  KNEE EXAMINATION     OBSERVATION / INSPECTION   Gait:   Nonantalgic   Alignment:  Neutral   Scars:   None   Muscle atrophy: Mild  Effusion:  None   Warmth:  None   Discoloration:   none     TENDERNESS / CREPITUS (T / C):          T / C      T / C   Patella   - / -   Lateral joint line   - / -      Peripatellar medial  -  Medial joint line    - / -   Peripatellar lateral -  Medial plica   - / -   Patellar tendon -   Popliteal fossa  + / -   Quad tendon   -   Gastrocnemius   -   Prepatellar Bursa - / -   Quadricep   -   Tibial tubercle  -  Thigh/hamstring  -   Pes anserine/HS -  Fibula    -   ITB   - / -  Tibia     -   Tib/fib joint  - / -  LCL    -     MFC   - / -   MCL: Proximal  -    LFC   - / -    Distal   -          ROM: (* = pain)  PASSIVE   ACTIVE    Left :   0 / 0 / 140   0 / 0 / 140     Right :    0 / 0 / 135   0 / 0 / 130    Patellofemoral examination:  See " above noted areas of tenderness.   Patella position    Subluxation / dislocation: Centered           Sup. / Inf;   Normal   Crepitus (PF):    Absent   Patellar Mobility:       Medial-lateral:   Normal    Superior-inferior:  Normal    Inferior tilt   Normal    Patellar tendon:  Normal   Lateral tilt:    Normal   J-sign:     None   Patellofemoral grind:   neg      MENISCAL SIGNS:     Pain on terminal extension:  +  Pain on terminal flexion:  -  Rays maneuver:  -  Squat     NT    LIGAMENT EXAMINATION:  ACL / Lachman:  normal (-1 to 2mm)    PCL-Post.  drawer: normal 0 to 2mm  MCL- Valgus:  normal 0 to 2mm  LCL- Varus:  normal 0 to 2mm  Pivot shift: normal (Equal)   Dial Test: difference c/w other side   At 30° flexion: normal (< 5°)    At 90° flexion: normal (< 5°)   Reverse Pivot Shift:   normal (Equal)     STRENGTH: (* = with pain) PAINFUL SIDE   Quadricep   5/5   Hamstrin/5    EXTREMITY NEURO-VASCULAR EXAMINATION:   Sensation:  Grossly intact to light touch all dermatomal regions.   Motor Function:  Fully intact motor function at hip, knee, foot and ankle    DTRs;  quadriceps and  achilles 2+.  No clonus and downgoing Babinski.    Vascular status:  DP and PT pulses 2+, brisk capillary refill, symmetric.     Other Findings:  + step down bilat  + bridge test     Xrays:  Xrays of the bilateral knees with flexion were ordered and reviewed by me today. No fracture, subluxation noted. Mild to moderate DJD with medial joint space narrowing of bilateral knees.    MRI right knee from 10/14/20:   Posterior horn medial meniscal tear, as above.     Mild to moderate amount of cartilage loss/ osteoarthritis in the medial compartment.     Small joint effusion and partially ruptured Baker's cyst.     ASSESSMENT:    1. right Knee pain  2. Right knee osteoarthritis   3. Ruptured baker's cyst  Bilateral hip abd/core weakness    PLAN:    1. Previous knee CSI followed by ruptured baker's cyst.  I feel that his meniscus  tear is degenerative and that his pain currently is from rupture baker's cyst.    2. Posterior knee warm compresses daily.   Can possibly try voltaren gel OTC if he would like.     3. Closed chained exercises discussed to preserve knee cartilage over open chained exercises     4. Weight loss and the importance of this for overall joint health was discussed with the patient today. He currently weighs 260 lbs. He declined diet services today. Explained importance of this for back, sciatica, and knees.     5. PT for right knee posterior knee pain likely from ruptured baker's cyst and OA and if you can also include PT for left sciatic nerve pain. Eval and treat with HEP.      All questions were answered, pt will contact us for questions or concerns in the interim.

## 2020-11-04 ENCOUNTER — TELEPHONE (OUTPATIENT)
Dept: ORTHOPEDICS | Facility: CLINIC | Age: 50
End: 2020-11-04

## 2020-11-04 ENCOUNTER — PATIENT MESSAGE (OUTPATIENT)
Dept: ORTHOPEDICS | Facility: CLINIC | Age: 50
End: 2020-11-04

## 2020-11-04 DIAGNOSIS — M51.36 DDD (DEGENERATIVE DISC DISEASE), LUMBAR: Primary | ICD-10-CM

## 2020-11-05 DIAGNOSIS — M54.16 LUMBAR RADICULOPATHY: Primary | ICD-10-CM

## 2020-11-06 ENCOUNTER — TELEPHONE (OUTPATIENT)
Dept: PAIN MEDICINE | Facility: OTHER | Age: 50
End: 2020-11-06

## 2020-11-06 ENCOUNTER — PATIENT MESSAGE (OUTPATIENT)
Dept: PAIN MEDICINE | Facility: OTHER | Age: 50
End: 2020-11-06

## 2020-11-06 DIAGNOSIS — M54.16 LUMBAR RADICULOPATHY: Primary | ICD-10-CM

## 2020-11-18 ENCOUNTER — HOSPITAL ENCOUNTER (OUTPATIENT)
Facility: OTHER | Age: 50
Discharge: HOME | End: 2020-11-18
Attending: ANESTHESIOLOGY | Admitting: ANESTHESIOLOGY
Payer: COMMERCIAL

## 2020-11-18 VITALS
OXYGEN SATURATION: 94 % | DIASTOLIC BLOOD PRESSURE: 71 MMHG | TEMPERATURE: 99 F | SYSTOLIC BLOOD PRESSURE: 129 MMHG | BODY MASS INDEX: 36.68 KG/M2 | WEIGHT: 262 LBS | HEART RATE: 82 BPM | HEIGHT: 71 IN | RESPIRATION RATE: 16 BRPM

## 2020-11-18 DIAGNOSIS — M51.37 DDD (DEGENERATIVE DISC DISEASE), LUMBOSACRAL: ICD-10-CM

## 2020-11-18 DIAGNOSIS — G89.29 CHRONIC PAIN: ICD-10-CM

## 2020-11-18 DIAGNOSIS — M54.16 LUMBAR RADICULOPATHY: Primary | ICD-10-CM

## 2020-11-18 PROCEDURE — 64483 NJX AA&/STRD TFRM EPI L/S 1: CPT | Mod: LT,,, | Performed by: ANESTHESIOLOGY

## 2020-11-18 PROCEDURE — 64483 PR EPIDURAL INJ, ANES/STEROID, TRANSFORAMINAL, LUMB/SACR, SNGL LEVL: ICD-10-PCS | Mod: LT,,, | Performed by: ANESTHESIOLOGY

## 2020-11-18 PROCEDURE — 25500020 PHARM REV CODE 255: Performed by: ANESTHESIOLOGY

## 2020-11-18 PROCEDURE — 64483 NJX AA&/STRD TFRM EPI L/S 1: CPT | Mod: LT | Performed by: ANESTHESIOLOGY

## 2020-11-18 PROCEDURE — 63600175 PHARM REV CODE 636 W HCPCS: Performed by: ANESTHESIOLOGY

## 2020-11-18 PROCEDURE — 25000003 PHARM REV CODE 250: Performed by: STUDENT IN AN ORGANIZED HEALTH CARE EDUCATION/TRAINING PROGRAM

## 2020-11-18 PROCEDURE — 25000003 PHARM REV CODE 250: Performed by: ANESTHESIOLOGY

## 2020-11-18 RX ORDER — FENTANYL CITRATE 50 UG/ML
INJECTION, SOLUTION INTRAMUSCULAR; INTRAVENOUS
Status: DISCONTINUED | OUTPATIENT
Start: 2020-11-18 | End: 2020-11-18 | Stop reason: HOSPADM

## 2020-11-18 RX ORDER — SODIUM CHLORIDE 9 MG/ML
500 INJECTION, SOLUTION INTRAVENOUS CONTINUOUS
Status: DISCONTINUED | OUTPATIENT
Start: 2020-11-18 | End: 2020-11-18 | Stop reason: HOSPADM

## 2020-11-18 RX ORDER — DEXAMETHASONE SODIUM PHOSPHATE 10 MG/ML
INJECTION INTRAMUSCULAR; INTRAVENOUS
Status: DISCONTINUED | OUTPATIENT
Start: 2020-11-18 | End: 2020-11-18 | Stop reason: HOSPADM

## 2020-11-18 RX ORDER — MIDAZOLAM HYDROCHLORIDE 1 MG/ML
INJECTION INTRAMUSCULAR; INTRAVENOUS
Status: DISCONTINUED | OUTPATIENT
Start: 2020-11-18 | End: 2020-11-18 | Stop reason: HOSPADM

## 2020-11-18 RX ORDER — LIDOCAINE HYDROCHLORIDE 5 MG/ML
INJECTION, SOLUTION INFILTRATION; INTRAVENOUS
Status: DISCONTINUED | OUTPATIENT
Start: 2020-11-18 | End: 2020-11-18 | Stop reason: HOSPADM

## 2020-11-18 RX ORDER — LIDOCAINE HYDROCHLORIDE 10 MG/ML
INJECTION INFILTRATION; PERINEURAL
Status: DISCONTINUED | OUTPATIENT
Start: 2020-11-18 | End: 2020-11-18 | Stop reason: HOSPADM

## 2020-11-18 RX ADMIN — SODIUM CHLORIDE 500 ML: 0.9 INJECTION, SOLUTION INTRAVENOUS at 08:11

## 2020-11-18 NOTE — DISCHARGE SUMMARY
Discharge Note  Short Stay      SUMMARY     Admit Date: 11/18/2020    Attending Physician: Anthony Schaefer      Discharge Physician: Anthony Schaefer      Discharge Date: 11/18/2020 9:03 AM    Procedure(s) (LRB):  LUMBAR TRANSFORAMINAL LEFT L5/S1 DIRECT REFERRAL (Left)    Final Diagnosis: Lumbar radiculopathy [M54.16]    Disposition: Home or self care    Patient Instructions:   Current Discharge Medication List      CONTINUE these medications which have NOT CHANGED    Details   amLODIPine (NORVASC) 10 MG tablet TAKE 1 TABLET BY MOUTH EVERY DAY  Qty: 90 tablet, Refills: 3      atorvastatin (LIPITOR) 20 MG tablet Take 20 mg by mouth once daily.  Refills: 5      gabapentin (NEURONTIN) 300 MG capsule Take 1 capsule (300 mg total) by mouth every 8 (eight) hours.  Qty: 60 capsule, Refills: 0    Associated Diagnoses: Acute pain of right thigh; Acute right lumbar radiculopathy      HYDROcodone-acetaminophen (NORCO)  mg per tablet Take 1 tablet by mouth every 4-6 hours for pain.  Qty: 20 tablet, Refills: 0    Associated Diagnoses: Plica syndrome; Osteoarthritis of left knee, unspecified osteoarthritis type; Plica of knee, left      meloxicam (MOBIC) 15 MG tablet PLEASE SEE ATTACHED FOR DETAILED DIRECTIONS  Qty: 30 tablet, Refills: 1    Associated Diagnoses: Acute pain of right knee      methocarbamol (ROBAXIN) 750 MG Tab PLEASE SEE ATTACHED FOR DETAILED DIRECTIONS  Qty: 60 tablet, Refills: 0      methylPREDNISolone (MEDROL DOSEPACK) 4 mg tablet Use as directed per package directions.  Qty: 1 Package, Refills: 0    Associated Diagnoses: Acute pain of right thigh; Acute right lumbar radiculopathy      promethazine (PHENERGAN) 25 MG tablet Take 1 tablet (25 mg total) by mouth every 6 (six) hours as needed for Nausea.  Qty: 14 tablet, Refills: 0    Associated Diagnoses: Plica syndrome; Osteoarthritis of left knee, unspecified osteoarthritis type; Plica of knee, left                 Discharge Diagnosis: Lumbar  radiculopathy [M54.16]  Condition on Discharge: Stable with no complications to procedure   Diet on Discharge: Same as before.  Activity: as per instruction sheet.  Discharge to: Home with a responsible adult.  Follow up: 2-4 weeks       Please call my office or pager at [phone] if experienced any weakness or loss of sensation, fever > 101.5, pain uncontrolled with oral medications, persistent nausea/vomiting/or diarrhea, redness or drainage from the incisions, or any other worrisome concerns. If physician on call was not reached or could not communicate with our office for any reason please go to the nearest emergency department

## 2020-11-18 NOTE — DISCHARGE INSTRUCTIONS
Thank you for allowing us to care for you today. You may receive a survey about the care we provided. Your feedback is valuable and helps us provide excellent care throughout the community.     Home Care Instructions for Pain Management:    1. DIET:   You may resume your normal diet today.   2. BATHING:   You may shower with luke warm water. No tub baths or anything that will soak injection sites under water for the next 24 hours.  3. DRESSING:   You may remove your bandage today.   4. ACTIVITY LEVEL:   You may resume your normal activities 24 hrs after your procedure. Nothing strenuous today.  5. MEDICATIONS:   You may resume your normal medications today. To restart blood thinners, ask your doctor.  6. DRIVING    If you have received any sedatives by mouth today, you may not drive for 12 hours.    If you have received any sedation through your IV, you may not drive for 24 hrs.   7. SPECIAL INSTRUCTIONS:   No heat to the injection site for 24 hrs including, hot bath or shower, heating pad, moist heat, or hot tubs.    Use ice pack to injection site for any pain or discomfort.  Apply ice packs for 20 minute intervals as needed.    IF you have diabetes, be sure to monitor your blood sugar more closely. IF your injection contained steroids your blood sugar levels may become higher than normal.    If you are still having pain upon discharge:  Your pain may improve over the next 48 hours. The anesthetic (numbing medication) works immediately to 48 hours. IF your injection contained a steroid (anti-inflammatory medication), it takes approximately 3 days to start feeling relief and 7-10 days to see your greatest results from the medication. It is possible you may need subsequent injections. This would be discussed at your follow up appointment with pain management or your referring doctor.    Please call the PAIN MANAGEMENT office at [phone] or ON CALL pager at [phone] if you experienced any:   -Weakness or  loss of sensation  -Fever > 101.5  -Pain uncontrolled with oral medications   -Persistent nausea, vomiting, or diarrhea  -Redness or drainage from the injection sites, or any other worrisome concerns.   If physician on call was not reached or could not communicate with our office for any reason please go to the nearest emergency department.  Adult Procedural Sedation Instructions    Recovery After Procedural Sedation (Adult)  You have been given medicine by vein to make you sleep during your surgery. This may have included both a pain medicine and sleeping medicine. Most of the effects have worn off. But you may still have some drowsiness for the next 6 to 8 hours.  Home care  Follow these guidelines when you get home:  · For the next 8 hours, you should be watched by a responsible adult. This person should make sure your condition is not getting worse.  · Don't drink any alcohol for the next 24 hours.  · Don't drive, operate dangerous machinery, or make important business or personal decisions during the next 24 hours.  Note: Your healthcare provider may tell you not to take any medicine by mouth for pain or sleep in the next 4 hours. These medicines may react with the medicines you were given in the hospital. This could cause a much stronger response than usual.  Follow-up care  Follow up with your healthcare provider if you are not alert and back to your usual level of activity within 12 hours.  When to seek medical advice  Call your healthcare provider right away if any of these occur:  · Drowsiness gets worse  · Weakness or dizziness gets worse  · Repeated vomiting  · You can't be awakened   Date Last Reviewed: 10/18/2016  © 2256-6162 [employer]. [address] [place] [address], [place] [address]. All rights reserved. This information is not intended as a substitute for professional medical care. Always follow your healthcare professional's instructions.

## 2020-11-18 NOTE — OP NOTE
Patient Name: Bhaskar Stiles  MRN: [id number]    INFORMED CONSENT: The procedure, risks, benefits and options were discussed with patient. There are no contraindications to the procedure. The patient expressed understanding and agreed to proceed. The personnel performing the procedure was discussed. I verify that I personally obtained Bhaskar's consent prior to the start of the procedure and the signed consent can be found on the patient's chart.    Procedure Date: 11/18/2020    Anesthesia: Topical    Pre Procedure diagnosis: Lumbar radiculopathy [M54.16]  1. Lumbar radiculopathy    2. DDD (degenerative disc disease), lumbosacral    3. Chronic pain      Post-Procedure diagnosis: SAME      Sedation: Yes - Fentanyl 50 mcg and Midazolam 2 mg    PROCEDURE:Left L5/S1   TRANSFORAMINAL EPIDURAL STEROID INJECTION        DESCRIPTION OF PROCEDURE: The patient was brought to the procedure room. After performing time out IV access was obtained prior to the procedure. The patient was positioned prone on the fluoroscopy table. Continuous hemodynamic monitoring was initiated including blood pressure, EKG, and pulse oximetry. . The skin was prepped with chlorhexidine three times and draped in a sterile fashion. Skin anesthesia was achieved using 3 mL of lidocaine 1% over the respective injection site.     An oblique fluoroscopic view was obtained, with the superior articular process of the inferior vertebral body aligned with the pedicle. The tip of a 22-gauge 5-inch Quincke-type spinal needle was advanced toward the 6 oclock position of the pedicle under intermittent fluoroscopic guidance. Confirmation of proper needle position was made with AP, oblique, and lateral fluoroscopic views. Negative aspiration for blood or CSF was confirmed. 2 mL of Omnipaque 300 was injected. Live fluoroscopic imaging revealed a clear outline of the spinal nerve with proximal spread of agent through the neural foramen into the anterior epidural space. A  total combination of 3 mL of Lidocaine 0.5% and 10 mg decadron was injected at each level. Contrast spread was noted from L4 to L5 level. There was no pain on injection. The needle was removed and bleeding was nil.  A sterile dressing was applied. Bhaskar was taken back to the recovery room for further observation.     Blood Loss: Nill  Specimen: None    Anthony Schaefer MD

## 2020-11-18 NOTE — H&P
HPI  Patient presenting for Procedure(s) (LRB):  LUMBAR TRANSFORAMINAL LEFT L5/S1 DIRECT REFERRAL (Left)     Patient on Anti-coagulation No    No health changes since previous encounter    Past Medical History:   Diagnosis Date    Hyperlipidemia     Hypertension     Syncope and collapse      Past Surgical History:   Procedure Laterality Date    CHONDROPLASTY OF KNEE Left 8/22/2019    Procedure: CHONDROPLASTY, KNEE;  Surgeon: Shelbi Hughes MD;  Location: [facility] OR;  Service: Orthopedics;  Laterality: Left;    HERNIA REPAIR      KNEE ARTHROSCOPY W/ MENISCECTOMY Left 8/22/2019    Procedure: ARTHROSCOPY, KNEE, WITH MEDIAL AND LATERAL MENISCECTOMY;  Surgeon: Shelbi Hughes MD;  Location: [facility] OR;  Service: Orthopedics;  Laterality: Left;    KNEE ARTHROSCOPY W/ PLICA EXCISION Left 8/22/2019    Procedure: EXCISION, MEDIAL PLICA, KNEE, ARTHROSCOPIC;  Surgeon: Shelbi Hughes MD;  Location: [facility] OR;  Service: Orthopedics;  Laterality: Left;    LIPOMA RESECTION      abdomen    REMOVAL OF FOREIGN BODY FROM LOWER EXTREMITY Left 8/22/2019    Procedure: REMOVAL, FOREIGN BODY, LOOSE BODY,  LOWER EXTREMITY;  Surgeon: Shelbi Hughes MD;  Location: [facility] OR;  Service: Orthopedics;  Laterality: Left;    SHOULDER SURGERY      left    SYNOVECTOMY OF KNEE Left 8/22/2019    Procedure: PARTIAL SYNOVECTOMY, KNEE;  Surgeon: Shelbi Hughes MD;  Location: [facility] OR;  Service: Orthopedics;  Laterality: Left;    TILT TABLE TEST N/A 7/9/2018    Procedure: TILT TABLE TEST;  Surgeon: Leonardo Whitfield MD;  Location: [facility] CATH LAB;  Service: Cardiology;  Laterality: N/A;  Syncope, HUT, DM, 3 PREP    TRANSFORAMINAL EPIDURAL INJECTION OF STEROID Right 10/9/2019    Procedure: LUMBAR TRANSFORAMINAL RIGHT L5/S1;  Surgeon: Anthony Schaefer MD;  Location: [facility] PAIN MGT;  Service: Pain Management;  Laterality: Right;  NEEDS CONSENT     Review of patient's allergies indicates:  No Known Allergies   No current facility-administered medications for this encounter.         PMHx, PSHx, Allergies, Medications reviewed in epic    ROS negative except pain complaints in HPI    OBJECTIVE:    There were no vitals taken for this visit.    PHYSICAL EXAMINATION:    GENERAL: Well appearing, in no acute distress, alert and oriented x3.  PSYCH:  Mood and affect appropriate.  SKIN: Skin color, texture, turgor normal, no rashes or lesions which will impact the procedure.  CV: RRR with palpation of the radial artery.  PULM: No evidence of respiratory difficulty, symmetric chest rise. Clear to auscultation.  NEURO: Cranial nerves grossly intact.    Plan:    Proceed with procedure as planned Procedure(s) (LRB):  LUMBAR TRANSFORAMINAL LEFT L5/S1 DIRECT REFERRAL (Left)    Sachi Field  11/18/2020

## 2020-11-30 ENCOUNTER — CLINICAL SUPPORT (OUTPATIENT)
Dept: REHABILITATION | Facility: HOSPITAL | Age: 50
End: 2020-11-30
Payer: COMMERCIAL

## 2020-11-30 DIAGNOSIS — M54.41 BILATERAL LOW BACK PAIN WITH BILATERAL SCIATICA, UNSPECIFIED CHRONICITY: ICD-10-CM

## 2020-11-30 DIAGNOSIS — M54.32 LEFT SCIATIC NERVE PAIN: ICD-10-CM

## 2020-11-30 DIAGNOSIS — M54.42 BILATERAL LOW BACK PAIN WITH BILATERAL SCIATICA, UNSPECIFIED CHRONICITY: ICD-10-CM

## 2020-11-30 DIAGNOSIS — R29.898 WEAKNESS OF BOTH LOWER EXTREMITIES: ICD-10-CM

## 2020-11-30 DIAGNOSIS — M25.561 ACUTE PAIN OF RIGHT KNEE: ICD-10-CM

## 2020-11-30 DIAGNOSIS — M17.11 OSTEOARTHRITIS OF RIGHT KNEE, UNSPECIFIED OSTEOARTHRITIS TYPE: ICD-10-CM

## 2020-11-30 PROBLEM — M54.50 LOW BACK PAIN: Status: ACTIVE | Noted: 2020-11-30

## 2020-11-30 PROCEDURE — 97110 THERAPEUTIC EXERCISES: CPT

## 2020-11-30 PROCEDURE — 97161 PT EVAL LOW COMPLEX 20 MIN: CPT

## 2020-11-30 PROCEDURE — 97140 MANUAL THERAPY 1/> REGIONS: CPT

## 2020-11-30 NOTE — PLAN OF CARE
[facility]  Physical Therapy Initial Evaluation    Name: Bhaskar Stiles  Clinic Number: [id number]    Therapy Diagnosis:   Encounter Diagnoses   Name Primary?    Acute pain of right knee     Osteoarthritis of right knee, unspecified osteoarthritis type     Left sciatic nerve pain     Bilateral low back pain with bilateral sciatica, unspecified chronicity     Weakness of both lower extremities      Physician: Michael Tesfaye III, *    Physician Orders: PT Eval and Treat   Medical Diagnosis: L Sciatica; R Knee Pain   Evaluation Date: 11/30/2020  Authorization Period Expiration: 12/31/2020  Plan of Care Certification Period: 2/1/2020  Visit # / Visits authorized: 1/ 20    Time In: 8:00AM  Time Out: 9:00AM  Total Billable Time: 60 minutes    Precautions: Standard    Subjective   Date of onset: One year ago back started bothering him   History of current condition - Bhaskar reports: Patient started noticing discomfort in his low back around September of last year, he got an injection last year which alleviated his symptoms until recently. About two weeks ago, he had a similar injection, however, he is not experiencing similar alleviation. While he has complaints of his back/knee he would prefer to work on his back first. He states that his back bothers him intermittently and describes his flair-ups as random.     Past Medical History:   Diagnosis Date    Hyperlipidemia     Hypertension     Syncope and collapse      Bhaskar Stiles  has a past surgical history that includes Tilt table test (N/A, 7/9/2018); Hernia repair; Shoulder surgery; Lipoma resection; Synovectomy of knee (Left, 8/22/2019); Knee arthroscopy w/ meniscectomy (Left, 8/22/2019); Chondroplasty of knee (Left, 8/22/2019); Removal of foreign body from lower extremity (Left, 8/22/2019); Knee arthroscopy w/ plica excision (Left, 8/22/2019); Transforaminal epidural injection of steroid (Right, 10/9/2019); and Transforaminal  epidural injection of steroid (Left, 11/18/2020).    Bhaskar has a current medication list which includes the following prescription(s): amlodipine, atorvastatin, gabapentin, hydrocodone-acetaminophen, meloxicam, methocarbamol, methylprednisolone, and promethazine.    Review of patient's allergies indicates:  No Known Allergies     Pain:  Current 6/10, worst 10/10, best 4/10   Location: bilateral back   Description: Aching, Dull and Numb  Aggravating Factors: Sitting, Bending, Walking and Lifting  Easing Factors: nothing    Prior Therapy: Yes;  Social History:  lives with their partner  Occupation:  for [employer]  Prior Level of Function: Patient was able to walk and perform ADL's without discomfort  Current Level of Function: Patient is limited by intermittent exacerbations of symptoms.    Pts goals: Patient would like to perform his job and ADL's without onset of pain.    Objective     Observation: Patient arrived with kyphotic posture. Patient demonstrated gait with decreased stance time on L.    Strength:    Right Left Comment   Knee Extension: 4+/5 4-/5    Knee Flexion: 5/5 4+/5    Hip Abduction: 3/5 3/5    Hip Extension:      3/5      3/5    Special Tests: SLR(L+) KIANA;Bridge Test(-) KIANA;Slump(L+)    Palpation: No palpable tenderness to the lowback/lumbar spine area,even iliac crests,no notable rotation at the ASIS. Decrease activation of L Glut.      CMS Impairment/Limitation/Restriction for FOTO  Survey    Therapist reviewed FOTO scores for Bhaskar Stiles on 11/30/2020.   FOTO documents entered into [employer] [address] - see Media section.    Limitation Score: 33%  Category: Carrying       TREATMENT   Treatment Time In: 8:35  Treatment Time Out: 9:00  Total Treatment time separate from Evaluation time: 25 minutes     Bhaskar received therapeutic exercises to develop strength, ROM and posture for 15 minutes including:  Jeovanny Stretch 4x30s  TA Activation x10,5s  Glut Sets 10x10s    Bhaskar received the following  manual therapy techniques: Joint mobilizations, Manual traction and Soft tissue Mobilization were applied to the: lowback for 10 minutes, including:    Lumbar Manual Traction  Lumbar Extension with Overpressure    Education     Home Exercises and Patient Education Provided    Education provided re:   -Patient was educated on postural control,proper lifting mechanics with core activation, and tissue healing timelines.  - progress towards goals   - role of therapy in multi - disciplinary team, goals for therapy  No spiritual or educational barriers to learning provided    Written Home Exercises Provided: Yes.  Exercises were reviewed and Bhaskar was able to demonstrate them prior to the end of the session.   Pt received a written copy of exercises to perform at home. Bhaskar demonstrated good  understanding of the education provided.     Assessment   Bhaskar is a 50 y.o. male referred to outpatient Physical Therapy with a medical diagnosis of R knee pain and L Sciatica. Pt presents with decreased hip/knee range of motion, movement limited by pain, decreased activation of his LE musculature, and decreased strength of his hips and core. These impairments prevent the patient from performing his work ADL's(driving, carrying, and walking) as well as household ADL's which include sitting and transfers. The patient will be an appropriate candidate for PT due to his decreased pain level at the end of the session (6/10 to 3/10) and his increased tolerance to SLR.     Pt prognosis is Good.   Pt will benefit from skilled outpatient Physical Therapy to address the deficits stated above and in the chart below, provide pt/family education, and to maximize pt's level of independence.     Plan of care discussed with patient: Yes  Pt's spiritual, cultural and educational needs considered and pt agreeable to plan of care and goals as stated below:     Anticipated Barriers for therapy: Previous history of low back pain    Medical Necessity  is demonstrated by the following  History  Co-morbidities and personal factors that may impact the plan of care Co-morbidities:   HTN    Personal Factors:   no deficits     low   Examination  Body Structures and Functions, activity limitations and participation restrictions that may impact the plan of care Body Regions:   back    Body Systems:    gross symmetry  ROM  strength  gait  transfers  motor control  motor learning    Participation Restrictions:   Patient is unable to work without pain(driving, carrying, lifting )    Activity limitations:   Mobility  lifting and carrying objects  walking    Self care  no deficits    Domestic Life  assisting others    Community and Social Life  no deficits         low   Clinical Presentation stable and uncomplicated low   Decision Making/ Complexity Score: low     Goals   1. Patient will demonstrate HEP independence by 12/15/2020.  2. Patient will demonstrate a negative SLR/slump testing by 1/1/2020.  3. Patient will demonstrate improved activation of his core as demonstrated by performance of a squat without increase in symptoms by 1/1/2020.  4. Patient will perform work related tasks such as driving and lifting without pain by 1/20/2020.    Plan   Certification Period: 11/30/2020 to 2/1/2020.    Outpatient Physical Therapy 2 times weekly for 4 weeks to include the following interventions: patient education, Cervical/Lumbar Traction, Manual Therapy, Moist Heat/ Ice, Neuromuscular Re-ed, Patient Education, Therapeutic Activites and Therapeutic Exercise.     Hoang Velez, PT

## 2020-12-02 ENCOUNTER — CLINICAL SUPPORT (OUTPATIENT)
Dept: REHABILITATION | Facility: HOSPITAL | Age: 50
End: 2020-12-02
Payer: COMMERCIAL

## 2020-12-02 DIAGNOSIS — M54.42 BILATERAL LOW BACK PAIN WITH BILATERAL SCIATICA, UNSPECIFIED CHRONICITY: ICD-10-CM

## 2020-12-02 DIAGNOSIS — R29.898 WEAKNESS OF BOTH LOWER EXTREMITIES: ICD-10-CM

## 2020-12-02 DIAGNOSIS — M54.41 BILATERAL LOW BACK PAIN WITH BILATERAL SCIATICA, UNSPECIFIED CHRONICITY: ICD-10-CM

## 2020-12-02 PROCEDURE — 97110 THERAPEUTIC EXERCISES: CPT

## 2020-12-02 PROCEDURE — 97112 NEUROMUSCULAR REEDUCATION: CPT

## 2020-12-02 PROCEDURE — 97140 MANUAL THERAPY 1/> REGIONS: CPT

## 2020-12-02 NOTE — PROGRESS NOTES
Physical Therapy Daily Treatment Note     Visit Date: 12/2/2020    Name: Bhaskar Stiles  [facility] Number: [id number]    Therapy Diagnosis:   Encounter Diagnoses   Name Primary?    Bilateral low back pain with bilateral sciatica, unspecified chronicity     Weakness of both lower extremities      Physician: Michael Tesfaye III, *       Physician Orders: PT Eval and Treat   Medical Diagnosis: L Sciatica; R Knee Pain   Evaluation Date: 11/30/2020  Authorization Period Expiration: 12/31/2020  Plan of Care Certification Period: 2/1/2020  Visit # / Visits authorized: 2/ 20     Time In: 8:25AM  Time Out: 9:10AM  Total Billable Time: 45minutes     Precautions: Standard      Subjective      Pt reports: Patient states that he is feeling overall better. He reports that his back was feeling great after his initial session for the rest of the day.    he was compliant with home exercise program given last session.   Response to previous treatment:Decreased symptoms in his low back for the rest of the day  Functional change: Able to climb stairs without pain    Pain: 5/10  Location: bilateral back      Objective     Measurements taken:  Negative SLR upon arrival; positive bridge test that is negative upon leaving.     Bhaskar received therapeutic exercises to develop strength, ROM and flexibility for 25 minutes including:  Jeovanny Stretch 4x30s  QL stretch 4x20s  Quad Stretch 4x30s  Monster Walks 2 laps RTB  Lateral band walks 2 laps RTB    Bhaskar received the following manual therapy techniques: Joint mobilizations, Manual traction and Soft tissue Mobilization were applied to the: low back for 10 minutes, including:    STM to the lumbar paraspinals  Overpressure into extension 3x10    Bhaskar participated in neuromuscular re-education activities to improve: Balance, Coordination and Proprioception for 10 minutes. The following activities were included:    TA PPT x20,5s   Glut Sets 10x10s  Glut Bridges 2x10    Bhaskar participated in  dynamic functional therapeutic activities to improve functional performance for 0  minutes, including:  NP        Home Exercises Provided and Patient Education Provided     Education provided:   - Patient was educated on progression to glut bridges for his HEP, tissue healing timelines, and biomechanics of walking and lifting    Written Home Exercises Provided: Patient instructed to cont prior HEP.  Exercises were reviewed and Bhaskar was able to demonstrate them prior to the end of the session.  Bhaskar demonstrated good  understanding of the education provided.     See EMR under Patient Instructions for exercises provided prior visit.      Assessment     The patient reports that he was feeling much better after his initial physical therapy session. Upon arrival he demonstrated a negative SLR and his bridge test was negative at the end of the session. This session focused on neuro-muscular control in order to improve glut activation and decrease activation of his low back extensors. At the end of the session the patient noted a large decrease in his pain and described his discomfort as tightness as opposed to neuro symptoms(his pain level was a 3/10 at end of session). Future sessions will continue to focus on neuro-muscular control and increasing ROM of his LE to increase performance of his ADL's.    Bhaskar is progressing well towards his goals.   Pt prognosis is Good.     Pt will continue to benefit from skilled outpatient physical therapy to address the deficits listed in the problem list box on initial evaluation, provide pt/family education and to maximize pt's level of independence in the home and community environment.     Pt's spiritual, cultural and educational needs considered and pt agreeable to plan of care and goals.    Anticipated barriers to physical therapy: none    Goals:     1. Patient will demonstrate HEP independence by 12/15/2020.  2. Patient will demonstrate a negative SLR/slump testing by  1/1/2020.  3. Patient will demonstrate improved activation of his core as demonstrated by performance of a squat without increase in symptoms by 1/1/2020.  4. Patient will perform work related tasks such as driving and lifting without pain by 1/20/2020.      Plan     Continue with established Plan of Care towards PT goals with focus on decreasing pain, increasing ROM, strength, neuromuscular control and functional status       Hoang Velez, PT

## 2020-12-03 ENCOUNTER — PATIENT MESSAGE (OUTPATIENT)
Dept: PAIN MEDICINE | Facility: CLINIC | Age: 50
End: 2020-12-03

## 2020-12-04 ENCOUNTER — OFFICE VISIT (OUTPATIENT)
Dept: PAIN MEDICINE | Facility: CLINIC | Age: 50
End: 2020-12-04
Payer: COMMERCIAL

## 2020-12-04 VITALS
SYSTOLIC BLOOD PRESSURE: 140 MMHG | DIASTOLIC BLOOD PRESSURE: 90 MMHG | TEMPERATURE: 98 F | WEIGHT: 264.31 LBS | HEIGHT: 71 IN | BODY MASS INDEX: 37 KG/M2 | HEART RATE: 88 BPM

## 2020-12-04 DIAGNOSIS — M51.37 DDD (DEGENERATIVE DISC DISEASE), LUMBOSACRAL: ICD-10-CM

## 2020-12-04 DIAGNOSIS — G89.4 CHRONIC PAIN SYNDROME: ICD-10-CM

## 2020-12-04 DIAGNOSIS — M54.16 LUMBAR RADICULOPATHY: Primary | ICD-10-CM

## 2020-12-04 PROCEDURE — 99999 PR PBB SHADOW E&M-EST. PATIENT-LVL III: ICD-10-PCS | Mod: PBBFAC,,, | Performed by: NURSE PRACTITIONER

## 2020-12-04 PROCEDURE — 99213 PR OFFICE/OUTPT VISIT, EST, LEVL III, 20-29 MIN: ICD-10-PCS | Mod: S$GLB,,, | Performed by: NURSE PRACTITIONER

## 2020-12-04 PROCEDURE — 99213 OFFICE O/P EST LOW 20 MIN: CPT | Mod: S$GLB,,, | Performed by: NURSE PRACTITIONER

## 2020-12-04 PROCEDURE — 99999 PR PBB SHADOW E&M-EST. PATIENT-LVL III: CPT | Mod: PBBFAC,,, | Performed by: NURSE PRACTITIONER

## 2020-12-04 RX ORDER — ROSUVASTATIN CALCIUM 10 MG/1
10 TABLET, COATED ORAL DAILY
COMMUNITY
Start: 2020-10-22 | End: 2021-07-16 | Stop reason: SDUPTHER

## 2020-12-04 NOTE — PROGRESS NOTES
Chronic Pain - Established visit    Referring Physician: No ref. provider found    Chief Complaint:   Chief Complaint   Patient presents with    Groin Pain        SUBJECTIVE: Disclaimer: This note has been generated using voice-recognition software. There may be typographical errors that have been missed during proof-reading    Initial encounter:    Bhaskar Stiles presents to the clinic for the evaluation of back and leg pain. He is s/p left L5/S1 TF NEIDA with 80% relief of leg pain. This was a direct referral from Shauna Valencia. He also had benefit with right L5/S1 in the past.  Currently, he is having some back pain with radiation into the anterior thighs. He also has a bulge at L3/4 with NF narrowing on previous MRI.  He is currently in PT which he finds helpful. He feels as though this will be helpful long-term for him. The pain started years ago. He also has a history of right knee arthoscopic surgery last year.    Brief history:    Pain Description:    The pain is located in the back area and radiates to the bilateral legs.      Symptoms interfere with daily activity.     Exacerbating factors: Walking.      Mitigating factors physical therapy and rest.     Patient denies night fever/night sweats, urinary incontinence and bowel incontinence.  Patient denies any suicidal or homicidal ideations    Pain Medications:  Current:  None    Tried in Past:  NSAIDs - Mobic  TCA -Never  SNRI -Never  Anti-convulsants - Gabapentin  Muscle Relaxants - Robaxin  Opioids- Iota    Physical Therapy/Home Exercise: yes       report:  Not applicable    Pain Procedures:   10/9/20 right L5/S1 TF NEIDA- 80% relief  11/18/20 left L5/S1 TF NEIDA- 80% relief    Chiropractor -never  Acupuncture - never  TENS unit -never  Spinal decompression -never  Joint replacement -never    Imaging: none available for review today    Past Medical History:   Diagnosis Date    Hyperlipidemia     Hypertension     Syncope and collapse      Past Surgical  History:   Procedure Laterality Date    CHONDROPLASTY OF KNEE Left 8/22/2019    Procedure: CHONDROPLASTY, KNEE;  Surgeon: Shelbi Hughes MD;  Location: [facility] OR;  Service: Orthopedics;  Laterality: Left;    HERNIA REPAIR      KNEE ARTHROSCOPY W/ MENISCECTOMY Left 8/22/2019    Procedure: ARTHROSCOPY, KNEE, WITH MEDIAL AND LATERAL MENISCECTOMY;  Surgeon: Shelbi Hughes MD;  Location: [facility] OR;  Service: Orthopedics;  Laterality: Left;    KNEE ARTHROSCOPY W/ PLICA EXCISION Left 8/22/2019    Procedure: EXCISION, MEDIAL PLICA, KNEE, ARTHROSCOPIC;  Surgeon: Shelbi Hughes MD;  Location: [facility] OR;  Service: Orthopedics;  Laterality: Left;    LIPOMA RESECTION      abdomen    REMOVAL OF FOREIGN BODY FROM LOWER EXTREMITY Left 8/22/2019    Procedure: REMOVAL, FOREIGN BODY, LOOSE BODY,  LOWER EXTREMITY;  Surgeon: Shelbi Hughes MD;  Location: [facility] OR;  Service: Orthopedics;  Laterality: Left;    SHOULDER SURGERY      left    SYNOVECTOMY OF KNEE Left 8/22/2019    Procedure: PARTIAL SYNOVECTOMY, KNEE;  Surgeon: Shelbi Hughes MD;  Location: [facility] OR;  Service: Orthopedics;  Laterality: Left;    TILT TABLE TEST N/A 7/9/2018    Procedure: TILT TABLE TEST;  Surgeon: Leonardo Whitfield MD;  Location: [facility] CATH LAB;  Service: Cardiology;  Laterality: N/A;  Syncope, HUT, DM, 3 PREP    TRANSFORAMINAL EPIDURAL INJECTION OF STEROID Right 10/9/2019    Procedure: LUMBAR TRANSFORAMINAL RIGHT L5/S1;  Surgeon: Anthony Schaefer MD;  Location: [facility] PAIN MGT;  Service: Pain Management;  Laterality: Right;  NEEDS CONSENT    TRANSFORAMINAL EPIDURAL INJECTION OF STEROID Left 11/18/2020    Procedure: LUMBAR TRANSFORAMINAL LEFT L5/S1 DIRECT REFERRAL;  Surgeon: Anthony Schaefer MD;  Location: [facility] PAIN MGT;  Service: Pain Management;  Laterality: Left;  NEEDS CONSENT     Social History     Socioeconomic History    Marital status: Single     Spouse name: Not on file    Number of children: Not on file    Years of education: Not on file    Highest education  level: Not on file   Occupational History    Not on file   Social Needs    Financial resource strain: Not on file    Food insecurity     Worry: Not on file     Inability: Not on file    Transportation needs     Medical: Not on file     Non-medical: Not on file   Tobacco Use    Smoking status: Never Smoker    Smokeless tobacco: Never Used   Substance and Sexual Activity    Alcohol use: No    Drug use: No    Sexual activity: Not on file   Lifestyle    Physical activity     Days per week: Not on file     Minutes per session: Not on file    Stress: Not on file   Relationships    Social connections     Talks on phone: Not on file     Gets together: Not on file     Attends [employer] service: Not on file     Active member of club or organization: Not on file     Attends meetings of clubs or organizations: Not on file     Relationship status: Not on file   Other Topics Concern    Not on file   Social History Narrative    Not on file     No family history on file.    Review of patient's allergies indicates:  No Known Allergies    Current Outpatient Medications   Medication Sig    amLODIPine (NORVASC) 10 MG tablet TAKE 1 TABLET BY MOUTH EVERY DAY    atorvastatin (LIPITOR) 20 MG tablet Take 20 mg by mouth once daily.    rosuvastatin (CRESTOR) 10 MG tablet Take 10 mg by mouth once daily.    gabapentin (NEURONTIN) 300 MG capsule Take 1 capsule (300 mg total) by mouth every 8 (eight) hours. (Patient not taking: Reported on 3/27/2020)    HYDROcodone-acetaminophen (NORCO)  mg per tablet Take 1 tablet by mouth every 4-6 hours for pain. (Patient not taking: Reported on 12/4/2020)    meloxicam (MOBIC) 15 MG tablet PLEASE SEE ATTACHED FOR DETAILED DIRECTIONS (Patient not taking: Reported on 12/4/2020)    methocarbamol (ROBAXIN) 750 MG Tab PLEASE SEE ATTACHED FOR DETAILED DIRECTIONS (Patient not taking: Reported on 12/4/2020)    methylPREDNISolone (MEDROL DOSEPACK) 4 mg tablet Use as directed per package  "directions. (Patient not taking: Reported on 12/4/2020)    promethazine (PHENERGAN) 25 MG tablet Take 1 tablet (25 mg total) by mouth every 6 (six) hours as needed for Nausea. (Patient not taking: Reported on 12/4/2020)     No current facility-administered medications for this visit.        REVIEW OF SYSTEMS:    GENERAL:  No weight loss, malaise or fevers.  HEENT:   No recent changes in vision or hearing  NECK:  Negative for lumps, no difficulty with swallowing.  RESPIRATORY:  Negative for cough, wheezing or shortness of breath, patient denies any recent URI.  CARDIOVASCULAR:  Negative for chest pain, leg swelling or palpitations.  GI:  Negative for abdominal discomfort, blood in stools or black stools or change in bowel habits.  MUSCULOSKELETAL:  See HPI.  SKIN:  Negative for lesions, rash, and itching.  PSYCH:  No mood disorder or recent psychosocial stressors.  Patients sleep is not disturbed secondary to pain.  HEMATOLOGY/LYMPHOLOGY:  Negative for prolonged bleeding, bruising easily or swollen nodes.  Patient is not currently taking any anti-coagulants  ENDO: No history of diabetes or thyroid dysfunction  NEURO:   No history of headaches, syncope, paralysis, seizures or tremors.  All other reviewed and negative other than HPI.    OBJECTIVE:    BP (!) 140/90   Pulse 88   Temp 98.4 °F (36.9 °C) (Oral)   Ht 5' 11" (1.803 m)   Wt 119.9 kg (264 lb 5.3 oz)   BMI 36.87 kg/m²     PHYSICAL EXAMINATION:    GENERAL: Well appearing, in no acute distress, alert and oriented x3.  PSYCH:  Mood and affect appropriate.  SKIN: Skin color, texture, turgor normal, no rashes or lesions.  HEAD/FACE:  Normocephalic, atraumatic. Cranial nerves grossly intact.  GI:  Soft and non-tender.  BACK: Straight leg raising in the supine position is negative to radicular pain. No pain to palpation over the facet joints of the lumbar spine or spinous processes. Limited range of motion with pain on flexion and extension.  EXTREMITIES: " Peripheral joint ROM is full and pain free without obvious instability or laxity in all four extremities. No deformities, edema, or skin discoloration. Good capillary refill.  MUSCULOSKELETAL: There is no pain with palpation over the sacroiliac joints bilaterally. Bilateral upper and lower extremity strength is normal and symmetric.  No atrophy or tone abnormalities are noted.  NEURO: No clonus.  No loss of sensation is noted.  GAIT: Normal.    ASSESSMENT: 50 y.o. year old male with pain, consistent with     Encounter Diagnoses   Name Primary?    Lumbar radiculopathy Yes    DDD (degenerative disc disease), lumbosacral     Chronic pain syndrome        PLAN:     - Previous imaging was reviewed and discussed with the patient today.    - We discussed bilateral L3/4 TF NEIDA if anterior leg pain continues.    - Continue with Physical therapy.    - The patient will continue a home exercise routine to help with pain and strengthening.      - RTC as needed.    The above plan and management options were discussed at length with patient. Patient is in agreement with the above and verbalized understanding. It will be communicated with the referring physician via electronic record, fax, or mail.    Cherelle Calvo  12/04/2020

## 2020-12-07 ENCOUNTER — CLINICAL SUPPORT (OUTPATIENT)
Dept: REHABILITATION | Facility: HOSPITAL | Age: 50
End: 2020-12-07
Payer: COMMERCIAL

## 2020-12-07 DIAGNOSIS — M54.41 BILATERAL LOW BACK PAIN WITH BILATERAL SCIATICA, UNSPECIFIED CHRONICITY: ICD-10-CM

## 2020-12-07 DIAGNOSIS — R29.898 WEAKNESS OF BOTH LOWER EXTREMITIES: ICD-10-CM

## 2020-12-07 DIAGNOSIS — M54.42 BILATERAL LOW BACK PAIN WITH BILATERAL SCIATICA, UNSPECIFIED CHRONICITY: ICD-10-CM

## 2020-12-07 PROCEDURE — 97110 THERAPEUTIC EXERCISES: CPT

## 2020-12-07 PROCEDURE — 97140 MANUAL THERAPY 1/> REGIONS: CPT

## 2020-12-07 PROCEDURE — 97112 NEUROMUSCULAR REEDUCATION: CPT

## 2020-12-07 NOTE — PROGRESS NOTES
Physical Therapy Daily Treatment Note     Visit Date: 2020    Name: Bhaskar Stiles  Clinic Number: [id number]    Therapy Diagnosis:   No diagnosis found.  Physician: Michael Tesfaye III, *       Physician Orders: PT Eval and Treat   Medical Diagnosis: L Sciatica; R Knee Pain   Evaluation Date: 2020  Authorization Period Expiration: 2020  Plan of Care Certification Period: 2020  Visit # / Visits authorized: 3/ 20     Time In: 7:55AM  Time Out: 8:45AM  Total Billable Time: 50minutes     Precautions: Standard      Subjective      Pt reports: That he is feeling better on this date. However, he saw spine specialist and she recommended to continue to pursue PT and potentially explore another low back injection. He states that his pain flare-ups are random in nature. He localizes his discomfort to the anterior thigh    he was compliant with home exercise program given last session.   Response to previous treatment:Decreased symptoms in his low back for the rest of the day  Functional change: Able to climb stairs without pain    Pain: 0/10  Location: bilateral back      Objective     Measurements taken:  Negative SLR    Bhaskar received therapeutic exercises to develop strength, ROM and flexibility for 23 minutes includinmin on Bike for aerobic conditioning  Jeovanny Stretch 4x30s KIANA   QL stretch 4x20s NP   Quad Stretch 4x30s  Monster Walks 2 laps RTB  Lateral band walks 3 laps RTB    Bhaskar received the following manual therapy techniques: Joint mobilizations, Manual traction and Soft tissue Mobilization were applied to the: low back for 15 minutes, including:    STM to the lumbar paraspinals  Thoracic Manipulation  Overpressure into extension 3x10 NP    Bhaskar participated in neuromuscular re-education activities to improve: Balance, Coordination and Proprioception for 10 minutes. The following activities were included:    TA PPT x20,5s   Glut Bridges 3x10, 5s holds    Bhaskar participated in  dynamic functional therapeutic activities to improve functional performance for 0  minutes, including:  NP      Home Exercises Provided and Patient Education Provided     Education provided:   - Patient was educated on progression to glut bridges for his HEP, tissue healing timelines, and biomechanics of walking and lifting and how to carry-over his clinic practiced activation.    Written Home Exercises Provided: Patient instructed to cont prior HEP.  Exercises were reviewed and Bhaskar was able to demonstrate them prior to the end of the session.  Bhaskar demonstrated good  understanding of the education provided.     See EMR under Patient Instructions for exercises provided prior visit.      Assessment     The patient continues to make good progress in regards to his pain frequency and intensity. While he still feels his pain, it is primarily during times when his muscle tissues are tight. After warm-up and light therapeutic exercise his symptoms decrease. His education focused on improving his core posture during his functional activities in order to alleviate load to his low back. He continues to demonstrate tightness within his proximal hip and hamstring which will require additional time due to his occupation.    Bhaskar is progressing well towards his goals.   Pt prognosis is Good.     Pt will continue to benefit from skilled outpatient physical therapy to address the deficits listed in the problem list box on initial evaluation, provide pt/family education and to maximize pt's level of independence in the home and community environment.     Pt's spiritual, cultural and educational needs considered and pt agreeable to plan of care and goals.    Anticipated barriers to physical therapy: none    Goals:     1. Patient will demonstrate HEP independence by 12/15/2020.  2. Patient will demonstrate a negative SLR/slump testing by 1/1/2020.  3. Patient will demonstrate improved activation of his core as demonstrated by  performance of a squat without increase in symptoms by 1/1/2020.  4. Patient will perform work related tasks such as driving and lifting without pain by 1/20/2020.      Plan     Continue with established Plan of Care towards PT goals with focus on decreasing pain, increasing ROM, strength, neuromuscular control and functional status       Hoang Velez, PT

## 2020-12-14 ENCOUNTER — CLINICAL SUPPORT (OUTPATIENT)
Dept: REHABILITATION | Facility: HOSPITAL | Age: 50
End: 2020-12-14
Payer: COMMERCIAL

## 2020-12-14 DIAGNOSIS — M54.42 BILATERAL LOW BACK PAIN WITH BILATERAL SCIATICA, UNSPECIFIED CHRONICITY: ICD-10-CM

## 2020-12-14 DIAGNOSIS — M54.41 BILATERAL LOW BACK PAIN WITH BILATERAL SCIATICA, UNSPECIFIED CHRONICITY: ICD-10-CM

## 2020-12-14 DIAGNOSIS — R29.898 WEAKNESS OF BOTH LOWER EXTREMITIES: ICD-10-CM

## 2020-12-14 PROCEDURE — 97110 THERAPEUTIC EXERCISES: CPT

## 2020-12-14 PROCEDURE — 97112 NEUROMUSCULAR REEDUCATION: CPT

## 2020-12-14 PROCEDURE — 97140 MANUAL THERAPY 1/> REGIONS: CPT

## 2020-12-14 NOTE — PROGRESS NOTES
"  Physical Therapy Daily Treatment Note     Visit Date: 2020    Name: Bhaskar Stiles  Clinic Number: [id number]    Therapy Diagnosis:   No diagnosis found.  Physician: Michael Tesfaye III, *       Physician Orders: PT Eval and Treat   Medical Diagnosis: L Sciatica; R Knee Pain   Evaluation Date: 2020  Authorization Period Expiration: 2020  Plan of Care Certification Period: 2020  Visit # / Visits authorized:  20     Time In: 8AM  Time Out: 8:45AM  Total Billable Time: 45minutes     Precautions: Standard      Subjective      Pt reports: That he is still experiencing some lowback pain that is intermittent in nature. Overall, he states he's doing better and all of his flare-ups are "random".    he was compliant with home exercise program given last session.   Response to previous treatment:Decreased symptoms in his low back for the rest of the day  Functional change: Able to climb stairs without pain    Pain: 0/10  Location: bilateral back      Objective     Measurements taken:      Bhaskar received therapeutic exercises to develop strength, ROM and flexibility for 23 minutes includinmin on Bike for aerobic conditioning  LTR x20 ea  Jeovanny Stretch 4x30s KIANA   QL stretch 4x20s NP   Quad Stretch 4x30s  Clamshells 3x10 RTB 5s hold   Monster Walks 2 laps GTB  Lateral band walks 32 laps GTB     Bhaskar received the following manual therapy techniques: Joint mobilizations, Manual traction and Soft tissue Mobilization were applied to the: low back for 8 minutes, including:    PA to L3/L4 Gd 3  Overpressure into extension 3x10     Bhaskar participated in neuromuscular re-education activities to improve: Balance, Coordination and Proprioception for 12 minutes. The following activities were included:    TA PPT x20,5s   Glut Bridges 3x10, 5s holds    Bhaskar participated in dynamic functional therapeutic activities to improve functional performance for 0  minutes, including:  NP      Home Exercises " Provided and Patient Education Provided     Education provided:   - Patient was educated on progression to glut bridges for his HEP, tissue healing timelines, and biomechanics of walking and lifting and how to carry-over his clinic practiced activation.    Written Home Exercises Provided: Patient instructed to cont prior HEP.  Exercises were reviewed and Bhaskar was able to demonstrate them prior to the end of the session.  Bhaskar demonstrated good  understanding of the education provided.     See EMR under Patient Instructions for exercises provided prior visit.      Assessment     While the patient expresses frustration with the current progress of therapy he continues to make good objective ROM, strength, and functional improvements. His pain is more intermittent and he has decreased the frequency of his flare-ups. Most of his reported flare-ups are after consistent and prolonged activity. Therapy has focused on developing strength and endurance for his core, trunk, and LE strength to decrease the intensity and frequency of symptoms while tolerating a higher workload. He tolerated all strengthening interventions without an increase in symptoms on this date.    Bhaskar is progressing well towards his goals.   Pt prognosis is Good.     Pt will continue to benefit from skilled outpatient physical therapy to address the deficits listed in the problem list box on initial evaluation, provide pt/family education and to maximize pt's level of independence in the home and community environment.     Pt's spiritual, cultural and educational needs considered and pt agreeable to plan of care and goals.    Anticipated barriers to physical therapy: none    Goals:     1. Patient will demonstrate HEP independence by 12/15/2020.  2. Patient will demonstrate a negative SLR/slump testing by 1/1/2020.  3. Patient will demonstrate improved activation of his core as demonstrated by performance of a squat without increase in symptoms by  1/1/2020.  4. Patient will perform work related tasks such as driving and lifting without pain by 1/20/2020.      Plan     Continue with established Plan of Care towards PT goals with focus on decreasing pain, increasing ROM, strength, neuromuscular control and functional status       Hoang Velez, PT

## 2020-12-21 ENCOUNTER — CLINICAL SUPPORT (OUTPATIENT)
Dept: REHABILITATION | Facility: HOSPITAL | Age: 50
End: 2020-12-21
Payer: COMMERCIAL

## 2020-12-21 DIAGNOSIS — M54.41 BILATERAL LOW BACK PAIN WITH BILATERAL SCIATICA, UNSPECIFIED CHRONICITY: ICD-10-CM

## 2020-12-21 DIAGNOSIS — M54.42 BILATERAL LOW BACK PAIN WITH BILATERAL SCIATICA, UNSPECIFIED CHRONICITY: ICD-10-CM

## 2020-12-21 DIAGNOSIS — R29.898 WEAKNESS OF BOTH LOWER EXTREMITIES: ICD-10-CM

## 2020-12-21 PROCEDURE — 97110 THERAPEUTIC EXERCISES: CPT

## 2020-12-21 PROCEDURE — 97112 NEUROMUSCULAR REEDUCATION: CPT

## 2020-12-21 NOTE — PROGRESS NOTES
Physical Therapy Daily Treatment Note     Visit Date: 2020    Name: Bhaskar Stiles  [facility] Number: [id number]    Therapy Diagnosis:   Encounter Diagnoses   Name Primary?    Bilateral low back pain with bilateral sciatica, unspecified chronicity     Weakness of both lower extremities      Physician: Michael Tesfaye III, *       Physician Orders: PT Eval and Treat   Medical Diagnosis: L Sciatica; R Knee Pain   Evaluation Date: 2020  Authorization Period Expiration: 2020  Plan of Care Certification Period: 2020  Visit # / Visits authorized:      Time In: 8AM  Time Out: 8:50AM  Total Billable Time: 50minutes       Precautions: Standard      Subjective      Pt reports: That his low back pain has been getting better at this time. Pain is less intense in both his low back and anterior thigh pain; still is unable to pinpoint activities that aggravate his symptoms.    he was compliant with home exercise program given last session.   Response to previous treatment: A little bit of soreness  Functional change: Increased activity without increased discomfort     Pain: 0/10  Location: bilateral back      Objective     Measurements taken:      Bhaskar received therapeutic exercises to develop strength, ROM and flexibility for 30 minutes includinmin on Bike for aerobic conditioning  LTR x20 ea  Jeovanny Stretch 4x30s KIANA   QL stretch 4x20s NP   Quad Stretch 4x30s NP   Clamshells 3x10 GTB 5s hold   Monster Walks 2 laps GTB  Lateral band walks 2 laps GTB   Step-ups/downs 2x10 6in ea    Bhaskar received the following manual therapy techniques: Joint mobilizations, Manual traction and Soft tissue Mobilization were applied to the: low back for 0 minutes, including:    PA to L3/L4 Gd 3 NP  Overpressure into extension 3x10 NP    Bhaskar participated in neuromuscular re-education activities to improve: Balance, Coordination and Proprioception for 10 minutes. The following activities were included:    TA PPT  x20,5s   Glut Bridges 3x10, 10s holds    Bhaskar participated in dynamic functional therapeutic activities to improve functional performance for 0  minutes, including:  NP      Home Exercises Provided and Patient Education Provided     Education provided:   - Tissue Healing timelines, importance of HEP compliance     Written Home Exercises Provided: Patient instructed to cont prior HEP.  Exercises were reviewed and Bhaskar was able to demonstrate them prior to the end of the session.  Bhaskar demonstrated good  understanding of the education provided.     See EMR under Patient Instructions for exercises provided prior visit.      Assessment     Patient continues to note that the intensity of his back/anterior thigh symptoms have decreased overall. He also demonstrates improved glut strength with continued interventions and increased ROM of his lumbar spine. He feels about 80% better at this date and believes that he will be 100% better when his symptoms do not flare-up on an inconsistent basis. Performed functional stairs today without pain.    Bhaskar is progressing well towards his goals.   Pt prognosis is Good.     Pt will continue to benefit from skilled outpatient physical therapy to address the deficits listed in the problem list box on initial evaluation, provide pt/family education and to maximize pt's level of independence in the home and community environment.     Pt's spiritual, cultural and educational needs considered and pt agreeable to plan of care and goals.    Anticipated barriers to physical therapy: none    Goals:     1. Patient will demonstrate HEP independence by 12/15/2020.  2. Patient will demonstrate a negative SLR/slump testing by 1/1/2020.  3. Patient will demonstrate improved activation of his core as demonstrated by performance of a squat without increase in symptoms by 1/1/2020.  4. Patient will perform work related tasks such as driving and lifting without pain by 1/20/2020.      Plan      Continue with established Plan of Care towards PT goals with focus on decreasing pain, increasing ROM, strength, neuromuscular control and functional status       Hoang Velez, PT

## 2021-02-03 ENCOUNTER — OFFICE VISIT (OUTPATIENT)
Dept: SLEEP MEDICINE | Facility: CLINIC | Age: 51
End: 2021-02-03
Payer: COMMERCIAL

## 2021-02-03 VITALS
HEART RATE: 80 BPM | DIASTOLIC BLOOD PRESSURE: 89 MMHG | SYSTOLIC BLOOD PRESSURE: 147 MMHG | BODY MASS INDEX: 37.19 KG/M2 | HEIGHT: 71 IN | WEIGHT: 265.63 LBS

## 2021-02-03 DIAGNOSIS — G47.33 OSA (OBSTRUCTIVE SLEEP APNEA): Primary | ICD-10-CM

## 2021-02-03 PROCEDURE — 99999 PR PBB SHADOW E&M-EST. PATIENT-LVL III: ICD-10-PCS | Mod: PBBFAC,,, | Performed by: INTERNAL MEDICINE

## 2021-02-03 PROCEDURE — 99999 PR PBB SHADOW E&M-EST. PATIENT-LVL III: CPT | Mod: PBBFAC,,, | Performed by: INTERNAL MEDICINE

## 2021-02-03 PROCEDURE — 99212 OFFICE O/P EST SF 10 MIN: CPT | Mod: S$GLB,,, | Performed by: INTERNAL MEDICINE

## 2021-02-03 PROCEDURE — 99212 PR OFFICE/OUTPT VISIT, EST, LEVL II, 10-19 MIN: ICD-10-PCS | Mod: S$GLB,,, | Performed by: INTERNAL MEDICINE

## 2021-04-16 ENCOUNTER — PATIENT MESSAGE (OUTPATIENT)
Dept: RESEARCH | Facility: HOSPITAL | Age: 51
End: 2021-04-16

## 2021-06-10 ENCOUNTER — PATIENT MESSAGE (OUTPATIENT)
Dept: SLEEP MEDICINE | Facility: CLINIC | Age: 51
End: 2021-06-10

## 2021-06-10 DIAGNOSIS — G47.33 OSA (OBSTRUCTIVE SLEEP APNEA): Primary | ICD-10-CM

## 2021-06-16 ENCOUNTER — OFFICE VISIT (OUTPATIENT)
Dept: INTERNAL MEDICINE | Facility: CLINIC | Age: 51
End: 2021-06-16
Payer: COMMERCIAL

## 2021-06-16 VITALS
OXYGEN SATURATION: 95 % | BODY MASS INDEX: 37.97 KG/M2 | DIASTOLIC BLOOD PRESSURE: 88 MMHG | HEART RATE: 80 BPM | HEIGHT: 71 IN | WEIGHT: 271.19 LBS | SYSTOLIC BLOOD PRESSURE: 142 MMHG

## 2021-06-16 DIAGNOSIS — R90.89 ABNORMAL FINDING ON MRI OF BRAIN: ICD-10-CM

## 2021-06-16 DIAGNOSIS — G89.29 OTHER CHRONIC PAIN: ICD-10-CM

## 2021-06-16 DIAGNOSIS — M54.42 BILATERAL LOW BACK PAIN WITH BILATERAL SCIATICA, UNSPECIFIED CHRONICITY: ICD-10-CM

## 2021-06-16 DIAGNOSIS — Z13.220 ENCOUNTER FOR LIPID SCREENING FOR CARDIOVASCULAR DISEASE: ICD-10-CM

## 2021-06-16 DIAGNOSIS — R61 NIGHT SWEATS: ICD-10-CM

## 2021-06-16 DIAGNOSIS — R26.2 DIFFICULTY WALKING: ICD-10-CM

## 2021-06-16 DIAGNOSIS — Z11.59 NEED FOR HEPATITIS C SCREENING TEST: ICD-10-CM

## 2021-06-16 DIAGNOSIS — R73.01 ELEVATED FASTING GLUCOSE: ICD-10-CM

## 2021-06-16 DIAGNOSIS — Z13.6 ENCOUNTER FOR LIPID SCREENING FOR CARDIOVASCULAR DISEASE: ICD-10-CM

## 2021-06-16 DIAGNOSIS — Z11.4 SCREENING FOR HIV (HUMAN IMMUNODEFICIENCY VIRUS): ICD-10-CM

## 2021-06-16 DIAGNOSIS — M54.41 BILATERAL LOW BACK PAIN WITH BILATERAL SCIATICA, UNSPECIFIED CHRONICITY: ICD-10-CM

## 2021-06-16 DIAGNOSIS — G47.33 OSA (OBSTRUCTIVE SLEEP APNEA): ICD-10-CM

## 2021-06-16 DIAGNOSIS — Z00.00 PREVENTATIVE HEALTH CARE: Primary | ICD-10-CM

## 2021-06-16 PROBLEM — M25.562 CHRONIC PAIN OF LEFT KNEE: Status: RESOLVED | Noted: 2019-03-27 | Resolved: 2021-06-16

## 2021-06-16 PROBLEM — M25.662 STIFFNESS OF LEFT KNEE: Status: RESOLVED | Noted: 2019-08-23 | Resolved: 2021-06-16

## 2021-06-16 PROBLEM — M67.52 PLICA OF KNEE, LEFT: Status: RESOLVED | Noted: 2019-08-22 | Resolved: 2021-06-16

## 2021-06-16 PROCEDURE — 99205 OFFICE O/P NEW HI 60 MIN: CPT | Mod: S$GLB,,, | Performed by: STUDENT IN AN ORGANIZED HEALTH CARE EDUCATION/TRAINING PROGRAM

## 2021-06-16 PROCEDURE — 99205 PR OFFICE/OUTPT VISIT, NEW, LEVL V, 60-74 MIN: ICD-10-PCS | Mod: S$GLB,,, | Performed by: STUDENT IN AN ORGANIZED HEALTH CARE EDUCATION/TRAINING PROGRAM

## 2021-06-16 PROCEDURE — 99999 PR PBB SHADOW E&M-EST. PATIENT-LVL III: ICD-10-PCS | Mod: PBBFAC,,, | Performed by: STUDENT IN AN ORGANIZED HEALTH CARE EDUCATION/TRAINING PROGRAM

## 2021-06-16 PROCEDURE — 99999 PR PBB SHADOW E&M-EST. PATIENT-LVL III: CPT | Mod: PBBFAC,,, | Performed by: STUDENT IN AN ORGANIZED HEALTH CARE EDUCATION/TRAINING PROGRAM

## 2021-06-16 RX ORDER — GABAPENTIN 300 MG/1
300 CAPSULE ORAL
COMMUNITY
Start: 2021-01-11 | End: 2022-07-28 | Stop reason: SDUPTHER

## 2021-06-17 ENCOUNTER — TELEPHONE (OUTPATIENT)
Dept: INTERNAL MEDICINE | Facility: CLINIC | Age: 51
End: 2021-06-17

## 2021-06-24 LAB
ALBUMIN: 4.6 GRAM/DL (ref 3.5–5)
ALP SERPL-CCNC: 69 UNIT/L (ref 38–126)
ALT SERPL W P-5'-P-CCNC: 33 UNIT/L (ref 7–56)
ANION GAP SERPL CALC-SCNC: 14 MEQ/L (ref 9–18)
AST SERPL-CCNC: 22 UNIT/L (ref 7–40)
BILIRUB SERPL-MCNC: 0.4 MG/DL (ref 0–1.2)
BUN BLD-MCNC: 11 MG/DL (ref 7–21)
BUN/CREAT SERPL: 10 RATIO (ref 6–22)
CALC OSMOLALITY: 285 MOSM/KG (ref 275–295)
CALCIUM SERPL-MCNC: 9.3 MG/DL (ref 8.5–10.4)
CHLORIDE SERPL-SCNC: 107 MEQ/L (ref 98–107)
CHOL/HDLC RATIO: 4
CHOLEST SERPL-MSCNC: 146 MG/DL (ref 100–200)
CO2 SERPL-SCNC: 27 MEQ/L (ref 21–31)
CREAT SERPL-MCNC: 1.1 MG/DL (ref 0.7–1.2)
GFR: 69.7 ML/MIN/1.73M2
GLUCOSE SERPL-MCNC: 84 MG/DL (ref 70–100)
HBA1C MFR BLD: 5.1 % (ref 4.5–5.7)
HCV AB S/CO SERPL IA: 0.01
HCV AB SERPL QL IA: NORMAL
HDLC SERPL-MCNC: 41 MG/DL (ref 40–75)
HIV 1+2 AB+HIV1 P24 AG SERPL QL IA: NORMAL
LDLC SERPL CALC-MCNC: 93 MG/DL (ref 0–130)
NONHDLC SERPL-MCNC: 105 MG/DL (ref 60–125)
POTASSIUM SERPL-SCNC: 4.3 MEQ/L (ref 3.5–5)
SODIUM BLD-SCNC: 144 MEQ/L (ref 135–145)
TOTAL PROTEIN: 6.9 GRAM/DL (ref 6.3–8.2)
TRIGL SERPL-MCNC: 76 MG/DL (ref 30–150)
TSH SERPL DL<=0.005 MIU/L-ACNC: 0.71 UIL/ML (ref 0.35–4)

## 2021-06-30 ENCOUNTER — CLINICAL SUPPORT (OUTPATIENT)
Dept: INTERNAL MEDICINE | Facility: CLINIC | Age: 51
End: 2021-06-30
Payer: COMMERCIAL

## 2021-06-30 DIAGNOSIS — I20.89 ANGINA AT REST: ICD-10-CM

## 2021-06-30 DIAGNOSIS — I25.10 ATHEROSCLEROSIS OF NATIVE CORONARY ARTERY OF NATIVE HEART WITHOUT ANGINA PECTORIS: Primary | ICD-10-CM

## 2021-06-30 DIAGNOSIS — R55 VASOVAGAL SYNCOPE: ICD-10-CM

## 2021-07-01 ENCOUNTER — TELEPHONE (OUTPATIENT)
Dept: INTERNAL MEDICINE | Facility: CLINIC | Age: 51
End: 2021-07-01

## 2021-07-12 ENCOUNTER — HOSPITAL ENCOUNTER (OUTPATIENT)
Dept: CARDIOLOGY | Facility: OTHER | Age: 51
Discharge: HOME | End: 2021-07-12
Attending: STUDENT IN AN ORGANIZED HEALTH CARE EDUCATION/TRAINING PROGRAM
Payer: COMMERCIAL

## 2021-07-12 VITALS
HEART RATE: 73 BPM | WEIGHT: 271 LBS | DIASTOLIC BLOOD PRESSURE: 80 MMHG | BODY MASS INDEX: 37.94 KG/M2 | SYSTOLIC BLOOD PRESSURE: 150 MMHG | HEIGHT: 71 IN

## 2021-07-12 DIAGNOSIS — I25.10 ATHEROSCLEROSIS OF NATIVE CORONARY ARTERY OF NATIVE HEART WITHOUT ANGINA PECTORIS: ICD-10-CM

## 2021-07-12 DIAGNOSIS — I20.89 ANGINA AT REST: ICD-10-CM

## 2021-07-12 DIAGNOSIS — R55 VASOVAGAL SYNCOPE: ICD-10-CM

## 2021-07-12 LAB
CV STRESS BASE HR: 73 BPM
DIASTOLIC BLOOD PRESSURE: 80 MMHG
OHS CV CPX 85 PERCENT MAX PREDICTED HEART RATE MALE: 145
OHS CV CPX ESTIMATED METS: 13
OHS CV CPX MAX PREDICTED HEART RATE: 171
OHS CV CPX PATIENT IS FEMALE: 0
OHS CV CPX PATIENT IS MALE: 1
OHS CV CPX PEAK DIASTOLIC BLOOD PRESSURE: 86 MMHG
OHS CV CPX PEAK HEAR RATE: 153 BPM
OHS CV CPX PEAK RATE PRESSURE PRODUCT: NORMAL
OHS CV CPX PEAK SYSTOLIC BLOOD PRESSURE: 187 MMHG
OHS CV CPX PERCENT MAX PREDICTED HEART RATE ACHIEVED: 89
OHS CV CPX RATE PRESSURE PRODUCT PRESENTING: NORMAL
STRESS ECHO POST EXERCISE DUR MIN: 10 MINUTES
STRESS ECHO POST EXERCISE DUR SEC: 0 SECONDS
SYSTOLIC BLOOD PRESSURE: 150 MMHG

## 2021-07-12 PROCEDURE — 93016 CV STRESS TEST SUPVJ ONLY: CPT | Mod: ,,, | Performed by: INTERNAL MEDICINE

## 2021-07-12 PROCEDURE — 93018 EXERCISE STRESS - EKG (CUPID ONLY): ICD-10-PCS | Mod: ,,, | Performed by: INTERNAL MEDICINE

## 2021-07-12 PROCEDURE — 93018 CV STRESS TEST I&R ONLY: CPT | Mod: ,,, | Performed by: INTERNAL MEDICINE

## 2021-07-12 PROCEDURE — 93017 CV STRESS TEST TRACING ONLY: CPT

## 2021-07-12 PROCEDURE — 93016 EXERCISE STRESS - EKG (CUPID ONLY): ICD-10-PCS | Mod: ,,, | Performed by: INTERNAL MEDICINE

## 2021-07-16 ENCOUNTER — OFFICE VISIT (OUTPATIENT)
Dept: INTERNAL MEDICINE | Facility: CLINIC | Age: 51
End: 2021-07-16
Payer: COMMERCIAL

## 2021-07-16 VITALS
HEIGHT: 71 IN | WEIGHT: 270.94 LBS | DIASTOLIC BLOOD PRESSURE: 91 MMHG | OXYGEN SATURATION: 96 % | HEART RATE: 74 BPM | SYSTOLIC BLOOD PRESSURE: 140 MMHG | BODY MASS INDEX: 37.93 KG/M2

## 2021-07-16 DIAGNOSIS — Z87.898 HISTORY OF SYNCOPE: Chronic | ICD-10-CM

## 2021-07-16 DIAGNOSIS — M54.41 BILATERAL LOW BACK PAIN WITH RIGHT-SIDED SCIATICA, UNSPECIFIED CHRONICITY: ICD-10-CM

## 2021-07-16 DIAGNOSIS — G47.33 OSA (OBSTRUCTIVE SLEEP APNEA): ICD-10-CM

## 2021-07-16 DIAGNOSIS — G89.29 OTHER CHRONIC PAIN: ICD-10-CM

## 2021-07-16 DIAGNOSIS — I10 ESSENTIAL HYPERTENSION: Primary | ICD-10-CM

## 2021-07-16 DIAGNOSIS — E78.5 HYPERLIPIDEMIA, UNSPECIFIED HYPERLIPIDEMIA TYPE: ICD-10-CM

## 2021-07-16 PROBLEM — R55 SYNCOPE: Status: RESOLVED | Noted: 2018-07-09 | Resolved: 2021-07-16

## 2021-07-16 PROCEDURE — 99215 OFFICE O/P EST HI 40 MIN: CPT | Mod: S$GLB,,, | Performed by: STUDENT IN AN ORGANIZED HEALTH CARE EDUCATION/TRAINING PROGRAM

## 2021-07-16 PROCEDURE — 99999 PR PBB SHADOW E&M-EST. PATIENT-LVL III: CPT | Mod: PBBFAC,,, | Performed by: STUDENT IN AN ORGANIZED HEALTH CARE EDUCATION/TRAINING PROGRAM

## 2021-07-16 PROCEDURE — 99215 PR OFFICE/OUTPT VISIT, EST, LEVL V, 40-54 MIN: ICD-10-PCS | Mod: S$GLB,,, | Performed by: STUDENT IN AN ORGANIZED HEALTH CARE EDUCATION/TRAINING PROGRAM

## 2021-07-16 PROCEDURE — 99999 PR PBB SHADOW E&M-EST. PATIENT-LVL III: ICD-10-PCS | Mod: PBBFAC,,, | Performed by: STUDENT IN AN ORGANIZED HEALTH CARE EDUCATION/TRAINING PROGRAM

## 2021-07-16 RX ORDER — ROSUVASTATIN CALCIUM 10 MG/1
10 TABLET, COATED ORAL DAILY
Qty: 90 TABLET | Refills: 3 | Status: SHIPPED | OUTPATIENT
Start: 2021-07-16 | End: 2022-07-18 | Stop reason: SDUPTHER

## 2021-07-16 RX ORDER — LOSARTAN POTASSIUM AND HYDROCHLOROTHIAZIDE 12.5; 1 MG/1; MG/1
1 TABLET ORAL DAILY
Qty: 30 TABLET | Refills: 1 | Status: SHIPPED | OUTPATIENT
Start: 2021-07-16 | End: 2021-08-07

## 2021-09-24 ENCOUNTER — PATIENT MESSAGE (OUTPATIENT)
Dept: INTERNAL MEDICINE | Facility: CLINIC | Age: 51
End: 2021-09-24

## 2021-09-24 ENCOUNTER — OFFICE VISIT (OUTPATIENT)
Dept: INTERNAL MEDICINE | Facility: CLINIC | Age: 51
End: 2021-09-24
Payer: COMMERCIAL

## 2021-09-24 VITALS
BODY MASS INDEX: 38.03 KG/M2 | WEIGHT: 271.63 LBS | HEIGHT: 71 IN | HEART RATE: 81 BPM | OXYGEN SATURATION: 94 % | SYSTOLIC BLOOD PRESSURE: 124 MMHG | DIASTOLIC BLOOD PRESSURE: 81 MMHG

## 2021-09-24 DIAGNOSIS — I10 ESSENTIAL HYPERTENSION: ICD-10-CM

## 2021-09-24 DIAGNOSIS — S49.91XA INJURY OF RIGHT SHOULDER, INITIAL ENCOUNTER: ICD-10-CM

## 2021-09-24 DIAGNOSIS — M62.838 MUSCLE SPASM: Primary | ICD-10-CM

## 2021-09-24 PROCEDURE — 99999 PR PBB SHADOW E&M-EST. PATIENT-LVL III: ICD-10-PCS | Mod: PBBFAC,,, | Performed by: STUDENT IN AN ORGANIZED HEALTH CARE EDUCATION/TRAINING PROGRAM

## 2021-09-24 PROCEDURE — 96372 THER/PROPH/DIAG INJ SC/IM: CPT | Mod: S$GLB,,, | Performed by: STUDENT IN AN ORGANIZED HEALTH CARE EDUCATION/TRAINING PROGRAM

## 2021-09-24 PROCEDURE — 99214 OFFICE O/P EST MOD 30 MIN: CPT | Mod: 25,S$GLB,, | Performed by: STUDENT IN AN ORGANIZED HEALTH CARE EDUCATION/TRAINING PROGRAM

## 2021-09-24 PROCEDURE — 99214 PR OFFICE/OUTPT VISIT, EST, LEVL IV, 30-39 MIN: ICD-10-PCS | Mod: 25,S$GLB,, | Performed by: STUDENT IN AN ORGANIZED HEALTH CARE EDUCATION/TRAINING PROGRAM

## 2021-09-24 PROCEDURE — 96372 PR INJECTION,THERAP/PROPH/DIAG2ST, IM OR SUBCUT: ICD-10-PCS | Mod: S$GLB,,, | Performed by: STUDENT IN AN ORGANIZED HEALTH CARE EDUCATION/TRAINING PROGRAM

## 2021-09-24 PROCEDURE — 99999 PR PBB SHADOW E&M-EST. PATIENT-LVL III: CPT | Mod: PBBFAC,,, | Performed by: STUDENT IN AN ORGANIZED HEALTH CARE EDUCATION/TRAINING PROGRAM

## 2021-09-24 RX ORDER — IBUPROFEN 800 MG/1
800 TABLET ORAL 3 TIMES DAILY PRN
Qty: 15 TABLET | Refills: 0 | Status: SHIPPED | OUTPATIENT
Start: 2021-09-24 | End: 2021-09-27 | Stop reason: HOSPADM

## 2021-09-24 RX ORDER — CYCLOBENZAPRINE HCL 10 MG
10 TABLET ORAL 3 TIMES DAILY PRN
Qty: 15 TABLET | Refills: 0 | Status: SHIPPED | OUTPATIENT
Start: 2021-09-24 | End: 2021-09-27 | Stop reason: HOSPADM

## 2021-09-24 RX ORDER — KETOROLAC TROMETHAMINE 30 MG/ML
15 INJECTION, SOLUTION INTRAMUSCULAR; INTRAVENOUS ONCE
Status: COMPLETED | OUTPATIENT
Start: 2021-09-24 | End: 2021-09-24

## 2021-09-24 RX ADMIN — KETOROLAC TROMETHAMINE 15 MG: 30 INJECTION, SOLUTION INTRAMUSCULAR; INTRAVENOUS at 09:09

## 2021-09-26 ENCOUNTER — PATIENT MESSAGE (OUTPATIENT)
Dept: INTERNAL MEDICINE | Facility: CLINIC | Age: 51
End: 2021-09-26

## 2021-09-26 ENCOUNTER — PATIENT OUTREACH (OUTPATIENT)
Dept: ADMINISTRATIVE | Facility: OTHER | Age: 51
End: 2021-09-26

## 2021-09-26 DIAGNOSIS — M54.2 NECK PAIN: Primary | ICD-10-CM

## 2021-09-27 ENCOUNTER — HOSPITAL ENCOUNTER (OUTPATIENT)
Dept: RADIOLOGY | Facility: HOSPITAL | Age: 51
Discharge: HOME | End: 2021-09-27
Attending: PHYSICIAN ASSISTANT
Payer: COMMERCIAL

## 2021-09-27 ENCOUNTER — OFFICE VISIT (OUTPATIENT)
Dept: SPORTS MEDICINE | Facility: CLINIC | Age: 51
End: 2021-09-27
Payer: COMMERCIAL

## 2021-09-27 VITALS
DIASTOLIC BLOOD PRESSURE: 75 MMHG | HEART RATE: 98 BPM | BODY MASS INDEX: 36.96 KG/M2 | TEMPERATURE: 98 F | HEIGHT: 71 IN | WEIGHT: 264 LBS | SYSTOLIC BLOOD PRESSURE: 122 MMHG

## 2021-09-27 DIAGNOSIS — S49.91XA INJURY OF RIGHT SHOULDER, INITIAL ENCOUNTER: ICD-10-CM

## 2021-09-27 DIAGNOSIS — M54.2 NECK PAIN ON RIGHT SIDE: ICD-10-CM

## 2021-09-27 DIAGNOSIS — M25.511 ACUTE PAIN OF RIGHT SHOULDER: Primary | ICD-10-CM

## 2021-09-27 DIAGNOSIS — M75.21 BICEPS TENDINITIS ON RIGHT: ICD-10-CM

## 2021-09-27 PROCEDURE — 99999 PR PBB SHADOW E&M-EST. PATIENT-LVL III: CPT | Mod: PBBFAC,,, | Performed by: PHYSICIAN ASSISTANT

## 2021-09-27 PROCEDURE — 73030 X-RAY EXAM OF SHOULDER: CPT | Mod: 26,RT,, | Performed by: RADIOLOGY

## 2021-09-27 PROCEDURE — 73030 X-RAY EXAM OF SHOULDER: CPT | Mod: TC,RT

## 2021-09-27 PROCEDURE — 99999 PR PBB SHADOW E&M-EST. PATIENT-LVL III: ICD-10-PCS | Mod: PBBFAC,,, | Performed by: PHYSICIAN ASSISTANT

## 2021-09-27 PROCEDURE — 99214 PR OFFICE/OUTPT VISIT, EST, LEVL IV, 30-39 MIN: ICD-10-PCS | Mod: S$GLB,,, | Performed by: PHYSICIAN ASSISTANT

## 2021-09-27 PROCEDURE — 73030 XR SHOULDER COMPLETE 2 OR MORE VIEWS RIGHT: ICD-10-PCS | Mod: 26,RT,, | Performed by: RADIOLOGY

## 2021-09-27 PROCEDURE — 99214 OFFICE O/P EST MOD 30 MIN: CPT | Mod: S$GLB,,, | Performed by: PHYSICIAN ASSISTANT

## 2021-09-27 RX ORDER — METHOCARBAMOL 750 MG/1
TABLET, FILM COATED ORAL
Qty: 30 TABLET | Refills: 0 | Status: SHIPPED | OUTPATIENT
Start: 2021-09-27 | End: 2022-02-15

## 2021-09-27 RX ORDER — PREDNISONE 20 MG/1
40 TABLET ORAL DAILY
Qty: 10 TABLET | Refills: 0 | Status: SHIPPED | OUTPATIENT
Start: 2021-09-27 | End: 2021-10-01 | Stop reason: ALTCHOICE

## 2021-09-28 ENCOUNTER — OFFICE VISIT (OUTPATIENT)
Dept: SPINE | Facility: CLINIC | Age: 51
End: 2021-09-28
Payer: COMMERCIAL

## 2021-09-28 ENCOUNTER — HOSPITAL ENCOUNTER (OUTPATIENT)
Dept: RADIOLOGY | Facility: OTHER | Age: 51
Discharge: HOME | End: 2021-09-28
Attending: STUDENT IN AN ORGANIZED HEALTH CARE EDUCATION/TRAINING PROGRAM
Payer: COMMERCIAL

## 2021-09-28 VITALS
SYSTOLIC BLOOD PRESSURE: 136 MMHG | DIASTOLIC BLOOD PRESSURE: 79 MMHG | BODY MASS INDEX: 36.94 KG/M2 | HEART RATE: 93 BPM | WEIGHT: 263.88 LBS | HEIGHT: 71 IN

## 2021-09-28 DIAGNOSIS — M54.2 CERVICALGIA: ICD-10-CM

## 2021-09-28 DIAGNOSIS — M54.2 NECK PAIN: ICD-10-CM

## 2021-09-28 DIAGNOSIS — M47.892 OTHER SPONDYLOSIS, CERVICAL REGION: Primary | ICD-10-CM

## 2021-09-28 PROCEDURE — 99244 OFF/OP CNSLTJ NEW/EST MOD 40: CPT | Mod: S$GLB,,, | Performed by: NURSE PRACTITIONER

## 2021-09-28 PROCEDURE — 99999 PR PBB SHADOW E&M-EST. PATIENT-LVL IV: CPT | Mod: PBBFAC,,, | Performed by: NURSE PRACTITIONER

## 2021-09-28 PROCEDURE — 72050 X-RAY EXAM NECK SPINE 4/5VWS: CPT | Mod: TC,FY

## 2021-09-28 PROCEDURE — 99244 PR OFFICE CONSULTATION,LEVEL IV: ICD-10-PCS | Mod: S$GLB,,, | Performed by: NURSE PRACTITIONER

## 2021-09-28 PROCEDURE — 72050 XR CERVICAL SPINE COMPLETE 5 VIEW: ICD-10-PCS | Mod: 26,,, | Performed by: RADIOLOGY

## 2021-09-28 PROCEDURE — 99999 PR PBB SHADOW E&M-EST. PATIENT-LVL IV: ICD-10-PCS | Mod: PBBFAC,,, | Performed by: NURSE PRACTITIONER

## 2021-09-28 PROCEDURE — 72050 X-RAY EXAM NECK SPINE 4/5VWS: CPT | Mod: 26,,, | Performed by: RADIOLOGY

## 2021-10-01 ENCOUNTER — CLINICAL SUPPORT (OUTPATIENT)
Dept: REHABILITATION | Facility: HOSPITAL | Age: 51
End: 2021-10-01
Payer: COMMERCIAL

## 2021-10-01 ENCOUNTER — PATIENT MESSAGE (OUTPATIENT)
Dept: SPORTS MEDICINE | Facility: CLINIC | Age: 51
End: 2021-10-01

## 2021-10-01 ENCOUNTER — PATIENT MESSAGE (OUTPATIENT)
Dept: SPINE | Facility: CLINIC | Age: 51
End: 2021-10-01

## 2021-10-01 ENCOUNTER — TELEPHONE (OUTPATIENT)
Dept: SPORTS MEDICINE | Facility: CLINIC | Age: 51
End: 2021-10-01

## 2021-10-01 DIAGNOSIS — M25.511 ACUTE PAIN OF RIGHT SHOULDER: ICD-10-CM

## 2021-10-01 DIAGNOSIS — M54.2 NECK PAIN: ICD-10-CM

## 2021-10-01 DIAGNOSIS — M54.2 CERVICALGIA: ICD-10-CM

## 2021-10-01 DIAGNOSIS — M54.2 NECK PAIN ON RIGHT SIDE: Primary | ICD-10-CM

## 2021-10-01 DIAGNOSIS — M47.892 OTHER SPONDYLOSIS, CERVICAL REGION: ICD-10-CM

## 2021-10-01 PROBLEM — M54.50 LOW BACK PAIN: Status: RESOLVED | Noted: 2020-11-30 | Resolved: 2021-10-01

## 2021-10-01 PROBLEM — R29.898 WEAKNESS OF BOTH LOWER EXTREMITIES: Status: RESOLVED | Noted: 2020-11-30 | Resolved: 2021-10-01

## 2021-10-01 PROCEDURE — 97110 THERAPEUTIC EXERCISES: CPT | Performed by: PHYSICAL THERAPIST

## 2021-10-01 PROCEDURE — 97161 PT EVAL LOW COMPLEX 20 MIN: CPT | Performed by: PHYSICAL THERAPIST

## 2021-10-01 RX ORDER — TRAMADOL HYDROCHLORIDE 50 MG/1
50-100 TABLET ORAL EVERY 8 HOURS PRN
Qty: 21 TABLET | Refills: 0 | Status: SHIPPED | OUTPATIENT
Start: 2021-10-01 | End: 2022-02-15

## 2021-10-04 ENCOUNTER — PATIENT MESSAGE (OUTPATIENT)
Dept: ORTHOPEDICS | Facility: CLINIC | Age: 51
End: 2021-10-04

## 2021-10-04 ENCOUNTER — OFFICE VISIT (OUTPATIENT)
Dept: SPORTS MEDICINE | Facility: CLINIC | Age: 51
End: 2021-10-04
Payer: COMMERCIAL

## 2021-10-04 ENCOUNTER — PATIENT MESSAGE (OUTPATIENT)
Dept: SPORTS MEDICINE | Facility: CLINIC | Age: 51
End: 2021-10-04

## 2021-10-04 ENCOUNTER — TELEPHONE (OUTPATIENT)
Dept: ORTHOPEDICS | Facility: CLINIC | Age: 51
End: 2021-10-04

## 2021-10-04 VITALS
HEART RATE: 98 BPM | HEIGHT: 71 IN | WEIGHT: 266.31 LBS | BODY MASS INDEX: 37.28 KG/M2 | DIASTOLIC BLOOD PRESSURE: 84 MMHG | SYSTOLIC BLOOD PRESSURE: 126 MMHG

## 2021-10-04 DIAGNOSIS — M48.02 SPINAL STENOSIS, CERVICAL REGION: ICD-10-CM

## 2021-10-04 DIAGNOSIS — M75.21 BICEPS TENDINITIS ON RIGHT: Primary | ICD-10-CM

## 2021-10-04 DIAGNOSIS — M25.511 ACUTE PAIN OF RIGHT SHOULDER: ICD-10-CM

## 2021-10-04 DIAGNOSIS — M47.892 OTHER SPONDYLOSIS, CERVICAL REGION: ICD-10-CM

## 2021-10-04 DIAGNOSIS — M54.2 CERVICALGIA: ICD-10-CM

## 2021-10-04 DIAGNOSIS — M54.12 RADICULOPATHY, CERVICAL REGION: ICD-10-CM

## 2021-10-04 PROCEDURE — 99214 OFFICE O/P EST MOD 30 MIN: CPT | Mod: S$GLB,,, | Performed by: PHYSICIAN ASSISTANT

## 2021-10-04 PROCEDURE — 99999 PR PBB SHADOW E&M-EST. PATIENT-LVL IV: CPT | Mod: PBBFAC,,, | Performed by: PHYSICIAN ASSISTANT

## 2021-10-04 PROCEDURE — 99999 PR PBB SHADOW E&M-EST. PATIENT-LVL IV: ICD-10-PCS | Mod: PBBFAC,,, | Performed by: PHYSICIAN ASSISTANT

## 2021-10-04 PROCEDURE — 99214 PR OFFICE/OUTPT VISIT, EST, LEVL IV, 30-39 MIN: ICD-10-PCS | Mod: S$GLB,,, | Performed by: PHYSICIAN ASSISTANT

## 2021-10-04 RX ORDER — KETOROLAC TROMETHAMINE 10 MG/1
10 TABLET, FILM COATED ORAL EVERY 8 HOURS PRN
Qty: 12 TABLET | Refills: 0 | Status: SHIPPED | OUTPATIENT
Start: 2021-10-04 | End: 2022-02-15

## 2021-10-04 RX ORDER — DIAZEPAM 10 MG/1
10 TABLET ORAL DAILY PRN
Qty: 1 TABLET | Refills: 0 | Status: SHIPPED | OUTPATIENT
Start: 2021-10-04 | End: 2022-08-23

## 2021-10-04 RX ORDER — OMEPRAZOLE 20 MG/1
20 CAPSULE, DELAYED RELEASE ORAL DAILY
Qty: 14 CAPSULE | Refills: 0 | Status: SHIPPED | OUTPATIENT
Start: 2021-10-04 | End: 2022-02-15

## 2021-10-05 ENCOUNTER — PATIENT MESSAGE (OUTPATIENT)
Dept: SPINE | Facility: CLINIC | Age: 51
End: 2021-10-05

## 2021-10-07 ENCOUNTER — CLINICAL SUPPORT (OUTPATIENT)
Dept: REHABILITATION | Facility: HOSPITAL | Age: 51
End: 2021-10-07
Payer: COMMERCIAL

## 2021-10-07 DIAGNOSIS — M54.2 NECK PAIN: ICD-10-CM

## 2021-10-07 PROCEDURE — 97112 NEUROMUSCULAR REEDUCATION: CPT | Performed by: PHYSICAL THERAPIST

## 2021-10-07 PROCEDURE — 97110 THERAPEUTIC EXERCISES: CPT | Performed by: PHYSICAL THERAPIST

## 2021-10-08 ENCOUNTER — TELEPHONE (OUTPATIENT)
Dept: PAIN MEDICINE | Facility: CLINIC | Age: 51
End: 2021-10-08

## 2021-10-08 ENCOUNTER — OFFICE VISIT (OUTPATIENT)
Dept: SPORTS MEDICINE | Facility: CLINIC | Age: 51
End: 2021-10-08
Payer: COMMERCIAL

## 2021-10-08 VITALS
WEIGHT: 266 LBS | HEIGHT: 71 IN | BODY MASS INDEX: 37.24 KG/M2 | SYSTOLIC BLOOD PRESSURE: 116 MMHG | DIASTOLIC BLOOD PRESSURE: 78 MMHG

## 2021-10-08 DIAGNOSIS — M99.01 SOMATIC DYSFUNCTION OF CERVICAL REGION: ICD-10-CM

## 2021-10-08 DIAGNOSIS — M99.08 SOMATIC DYSFUNCTION OF RIB CAGE REGION: ICD-10-CM

## 2021-10-08 DIAGNOSIS — M54.2 CERVICALGIA: Primary | ICD-10-CM

## 2021-10-08 DIAGNOSIS — M99.00 CRANIAL SOMATIC DYSFUNCTION: ICD-10-CM

## 2021-10-08 DIAGNOSIS — M99.02 SOMATIC DYSFUNCTION OF THORACIC REGION: ICD-10-CM

## 2021-10-08 DIAGNOSIS — M99.07 SOMATIC DYSFUNCTION OF UPPER EXTREMITY: ICD-10-CM

## 2021-10-08 DIAGNOSIS — M54.12 RADICULOPATHY, CERVICAL REGION: ICD-10-CM

## 2021-10-08 PROCEDURE — 98927 OSTEOPATH MANJ 5-6 REGIONS: CPT | Mod: S$GLB,,, | Performed by: ORTHOPAEDIC SURGERY

## 2021-10-08 PROCEDURE — 98927 PR OSTEOPATHIC MANIP,5-6 BODY REGN: ICD-10-PCS | Mod: S$GLB,,, | Performed by: ORTHOPAEDIC SURGERY

## 2021-10-08 PROCEDURE — 99999 PR PBB SHADOW E&M-EST. PATIENT-LVL III: CPT | Mod: PBBFAC,,, | Performed by: ORTHOPAEDIC SURGERY

## 2021-10-08 PROCEDURE — 99999 PR PBB SHADOW E&M-EST. PATIENT-LVL III: ICD-10-PCS | Mod: PBBFAC,,, | Performed by: ORTHOPAEDIC SURGERY

## 2021-10-08 PROCEDURE — 99214 OFFICE O/P EST MOD 30 MIN: CPT | Mod: 25,S$GLB,, | Performed by: ORTHOPAEDIC SURGERY

## 2021-10-08 PROCEDURE — 99214 PR OFFICE/OUTPT VISIT, EST, LEVL IV, 30-39 MIN: ICD-10-PCS | Mod: 25,S$GLB,, | Performed by: ORTHOPAEDIC SURGERY

## 2021-10-09 ENCOUNTER — HOSPITAL ENCOUNTER (OUTPATIENT)
Dept: RADIOLOGY | Facility: OTHER | Age: 51
Discharge: HOME | End: 2021-10-09
Attending: PHYSICIAN ASSISTANT
Payer: COMMERCIAL

## 2021-10-09 DIAGNOSIS — M48.02 SPINAL STENOSIS, CERVICAL REGION: ICD-10-CM

## 2021-10-09 DIAGNOSIS — M47.892 OTHER SPONDYLOSIS, CERVICAL REGION: ICD-10-CM

## 2021-10-09 DIAGNOSIS — M54.2 CERVICALGIA: ICD-10-CM

## 2021-10-09 DIAGNOSIS — M54.12 RADICULOPATHY, CERVICAL REGION: ICD-10-CM

## 2021-10-09 PROCEDURE — 72141 MRI NECK SPINE W/O DYE: CPT | Mod: 26,,, | Performed by: RADIOLOGY

## 2021-10-09 PROCEDURE — 72141 MRI NECK SPINE W/O DYE: CPT | Mod: TC

## 2021-10-09 PROCEDURE — 72141 MRI CERVICAL SPINE WITHOUT CONTRAST: ICD-10-PCS | Mod: 26,,, | Performed by: RADIOLOGY

## 2021-10-11 ENCOUNTER — CLINICAL SUPPORT (OUTPATIENT)
Dept: REHABILITATION | Facility: HOSPITAL | Age: 51
End: 2021-10-11
Payer: COMMERCIAL

## 2021-10-11 DIAGNOSIS — M54.2 NECK PAIN: ICD-10-CM

## 2021-10-11 PROCEDURE — 97110 THERAPEUTIC EXERCISES: CPT | Performed by: PHYSICAL THERAPIST

## 2021-10-11 PROCEDURE — 97140 MANUAL THERAPY 1/> REGIONS: CPT | Performed by: PHYSICAL THERAPIST

## 2021-10-13 ENCOUNTER — TELEPHONE (OUTPATIENT)
Dept: SPORTS MEDICINE | Facility: CLINIC | Age: 51
End: 2021-10-13

## 2021-10-13 ENCOUNTER — OFFICE VISIT (OUTPATIENT)
Dept: SLEEP MEDICINE | Facility: CLINIC | Age: 51
End: 2021-10-13
Payer: COMMERCIAL

## 2021-10-13 ENCOUNTER — PATIENT MESSAGE (OUTPATIENT)
Dept: SPINE | Facility: CLINIC | Age: 51
End: 2021-10-13
Payer: COMMERCIAL

## 2021-10-13 VITALS
SYSTOLIC BLOOD PRESSURE: 120 MMHG | DIASTOLIC BLOOD PRESSURE: 64 MMHG | HEART RATE: 87 BPM | WEIGHT: 264.75 LBS | BODY MASS INDEX: 37.06 KG/M2 | HEIGHT: 71 IN

## 2021-10-13 DIAGNOSIS — G47.33 OBSTRUCTIVE SLEEP APNEA: Primary | ICD-10-CM

## 2021-10-13 PROCEDURE — 99999 PR PBB SHADOW E&M-EST. PATIENT-LVL III: CPT | Mod: PBBFAC,,, | Performed by: NURSE PRACTITIONER

## 2021-10-13 PROCEDURE — 99212 OFFICE O/P EST SF 10 MIN: CPT | Mod: S$GLB,,, | Performed by: NURSE PRACTITIONER

## 2021-10-13 PROCEDURE — 99212 PR OFFICE/OUTPT VISIT, EST, LEVL II, 10-19 MIN: ICD-10-PCS | Mod: S$GLB,,, | Performed by: NURSE PRACTITIONER

## 2021-10-13 PROCEDURE — 99999 PR PBB SHADOW E&M-EST. PATIENT-LVL III: ICD-10-PCS | Mod: PBBFAC,,, | Performed by: NURSE PRACTITIONER

## 2021-10-14 ENCOUNTER — TELEPHONE (OUTPATIENT)
Dept: PAIN MEDICINE | Facility: CLINIC | Age: 51
End: 2021-10-14

## 2021-10-15 ENCOUNTER — OFFICE VISIT (OUTPATIENT)
Dept: SPINE | Facility: CLINIC | Age: 51
End: 2021-10-15
Payer: COMMERCIAL

## 2021-10-15 VITALS
HEIGHT: 71 IN | HEART RATE: 87 BPM | WEIGHT: 264.75 LBS | BODY MASS INDEX: 37.06 KG/M2 | SYSTOLIC BLOOD PRESSURE: 128 MMHG | DIASTOLIC BLOOD PRESSURE: 75 MMHG

## 2021-10-15 DIAGNOSIS — M47.892 OTHER SPONDYLOSIS, CERVICAL REGION: ICD-10-CM

## 2021-10-15 DIAGNOSIS — M54.2 CERVICALGIA: Primary | ICD-10-CM

## 2021-10-15 PROCEDURE — 99999 PR PBB SHADOW E&M-EST. PATIENT-LVL III: ICD-10-PCS | Mod: PBBFAC,,, | Performed by: NURSE PRACTITIONER

## 2021-10-15 PROCEDURE — 99999 PR PBB SHADOW E&M-EST. PATIENT-LVL III: CPT | Mod: PBBFAC,,, | Performed by: NURSE PRACTITIONER

## 2021-10-15 PROCEDURE — 99214 PR OFFICE/OUTPT VISIT, EST, LEVL IV, 30-39 MIN: ICD-10-PCS | Mod: S$GLB,,, | Performed by: NURSE PRACTITIONER

## 2021-10-15 PROCEDURE — 99214 OFFICE O/P EST MOD 30 MIN: CPT | Mod: S$GLB,,, | Performed by: NURSE PRACTITIONER

## 2021-10-18 ENCOUNTER — OFFICE VISIT (OUTPATIENT)
Dept: INTERNAL MEDICINE | Facility: CLINIC | Age: 51
End: 2021-10-18
Payer: COMMERCIAL

## 2021-10-18 ENCOUNTER — PATIENT MESSAGE (OUTPATIENT)
Dept: ORTHOPEDICS | Facility: CLINIC | Age: 51
End: 2021-10-18
Payer: COMMERCIAL

## 2021-10-18 VITALS
WEIGHT: 267.19 LBS | SYSTOLIC BLOOD PRESSURE: 128 MMHG | OXYGEN SATURATION: 97 % | HEIGHT: 71 IN | BODY MASS INDEX: 37.41 KG/M2 | DIASTOLIC BLOOD PRESSURE: 80 MMHG | HEART RATE: 85 BPM

## 2021-10-18 DIAGNOSIS — Z80.0 FAMILY HISTORY OF COLON CANCER IN FATHER: Chronic | ICD-10-CM

## 2021-10-18 DIAGNOSIS — T88.7XXA MEDICATION SIDE EFFECT: ICD-10-CM

## 2021-10-18 DIAGNOSIS — G89.29 OTHER CHRONIC PAIN: ICD-10-CM

## 2021-10-18 DIAGNOSIS — I10 ESSENTIAL HYPERTENSION: ICD-10-CM

## 2021-10-18 DIAGNOSIS — G47.33 OSA (OBSTRUCTIVE SLEEP APNEA): ICD-10-CM

## 2021-10-18 DIAGNOSIS — K63.5 POLYP OF COLON, UNSPECIFIED PART OF COLON, UNSPECIFIED TYPE: ICD-10-CM

## 2021-10-18 DIAGNOSIS — Z87.898 HISTORY OF SYNCOPE: Chronic | ICD-10-CM

## 2021-10-18 DIAGNOSIS — M54.2 NECK PAIN: Primary | ICD-10-CM

## 2021-10-18 PROCEDURE — 99214 PR OFFICE/OUTPT VISIT, EST, LEVL IV, 30-39 MIN: ICD-10-PCS | Mod: S$GLB,,, | Performed by: STUDENT IN AN ORGANIZED HEALTH CARE EDUCATION/TRAINING PROGRAM

## 2021-10-18 PROCEDURE — 99999 PR PBB SHADOW E&M-EST. PATIENT-LVL III: ICD-10-PCS | Mod: PBBFAC,,, | Performed by: STUDENT IN AN ORGANIZED HEALTH CARE EDUCATION/TRAINING PROGRAM

## 2021-10-18 PROCEDURE — 99214 OFFICE O/P EST MOD 30 MIN: CPT | Mod: S$GLB,,, | Performed by: STUDENT IN AN ORGANIZED HEALTH CARE EDUCATION/TRAINING PROGRAM

## 2021-10-18 PROCEDURE — 99999 PR PBB SHADOW E&M-EST. PATIENT-LVL III: CPT | Mod: PBBFAC,,, | Performed by: STUDENT IN AN ORGANIZED HEALTH CARE EDUCATION/TRAINING PROGRAM

## 2021-10-18 RX ORDER — ACETAMINOPHEN 160 MG/5ML
200 SUSPENSION, ORAL (FINAL DOSE FORM) ORAL DAILY
Start: 2021-10-18 | End: 2022-02-15

## 2021-10-18 RX ORDER — LOSARTAN POTASSIUM AND HYDROCHLOROTHIAZIDE 12.5; 1 MG/1; MG/1
1 TABLET ORAL DAILY
Qty: 90 TABLET | Refills: 1 | Status: SHIPPED | OUTPATIENT
Start: 2021-10-18 | End: 2022-01-18 | Stop reason: SDUPTHER

## 2021-10-19 ENCOUNTER — PATIENT MESSAGE (OUTPATIENT)
Dept: INTERNAL MEDICINE | Facility: CLINIC | Age: 51
End: 2021-10-19
Payer: COMMERCIAL

## 2021-10-20 ENCOUNTER — CLINICAL SUPPORT (OUTPATIENT)
Dept: REHABILITATION | Facility: HOSPITAL | Age: 51
End: 2021-10-20
Payer: COMMERCIAL

## 2021-10-20 DIAGNOSIS — M54.2 NECK PAIN: ICD-10-CM

## 2021-10-20 PROCEDURE — 97110 THERAPEUTIC EXERCISES: CPT | Performed by: PHYSICAL THERAPIST

## 2021-10-20 PROCEDURE — 97112 NEUROMUSCULAR REEDUCATION: CPT | Performed by: PHYSICAL THERAPIST

## 2021-10-20 PROCEDURE — 97140 MANUAL THERAPY 1/> REGIONS: CPT | Performed by: PHYSICAL THERAPIST

## 2021-11-06 LAB — CK SERPL-CCNC: 281 U/L (ref 44–196)

## 2021-11-08 ENCOUNTER — PATIENT MESSAGE (OUTPATIENT)
Dept: INTERNAL MEDICINE | Facility: CLINIC | Age: 51
End: 2021-11-08
Payer: COMMERCIAL

## 2021-11-16 ENCOUNTER — TELEPHONE (OUTPATIENT)
Dept: ENDOSCOPY | Facility: HOSPITAL | Age: 51
End: 2021-11-16
Payer: COMMERCIAL

## 2022-01-03 ENCOUNTER — PATIENT MESSAGE (OUTPATIENT)
Dept: INTERNAL MEDICINE | Facility: CLINIC | Age: 52
End: 2022-01-03
Payer: COMMERCIAL

## 2022-01-05 ENCOUNTER — PATIENT OUTREACH (OUTPATIENT)
Dept: ADMINISTRATIVE | Facility: HOSPITAL | Age: 52
End: 2022-01-05
Payer: COMMERCIAL

## 2022-01-18 ENCOUNTER — OFFICE VISIT (OUTPATIENT)
Dept: INTERNAL MEDICINE | Facility: CLINIC | Age: 52
End: 2022-01-18
Payer: COMMERCIAL

## 2022-01-18 VITALS
WEIGHT: 270.06 LBS | HEIGHT: 71 IN | BODY MASS INDEX: 37.81 KG/M2 | SYSTOLIC BLOOD PRESSURE: 122 MMHG | DIASTOLIC BLOOD PRESSURE: 82 MMHG | OXYGEN SATURATION: 95 % | HEART RATE: 89 BPM

## 2022-01-18 DIAGNOSIS — I10 ESSENTIAL HYPERTENSION: ICD-10-CM

## 2022-01-18 DIAGNOSIS — Z80.0 FAMILY HISTORY OF COLON CANCER IN FATHER: Chronic | ICD-10-CM

## 2022-01-18 DIAGNOSIS — G47.33 OSA (OBSTRUCTIVE SLEEP APNEA): ICD-10-CM

## 2022-01-18 DIAGNOSIS — Z87.898 HISTORY OF SYNCOPE: Primary | Chronic | ICD-10-CM

## 2022-01-18 PROBLEM — R26.2 DIFFICULTY WALKING: Status: RESOLVED | Noted: 2019-08-23 | Resolved: 2022-01-18

## 2022-01-18 PROBLEM — M54.2 NECK PAIN: Status: RESOLVED | Noted: 2021-10-01 | Resolved: 2022-01-18

## 2022-01-18 PROCEDURE — 99214 OFFICE O/P EST MOD 30 MIN: CPT | Mod: S$GLB,,, | Performed by: STUDENT IN AN ORGANIZED HEALTH CARE EDUCATION/TRAINING PROGRAM

## 2022-01-18 PROCEDURE — 99214 PR OFFICE/OUTPT VISIT, EST, LEVL IV, 30-39 MIN: ICD-10-PCS | Mod: S$GLB,,, | Performed by: STUDENT IN AN ORGANIZED HEALTH CARE EDUCATION/TRAINING PROGRAM

## 2022-01-18 PROCEDURE — 99999 PR PBB SHADOW E&M-EST. PATIENT-LVL III: ICD-10-PCS | Mod: PBBFAC,,, | Performed by: STUDENT IN AN ORGANIZED HEALTH CARE EDUCATION/TRAINING PROGRAM

## 2022-01-18 PROCEDURE — 99999 PR PBB SHADOW E&M-EST. PATIENT-LVL III: CPT | Mod: PBBFAC,,, | Performed by: STUDENT IN AN ORGANIZED HEALTH CARE EDUCATION/TRAINING PROGRAM

## 2022-01-18 RX ORDER — AMLODIPINE BESYLATE 10 MG/1
10 TABLET ORAL DAILY
Qty: 90 TABLET | Refills: 4 | Status: SHIPPED | OUTPATIENT
Start: 2022-01-18 | End: 2023-01-18 | Stop reason: SDUPTHER

## 2022-01-18 RX ORDER — LOSARTAN POTASSIUM AND HYDROCHLOROTHIAZIDE 12.5; 1 MG/1; MG/1
1 TABLET ORAL DAILY
Qty: 90 TABLET | Refills: 1 | Status: SHIPPED | OUTPATIENT
Start: 2022-01-18 | End: 2022-07-18 | Stop reason: SDUPTHER

## 2022-01-18 NOTE — PROGRESS NOTES
[facility]    Subjective:       Patient ID: Bhaskar Stiles is a 51 y.o. male.    Chief Complaint: Hypertension (F/u)    History was obtained from the patient and supplemented through chart review.  This patient is known to me.      HPI:    Patient is a 51 y.o. male who presents to f/u on HTN, HLD    htn  Controlled  Cont losartan-HCTZ 100-12.5 mg, amlodipine 10 mg daily    Occasional diffuse muscle cramps- resolved, perhaps due to yard work  Discussed adding coenzyme Q10 to statin, didn't need    Vision changes reported in the past  Appt November has to be rescheduled due to elderly mother, will do so    hld  Cont Crestor 10  Normal 6/2021    shantal  CPAP better with new nose mask  Switched out after recall  Much improved and using    Morbid Obesity  Popeyes, bread, poor diet- working on changing diet  Will work on exercise, weight loss   A1cs normal  Wt Readings from Last 15 Encounters:   01/18/22 122.5 kg (270 lb 1 oz)   10/18/21 121.2 kg (267 lb 3.2 oz)   10/15/21 120.1 kg (264 lb 12.4 oz)   10/13/21 120.1 kg (264 lb 12.4 oz)   10/08/21 120.7 kg (266 lb)   10/04/21 120.8 kg (266 lb 4.8 oz)   09/28/21 119.7 kg (263 lb 14.3 oz)   09/27/21 119.7 kg (264 lb)   09/24/21 123.2 kg (271 lb 9.7 oz)   07/30/21 122.5 kg (270 lb 1 oz)   07/16/21 122.9 kg (270 lb 15.1 oz)   07/12/21 122.9 kg (271 lb)   06/16/21 123 kg (271 lb 2.7 oz)   02/03/21 120.5 kg (265 lb 10.5 oz)   12/04/20 119.9 kg (264 lb 5.3 oz)     Neck pain  Much improved after manipulation by Dr. 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   HENT: Negative for trouble swallowing.    Eyes: Negative for visual disturbance.   Respiratory: Negative for shortness of breath.    Cardiovascular: Negative for chest pain.   Gastrointestinal: Negative for diarrhea, nausea and vomiting.   Musculoskeletal: Negative for arthralgias, back pain, gait problem and neck pain.   Neurological: Negative for dizziness and seizures.   Psychiatric/Behavioral: Negative for hallucinations and sleep disturbance.         Surgical hx, family hx, social hx   Have been reviewed       Current Outpatient Medications:     rosuvastatin (CRESTOR) 10 MG tablet, Take 1 tablet (10 mg total) by mouth once daily., Disp: 90 tablet, Rfl: 3    amLODIPine (NORVASC) 10 MG tablet, Take 1 tablet (10 mg total) by mouth once daily., Disp: 90 tablet, Rfl: 4    coenzyme Q10 200 mg capsule, Take 200 mg by mouth once daily. (Patient not taking: Reported on 1/18/2022), Disp: , Rfl:     diazePAM (VALIUM) 10 MG Tab, Take 1 tablet (10 mg total) by mouth daily as needed (30-60 minutes prior to procedure to help anxiety)., Disp: 1 tablet, Rfl: 0    gabapentin (NEURONTIN) 300 MG capsule, Take 300 mg by mouth., Disp: , Rfl:     ketorolac (TORADOL) 10 mg tablet, Take 1 tablet (10 mg total) by mouth every 8 (eight) hours as needed (breakthrough pain). Take with food. (Patient not taking: Reported on 1/18/2022), Disp: 12 tablet, Rfl: 0    losartan-hydrochlorothiazide 100-12.5 mg (HYZAAR) 100-12.5 mg Tab, Take 1 tablet by mouth once daily., Disp: 90 tablet, Rfl: 1    methocarbamoL (ROBAXIN) 750 MG Tab, Take 1 to 2 tablets by mouth every 8 hours as needed for muscle  "pain. May cause drowsiness. (Patient not taking: No sig reported), Disp: 30 tablet, Rfl: 0    omeprazole (PRILOSEC) 20 MG capsule, Take 1 capsule (20 mg total) by mouth once daily. for 14 days (Patient not taking: Reported on 10/13/2021), Disp: 14 capsule, Rfl: 0    traMADoL (ULTRAM) 50 mg tablet, Take 1-2 tablets ( mg total) by mouth every 8 (eight) hours as needed for Pain. (Patient not taking: No sig reported), Disp: 21 tablet, Rfl: 0    Objective:        Body mass index is 37.67 kg/m².  Vitals:    01/18/22 0817   BP: 122/82   Pulse: 89   SpO2: 95%   Weight: 122.5 kg (270 lb 1 oz)   Height: 5' 11" (1.803 m)   PainSc: 0-No pain     Physical Exam  Vitals and nursing note reviewed.   Constitutional:       General: He is not in acute distress.     Appearance: Normal appearance. He is not ill-appearing.      Comments: Obesity   HENT:      Head: Normocephalic and atraumatic.   Eyes:      General: No scleral icterus.  Cardiovascular:      Rate and Rhythm: Normal rate and regular rhythm.      Heart sounds: Normal heart sounds.   Pulmonary:      Effort: Pulmonary effort is normal.   Abdominal:      General: There is no distension.   Musculoskeletal:         General: No deformity.      Cervical back: Normal range of motion.   Skin:     General: Skin is warm and dry.   Neurological:      Mental Status: He is alert and oriented to person, place, and time.   Psychiatric:         Behavior: Behavior normal.           Lab Results   Component Value Date    WBC 14.09 (H) 06/05/2018    HGB 13.9 (L) 06/05/2018    HCT 42.6 06/05/2018     06/05/2018    CHOL 146 06/23/2021    TRIG 76 06/23/2021    HDL 41 06/23/2021    ALT 33 06/23/2021    AST 22 06/23/2021     06/23/2021    K 4.3 06/23/2021     06/23/2021    CREATININE 1.1 06/23/2021    BUN 11 06/23/2021    CO2 27 06/23/2021    TSH 0.71 06/23/2021    INR 1.0 06/05/2018    HGBA1C 5.1 06/23/2021       The 10-year ASCVD risk score (Addi NUÑEZ Jr., et al., 2013) is: " 7.8%    Values used to calculate the score:      Age: 51 years      Sex: Male      Is Non- : Yes      Diabetic: No      Tobacco smoker: No      Systolic Blood Pressure: 122 mmHg      Is BP treated: Yes      HDL Cholesterol: 41 mg/dL      Total Cholesterol: 146 mg/dL    On crestor 10 mg    (Imaging have been independently reviewed)  9/28/2019  MRI L spine  Terminated early due to severe pain  No acute fractures.     Moderate lumbar spondylosis which appears to be worst at L5-S1 with evidence of moderate to severe bilateral neural foraminal narrowing, as above.  Consider repeat full examination when clinically appropriate.    Assessment:         1. History of syncope    2. Essential hypertension    3. LAMONT (obstructive sleep apnea)    4. Family history of colon cancer in father          Plan:     Bhaskar was seen today for hypertension.    Diagnoses and all orders for this visit:    History of syncope    Essential hypertension  -     amLODIPine (NORVASC) 10 MG tablet; Take 1 tablet (10 mg total) by mouth once daily.  -     losartan-hydrochlorothiazide 100-12.5 mg (HYZAAR) 100-12.5 mg Tab; Take 1 tablet by mouth once daily.    LAMONT (obstructive sleep apnea)    Family history of colon cancer in father        Health Maintenance  - Lipids: normal on rosuvastatin 6/23/2021  - A1C: 5.1 6/23/2021  - Colon Ca Screen: need sept 2022, Was Radha (scanned in)  - Immunizations: vaccinated (confirm)    Follow up in about 6 months (around 7/18/2022). for annual      Or sooner prn          All medications were reviewed including potential side effects and risks/benefits.  Pt was counseled to call back if anything worsens or if questions arise.    Fredy Rey MD  Family Medicine  [facility]  [facility]  [address]  [place] [address]  Phone [phone]  Fax [phone]

## 2022-02-15 ENCOUNTER — OFFICE VISIT (OUTPATIENT)
Dept: SPORTS MEDICINE | Facility: CLINIC | Age: 52
End: 2022-02-15
Payer: COMMERCIAL

## 2022-02-15 VITALS
HEART RATE: 89 BPM | WEIGHT: 268 LBS | BODY MASS INDEX: 37.52 KG/M2 | DIASTOLIC BLOOD PRESSURE: 85 MMHG | HEIGHT: 71 IN | SYSTOLIC BLOOD PRESSURE: 133 MMHG

## 2022-02-15 DIAGNOSIS — M99.00 CRANIAL SOMATIC DYSFUNCTION: ICD-10-CM

## 2022-02-15 DIAGNOSIS — M99.01 SOMATIC DYSFUNCTION OF CERVICAL REGION: ICD-10-CM

## 2022-02-15 DIAGNOSIS — M99.02 SOMATIC DYSFUNCTION OF THORACIC REGION: ICD-10-CM

## 2022-02-15 DIAGNOSIS — M99.08 SOMATIC DYSFUNCTION OF RIB CAGE REGION: ICD-10-CM

## 2022-02-15 DIAGNOSIS — M50.30 DEGENERATIVE DISC DISEASE, CERVICAL: ICD-10-CM

## 2022-02-15 DIAGNOSIS — M99.07 SOMATIC DYSFUNCTION OF UPPER EXTREMITY: ICD-10-CM

## 2022-02-15 DIAGNOSIS — M54.2 CERVICALGIA: Primary | ICD-10-CM

## 2022-02-15 PROCEDURE — 99214 OFFICE O/P EST MOD 30 MIN: CPT | Mod: 25,S$GLB,, | Performed by: ORTHOPAEDIC SURGERY

## 2022-02-15 PROCEDURE — 98927 PR OSTEOPATHIC MANIP,5-6 BODY REGN: ICD-10-PCS | Mod: S$GLB,,, | Performed by: ORTHOPAEDIC SURGERY

## 2022-02-15 PROCEDURE — 98927 OSTEOPATH MANJ 5-6 REGIONS: CPT | Mod: S$GLB,,, | Performed by: ORTHOPAEDIC SURGERY

## 2022-02-15 PROCEDURE — 99214 PR OFFICE/OUTPT VISIT, EST, LEVL IV, 30-39 MIN: ICD-10-PCS | Mod: 25,S$GLB,, | Performed by: ORTHOPAEDIC SURGERY

## 2022-02-15 PROCEDURE — 99999 PR PBB SHADOW E&M-EST. PATIENT-LVL III: ICD-10-PCS | Mod: PBBFAC,,, | Performed by: ORTHOPAEDIC SURGERY

## 2022-02-15 PROCEDURE — 99999 PR PBB SHADOW E&M-EST. PATIENT-LVL III: CPT | Mod: PBBFAC,,, | Performed by: ORTHOPAEDIC SURGERY

## 2022-02-15 RX ORDER — METHYLPREDNISOLONE 4 MG/1
TABLET ORAL
Qty: 1 EACH | Refills: 0 | Status: SHIPPED | OUTPATIENT
Start: 2022-02-15 | End: 2022-02-22

## 2022-02-15 NOTE — PROGRESS NOTES
CC: neck pain     Bhaskar is here today for follow up evaluation of his neck pain. He appreciated 100% improvement in his pain following OMT at his initial visit. He states his pain increased 02/10/2022 without new mechanism of injury. He states he has tried to remain compliant with his HEP but does not feel as though this was helpful. He has not continued with physical therapy. He followed up with back and spine 10/15/2021 to review cervical spine MRI results. He denies new falls or trauma since his last visit. When asked where he hurts he gestures to the base of the occiput and states his pain ranges down his cervical spine, radiating to the right trapezius. He denies radicular symptoms today.     Recall from visit on 10/08/2021  51 y.o. Male presents today for evaluation of his neck pain. He is here today with his wife who was present for the duration of the visit today. Patient admits to neck pain and right upper trapezius pain for the past two and a half weeks without known mechanism of injury. He was initially evaluated by Niels Tesfaye PA-C prior to being referred for possible OMT. He denies radicular symptoms, but recalls one instance of radiating pain to the right thumb. He is scheduled to have an MRI tomorrow further evaluation.   How long: Patient admits to neck pain for the past two and a half weeks.   What makes it better: Patient has been unable to find anything to improve his pain.   What makes it worse: Patient states his pain is constant and does not increased with any particular activity.   Does it radiate: Patient admits to his pain radiating to the right upper trapezius.   Attempted treatments: Patient has attended one physical therapy session without improvement in his pain yet. He has tried tramadol, robaxin, flexeril and gabapentin without improvement in his pain. He has also applied a topical pain relieving cream which has provided him with some relief.   Pain score: 9/10  Any mechanical  symptoms: Denies mechanical symptoms.   Feelings of instability: Denies feelings of instability.   Affecting ADLs: Admits tot his problem affecting his ability to perform ADLs.     REVIEW OF SYSTEMS:   Constitution: Patient denies fever, chills, night sweats, and weight changes.  Eyes: Patient denies eye pain or vision change.  HENT: Patient denies any headache, ear pain, sore throat, or nasal discharge.  CVS: Patient denies chest pain.  Lungs: Patient denies any shortness of breath or cough.  Abd: Patient denies any stomach pain, nausea, or vomiting.  Skin: Patient denies any skin rash or itching.    Hematologic/Lymphatic: Patient denies any bleeding problems, or easy bruising.   Musculoskeletal: Patient denies any recent falls. See HPI.  Psych: Patient denies any current anxiety or nervousness.    PAST MEDICAL HISTORY:   Past Medical History:   Diagnosis Date    Family history of colon cancer in father     Hyperlipidemia     Hypertension     LAMONT (obstructive sleep apnea)     Syncope and collapse        PAST SURGICAL HISTORY:   Past Surgical History:   Procedure Laterality Date    CHONDROPLASTY OF KNEE Left 8/22/2019    Procedure: CHONDROPLASTY, KNEE;  Surgeon: Shelbi Hughes MD;  Location: [facility];  Service: Orthopedics;  Laterality: Left;    HERNIA REPAIR      KNEE ARTHROSCOPY W/ MENISCECTOMY Left 8/22/2019    Procedure: ARTHROSCOPY, KNEE, WITH MEDIAL AND LATERAL MENISCECTOMY;  Surgeon: Shelbi Hughes MD;  Location: [facility];  Service: Orthopedics;  Laterality: Left;    KNEE ARTHROSCOPY W/ PLICA EXCISION Left 8/22/2019    Procedure: EXCISION, MEDIAL PLICA, KNEE, ARTHROSCOPIC;  Surgeon: Shelbi Hughes MD;  Location: [facility] [place] OR;  Service: Orthopedics;  Laterality: Left;    LIPOMA RESECTION      abdomen    REMOVAL OF FOREIGN BODY FROM LOWER EXTREMITY Left 8/22/2019    Procedure: REMOVAL, FOREIGN BODY, LOOSE BODY,  LOWER EXTREMITY;  Surgeon: Shelbi Hughes MD;  Location: [facility] [place] OR;  Service: Orthopedics;  Laterality: Left;     SHOULDER SURGERY      left    SYNOVECTOMY OF KNEE Left 8/22/2019    Procedure: PARTIAL SYNOVECTOMY, KNEE;  Surgeon: Shelbi Hughes MD;  Location: [facility] [place] [facility];  Service: Orthopedics;  Laterality: Left;    TILT TABLE TEST N/A 7/9/2018    Procedure: TILT TABLE TEST;  Surgeon: Leonardo Whitfield MD;  Location: [facility] CATH LAB;  Service: Cardiology;  Laterality: N/A;  Syncope, HUT, DM, 3 PREP    TRANSFORAMINAL EPIDURAL INJECTION OF STEROID Right 10/9/2019    Procedure: LUMBAR TRANSFORAMINAL RIGHT L5/S1;  Surgeon: Anthony Schaefer MD;  Location: [facility] [place] PAIN MGT;  Service: Pain Management;  Laterality: Right;  NEEDS CONSENT    TRANSFORAMINAL EPIDURAL INJECTION OF STEROID Left 11/18/2020    Procedure: LUMBAR TRANSFORAMINAL LEFT L5/S1 DIRECT REFERRAL;  Surgeon: Anthony Schaefer MD;  Location: [facility] [place] [facility]GT;  Service: Pain Management;  Laterality: Left;  NEEDS CONSENT       FAMILY HISTORY:   Family History   Problem Relation Age of Onset    Colon cancer Father        SOCIAL HISTORY:   Social History     Socioeconomic History    Marital status:    Tobacco Use    Smoking status: Never Smoker    Smokeless tobacco: Never Used   Substance and Sexual Activity    Alcohol use: No    Drug use: No       MEDICATIONS:     Current Outpatient Medications:     amLODIPine (NORVASC) 10 MG tablet, Take 1 tablet (10 mg total) by mouth once daily., Disp: 90 tablet, Rfl: 4    diazePAM (VALIUM) 10 MG Tab, Take 1 tablet (10 mg total) by mouth daily as needed (30-60 minutes prior to procedure to help anxiety)., Disp: 1 tablet, Rfl: 0    gabapentin (NEURONTIN) 300 MG capsule, Take 300 mg by mouth., Disp: , Rfl:     losartan-hydrochlorothiazide 100-12.5 mg (HYZAAR) 100-12.5 mg Tab, Take 1 tablet by mouth once daily., Disp: 90 tablet, Rfl: 1    methylPREDNISolone (MEDROL DOSEPACK) 4 mg tablet, use as directed, Disp: 1 each, Rfl: 0    rosuvastatin (CRESTOR) 10 MG tablet, Take 1 tablet (10 mg total) by mouth once daily., Disp: 90  "tablet, Rfl: 3    ALLERGIES:   Review of patient's allergies indicates:  No Known Allergies     PHYSICAL EXAMINATION:  /85   Pulse 89   Ht 5' 11" (1.803 m)   Wt 121.6 kg (268 lb)   BMI 37.38 kg/m²   Vitals signs and nursing note have been reviewed.  General: In no acute distress, well developed, well nourished, no diaphoresis  Eyes: EOM full and smooth, no eye redness or discharge  HENT: normocephalic and atraumatic, neck supple, trachea midline, no nasal discharge, no external ear redness or discharge  Cardiovascular: 2+ and symmetric radial bilaterally, no LE edema  Lungs: respirations non-labored, no conversational dyspnea   Abd: non-distended, no rigidity  MSK: no amputation or deformity, no swelling of extremities  Neuro: AAOx3, CN2-12 grossly intact  Skin: No rashes, warm and dry  Psychiatric: cooperative, pleasant, mood and affect appropriate for age    MUSCULOSKELETAL    CERVICAL SPINE    INSPECTION  Anterior head carriage.    Normal cervicothoracolumbar curves.    No obvious pelvic obliquity while standing.    No edema, erythema, or ecchymosis noted in cervical spine or shoulder girdle regions.    No atrophy noted in the upper limbs.    PALPATION  + tenderness to palpation throughout the cervical spine, right greater than left  + tenderness over the right shoulder girdle  No bony deformities or step offs palpated along the cervical spinous processes.   Myofascial tightness of the cervical paraspinal muscles    ROM  Active flexion to 45°.    Active extension to 50°.  Pain present    Active rotation to 75° bilaterally. Mild pain present with motion  Active sidebending to 45° bilaterally.    SPECIAL TESTING  Negative Spurlings test.  Negative Lhermittes test.  Negative shoulder abduction relief sign.  Negative upper limb tension tests    Posture:  Upright, Increased thoracic kyphosis and Anterior head carriage  Gait: Non-antalgic     TART (Tissue texture abnormality, Asymmetry,  Restriction of " motion and/or Tenderness) changes:    Head: Occipitoatlantal (OA) Joint ES-left, R-right   Myofascial restriction bilateral occiput, right greater than left     Cervical Spine  Thoracic Spine  Lumbar Spine   C1 RR T1 Neutral L1    C2 ERSR T2 Neutral L2    C3 ERSR T3 NSLRR L3    C4 ERSR T4 NSLRR L4    C5 Neutral T5 NSLRR L5    C6 Neutral T6 NSLRR     C7 Neutral T7 NSLRR       T8 ERSR       T9 Neutral       T10 Neutral       T11 Neutral       T12 Neutral       Fascial trigger bands from the medial trapezius muscle on the right to the occiput along the right paraspinal muscles    Ribs:  Superior rib 1 on the right  Myofascial restriction upper and middle bilateral ribcage    Upper Extremity:  Periscapular myofascial restriction bilaterally  Fascial herniated trigger point at the lateral trapezius on the right    Abdomen:    Pelvis:    Sacrum    Lower Extremity:      Key   F= Flexed   E = Extended   R = Rotated   N = Neutral   S = Sidebent   TTA = tissue texture abnormality   L/R/B = left/right/bilateral (last letter)       NEUROVASCULAR  Full strength with arm abduction, elbow flexion, wrist extension, elbow extension, finger flexion, and finger abduction.    Normal gait without wide base of support or scissoring.  2+ and symmetric radial and ulnar pulses at the wrists bilaterally.   Capillary refill intact at <2 seconds in all upper limb digits.      IMAGIN. X-ray obtained 2021 due to neck pain.   2. X-ray images were reviewed personally by me and then directly with patient.  3. FINDINGS: X-ray images obtained demonstrate no acute fracture.  There is trace retrolisthesis of C3 on C4.  Intervertebral disc height loss most notable at C3-4 and C5-6.  Right greater than left neural foraminal narrowing at these levels.  Visualized lung is clear.  4. IMPRESSION: Degenerative changes most notable at C3-4 and C5-6.    1. MRI obtained 10/04/2021 due to cervical spine pain.   2. MRI images were reviewed personally by  me and then directly with patient.  3. FINDINGS: MRI images obtained demonstrate multi-level degenerative findings with disc height loss most noted at C3-4, C5-6 and C6-7.  4. IMPRESSION: As above.       ASSESSMENT:      ICD-10-CM ICD-9-CM   1. Cervicalgia  M54.2 723.1   2. Degenerative disc disease, cervical  M50.30 722.4         PLAN:  1-2.  Cervicalgia/DDD - improved, recent exacerbation    - Bhaskar admits to cervical spine and right upper trapezius pain for the past two and a half weeks without known mechanism of injury. He denies radicular symptoms today, and has not appreciated resolution of his pain with various pain medications and muscle relaxers. He appreciated 100% improvement in his pain following OMT at his initial visit. His pain returned 02/10/2022 without new mechanism of injury.     - MRI images obtained on 10/04/2021 were personally reviewed.  See above for further detail.  It is noteworthy that he has moderate to severe stenosis at C3-C4 and this may be a pain  for him.    - Based on his description of pain/body language and somatic dysfunction identified on exam, I discussed osteopathic manipulation as a treatment option today. He consents to evaluation and treatment. See below.    - Continue with HEP for Nigerian, scalene stretching, snow angels prescribed at initial visit.    - Medrol dose pack prescribed today      3-7.  Somatic dysfunction of cervical, cranial, thoracic, ribcage, upper extremity regions -     - OMT 5-6 regions. Oral consent obtained. Reviewed benefits and potential side effects. OMT indicated today due to signs and symptoms as well as local and remote somatic dysfunction findings and their related neurokinetic, lymphatic, fascial and/or arteriovenous body connections. OMT techniques used: Soft Tissue, Myofascial Release, Muscle Energy, High Velocity Low Amplitude, Cranial  and Fascial Distortion Model. Treatment was tolerated well. Improvement noted in segmental  mobility post-treatment in dysfunctional regions. There were no adverse events and no complications immediately following treatment. Advised plenty of water to help alleviate soreness.      Future planning includes -  possibly more OMT if helpful and if indicated, referral to interventional pain to discuss injection options    All questions were answered to the best of my ability and all concerns were addressed at this time.    Follow up in 2-3 weeks for above, or sooner if needed.      This note is dictated using the M*Modal Fluency Direct word recognition program. There are word recognition mistakes that are occasionally missed on review.

## 2022-02-21 ENCOUNTER — PATIENT MESSAGE (OUTPATIENT)
Dept: SPORTS MEDICINE | Facility: CLINIC | Age: 52
End: 2022-02-21
Payer: COMMERCIAL

## 2022-02-21 ENCOUNTER — TELEPHONE (OUTPATIENT)
Dept: SPORTS MEDICINE | Facility: CLINIC | Age: 52
End: 2022-02-21
Payer: COMMERCIAL

## 2022-02-21 DIAGNOSIS — M50.30 DEGENERATIVE DISC DISEASE, CERVICAL: ICD-10-CM

## 2022-02-21 DIAGNOSIS — M54.2 CERVICALGIA: Primary | ICD-10-CM

## 2022-02-22 ENCOUNTER — TELEPHONE (OUTPATIENT)
Dept: ADMINISTRATIVE | Facility: OTHER | Age: 52
End: 2022-02-22
Payer: COMMERCIAL

## 2022-02-22 ENCOUNTER — OFFICE VISIT (OUTPATIENT)
Dept: PAIN MEDICINE | Facility: CLINIC | Age: 52
End: 2022-02-22
Payer: COMMERCIAL

## 2022-02-22 VITALS
WEIGHT: 266 LBS | HEIGHT: 71 IN | BODY MASS INDEX: 37.24 KG/M2 | RESPIRATION RATE: 18 BRPM | DIASTOLIC BLOOD PRESSURE: 75 MMHG | HEART RATE: 84 BPM | SYSTOLIC BLOOD PRESSURE: 121 MMHG | TEMPERATURE: 98 F | OXYGEN SATURATION: 98 %

## 2022-02-22 DIAGNOSIS — M50.30 DEGENERATIVE DISC DISEASE, CERVICAL: ICD-10-CM

## 2022-02-22 DIAGNOSIS — M54.2 CERVICALGIA: ICD-10-CM

## 2022-02-22 DIAGNOSIS — M79.18 MYOFASCIAL PAIN: Primary | ICD-10-CM

## 2022-02-22 DIAGNOSIS — M54.12 CERVICAL RADICULOPATHY: ICD-10-CM

## 2022-02-22 DIAGNOSIS — G89.29 OTHER CHRONIC PAIN: ICD-10-CM

## 2022-02-22 PROCEDURE — 99214 OFFICE O/P EST MOD 30 MIN: CPT | Mod: 25,S$GLB,, | Performed by: NURSE PRACTITIONER

## 2022-02-22 PROCEDURE — 99999 PR PBB SHADOW E&M-EST. PATIENT-LVL V: ICD-10-PCS | Mod: PBBFAC,,, | Performed by: NURSE PRACTITIONER

## 2022-02-22 PROCEDURE — 20553 PR INJECT TRIGGER POINTS, > 3: ICD-10-PCS | Mod: S$GLB,,, | Performed by: NURSE PRACTITIONER

## 2022-02-22 PROCEDURE — 99214 PR OFFICE/OUTPT VISIT, EST, LEVL IV, 30-39 MIN: ICD-10-PCS | Mod: 25,S$GLB,, | Performed by: NURSE PRACTITIONER

## 2022-02-22 PROCEDURE — 99999 PR PBB SHADOW E&M-EST. PATIENT-LVL V: CPT | Mod: PBBFAC,,, | Performed by: NURSE PRACTITIONER

## 2022-02-22 PROCEDURE — 20553 NJX 1/MLT TRIGGER POINTS 3/>: CPT | Mod: S$GLB,,, | Performed by: NURSE PRACTITIONER

## 2022-02-22 RX ORDER — METHOCARBAMOL 500 MG/1
500 TABLET, FILM COATED ORAL 3 TIMES DAILY PRN
Qty: 90 TABLET | Refills: 0 | Status: SHIPPED | OUTPATIENT
Start: 2022-02-22 | End: 2022-03-24

## 2022-02-22 RX ORDER — TRIAMCINOLONE ACETONIDE 40 MG/ML
40 INJECTION, SUSPENSION INTRA-ARTICULAR; INTRAMUSCULAR
Status: COMPLETED | OUTPATIENT
Start: 2022-02-22 | End: 2022-02-22

## 2022-02-22 RX ORDER — BUPIVACAINE HYDROCHLORIDE 2.5 MG/ML
9 INJECTION, SOLUTION EPIDURAL; INFILTRATION; INTRACAUDAL
Status: COMPLETED | OUTPATIENT
Start: 2022-02-22 | End: 2022-02-22

## 2022-02-22 RX ADMIN — TRIAMCINOLONE ACETONIDE 40 MG: 40 INJECTION, SUSPENSION INTRA-ARTICULAR; INTRAMUSCULAR at 10:02

## 2022-02-22 RX ADMIN — BUPIVACAINE HYDROCHLORIDE 22.5 MG: 2.5 INJECTION, SOLUTION EPIDURAL; INFILTRATION; INTRACAUDAL at 10:02

## 2022-02-22 NOTE — H&P (VIEW-ONLY)
Chronic Pain - Established visit    Referring Physician: No ref. provider found    Chief Complaint:   Chief Complaint   Patient presents with    Neck Pain    Leg Pain        SUBJECTIVE: Disclaimer: This note has been generated using voice-recognition software. There may be typographical errors that have been missed during proof-reading    Interval History 2/22/2022:  The patient is here for new onset neck pain. We did previously treat him for back pain which has overall been well controlled. He does have some right lateral leg numbness. However, his primary complaint today is neck pain with radiation into the right shoulder. He has significant right sided muscle tightness and pain. His cervical MRI does show severe stenosis. He has been evaluated by Back and Spine in the past. He recently saw his orthopedist, Dr. Eastman, who performed osteopathic manipulation with short term benefit only. He has intermittent numbness but denies weakness. No bowel or bladder changes. His pain today is 10/10.    Initial encounter:    Bhaskar Stiles presents to the clinic for the evaluation of back and leg pain. He is s/p left L5/S1 TF NEIDA with 80% relief of leg pain. This was a direct referral from Shauna Valencia. He also had benefit with right L5/S1 in the past.  Currently, he is having some back pain with radiation into the anterior thighs. He also has a bulge at L3/4 with NF narrowing on previous MRI.  He is currently in PT which he finds helpful. He feels as though this will be helpful long-term for him. The pain started years ago. He also has a history of right knee arthoscopic surgery last year.    Brief history:    Pain Description:    The pain is located in the back area and radiates to the bilateral legs.      Symptoms interfere with daily activity.     Exacerbating factors: Walking.      Mitigating factors physical therapy and rest.     Patient denies night fever/night sweats, urinary incontinence and bowel  incontinence.  Patient denies any suicidal or homicidal ideations    Pain Medications:  Current:  None    Tried in Past:  NSAIDs - Mobic  TCA -Never  SNRI -Never  Anti-convulsants - Gabapentin  Muscle Relaxants - Robaxin  Opioids- [place]    Physical Therapy/Home Exercise: yes       report:  Not applicable    Pain Procedures:   10/9/20 right L5/S1 TF NEIDA- 80% relief  11/18/20 left L5/S1 TF NEIDA- 80% relief    Chiropractor -never  Acupuncture - never  TENS unit -never  Spinal decompression -never  Joint replacement -never    Imaging:     Narrative & Impression  EXAMINATION:  MRI CERVICAL SPINE WITHOUT CONTRAST     CLINICAL HISTORY:  Neck pain, normal neuro exam;C-spine canal stenosis;Cervical osteoarthritis;Cervical radiculopathy;concern for canal stenosis; Other spondylosis, cervical region     TECHNIQUE:  Multiplanar, multisequence MR images of the cervical spine were performed without the administration of contrast.     COMPARISON:  Prior radiograph     FINDINGS:  There is straightening and mild reversal of cervical lordosis.  Trace retrolisthesis of C3 on C4. No concerning marrow signal present.  Multilevel disc desiccation present with height loss most noted at C3-4, C5-6 and C6-7.     Posterior fossa is unremarkable.  Spinal cord demonstrates no concerning signal abnormality.     Neck soft tissue structures unremarkable.     C2-C3: No spinal canal stenosis or neural foraminal narrowing.     C3-C4: Posterior disc osteophyte complex present effacing the anterior thecal sac with mild spinal canal stenosis.  Right greater than left uncovertebral degenerative findings results in severe right and moderate to severe left neural foraminal narrowing.     C4-C5: No significant spinal canal stenosis or right neural foraminal narrowing.  Left uncovertebral and facet degenerative findings result in mild neural foraminal narrowing.     C5-C6: Posterior disc osteophyte complex present asymmetric to the right indenting the  thecal sac without high-grade spinal canal stenosis.  Facet and uncovertebral degenerative findings present with mild-to-moderate left and moderate to severe right neural foraminal narrowing.     C6-C7: Posterior disc osteophyte complex present asymmetric to the left indenting the thecal sac with mild spinal canal stenosis.  Uncovertebral degenerative findings greater on the left with moderate neural foraminal narrowing.  Minimal right neural foraminal narrowing present.     C7-T1: No spinal canal stenosis or neural foraminal narrowing.     Impression:     Multilevel degenerative findings as above.    Past Medical History:   Diagnosis Date    Family history of colon cancer in father     Hyperlipidemia     Hypertension     LAMONT (obstructive sleep apnea)     Syncope and collapse      Past Surgical History:   Procedure Laterality Date    CHONDROPLASTY OF KNEE Left 8/22/2019    Procedure: CHONDROPLASTY, KNEE;  Surgeon: Shelbi Hughes MD;  Location: [facility] [place] OR;  Service: Orthopedics;  Laterality: Left;    HERNIA REPAIR      KNEE ARTHROSCOPY W/ MENISCECTOMY Left 8/22/2019    Procedure: ARTHROSCOPY, KNEE, WITH MEDIAL AND LATERAL MENISCECTOMY;  Surgeon: Shelbi Hughes MD;  Location: [facility] [place] OR;  Service: Orthopedics;  Laterality: Left;    KNEE ARTHROSCOPY W/ PLICA EXCISION Left 8/22/2019    Procedure: EXCISION, MEDIAL PLICA, KNEE, ARTHROSCOPIC;  Surgeon: Shelbi Hughes MD;  Location: [facility] [place] OR;  Service: Orthopedics;  Laterality: Left;    LIPOMA RESECTION      abdomen    REMOVAL OF FOREIGN BODY FROM LOWER EXTREMITY Left 8/22/2019    Procedure: REMOVAL, FOREIGN BODY, LOOSE BODY,  LOWER EXTREMITY;  Surgeon: Shelbi Hughes MD;  Location: [facility] [place] OR;  Service: Orthopedics;  Laterality: Left;    SHOULDER SURGERY      left    SYNOVECTOMY OF KNEE Left 8/22/2019    Procedure: PARTIAL SYNOVECTOMY, KNEE;  Surgeon: Shelbi Hguhes MD;  Location: [facility] [place] OR;  Service: Orthopedics;  Laterality: Left;    TILT TABLE TEST N/A 7/9/2018    Procedure: TILT  TABLE TEST;  Surgeon: Leonardo Whitfield MD;  Location: [facility] CATH LAB;  Service: Cardiology;  Laterality: N/A;  Syncope, HUT, DM, 3 PREP    TRANSFORAMINAL EPIDURAL INJECTION OF STEROID Right 10/9/2019    Procedure: LUMBAR TRANSFORAMINAL RIGHT L5/S1;  Surgeon: Anthony Schaefer MD;  Location: [facility] PAIN MGT;  Service: Pain Management;  Laterality: Right;  NEEDS CONSENT    TRANSFORAMINAL EPIDURAL INJECTION OF STEROID Left 11/18/2020    Procedure: LUMBAR TRANSFORAMINAL LEFT L5/S1 DIRECT REFERRAL;  Surgeon: Anthony Schaefer MD;  Location: [facility] PAIN MGT;  Service: Pain Management;  Laterality: Left;  NEEDS CONSENT     Social History     Socioeconomic History    Marital status:    Tobacco Use    Smoking status: Never Smoker    Smokeless tobacco: Never Used   Substance and Sexual Activity    Alcohol use: No    Drug use: No     Family History   Problem Relation Age of Onset    Colon cancer Father        Review of patient's allergies indicates:  No Known Allergies    Current Outpatient Medications   Medication Sig    amLODIPine (NORVASC) 10 MG tablet Take 1 tablet (10 mg total) by mouth once daily.    gabapentin (NEURONTIN) 300 MG capsule Take 300 mg by mouth.    losartan-hydrochlorothiazide 100-12.5 mg (HYZAAR) 100-12.5 mg Tab Take 1 tablet by mouth once daily.    diazePAM (VALIUM) 10 MG Tab Take 1 tablet (10 mg total) by mouth daily as needed (30-60 minutes prior to procedure to help anxiety).    methylPREDNISolone (MEDROL DOSEPACK) 4 mg tablet use as directed (Patient not taking: Reported on 2/22/2022)    rosuvastatin (CRESTOR) 10 MG tablet Take 1 tablet (10 mg total) by mouth once daily. (Patient not taking: Reported on 2/22/2022)     No current facility-administered medications for this visit.       REVIEW OF SYSTEMS:    GENERAL:  No weight loss, malaise or fevers.  HEENT:   No recent changes in vision or hearing  NECK:  Negative for lumps, no difficulty with swallowing.  RESPIRATORY:  Negative for  "cough, wheezing or shortness of breath, patient denies any recent URI.  CARDIOVASCULAR:  Negative for chest pain, leg swelling or palpitations.  GI:  Negative for abdominal discomfort, blood in stools or black stools or change in bowel habits.  MUSCULOSKELETAL:  See HPI.  SKIN:  Negative for lesions, rash, and itching.  PSYCH:  No mood disorder or recent psychosocial stressors.  Patients sleep is not disturbed secondary to pain.  HEMATOLOGY/LYMPHOLOGY:  Negative for prolonged bleeding, bruising easily or swollen nodes.  Patient is not currently taking any anti-coagulants  ENDO: No history of diabetes or thyroid dysfunction  NEURO:   No history of headaches, syncope, paralysis, seizures or tremors.  All other reviewed and negative other than HPI.    OBJECTIVE:    /75 (BP Location: Right arm, Patient Position: Sitting, BP Method: Large (Automatic))   Pulse 84   Temp 97.7 °F (36.5 °C) (Oral)   Resp 18   Ht 5' 11" (1.803 m)   Wt 120.7 kg (266 lb)   SpO2 98%   BMI 37.10 kg/m²     PHYSICAL EXAMINATION:    GENERAL: Well appearing, in no acute distress, alert and oriented x3.  PSYCH:  Mood and affect appropriate.  SKIN: Skin color, texture, turgor normal, no rashes or lesions.  HEAD/FACE:  Normocephalic, atraumatic. Cranial nerves grossly intact.  NECK: TTP over right cervical paraspinals and trapezius muscles. Limited ROM on the right with lateral flexion. Mildly positive facet loading. Spurling positive on the right.  BACK: Straight leg raising in the supine position is negative to radicular pain. No pain to palpation over the facet joints of the lumbar spine or spinous processes. Limited range of motion with pain on flexion and extension.  EXTREMITIES: Peripheral joint ROM is full and pain free without obvious instability or laxity in all four extremities. No deformities, edema, or skin discoloration. Good capillary refill.  MUSCULOSKELETAL: There is no pain with palpation over the sacroiliac joints " bilaterally. Bilateral upper and lower extremity strength is normal and symmetric.  No atrophy or tone abnormalities are noted.  NEURO: No clonus.  No loss of sensation is noted.  GAIT: Normal.    ASSESSMENT: 51 y.o. year old male with neck and back pain, consistent with     Encounter Diagnoses   Name Primary?    Myofascial pain Yes    Other chronic pain     Cervical radiculopathy        PLAN:     - Back and Spine and Ortho notes reviewed.    - Recent cervical imaging reviewed with patient. Pain source seems likely C3-4 stenosis as well as myofascial pain.    - Will give TPIs today as per below. Will order C7/T1 IL NEIDA in at least 2 weeks.    - Restart Physical therapy with dry needling and traction.    - Consider surgical consult if limited benefit with above.    - Start Robaxin 500 mg TID PRN muscle pain.    - The patient will continue a home exercise routine to help with pain and strengthening.      - RTC 2 weeks after NEIDA.    The above plan and management options were discussed at length with patient. Patient is in agreement with the above and verbalized understanding. It will be communicated with the referring physician via electronic record, fax, or mail.    Cherelle Calvo  02/22/2022    Trigger Point Injection:   The procedure was discussed with the patient including complications of nerve damage,  bleeding, infection, and failure of pain relief.   Trigger points were identified by palpation and marked. Alcohol prep of sites done. A mixture of 9mL 0.25% bupivacaine +40mg Kenalog was prepared (10 mL total).   A 27-gauge needle was advanced to the point of maximal tenderness, and medication was injected after negative aspiration. All sites done in the same manner. Patient tolerated the procedure well and without complications. Sites injected included: trapezius and right sided cervical bilaterally (4 sites total).

## 2022-02-22 NOTE — PROGRESS NOTES
Chronic Pain - Established visit    Referring Physician: No ref. provider found    Chief Complaint:   Chief Complaint   Patient presents with    Neck Pain    Leg Pain        SUBJECTIVE: Disclaimer: This note has been generated using voice-recognition software. There may be typographical errors that have been missed during proof-reading    Interval History 2/22/2022:  The patient is here for new onset neck pain. We did previously treat him for back pain which has overall been well controlled. He does have some right lateral leg numbness. However, his primary complaint today is neck pain with radiation into the right shoulder. He has significant right sided muscle tightness and pain. His cervical MRI does show severe stenosis. He has been evaluated by Back and Spine in the past. He recently saw his orthopedist, Dr. Eastman, who performed osteopathic manipulation with short term benefit only. He has intermittent numbness but denies weakness. No bowel or bladder changes. His pain today is 10/10.    Initial encounter:    Bhaskar Stiles presents to the clinic for the evaluation of back and leg pain. He is s/p left L5/S1 TF NEIDA with 80% relief of leg pain. This was a direct referral from Shauna Valencia. He also had benefit with right L5/S1 in the past.  Currently, he is having some back pain with radiation into the anterior thighs. He also has a bulge at L3/4 with NF narrowing on previous MRI.  He is currently in PT which he finds helpful. He feels as though this will be helpful long-term for him. The pain started years ago. He also has a history of right knee arthoscopic surgery last year.    Brief history:    Pain Description:    The pain is located in the back area and radiates to the bilateral legs.      Symptoms interfere with daily activity.     Exacerbating factors: Walking.      Mitigating factors physical therapy and rest.     Patient denies night fever/night sweats, urinary incontinence and bowel  incontinence.  Patient denies any suicidal or homicidal ideations    Pain Medications:  Current:  None    Tried in Past:  NSAIDs - Mobic  TCA -Never  SNRI -Never  Anti-convulsants - Gabapentin  Muscle Relaxants - Robaxin  Opioids- [place]    Physical Therapy/Home Exercise: yes       report:  Not applicable    Pain Procedures:   10/9/20 right L5/S1 TF NEIAD- 80% relief  11/18/20 left L5/S1 TF NEIDA- 80% relief    Chiropractor -never  Acupuncture - never  TENS unit -never  Spinal decompression -never  Joint replacement -never    Imaging:     Narrative & Impression  EXAMINATION:  MRI CERVICAL SPINE WITHOUT CONTRAST     CLINICAL HISTORY:  Neck pain, normal neuro exam;C-spine canal stenosis;Cervical osteoarthritis;Cervical radiculopathy;concern for canal stenosis; Other spondylosis, cervical region     TECHNIQUE:  Multiplanar, multisequence MR images of the cervical spine were performed without the administration of contrast.     COMPARISON:  Prior radiograph     FINDINGS:  There is straightening and mild reversal of cervical lordosis.  Trace retrolisthesis of C3 on C4. No concerning marrow signal present.  Multilevel disc desiccation present with height loss most noted at C3-4, C5-6 and C6-7.     Posterior fossa is unremarkable.  Spinal cord demonstrates no concerning signal abnormality.     Neck soft tissue structures unremarkable.     C2-C3: No spinal canal stenosis or neural foraminal narrowing.     C3-C4: Posterior disc osteophyte complex present effacing the anterior thecal sac with mild spinal canal stenosis.  Right greater than left uncovertebral degenerative findings results in severe right and moderate to severe left neural foraminal narrowing.     C4-C5: No significant spinal canal stenosis or right neural foraminal narrowing.  Left uncovertebral and facet degenerative findings result in mild neural foraminal narrowing.     C5-C6: Posterior disc osteophyte complex present asymmetric to the right indenting the  thecal sac without high-grade spinal canal stenosis.  Facet and uncovertebral degenerative findings present with mild-to-moderate left and moderate to severe right neural foraminal narrowing.     C6-C7: Posterior disc osteophyte complex present asymmetric to the left indenting the thecal sac with mild spinal canal stenosis.  Uncovertebral degenerative findings greater on the left with moderate neural foraminal narrowing.  Minimal right neural foraminal narrowing present.     C7-T1: No spinal canal stenosis or neural foraminal narrowing.     Impression:     Multilevel degenerative findings as above.    Past Medical History:   Diagnosis Date    Family history of colon cancer in father     Hyperlipidemia     Hypertension     LAMONT (obstructive sleep apnea)     Syncope and collapse      Past Surgical History:   Procedure Laterality Date    CHONDROPLASTY OF KNEE Left 8/22/2019    Procedure: CHONDROPLASTY, KNEE;  Surgeon: Shelbi Hughes MD;  Location: [facility] OR;  Service: Orthopedics;  Laterality: Left;    HERNIA REPAIR      KNEE ARTHROSCOPY W/ MENISCECTOMY Left 8/22/2019    Procedure: ARTHROSCOPY, KNEE, WITH MEDIAL AND LATERAL MENISCECTOMY;  Surgeon: Shelbi Huhges MD;  Location: [facility] OR;  Service: Orthopedics;  Laterality: Left;    KNEE ARTHROSCOPY W/ PLICA EXCISION Left 8/22/2019    Procedure: EXCISION, MEDIAL PLICA, KNEE, ARTHROSCOPIC;  Surgeon: Shelbi Hughes MD;  Location: [facility] OR;  Service: Orthopedics;  Laterality: Left;    LIPOMA RESECTION      abdomen    REMOVAL OF FOREIGN BODY FROM LOWER EXTREMITY Left 8/22/2019    Procedure: REMOVAL, FOREIGN BODY, LOOSE BODY,  LOWER EXTREMITY;  Surgeon: Shelbi Hughes MD;  Location: [facility] OR;  Service: Orthopedics;  Laterality: Left;    SHOULDER SURGERY      left    SYNOVECTOMY OF KNEE Left 8/22/2019    Procedure: PARTIAL SYNOVECTOMY, KNEE;  Surgeon: Shelbi Hughes MD;  Location: [facility] OR;  Service: Orthopedics;  Laterality: Left;    TILT TABLE TEST N/A 7/9/2018    Procedure: TILT  TABLE TEST;  Surgeon: Leonardo Whitfield MD;  Location: [facility] CATH LAB;  Service: Cardiology;  Laterality: N/A;  Syncope, HUT, DM, 3 PREP    TRANSFORAMINAL EPIDURAL INJECTION OF STEROID Right 10/9/2019    Procedure: LUMBAR TRANSFORAMINAL RIGHT L5/S1;  Surgeon: Anthony Schaefer MD;  Location: [facility] PAIN MGT;  Service: Pain Management;  Laterality: Right;  NEEDS CONSENT    TRANSFORAMINAL EPIDURAL INJECTION OF STEROID Left 11/18/2020    Procedure: LUMBAR TRANSFORAMINAL LEFT L5/S1 DIRECT REFERRAL;  Surgeon: Anthony Schaefer MD;  Location: [facility] PAIN MGT;  Service: Pain Management;  Laterality: Left;  NEEDS CONSENT     Social History     Socioeconomic History    Marital status:    Tobacco Use    Smoking status: Never Smoker    Smokeless tobacco: Never Used   Substance and Sexual Activity    Alcohol use: No    Drug use: No     Family History   Problem Relation Age of Onset    Colon cancer Father        Review of patient's allergies indicates:  No Known Allergies    Current Outpatient Medications   Medication Sig    amLODIPine (NORVASC) 10 MG tablet Take 1 tablet (10 mg total) by mouth once daily.    gabapentin (NEURONTIN) 300 MG capsule Take 300 mg by mouth.    losartan-hydrochlorothiazide 100-12.5 mg (HYZAAR) 100-12.5 mg Tab Take 1 tablet by mouth once daily.    diazePAM (VALIUM) 10 MG Tab Take 1 tablet (10 mg total) by mouth daily as needed (30-60 minutes prior to procedure to help anxiety).    methylPREDNISolone (MEDROL DOSEPACK) 4 mg tablet use as directed (Patient not taking: Reported on 2/22/2022)    rosuvastatin (CRESTOR) 10 MG tablet Take 1 tablet (10 mg total) by mouth once daily. (Patient not taking: Reported on 2/22/2022)     No current facility-administered medications for this visit.       REVIEW OF SYSTEMS:    GENERAL:  No weight loss, malaise or fevers.  HEENT:   No recent changes in vision or hearing  NECK:  Negative for lumps, no difficulty with swallowing.  RESPIRATORY:  Negative for  "cough, wheezing or shortness of breath, patient denies any recent URI.  CARDIOVASCULAR:  Negative for chest pain, leg swelling or palpitations.  GI:  Negative for abdominal discomfort, blood in stools or black stools or change in bowel habits.  MUSCULOSKELETAL:  See HPI.  SKIN:  Negative for lesions, rash, and itching.  PSYCH:  No mood disorder or recent psychosocial stressors.  Patients sleep is not disturbed secondary to pain.  HEMATOLOGY/LYMPHOLOGY:  Negative for prolonged bleeding, bruising easily or swollen nodes.  Patient is not currently taking any anti-coagulants  ENDO: No history of diabetes or thyroid dysfunction  NEURO:   No history of headaches, syncope, paralysis, seizures or tremors.  All other reviewed and negative other than HPI.    OBJECTIVE:    /75 (BP Location: Right arm, Patient Position: Sitting, BP Method: Large (Automatic))   Pulse 84   Temp 97.7 °F (36.5 °C) (Oral)   Resp 18   Ht 5' 11" (1.803 m)   Wt 120.7 kg (266 lb)   SpO2 98%   BMI 37.10 kg/m²     PHYSICAL EXAMINATION:    GENERAL: Well appearing, in no acute distress, alert and oriented x3.  PSYCH:  Mood and affect appropriate.  SKIN: Skin color, texture, turgor normal, no rashes or lesions.  HEAD/FACE:  Normocephalic, atraumatic. Cranial nerves grossly intact.  NECK: TTP over right cervical paraspinals and trapezius muscles. Limited ROM on the right with lateral flexion. Mildly positive facet loading. Spurling positive on the right.  BACK: Straight leg raising in the supine position is negative to radicular pain. No pain to palpation over the facet joints of the lumbar spine or spinous processes. Limited range of motion with pain on flexion and extension.  EXTREMITIES: Peripheral joint ROM is full and pain free without obvious instability or laxity in all four extremities. No deformities, edema, or skin discoloration. Good capillary refill.  MUSCULOSKELETAL: There is no pain with palpation over the sacroiliac joints " bilaterally. Bilateral upper and lower extremity strength is normal and symmetric.  No atrophy or tone abnormalities are noted.  NEURO: No clonus.  No loss of sensation is noted.  GAIT: Normal.    ASSESSMENT: 51 y.o. year old male with neck and back pain, consistent with     Encounter Diagnoses   Name Primary?    Myofascial pain Yes    Other chronic pain     Cervical radiculopathy        PLAN:     - Back and Spine and Ortho notes reviewed.    - Recent cervical imaging reviewed with patient. Pain source seems likely C3-4 stenosis as well as myofascial pain.    - Will give TPIs today as per below. Will order C7/T1 IL NEIDA in at least 2 weeks.    - Restart Physical therapy with dry needling and traction.    - Consider surgical consult if limited benefit with above.    - Start Robaxin 500 mg TID PRN muscle pain.    - The patient will continue a home exercise routine to help with pain and strengthening.      - RTC 2 weeks after NEIDA.    The above plan and management options were discussed at length with patient. Patient is in agreement with the above and verbalized understanding. It will be communicated with the referring physician via electronic record, fax, or mail.    Cherelle Calvo  02/22/2022    Trigger Point Injection:   The procedure was discussed with the patient including complications of nerve damage,  bleeding, infection, and failure of pain relief.   Trigger points were identified by palpation and marked. Alcohol prep of sites done. A mixture of 9mL 0.25% bupivacaine +40mg Kenalog was prepared (10 mL total).   A 27-gauge needle was advanced to the point of maximal tenderness, and medication was injected after negative aspiration. All sites done in the same manner. Patient tolerated the procedure well and without complications. Sites injected included: trapezius and right sided cervical bilaterally (4 sites total).

## 2022-03-11 ENCOUNTER — PATIENT OUTREACH (OUTPATIENT)
Dept: INTERNAL MEDICINE | Facility: CLINIC | Age: 52
End: 2022-03-11
Payer: COMMERCIAL

## 2022-03-15 ENCOUNTER — TELEPHONE (OUTPATIENT)
Dept: ADMINISTRATIVE | Facility: OTHER | Age: 52
End: 2022-03-15
Payer: COMMERCIAL

## 2022-03-15 ENCOUNTER — TELEPHONE (OUTPATIENT)
Dept: PAIN MEDICINE | Facility: CLINIC | Age: 52
End: 2022-03-15
Payer: COMMERCIAL

## 2022-03-15 NOTE — TELEPHONE ENCOUNTER
Staff spoke with patient and he stated that  left a vm in regards to scheduling procedure. Staff informed patient we will submit a message to the schedulers to give him a call back.

## 2022-03-15 NOTE — TELEPHONE ENCOUNTER
----- Message from Aparna Puentes MA sent at 3/15/2022  1:27 PM CDT -----  Regarding: pt reqauesting a call back  Name of Who is Calling: SHARAN MORRISON [[id number]]           What is the request in detail: pt reqauesting a call back in regards to scheduling a procedure            Can the clinic reply by [facility]: N            What [facility]: [phone]

## 2022-03-16 ENCOUNTER — PATIENT MESSAGE (OUTPATIENT)
Dept: PAIN MEDICINE | Facility: OTHER | Age: 52
End: 2022-03-16
Payer: COMMERCIAL

## 2022-03-16 ENCOUNTER — TELEPHONE (OUTPATIENT)
Dept: PAIN MEDICINE | Facility: CLINIC | Age: 52
End: 2022-03-16
Payer: COMMERCIAL

## 2022-03-16 ENCOUNTER — TELEPHONE (OUTPATIENT)
Dept: ADMINISTRATIVE | Facility: OTHER | Age: 52
End: 2022-03-16
Payer: COMMERCIAL

## 2022-03-16 NOTE — TELEPHONE ENCOUNTER
----- Message from Marlena Florentino sent at 3/16/2022  8:59 AM CDT -----  Regarding: Procedure  Who Called: Bhaskar Stiles         Who Left Message for Patient: Judith         Does the patient know what this is regarding? Yes         Best Call Back Number:[phone] ( Wife Number- Maricel Stiles )  [phone]        Additional Information:

## 2022-03-21 ENCOUNTER — TELEPHONE (OUTPATIENT)
Dept: PAIN MEDICINE | Facility: CLINIC | Age: 52
End: 2022-03-21
Payer: COMMERCIAL

## 2022-03-21 NOTE — TELEPHONE ENCOUNTER
----- Message from Suma Howe sent at 3/21/2022  8:35 AM CDT -----  Regarding: Call Back  Name of Who is Calling:SHARAN MORRISON [[id number]]              What is the request in detail: Patient requesting a call back to see what time he needs to be there for his upcoming  proceeded 03-. Please assist              Can the clinic reply by [facility]: No              [facility]:[phone]

## 2022-03-21 NOTE — TELEPHONE ENCOUNTER
Staff spoke with patient and provided the time of the procedure after collecting from the .procedure on 3/23/22 @ 1:15pm.

## 2022-03-23 ENCOUNTER — HOSPITAL ENCOUNTER (OUTPATIENT)
Facility: OTHER | Age: 52
Discharge: HOME | End: 2022-03-23
Attending: ANESTHESIOLOGY | Admitting: ANESTHESIOLOGY
Payer: COMMERCIAL

## 2022-03-23 VITALS
HEART RATE: 81 BPM | SYSTOLIC BLOOD PRESSURE: 162 MMHG | HEIGHT: 71 IN | WEIGHT: 263 LBS | BODY MASS INDEX: 36.82 KG/M2 | OXYGEN SATURATION: 95 % | RESPIRATION RATE: 14 BRPM | DIASTOLIC BLOOD PRESSURE: 89 MMHG | TEMPERATURE: 98 F

## 2022-03-23 DIAGNOSIS — G89.29 CHRONIC PAIN: ICD-10-CM

## 2022-03-23 DIAGNOSIS — G89.29 OTHER CHRONIC PAIN: Primary | ICD-10-CM

## 2022-03-23 DIAGNOSIS — M54.12 CERVICAL RADICULOPATHY: ICD-10-CM

## 2022-03-23 PROCEDURE — 25000003 PHARM REV CODE 250: Performed by: ANESTHESIOLOGY

## 2022-03-23 PROCEDURE — 62321 PR INJ CERV/THORAC, W/GUIDANCE: ICD-10-PCS | Mod: ,,, | Performed by: ANESTHESIOLOGY

## 2022-03-23 PROCEDURE — 63600175 PHARM REV CODE 636 W HCPCS: Performed by: ANESTHESIOLOGY

## 2022-03-23 PROCEDURE — 62321 NJX INTERLAMINAR CRV/THRC: CPT | Performed by: ANESTHESIOLOGY

## 2022-03-23 PROCEDURE — 62321 NJX INTERLAMINAR CRV/THRC: CPT | Mod: ,,, | Performed by: ANESTHESIOLOGY

## 2022-03-23 PROCEDURE — 25500020 PHARM REV CODE 255: Performed by: ANESTHESIOLOGY

## 2022-03-23 RX ORDER — DEXAMETHASONE SODIUM PHOSPHATE 10 MG/ML
INJECTION INTRAMUSCULAR; INTRAVENOUS
Status: DISCONTINUED | OUTPATIENT
Start: 2022-03-23 | End: 2022-03-23 | Stop reason: HOSPADM

## 2022-03-23 RX ORDER — LIDOCAINE HYDROCHLORIDE 10 MG/ML
INJECTION, SOLUTION EPIDURAL; INFILTRATION; INTRACAUDAL; PERINEURAL
Status: DISCONTINUED | OUTPATIENT
Start: 2022-03-23 | End: 2022-03-23 | Stop reason: HOSPADM

## 2022-03-23 RX ORDER — LIDOCAINE HYDROCHLORIDE 20 MG/ML
INJECTION, SOLUTION INFILTRATION; PERINEURAL
Status: DISCONTINUED | OUTPATIENT
Start: 2022-03-23 | End: 2022-03-23 | Stop reason: HOSPADM

## 2022-03-23 RX ORDER — FENTANYL CITRATE 50 UG/ML
INJECTION, SOLUTION INTRAMUSCULAR; INTRAVENOUS
Status: DISCONTINUED | OUTPATIENT
Start: 2022-03-23 | End: 2022-03-23 | Stop reason: HOSPADM

## 2022-03-23 RX ORDER — MIDAZOLAM HYDROCHLORIDE 1 MG/ML
INJECTION INTRAMUSCULAR; INTRAVENOUS
Status: DISCONTINUED | OUTPATIENT
Start: 2022-03-23 | End: 2022-03-23 | Stop reason: HOSPADM

## 2022-03-23 RX ORDER — SODIUM CHLORIDE 9 MG/ML
500 INJECTION, SOLUTION INTRAVENOUS CONTINUOUS
Status: DISCONTINUED | OUTPATIENT
Start: 2022-03-23 | End: 2022-03-23 | Stop reason: HOSPADM

## 2022-03-23 NOTE — DISCHARGE SUMMARY
Discharge Note  Short Stay      SUMMARY     Admit Date: 3/23/2022    Attending Physician: Tammy Miramontes      Discharge Physician: Tammy Miramontes      Discharge Date: 3/23/2022 1:42 PM    Procedure(s) (LRB):  INJECTION, STEROID, EPIDURAL, C7-T1 IL (N/A)    Final Diagnosis: Myofascial pain [M79.18]    Disposition: Home or self care    Patient Instructions:   Current Discharge Medication List      CONTINUE these medications which have NOT CHANGED    Details   amLODIPine (NORVASC) 10 MG tablet Take 1 tablet (10 mg total) by mouth once daily.  Qty: 90 tablet, Refills: 4    Comments: .  Associated Diagnoses: Essential hypertension      diazePAM (VALIUM) 10 MG Tab Take 1 tablet (10 mg total) by mouth daily as needed (30-60 minutes prior to procedure to help anxiety).  Qty: 1 tablet, Refills: 0    Associated Diagnoses: Other spondylosis, cervical region; Radiculopathy, cervical region; Spinal stenosis, cervical region; Cervicalgia      gabapentin (NEURONTIN) 300 MG capsule Take 300 mg by mouth.      losartan-hydrochlorothiazide 100-12.5 mg (HYZAAR) 100-12.5 mg Tab Take 1 tablet by mouth once daily.  Qty: 90 tablet, Refills: 1    Comments: .  Associated Diagnoses: Essential hypertension      methocarbamoL (ROBAXIN) 500 MG Tab Take 1 tablet (500 mg total) by mouth 3 (three) times daily as needed (muscle pain).  Qty: 90 tablet, Refills: 0    Associated Diagnoses: Myofascial pain      rosuvastatin (CRESTOR) 10 MG tablet Take 1 tablet (10 mg total) by mouth once daily.  Qty: 90 tablet, Refills: 3    Comments: Please keep on file  Associated Diagnoses: Hyperlipidemia, unspecified hyperlipidemia type                 Discharge Diagnosis: Myofascial pain [M79.18]  Condition on Discharge: Stable with no complications to procedure   Diet on Discharge: Same as before.  Activity: as per instruction sheet.  Discharge to: Home with a responsible adult.  Follow up: 2-4 weeks       Please call my office or pager at [phone] if  experienced any weakness or loss of sensation, fever > 101.5, pain uncontrolled with oral medications, persistent nausea/vomiting/or diarrhea, redness or drainage from the incisions, or any other worrisome concerns. If physician on call was not reached or could not communicate with our office for any reason please go to the nearest emergency department

## 2022-03-23 NOTE — OP NOTE
Cervical Interlaminar Epidural Steroid Injection under Fluoroscopic Guidance    The procedure, risks, benefits, and options were discussed with the patient. There are no contraindications to the procedure. The patent expressed understanding and agreed to the procedure. Informed written consent was obtained prior to the start of the procedure and can be found in the patient's chart.     PATIENT NAME: Bhaskar Stiles   MRN: [id number]     DATE OF PROCEDURE: 03/23/2022    PROCEDURE: Cervical Interlaminar Epidural Steroid Injection C7/T1 under Fluoroscopic Guidance    PRE-OP DIAGNOSIS:   1. Other chronic pain    2. Cervical radiculopathy    3. Chronic pain    4        DDD CERVICAL  POST-OP DIAGNOSIS: Same    PHYSICIAN: Anthony Schaefer MD     ASSISTANTS: Dr. Miramontes    MEDICATIONS INJECTED: Preservative-free Decadron 10mg with 1cc of Lidocaine 1% MPF and preservative free normal saline    LOCAL ANESTHETIC INJECTED: Xylocaine 2%     SEDATION:   Versed 1mg and Fentanyl 50mcg                                                                                                                                                                                     Conscious sedation ordered by M.D. Patient re-evaluation prior to administration of conscious sedation. No changes noted in patient's status from initial evaluation. The patient's vital signs were monitored by RN and patient remained hemodynamically stable throughout the procedure.    Event Time In   Sedation Start 1332   Sedation End 1338       ESTIMATED BLOOD LOSS: None    COMPLICATIONS: None    TECHNIQUE: Time-out was performed to identify the patient and procedure to be performed. With the patient laying in a prone position, the surgical area was prepped and draped in the usual sterile fashion using ChloraPrep and a fenestrated drape. The level was determined under fluoroscopy guidance. Skin anesthesia was achieved by injecting Lidocaine 2% over the injection site.  The  interlaminar space was then approached with a 20 gauge, 3.5 inch Tuohy needle that was introduced under fluoroscopic guidance with AP, lateral and/or contralateral oblique imaging. Once the Ligamentum flavum was encountered loss of resistance to saline was used to enter the epidural space. With positive loss of resistance and negative aspiration for CSF or Blood, contrast dye Omnipaque (300mg/ml) was injected to confirm placement and there was no vascular runoff. Then 4 mL of the medication mixture listed above was then injected slowly. Displacement of the radio opaque contrast after injection of the medication confirmed that the medication went into the area of the epidural space. The needles were removed, and bleeding was nil. A sterile dressing was applied. No specimens collected. The patient tolerated the procedure well.       The patient was monitored after the procedure in the recovery area. They were given post-procedure and discharge instructions to follow at home. The patient was discharged in a stable condition.    I reviewed and edited the fellow's note. I conducted my own interview and physical examination. I agree with the findings. I was present and supervising all critical portions of the procedure.    Anthony Schaefer MD

## 2022-03-23 NOTE — DISCHARGE INSTRUCTIONS

## 2022-04-08 ENCOUNTER — PATIENT OUTREACH (OUTPATIENT)
Dept: ADMINISTRATIVE | Facility: OTHER | Age: 52
End: 2022-04-08
Payer: COMMERCIAL

## 2022-04-11 ENCOUNTER — OFFICE VISIT (OUTPATIENT)
Dept: PAIN MEDICINE | Facility: CLINIC | Age: 52
End: 2022-04-11
Payer: COMMERCIAL

## 2022-04-11 VITALS
HEIGHT: 71 IN | SYSTOLIC BLOOD PRESSURE: 128 MMHG | TEMPERATURE: 98 F | RESPIRATION RATE: 18 BRPM | WEIGHT: 266.75 LBS | BODY MASS INDEX: 37.35 KG/M2 | DIASTOLIC BLOOD PRESSURE: 80 MMHG | HEART RATE: 95 BPM

## 2022-04-11 DIAGNOSIS — M50.30 DEGENERATIVE DISC DISEASE, CERVICAL: ICD-10-CM

## 2022-04-11 DIAGNOSIS — M54.2 CERVICALGIA: Primary | ICD-10-CM

## 2022-04-11 DIAGNOSIS — M51.37 DDD (DEGENERATIVE DISC DISEASE), LUMBOSACRAL: ICD-10-CM

## 2022-04-11 DIAGNOSIS — M47.812 CERVICAL SPONDYLOSIS: ICD-10-CM

## 2022-04-11 PROCEDURE — 99213 OFFICE O/P EST LOW 20 MIN: CPT | Mod: S$GLB,,, | Performed by: NURSE PRACTITIONER

## 2022-04-11 PROCEDURE — 99999 PR PBB SHADOW E&M-EST. PATIENT-LVL IV: CPT | Mod: PBBFAC,,, | Performed by: NURSE PRACTITIONER

## 2022-04-11 PROCEDURE — 99213 PR OFFICE/OUTPT VISIT, EST, LEVL III, 20-29 MIN: ICD-10-PCS | Mod: S$GLB,,, | Performed by: NURSE PRACTITIONER

## 2022-04-11 PROCEDURE — 99999 PR PBB SHADOW E&M-EST. PATIENT-LVL IV: ICD-10-PCS | Mod: PBBFAC,,, | Performed by: NURSE PRACTITIONER

## 2022-04-11 NOTE — PROGRESS NOTES
Chronic Pain - Established visit    Referring Physician: Shauna Valencia    Chief Complaint:     Cervical pain        SUBJECTIVE: Disclaimer: This note has been generated using voice-recognition software. There may be typographical errors that have been missed during proof-reading    Interval History 04/11/2022:   Pt  returns to clinic for follow up of cervical pain. Pt is S/P NEIDA C7-T1 on 03/23/22 with Dr. Schaefer. Pt states that his pain is much improved since NEIDA. Pt states that in after 1 week post NEIDA,  his pain got significantly better. Currently, pt denies any pain.Pt states he couldn't tolerate  physical therapy before receiving  NEIDA due to severe neck pain. Today, pt's level of pain is 0/10. Pt denies any numbness, tingling sensation to arms. Pt also denies any coordination problems, loss of bowel and bladder control.       Interval History 2/22/2022:  The patient is here for new onset neck pain. We did previously treat him for back pain which has overall been well controlled. He does have some right lateral leg numbness. However, his primary complaint today is neck pain with radiation into the right shoulder. He has significant right sided muscle tightness and pain. His cervical MRI does show severe stenosis. He has been evaluated by [facility] in the past. He recently saw his orthopedist, Dr. Eastman, who performed osteopathic manipulation with short term benefit only. He has intermittent numbness but denies weakness. No bowel or bladder changes. His pain today is 10/10.    Initial encounter:    Bhaskar Stiles presents to the clinic for the evaluation of back and leg pain. He is s/p left L5/S1 TF NEIDA with 80% relief of leg pain. This was a direct referral from Shauna Valencia. He also had benefit with right L5/S1 in the past.  Currently, he is having some back pain with radiation into the anterior thighs. He also has a bulge at L3/4 with NF narrowing on previous MRI.  He is currently in PT which he  finds helpful. He feels as though this will be helpful long-term for him. The pain started years ago. He also has a history of right knee arthoscopic surgery last year.    Brief history:    Pain Description:    Pt denies any pain today.     Patient denies night fever/night sweats, urinary incontinence and bowel incontinence.  Patient denies any suicidal or homicidal ideations    Pain Medications: None  Current:  None    Tried in Past:  NSAIDs - Mobic  TCA -Never  SNRI -Never  Anti-convulsants - Gabapentin  Muscle Relaxants - Robaxin  Opioids- Bath    Physical Therapy/Home Exercise: yes       report:  Not applicable    Pain Procedures:   10/9/20 right L5/S1 TF NEIDA- 80% relief  11/18/20 left L5/S1 TF NEIDA- 80% relief  03/23/22 C7-T1 NEIDA-100% relief.     Chiropractor -never  Acupuncture - never  TENS unit -never  Spinal decompression -never  Joint replacement -never    Imaging:     Narrative & Impression  EXAMINATION:  MRI CERVICAL SPINE WITHOUT CONTRAST     CLINICAL HISTORY:  Neck pain, normal neuro exam;C-spine canal stenosis;Cervical osteoarthritis;Cervical radiculopathy;concern for canal stenosis; Other spondylosis, cervical region     TECHNIQUE:  Multiplanar, multisequence MR images of the cervical spine were performed without the administration of contrast.     COMPARISON:  Prior radiograph     FINDINGS:  There is straightening and mild reversal of cervical lordosis.  Trace retrolisthesis of C3 on C4. No concerning marrow signal present.  Multilevel disc desiccation present with height loss most noted at C3-4, C5-6 and C6-7.     Posterior fossa is unremarkable.  Spinal cord demonstrates no concerning signal abnormality.     Neck soft tissue structures unremarkable.     C2-C3: No spinal canal stenosis or neural foraminal narrowing.     C3-C4: Posterior disc osteophyte complex present effacing the anterior thecal sac with mild spinal canal stenosis.  Right greater than left uncovertebral degenerative findings  results in severe right and moderate to severe left neural foraminal narrowing.     C4-C5: No significant spinal canal stenosis or right neural foraminal narrowing.  Left uncovertebral and facet degenerative findings result in mild neural foraminal narrowing.     C5-C6: Posterior disc osteophyte complex present asymmetric to the right indenting the thecal sac without high-grade spinal canal stenosis.  Facet and uncovertebral degenerative findings present with mild-to-moderate left and moderate to severe right neural foraminal narrowing.     C6-C7: Posterior disc osteophyte complex present asymmetric to the left indenting the thecal sac with mild spinal canal stenosis.  Uncovertebral degenerative findings greater on the left with moderate neural foraminal narrowing.  Minimal right neural foraminal narrowing present.     C7-T1: No spinal canal stenosis or neural foraminal narrowing.     Impression:     Multilevel degenerative findings as above.    Past Medical History:   Diagnosis Date    Family history of colon cancer in father     Hyperlipidemia     Hypertension     LAMONT (obstructive sleep apnea)     Syncope and collapse      Past Surgical History:   Procedure Laterality Date    CHONDROPLASTY OF KNEE Left 8/22/2019    Procedure: CHONDROPLASTY, KNEE;  Surgeon: Shelbi Hughes MD;  Location: [facility] OR;  Service: Orthopedics;  Laterality: Left;    EPIDURAL STEROID INJECTION N/A 3/23/2022    Procedure: INJECTION, STEROID, EPIDURAL, C7-T1 IL;  Surgeon: Anthony Schaefer MD;  Location: [facility] PAIN MGT;  Service: Pain Management;  Laterality: N/A;    HERNIA REPAIR      KNEE ARTHROSCOPY W/ MENISCECTOMY Left 8/22/2019    Procedure: ARTHROSCOPY, KNEE, WITH MEDIAL AND LATERAL MENISCECTOMY;  Surgeon: Shelbi Hughes MD;  Location: [facility] OR;  Service: Orthopedics;  Laterality: Left;    KNEE ARTHROSCOPY W/ PLICA EXCISION Left 8/22/2019    Procedure: EXCISION, MEDIAL PLICA, KNEE, ARTHROSCOPIC;  Surgeon: Shelbi Hughes MD;  Location:  [facility] OR;  Service: Orthopedics;  Laterality: Left;    LIPOMA RESECTION      abdomen    REMOVAL OF FOREIGN BODY FROM LOWER EXTREMITY Left 8/22/2019    Procedure: REMOVAL, FOREIGN BODY, LOOSE BODY,  LOWER EXTREMITY;  Surgeon: Shelbi Hughes MD;  Location: [facility] OR;  Service: Orthopedics;  Laterality: Left;    SHOULDER SURGERY      left    SYNOVECTOMY OF KNEE Left 8/22/2019    Procedure: PARTIAL SYNOVECTOMY, KNEE;  Surgeon: Shelbi Hughes MD;  Location: [facility] OR;  Service: Orthopedics;  Laterality: Left;    TILT TABLE TEST N/A 7/9/2018    Procedure: TILT TABLE TEST;  Surgeon: Leonardo Whitfield MD;  Location: [facility] CATH LAB;  Service: Cardiology;  Laterality: N/A;  Syncope, HUT, DM, 3 PREP    TRANSFORAMINAL EPIDURAL INJECTION OF STEROID Right 10/9/2019    Procedure: LUMBAR TRANSFORAMINAL RIGHT L5/S1;  Surgeon: Anthony Schaefer MD;  Location: [facility] PAIN MGT;  Service: Pain Management;  Laterality: Right;  NEEDS CONSENT    TRANSFORAMINAL EPIDURAL INJECTION OF STEROID Left 11/18/2020    Procedure: LUMBAR TRANSFORAMINAL LEFT L5/S1 DIRECT REFERRAL;  Surgeon: Anthony Schaefer MD;  Location: [facility] PAIN MGT;  Service: Pain Management;  Laterality: Left;  NEEDS CONSENT     Social History     Socioeconomic History    Marital status:    Tobacco Use    Smoking status: Never Smoker    Smokeless tobacco: Never Used   Substance and Sexual Activity    Alcohol use: No    Drug use: No     Family History   Problem Relation Age of Onset    Colon cancer Father        Review of patient's allergies indicates:  No Known Allergies    Current Outpatient Medications   Medication Sig    amLODIPine (NORVASC) 10 MG tablet Take 1 tablet (10 mg total) by mouth once daily.    diazePAM (VALIUM) 10 MG Tab Take 1 tablet (10 mg total) by mouth daily as needed (30-60 minutes prior to procedure to help anxiety).    gabapentin (NEURONTIN) 300 MG capsule Take 300 mg by mouth.    losartan-hydrochlorothiazide 100-12.5 mg (HYZAAR) 100-12.5 mg Tab  "Take 1 tablet by mouth once daily.    rosuvastatin (CRESTOR) 10 MG tablet Take 1 tablet (10 mg total) by mouth once daily. (Patient not taking: Reported on 2/22/2022)     No current facility-administered medications for this visit.       REVIEW OF SYSTEMS:    GENERAL:  No weight loss, malaise or fevers.  HEENT:   No recent changes in vision or hearing  NECK:  Negative for lumps, no difficulty with swallowing.  RESPIRATORY:  Negative for cough, wheezing or shortness of breath, patient denies any recent URI.  CARDIOVASCULAR:  Negative for chest pain, leg swelling or palpitations.  GI:  Negative for abdominal discomfort, blood in stools or black stools or change in bowel habits.  MUSCULOSKELETAL:  See HPI.  SKIN:  Negative for lesions, rash, and itching.  PSYCH:  No mood disorder or recent psychosocial stressors.  Patients sleep is not disturbed secondary to pain.  HEMATOLOGY/LYMPHOLOGY:  Negative for prolonged bleeding, bruising easily or swollen nodes.  Patient is not currently taking any anti-coagulants  ENDO: No history of diabetes or thyroid dysfunction  NEURO:   No history of headaches, syncope, paralysis, seizures or tremors.  All other reviewed and negative other than HPI.    OBJECTIVE:    /80 (BP Location: Right arm, Patient Position: Sitting, BP Method: Small (Automatic))   Pulse 95   Temp 98 °F (36.7 °C) (Oral)   Resp 18   Ht 5' 11" (1.803 m)   Wt 121 kg (266 lb 12.1 oz)   BMI 37.21 kg/m²     PHYSICAL EXAMINATION:    GENERAL: Well appearing, in no acute distress, alert and oriented x3.  PSYCH:  Mood and affect appropriate.  SKIN: Skin color, texture, turgor normal, no rashes or lesions.  HEAD/FACE:  Normocephalic, atraumatic. Cranial nerves grossly intact.  NECK: No TTP over cervical paraspinals and trapezius muscles. Full ROM on the right and left with lateral flexion.    EXTREMITIES: Peripheral joint ROM is full and pain free without obvious instability or laxity in all four extremities. No " deformities, edema, or skin discoloration. Good capillary refill.  MUSCULOSKELETAL:  Bilateral upper extremity strength is normal and symmetric.  No atrophy or tone abnormalities are noted.  NEURO: No clonus. No loss of sensation is noted. Negative Marcelino's bilaterally.  GAIT: Normal.    ASSESSMENT: 51 y.o. year old male with history of neck pain, consistent with     Encounter Diagnoses   Name Primary?    Cervicalgia Yes    Cervical spondylosis     Degenerative disc disease, cervical     DDD (degenerative disc disease), lumbosacral        PLAN:     - Previous imaging was reviewed and discussed with the patient today.    - Pt is S/P NEIDA C7-T1 on 03/23/22 with complete resolution of pain.    - Dicussed incorporating home exercise. The patient will continue a home exercise routine to help with pain and strengthening.      - RTC  PRN. Can repeat injection if pain worsens in the future. We also discussed surgical referral which he declined at this time.        The above plan and management options were discussed at length with patient. Patient is in agreement with the above and verbalized understanding.     Cherelle Calvo  04/11/2022

## 2022-05-27 ENCOUNTER — OFFICE VISIT (OUTPATIENT)
Dept: PAIN MEDICINE | Facility: CLINIC | Age: 52
End: 2022-05-27
Payer: COMMERCIAL

## 2022-05-27 ENCOUNTER — TELEPHONE (OUTPATIENT)
Dept: PAIN MEDICINE | Facility: CLINIC | Age: 52
End: 2022-05-27
Payer: COMMERCIAL

## 2022-05-27 VITALS
TEMPERATURE: 98 F | SYSTOLIC BLOOD PRESSURE: 120 MMHG | DIASTOLIC BLOOD PRESSURE: 69 MMHG | BODY MASS INDEX: 37.35 KG/M2 | WEIGHT: 266.75 LBS | HEIGHT: 71 IN | RESPIRATION RATE: 18 BRPM | HEART RATE: 88 BPM

## 2022-05-27 DIAGNOSIS — M47.812 CERVICAL SPONDYLOSIS: ICD-10-CM

## 2022-05-27 DIAGNOSIS — G89.4 CHRONIC PAIN SYNDROME: ICD-10-CM

## 2022-05-27 DIAGNOSIS — M54.12 CERVICAL RADICULOPATHY: ICD-10-CM

## 2022-05-27 DIAGNOSIS — M51.37 DDD (DEGENERATIVE DISC DISEASE), LUMBOSACRAL: ICD-10-CM

## 2022-05-27 DIAGNOSIS — M79.18 MYOFASCIAL PAIN: Primary | ICD-10-CM

## 2022-05-27 PROCEDURE — 96372 THER/PROPH/DIAG INJ SC/IM: CPT | Mod: S$GLB,,, | Performed by: NURSE PRACTITIONER

## 2022-05-27 PROCEDURE — 99213 PR OFFICE/OUTPT VISIT, EST, LEVL III, 20-29 MIN: ICD-10-PCS | Mod: 25,S$GLB,, | Performed by: NURSE PRACTITIONER

## 2022-05-27 PROCEDURE — 99999 PR PBB SHADOW E&M-EST. PATIENT-LVL IV: CPT | Mod: PBBFAC,,, | Performed by: NURSE PRACTITIONER

## 2022-05-27 PROCEDURE — 99999 PR PBB SHADOW E&M-EST. PATIENT-LVL IV: ICD-10-PCS | Mod: PBBFAC,,, | Performed by: NURSE PRACTITIONER

## 2022-05-27 PROCEDURE — 96372 PR INJECTION,THERAP/PROPH/DIAG2ST, IM OR SUBCUT: ICD-10-PCS | Mod: S$GLB,,, | Performed by: NURSE PRACTITIONER

## 2022-05-27 PROCEDURE — 99213 OFFICE O/P EST LOW 20 MIN: CPT | Mod: 25,S$GLB,, | Performed by: NURSE PRACTITIONER

## 2022-05-27 RX ORDER — METHYLPREDNISOLONE ACETATE 40 MG/ML
40 INJECTION, SUSPENSION INTRA-ARTICULAR; INTRALESIONAL; INTRAMUSCULAR; SOFT TISSUE
Status: COMPLETED | OUTPATIENT
Start: 2022-05-27 | End: 2022-05-27

## 2022-05-27 RX ORDER — BUPIVACAINE HYDROCHLORIDE 2.5 MG/ML
9 INJECTION, SOLUTION EPIDURAL; INFILTRATION; INTRACAUDAL
Status: COMPLETED | OUTPATIENT
Start: 2022-05-27 | End: 2022-05-27

## 2022-05-27 RX ADMIN — BUPIVACAINE HYDROCHLORIDE 22.5 MG: 2.5 INJECTION, SOLUTION EPIDURAL; INFILTRATION; INTRACAUDAL at 11:05

## 2022-05-27 RX ADMIN — METHYLPREDNISOLONE ACETATE 40 MG: 40 INJECTION, SUSPENSION INTRA-ARTICULAR; INTRALESIONAL; INTRAMUSCULAR; SOFT TISSUE at 11:05

## 2022-05-27 NOTE — TELEPHONE ENCOUNTER
----- Message from Era Joseph sent at 5/27/2022  8:20 AM CDT -----  Name of Who is Calling: SHARAN MORRISON          What is the request in detail: The patient is calling to find out if he can be seen today the patient is in pain. Please advise         Can the clinic reply by [facility]: No         What Number to Call Back if not in [facility]: [phone]

## 2022-05-27 NOTE — TELEPHONE ENCOUNTER
Staff spoke with pt and he requested appt today . Staff offered the next open slot accepted 5/27/22 @ 10 am with .

## 2022-05-27 NOTE — PROGRESS NOTES
Chronic Pain - Established visit    Referring Physician: Shauna Valencia    Chief Complaint   Patient presents with    Neck Pain    Shoulder Pain     B/l            SUBJECTIVE: Disclaimer: This note has been generated using voice-recognition software. There may be typographical errors that have been missed during proof-reading    Interval History 5/27/2022:  The patient returns to clinic today for follow up of neck pain. He reports increased neck pain that radiates into both shoulders. He denies any radiating arm pain. His pain is worse activity. He describes this pain as tight. He denies any weakness. He denies any other health changes His pain today is 6/10.    Interval History 04/11/2022:   Pt  returns to clinic for follow up of cervical pain. Pt is S/P NEIDA C7-T1 on 03/23/22 with Dr. Schaefer. Pt states that his pain is much improved since NEIDA. Pt states that in after 1 week post NEIDA,  his pain got significantly better. Currently, pt denies any pain.Pt states he couldn't tolerate  physical therapy before receiving  NEIDA due to severe neck pain. Today, pt's level of pain is 0/10. Pt denies any numbness, tingling sensation to arms. Pt also denies any coordination problems, loss of bowel and bladder control.     Interval History 2/22/2022:  The patient is here for new onset neck pain. We did previously treat him for back pain which has overall been well controlled. He does have some right lateral leg numbness. However, his primary complaint today is neck pain with radiation into the right shoulder. He has significant right sided muscle tightness and pain. His cervical MRI does show severe stenosis. He has been evaluated by [facility] in the past. He recently saw his orthopedist, Dr. Eastman, who performed osteopathic manipulation with short term benefit only. He has intermittent numbness but denies weakness. No bowel or bladder changes. His pain today is 10/10.    Initial encounter:    Bhaskar Stiles presents  to the clinic for the evaluation of back and leg pain. He is s/p left L5/S1 TF NEIDA with 80% relief of leg pain. This was a direct referral from Shauna Valencia. He also had benefit with right L5/S1 in the past.  Currently, he is having some back pain with radiation into the anterior thighs. He also has a bulge at L3/4 with NF narrowing on previous MRI.  He is currently in PT which he finds helpful. He feels as though this will be helpful long-term for him. The pain started years ago. He also has a history of right knee arthoscopic surgery last year.    Brief history:    Pain Description:    Pt denies any pain today.     Patient denies night fever/night sweats, urinary incontinence and bowel incontinence.  Patient denies any suicidal or homicidal ideations    Pain Medications: None  Current:  None    Tried in Past:  NSAIDs - Mobic  TCA -Never  SNRI -Never  Anti-convulsants - Gabapentin  Muscle Relaxants - Robaxin  Opioids- [place]    Physical Therapy/Home Exercise: yes       report:  Not applicable    Pain Procedures:   10/9/20 right L5/S1 TF NEIDA- 80% relief  11/18/20 left L5/S1 TF NEIDA- 80% relief  03/23/22 C7-T1 NEIDA-100% relief.     Chiropractor -never  Acupuncture - never  TENS unit -never  Spinal decompression -never  Joint replacement -never    Imaging:     Narrative & Impression  EXAMINATION:  MRI CERVICAL SPINE WITHOUT CONTRAST     CLINICAL HISTORY:  Neck pain, normal neuro exam;C-spine canal stenosis;Cervical osteoarthritis;Cervical radiculopathy;concern for canal stenosis; Other spondylosis, cervical region     TECHNIQUE:  Multiplanar, multisequence MR images of the cervical spine were performed without the administration of contrast.     COMPARISON:  Prior radiograph     FINDINGS:  There is straightening and mild reversal of cervical lordosis.  Trace retrolisthesis of C3 on C4. No concerning marrow signal present.  Multilevel disc desiccation present with height loss most noted at C3-4, C5-6 and  C6-7.     Posterior fossa is unremarkable.  Spinal cord demonstrates no concerning signal abnormality.     Neck soft tissue structures unremarkable.     C2-C3: No spinal canal stenosis or neural foraminal narrowing.     C3-C4: Posterior disc osteophyte complex present effacing the anterior thecal sac with mild spinal canal stenosis.  Right greater than left uncovertebral degenerative findings results in severe right and moderate to severe left neural foraminal narrowing.     C4-C5: No significant spinal canal stenosis or right neural foraminal narrowing.  Left uncovertebral and facet degenerative findings result in mild neural foraminal narrowing.     C5-C6: Posterior disc osteophyte complex present asymmetric to the right indenting the thecal sac without high-grade spinal canal stenosis.  Facet and uncovertebral degenerative findings present with mild-to-moderate left and moderate to severe right neural foraminal narrowing.     C6-C7: Posterior disc osteophyte complex present asymmetric to the left indenting the thecal sac with mild spinal canal stenosis.  Uncovertebral degenerative findings greater on the left with moderate neural foraminal narrowing.  Minimal right neural foraminal narrowing present.     C7-T1: No spinal canal stenosis or neural foraminal narrowing.     Impression:     Multilevel degenerative findings as above.    Past Medical History:   Diagnosis Date    Family history of colon cancer in father     Hyperlipidemia     Hypertension     LAMONT (obstructive sleep apnea)     Syncope and collapse      Past Surgical History:   Procedure Laterality Date    CHONDROPLASTY OF KNEE Left 8/22/2019    Procedure: CHONDROPLASTY, KNEE;  Surgeon: Shelbi Hughes MD;  Location: [facility] OR;  Service: Orthopedics;  Laterality: Left;    EPIDURAL STEROID INJECTION N/A 3/23/2022    Procedure: INJECTION, STEROID, EPIDURAL, C7-T1 IL;  Surgeon: Anthony Schaefer MD;  Location: [facility] PAIN MGT;  Service: Pain Management;   Laterality: N/A;    HERNIA REPAIR      KNEE ARTHROSCOPY W/ MENISCECTOMY Left 8/22/2019    Procedure: ARTHROSCOPY, KNEE, WITH MEDIAL AND LATERAL MENISCECTOMY;  Surgeon: Shelbi Hughes MD;  Location: [facility] OR;  Service: Orthopedics;  Laterality: Left;    KNEE ARTHROSCOPY W/ PLICA EXCISION Left 8/22/2019    Procedure: EXCISION, MEDIAL PLICA, KNEE, ARTHROSCOPIC;  Surgeon: Shelbi Hughes MD;  Location: [facility] OR;  Service: Orthopedics;  Laterality: Left;    LIPOMA RESECTION      abdomen    REMOVAL OF FOREIGN BODY FROM LOWER EXTREMITY Left 8/22/2019    Procedure: REMOVAL, FOREIGN BODY, LOOSE BODY,  LOWER EXTREMITY;  Surgeon: Shelbi Hughes MD;  Location: [facility] OR;  Service: Orthopedics;  Laterality: Left;    SHOULDER SURGERY      left    SYNOVECTOMY OF KNEE Left 8/22/2019    Procedure: PARTIAL SYNOVECTOMY, KNEE;  Surgeon: Shelbi Hughes MD;  Location: [facility] OR;  Service: Orthopedics;  Laterality: Left;    TILT TABLE TEST N/A 7/9/2018    Procedure: TILT TABLE TEST;  Surgeon: Leonardo Whitfield MD;  Location: [facility] CATH LAB;  Service: Cardiology;  Laterality: N/A;  Syncope, HUT, DM, 3 PREP    TRANSFORAMINAL EPIDURAL INJECTION OF STEROID Right 10/9/2019    Procedure: LUMBAR TRANSFORAMINAL RIGHT L5/S1;  Surgeon: Anthony Schaefer MD;  Location: [facility] PAIN MGT;  Service: Pain Management;  Laterality: Right;  NEEDS CONSENT    TRANSFORAMINAL EPIDURAL INJECTION OF STEROID Left 11/18/2020    Procedure: LUMBAR TRANSFORAMINAL LEFT L5/S1 DIRECT REFERRAL;  Surgeon: Anthony Schaefer MD;  Location: [facility] PAIN MGT;  Service: Pain Management;  Laterality: Left;  NEEDS CONSENT     Social History     Socioeconomic History    Marital status:    Tobacco Use    Smoking status: Never Smoker    Smokeless tobacco: Never Used   Substance and Sexual Activity    Alcohol use: No    Drug use: No     Family History   Problem Relation Age of Onset    Colon cancer Father        Review of patient's allergies indicates:  No Known Allergies    Current  "Outpatient Medications   Medication Sig    amLODIPine (NORVASC) 10 MG tablet Take 1 tablet (10 mg total) by mouth once daily.    losartan-hydrochlorothiazide 100-12.5 mg (HYZAAR) 100-12.5 mg Tab Take 1 tablet by mouth once daily.    rosuvastatin (CRESTOR) 10 MG tablet Take 1 tablet (10 mg total) by mouth once daily.    diazePAM (VALIUM) 10 MG Tab Take 1 tablet (10 mg total) by mouth daily as needed (30-60 minutes prior to procedure to help anxiety). (Patient not taking: Reported on 5/27/2022)    gabapentin (NEURONTIN) 300 MG capsule Take 300 mg by mouth.     No current facility-administered medications for this visit.       REVIEW OF SYSTEMS:    GENERAL:  No weight loss, malaise or fevers.  HEENT:   No recent changes in vision or hearing  NECK:  Negative for lumps, no difficulty with swallowing.  RESPIRATORY:  Negative for cough, wheezing or shortness of breath, patient denies any recent URI.  CARDIOVASCULAR:  Negative for chest pain, leg swelling or palpitations. HTN  GI:  Negative for abdominal discomfort, blood in stools or black stools or change in bowel habits.  MUSCULOSKELETAL:  See HPI.  SKIN:  Negative for lesions, rash, and itching.  PSYCH:  No mood disorder or recent psychosocial stressors.  Patient's sleep is not disturbed secondary to pain.  HEMATOLOGY/LYMPHOLOGY:  Negative for prolonged bleeding, bruising easily or swollen nodes.  Patient is not currently taking any anti-coagulants  ENDO: No history of diabetes or thyroid dysfunction  NEURO:   No history of headaches, syncope, paralysis, seizures or tremors.  All other reviewed and negative other than HPI.    OBJECTIVE:    /69   Pulse 88   Temp 98 °F (36.7 °C)   Resp 18   Ht 5' 11" (1.803 m)   Wt 121 kg (266 lb 12.1 oz)   BMI 37.21 kg/m²     PHYSICAL EXAMINATION:    GENERAL: Well appearing, in no acute distress, alert and oriented x3.  PSYCH:  Mood and affect appropriate.  SKIN: Skin color, texture, turgor normal, no rashes or " lesions.  HEAD/FACE:  Normocephalic, atraumatic. Cranial nerves grossly intact.  NECK:  TTP over cervical paraspinals and trapezius muscles. Full ROM pain on lateral rotation. Spurling's negative bilaterally.    EXTREMITIES:  No deformities, edema, or skin discoloration. Good capillary refill.  MUSCULOSKELETAL:  Bilateral upper extremity strength is normal and symmetric.  No atrophy or tone abnormalities are noted.  NEURO: No clonus. No loss of sensation is noted. Negative Marcelino's bilaterally.  GAIT: Normal.    ASSESSMENT: 51 y.o. year old male with history of neck pain, consistent with     Encounter Diagnoses   Name Primary?    Myofascial pain Yes    Cervical spondylosis     Cervical radiculopathy     DDD (degenerative disc disease), lumbosacral     Chronic pain syndrome        PLAN:     - Previous imaging was reviewed and discussed with the patient today.    - Trigger point injections as per below.     - Consider repeat cervical NEIDA in the future.     - Trial Flexeril 5 mg TID PRN muscle pain.     - Continue home stretches.     - RTC in 2 weeks.     The above plan and management options were discussed at length with patient. Patient is in agreement with the above and verbalized understanding.     Leela Marshall  05/27/2022      Trigger Point Injection:   The procedure was discussed with the patient including complications of nerve damage,  bleeding, infection, and failure of pain relief. Consent signed.     Trigger points were identified by palpation and marked. Chlorhexidine prep of sites done. A mixture of 40 mg/ml Depomedrol + 9mL 0.25% bupivacaine (10 mL total).   A 27-gauge needle was advanced to the point of maximal tenderness, and  1.5mL  was injected after negative aspiration. All sites done in the same manner. Patient tolerated the procedure well and without complications. Sites injected included: paracervical, trapezius and paraspinal muscles Bilaterally     The patient was monitored for 10 minutes  without adverse effects.

## 2022-06-02 ENCOUNTER — TELEPHONE (OUTPATIENT)
Dept: PAIN MEDICINE | Facility: CLINIC | Age: 52
End: 2022-06-02
Payer: COMMERCIAL

## 2022-06-02 DIAGNOSIS — M79.18 MYOFASCIAL PAIN: Primary | ICD-10-CM

## 2022-06-02 RX ORDER — CYCLOBENZAPRINE HCL 5 MG
5 TABLET ORAL 3 TIMES DAILY PRN
Qty: 30 TABLET | Refills: 0 | Status: SHIPPED | OUTPATIENT
Start: 2022-06-02 | End: 2022-06-12

## 2022-06-02 NOTE — TELEPHONE ENCOUNTER
----- Message from Leela Marshall NP sent at 6/2/2022  3:45 PM CDT -----  Regarding: RE: PAtient Advice  Sent Flexeril to his pharmacy. Let's try this for a few days. If this does not help, then we can move forward with scheduling epidural.     Thanks.   ----- Message -----  From: Suni Campbell MA  Sent: 6/2/2022   1:15 PM CDT  To: Leela Marshall NP  Subject: FW: PAtient Advice                               Naima Swenson pt called stating he is still receiving some un comforting pain. I see that he had some triggered point injection on 05/27 and in the plan was to start a trial of Flexeril 5mg TID PRN for muscle pain. Patient never receive the script as he has called the office today. We spoke with him on this matter and told him that we will send the script to the provider and he can RTC to clinic is need be.      Suni Campbell MA  [facility]  [address] Floor [address] [place] [address]  [phone] office [phone]     ----- Message -----  From: Lilian Sung RN  Sent: 6/2/2022  12:30 PM CDT  To: Joanna Swenson Staff  Subject: FW: PAtient Advice                               Call returned to the patient at [phone]. No Answer. Left VM message. [facility]. We do not have any records of you being seen in the clinic.  We will forward the message to the Provider related to your call.  If the call was for the [facility] please call us back at [phone].    Thanks,  Lilian    ----- Message -----  From: Wilfredo Clark MA  Sent: 6/1/2022  11:02 AM CDT  To: Lilian Sung RN  Subject: FW: PAtient Advice                                 ----- Message -----  From: Ariel Cee MA  Sent: 5/31/2022   5:12 PM CDT  To: Wilfredo Clark MA  Subject: FW: PAtient Advice                                 ----- Message -----  From: Amarilys Gray  Sent: 5/31/2022  10:13 AM CDT  To: Aries Aguilar Staff  Subject: PAtient Advice                               "                 Name of Who is Calling:   Bhaskar Stiles    Who Left The Message:   Bhaskar Stiles      What is the request in detail:    Patient called requesting a call. Patient states, "he hasn't gotten any relief from the pain in his neck since the injection."   Please further advise.    Thank you!      Reply by [facility]: Yes      Preferred Call Back :  [phone] (K)                                                    "

## 2022-06-02 NOTE — TELEPHONE ENCOUNTER
Staff spoke with pt in regards to him wanting to receive some relief for pain pt stated he had never received his pain prescription. Staff stated to pt that we will send the message over to the Provider for further advice. Pt verbalized understanding and confirmed. SG

## 2022-06-02 NOTE — TELEPHONE ENCOUNTER
"----- Message from Lilian Sung RN sent at 6/2/2022 12:26 PM CDT -----  Regarding: FW: PAtient Advice  Call returned to the patient at [phone]. No Answer. Left VM message. [facility]. We do not have any records of you being seen in the clinic.  We will forward the message to the Provider related to your call.  If the call was for the [facility] please call us back at [phone].    Thanks,  Lilian    ----- Message -----  From: Wilfredo Clark MA  Sent: 6/1/2022  11:02 AM CDT  To: Lilian Sung RN  Subject: FW: PAtient Advice                                 ----- Message -----  From: Ariel Cee MA  Sent: 5/31/2022   5:12 PM CDT  To: Wilfredo Clark MA  Subject: FW: PAtient Advice                                 ----- Message -----  From: Amarilys Gray  Sent: 5/31/2022  10:13 AM CDT  To: Aries Aguilar Staff  Subject: PAtient Advice                                               Name of Who is Calling:   Bhaskar Stiles    Who Left The Message:   Bhaskar Stiles      What is the request in detail:    Patient called requesting a call. Patient states, "he hasn't gotten any relief from the pain in his neck since the injection."   Please further advise.    Thank you!      Reply by [facility]: Yes      Preferred Call Back :  [phone] (M)                                                "

## 2022-06-02 NOTE — TELEPHONE ENCOUNTER
Staff called pt and lvm stating to the pt that his prescription was sent to his pharmacy and give us a call at Pain for an update. SG

## 2022-06-13 ENCOUNTER — PATIENT MESSAGE (OUTPATIENT)
Dept: PAIN MEDICINE | Facility: CLINIC | Age: 52
End: 2022-06-13
Payer: COMMERCIAL

## 2022-06-14 ENCOUNTER — OFFICE VISIT (OUTPATIENT)
Dept: PAIN MEDICINE | Facility: CLINIC | Age: 52
End: 2022-06-14
Payer: COMMERCIAL

## 2022-06-14 VITALS
SYSTOLIC BLOOD PRESSURE: 111 MMHG | HEIGHT: 71 IN | RESPIRATION RATE: 20 BRPM | BODY MASS INDEX: 37.66 KG/M2 | HEART RATE: 67 BPM | TEMPERATURE: 98 F | WEIGHT: 269 LBS | DIASTOLIC BLOOD PRESSURE: 71 MMHG

## 2022-06-14 DIAGNOSIS — M54.12 CERVICAL RADICULOPATHY: ICD-10-CM

## 2022-06-14 DIAGNOSIS — M79.18 MYOFASCIAL PAIN: Primary | ICD-10-CM

## 2022-06-14 DIAGNOSIS — M51.37 DDD (DEGENERATIVE DISC DISEASE), LUMBOSACRAL: ICD-10-CM

## 2022-06-14 DIAGNOSIS — M47.812 CERVICAL SPONDYLOSIS: ICD-10-CM

## 2022-06-14 PROCEDURE — 99999 PR PBB SHADOW E&M-EST. PATIENT-LVL IV: CPT | Mod: PBBFAC,,, | Performed by: NURSE PRACTITIONER

## 2022-06-14 PROCEDURE — 99213 OFFICE O/P EST LOW 20 MIN: CPT | Mod: S$GLB,,, | Performed by: NURSE PRACTITIONER

## 2022-06-14 PROCEDURE — 99213 PR OFFICE/OUTPT VISIT, EST, LEVL III, 20-29 MIN: ICD-10-PCS | Mod: S$GLB,,, | Performed by: NURSE PRACTITIONER

## 2022-06-14 PROCEDURE — 99999 PR PBB SHADOW E&M-EST. PATIENT-LVL IV: ICD-10-PCS | Mod: PBBFAC,,, | Performed by: NURSE PRACTITIONER

## 2022-06-14 RX ORDER — CYCLOBENZAPRINE HCL 5 MG
5 TABLET ORAL 3 TIMES DAILY PRN
Qty: 30 TABLET | Refills: 1 | Status: SHIPPED | OUTPATIENT
Start: 2022-06-14 | End: 2022-06-24

## 2022-06-14 RX ORDER — METHYLPREDNISOLONE 4 MG/1
TABLET ORAL
Qty: 1 EACH | Refills: 0 | Status: SHIPPED | OUTPATIENT
Start: 2022-06-14 | End: 2022-07-05

## 2022-06-14 NOTE — PROGRESS NOTES
Chronic Pain - Established visit    Referring Physician: Shauna Valencia    Chief Complaint   Patient presents with    Neck Pain            SUBJECTIVE: Disclaimer: This note has been generated using voice-recognition software. There may be typographical errors that have been missed during proof-reading    Interval History 6/14/2022:  The patient returns to clinic today for follow up of neck pain. He reports limited relief with trigger point injections. He did have relief with Flexeril. He reports intermittent neck pain that radiates into both shoulder. He denies any radiating arm pain. He continues to report muscle tightness. He is going on vacation at the end of the month. He is worried about a flare of pain. He denies any other health changes. His pain today is 0/10.    Interval History 5/27/2022:  The patient returns to clinic today for follow up of neck pain. He reports increased neck pain that radiates into both shoulders. He denies any radiating arm pain. His pain is worse activity. He describes this pain as tight. He denies any weakness. He denies any other health changes His pain today is 6/10.    Interval History 04/11/2022:   Pt  returns to clinic for follow up of cervical pain. Pt is S/P NEIDA C7-T1 on 03/23/22 with Dr. Schaefer. Pt states that his pain is much improved since NEIDA. Pt states that in after 1 week post NEIDA,  his pain got significantly better. Currently, pt denies any pain.Pt states he couldn't tolerate  physical therapy before receiving  NEIDA due to severe neck pain. Today, pt's level of pain is 0/10. Pt denies any numbness, tingling sensation to arms. Pt also denies any coordination problems, loss of bowel and bladder control.     Interval History 2/22/2022:  The patient is here for new onset neck pain. We did previously treat him for back pain which has overall been well controlled. He does have some right lateral leg numbness. However, his primary complaint today is neck pain with  radiation into the right shoulder. He has significant right sided muscle tightness and pain. His cervical MRI does show severe stenosis. He has been evaluated by [facility] in the past. He recently saw his orthopedist, Dr. Eastman, who performed osteopathic manipulation with short term benefit only. He has intermittent numbness but denies weakness. No bowel or bladder changes. His pain today is 10/10.    Initial encounter:    Bhaskar Stiles presents to the clinic for the evaluation of back and leg pain. He is s/p left L5/S1 TF NEIDA with 80% relief of leg pain. This was a direct referral from Shauna Valencia. He also had benefit with right L5/S1 in the past.  Currently, he is having some back pain with radiation into the anterior thighs. He also has a bulge at L3/4 with NF narrowing on previous MRI.  He is currently in PT which he finds helpful. He feels as though this will be helpful long-term for him. The pain started years ago. He also has a history of right knee arthoscopic surgery last year.    Brief history:    Pain Description:    Pt denies any pain today.     Patient denies night fever/night sweats, urinary incontinence and bowel incontinence.  Patient denies any suicidal or homicidal ideations    Pain Medications: None  Current:  None    Tried in Past:  NSAIDs - Mobic  TCA -Never  SNRI -Never  Anti-convulsants - Gabapentin  Muscle Relaxants - Robaxin  Opioids- [place]    Physical Therapy/Home Exercise: yes       report:  Not applicable    Pain Procedures:   10/9/20 right L5/S1 TF NEIDA- 80% relief  11/18/20 left L5/S1 TF NEIDA- 80% relief  03/23/22 C7-T1 NEIDA-100% relief.     Chiropractor -never  Acupuncture - never  TENS unit -never  Spinal decompression -never  Joint replacement -never    Imaging:     Narrative & Impression  EXAMINATION:  MRI CERVICAL SPINE WITHOUT CONTRAST     CLINICAL HISTORY:  Neck pain, normal neuro exam;C-spine canal stenosis;Cervical osteoarthritis;Cervical radiculopathy;concern for  canal stenosis; Other spondylosis, cervical region     TECHNIQUE:  Multiplanar, multisequence MR images of the cervical spine were performed without the administration of contrast.     COMPARISON:  Prior radiograph     FINDINGS:  There is straightening and mild reversal of cervical lordosis.  Trace retrolisthesis of C3 on C4. No concerning marrow signal present.  Multilevel disc desiccation present with height loss most noted at C3-4, C5-6 and C6-7.     Posterior fossa is unremarkable.  Spinal cord demonstrates no concerning signal abnormality.     Neck soft tissue structures unremarkable.     C2-C3: No spinal canal stenosis or neural foraminal narrowing.     C3-C4: Posterior disc osteophyte complex present effacing the anterior thecal sac with mild spinal canal stenosis.  Right greater than left uncovertebral degenerative findings results in severe right and moderate to severe left neural foraminal narrowing.     C4-C5: No significant spinal canal stenosis or right neural foraminal narrowing.  Left uncovertebral and facet degenerative findings result in mild neural foraminal narrowing.     C5-C6: Posterior disc osteophyte complex present asymmetric to the right indenting the thecal sac without high-grade spinal canal stenosis.  Facet and uncovertebral degenerative findings present with mild-to-moderate left and moderate to severe right neural foraminal narrowing.     C6-C7: Posterior disc osteophyte complex present asymmetric to the left indenting the thecal sac with mild spinal canal stenosis.  Uncovertebral degenerative findings greater on the left with moderate neural foraminal narrowing.  Minimal right neural foraminal narrowing present.     C7-T1: No spinal canal stenosis or neural foraminal narrowing.     Impression:     Multilevel degenerative findings as above.    Past Medical History:   Diagnosis Date    Family history of colon cancer in father     Hyperlipidemia     Hypertension     LAMONT (obstructive  sleep apnea)     Syncope and collapse      Past Surgical History:   Procedure Laterality Date    CHONDROPLASTY OF KNEE Left 8/22/2019    Procedure: CHONDROPLASTY, KNEE;  Surgeon: Shelbi Hughes MD;  Location: [facility] OR;  Service: Orthopedics;  Laterality: Left;    EPIDURAL STEROID INJECTION N/A 3/23/2022    Procedure: INJECTION, STEROID, EPIDURAL, C7-T1 IL;  Surgeon: Anthony Schaefer MD;  Location: [facility] PAIN MGT;  Service: Pain Management;  Laterality: N/A;    HERNIA REPAIR      KNEE ARTHROSCOPY W/ MENISCECTOMY Left 8/22/2019    Procedure: ARTHROSCOPY, KNEE, WITH MEDIAL AND LATERAL MENISCECTOMY;  Surgeon: Shelbi Hughes MD;  Location: [facility] OR;  Service: Orthopedics;  Laterality: Left;    KNEE ARTHROSCOPY W/ PLICA EXCISION Left 8/22/2019    Procedure: EXCISION, MEDIAL PLICA, KNEE, ARTHROSCOPIC;  Surgeon: Shelbi Hughes MD;  Location: [facility] OR;  Service: Orthopedics;  Laterality: Left;    LIPOMA RESECTION      abdomen    REMOVAL OF FOREIGN BODY FROM LOWER EXTREMITY Left 8/22/2019    Procedure: REMOVAL, FOREIGN BODY, LOOSE BODY,  LOWER EXTREMITY;  Surgeon: Shelbi Hughes MD;  Location: [facility] OR;  Service: Orthopedics;  Laterality: Left;    SHOULDER SURGERY      left    SYNOVECTOMY OF KNEE Left 8/22/2019    Procedure: PARTIAL SYNOVECTOMY, KNEE;  Surgeon: Shelbi Hughes MD;  Location: [facility] OR;  Service: Orthopedics;  Laterality: Left;    TILT TABLE TEST N/A 7/9/2018    Procedure: TILT TABLE TEST;  Surgeon: Leonardo Whitfield MD;  Location: [facility] CATH LAB;  Service: Cardiology;  Laterality: N/A;  Syncope, HUT, DM, 3 PREP    TRANSFORAMINAL EPIDURAL INJECTION OF STEROID Right 10/9/2019    Procedure: LUMBAR TRANSFORAMINAL RIGHT L5/S1;  Surgeon: Anthony Schaefer MD;  Location: [facility] PAIN MGT;  Service: Pain Management;  Laterality: Right;  NEEDS CONSENT    TRANSFORAMINAL EPIDURAL INJECTION OF STEROID Left 11/18/2020    Procedure: LUMBAR TRANSFORAMINAL LEFT L5/S1 DIRECT REFERRAL;  Surgeon: Anthony Schaefer MD;  Location: [facility] PAIN  MGT;  Service: Pain Management;  Laterality: Left;  NEEDS CONSENT     Social History     Socioeconomic History    Marital status:    Tobacco Use    Smoking status: Never Smoker    Smokeless tobacco: Never Used   Substance and Sexual Activity    Alcohol use: No    Drug use: No     Family History   Problem Relation Age of Onset    Colon cancer Father        Review of patient's allergies indicates:  No Known Allergies    Current Outpatient Medications   Medication Sig    amLODIPine (NORVASC) 10 MG tablet Take 1 tablet (10 mg total) by mouth once daily.    losartan-hydrochlorothiazide 100-12.5 mg (HYZAAR) 100-12.5 mg Tab Take 1 tablet by mouth once daily.    rosuvastatin (CRESTOR) 10 MG tablet Take 1 tablet (10 mg total) by mouth once daily.    diazePAM (VALIUM) 10 MG Tab Take 1 tablet (10 mg total) by mouth daily as needed (30-60 minutes prior to procedure to help anxiety). (Patient not taking: Reported on 5/27/2022)    gabapentin (NEURONTIN) 300 MG capsule Take 300 mg by mouth.     No current facility-administered medications for this visit.       REVIEW OF SYSTEMS:    GENERAL:  No weight loss, malaise or fevers.  HEENT:   No recent changes in vision or hearing  NECK:  Negative for lumps, no difficulty with swallowing.  RESPIRATORY:  Negative for cough, wheezing or shortness of breath, patient denies any recent URI.  CARDIOVASCULAR:  Negative for chest pain, leg swelling or palpitations. HTN  GI:  Negative for abdominal discomfort, blood in stools or black stools or change in bowel habits.  MUSCULOSKELETAL:  See HPI.  SKIN:  Negative for lesions, rash, and itching.  PSYCH:  No mood disorder or recent psychosocial stressors.  Patient's sleep is not disturbed secondary to pain.  HEMATOLOGY/LYMPHOLOGY:  Negative for prolonged bleeding, bruising easily or swollen nodes.  Patient is not currently taking any anti-coagulants  ENDO: No history of diabetes or thyroid dysfunction  NEURO:   No history of  "headaches, syncope, paralysis, seizures or tremors.  All other reviewed and negative other than HPI.    OBJECTIVE:    /71   Pulse 67   Temp 98.4 °F (36.9 °C) (Temporal)   Resp 20   Ht 5' 11" (1.803 m)   Wt 122 kg (269 lb)   BMI 37.52 kg/m²     PHYSICAL EXAMINATION:    GENERAL: Well appearing, in no acute distress, alert and oriented x3.  PSYCH:  Mood and affect appropriate.  SKIN: Skin color, texture, turgor normal, no rashes or lesions.  HEAD/FACE:  Normocephalic, atraumatic. Cranial nerves grossly intact.  NECK:  There is no pain with palpation over cervical paraspinals. There is mild pain with palpation over trapezius muscles. Full ROM mild pain on lateral rotation. Spurling's negative bilaterally.    EXTREMITIES:  No deformities, edema, or skin discoloration. Good capillary refill.  MUSCULOSKELETAL:  Bilateral upper extremity strength is normal and symmetric.  No atrophy or tone abnormalities are noted.  NEURO: No clonus. No loss of sensation is noted. Negative Marcelino's bilaterally.  GAIT: Normal.    ASSESSMENT: 51 y.o. year old male with history of neck pain, consistent with     Encounter Diagnoses   Name Primary?    Myofascial pain Yes    Cervical spondylosis     Cervical radiculopathy     DDD (degenerative disc disease), lumbosacral        PLAN:     - Previous imaging was reviewed and discussed with the patient today.    - Consider repeat cervical NEIDA in the future.     - Continue Flexeril 5 mg TID PRN muscle pain. Refill provided.     - Medrol dose pack for emergency use on vacation.     - Continue home stretches.     - RTC PRN.     - Dr. Schaefer was consulted on the patient and agrees with this plan.    The above plan and management options were discussed at length with patient. Patient is in agreement with the above and verbalized understanding.     Leela Marshall  06/14/2022        "

## 2022-07-18 ENCOUNTER — OFFICE VISIT (OUTPATIENT)
Dept: INTERNAL MEDICINE | Facility: CLINIC | Age: 52
End: 2022-07-18
Payer: COMMERCIAL

## 2022-07-18 VITALS
DIASTOLIC BLOOD PRESSURE: 84 MMHG | WEIGHT: 269.81 LBS | BODY MASS INDEX: 37.77 KG/M2 | HEART RATE: 87 BPM | HEIGHT: 71 IN | SYSTOLIC BLOOD PRESSURE: 122 MMHG | OXYGEN SATURATION: 94 %

## 2022-07-18 DIAGNOSIS — Z80.0 FAMILY HISTORY OF COLON CANCER IN FATHER: ICD-10-CM

## 2022-07-18 DIAGNOSIS — G47.33 OSA (OBSTRUCTIVE SLEEP APNEA): ICD-10-CM

## 2022-07-18 DIAGNOSIS — I25.10 ATHEROSCLEROSIS OF NATIVE CORONARY ARTERY OF NATIVE HEART WITHOUT ANGINA PECTORIS: ICD-10-CM

## 2022-07-18 DIAGNOSIS — E78.5 HYPERLIPIDEMIA, UNSPECIFIED HYPERLIPIDEMIA TYPE: ICD-10-CM

## 2022-07-18 DIAGNOSIS — R53.83 FATIGUE, UNSPECIFIED TYPE: ICD-10-CM

## 2022-07-18 DIAGNOSIS — Z00.00 ANNUAL PHYSICAL EXAM: Primary | ICD-10-CM

## 2022-07-18 DIAGNOSIS — I10 ESSENTIAL HYPERTENSION: ICD-10-CM

## 2022-07-18 DIAGNOSIS — K63.5 POLYP OF COLON, UNSPECIFIED PART OF COLON, UNSPECIFIED TYPE: ICD-10-CM

## 2022-07-18 DIAGNOSIS — E66.01 CLASS 2 SEVERE OBESITY DUE TO EXCESS CALORIES WITH SERIOUS COMORBIDITY AND BODY MASS INDEX (BMI) OF 37.0 TO 37.9 IN ADULT: ICD-10-CM

## 2022-07-18 PROCEDURE — 99999 PR PBB SHADOW E&M-EST. PATIENT-LVL III: ICD-10-PCS | Mod: PBBFAC,,, | Performed by: STUDENT IN AN ORGANIZED HEALTH CARE EDUCATION/TRAINING PROGRAM

## 2022-07-18 PROCEDURE — 99396 PREV VISIT EST AGE 40-64: CPT | Mod: S$GLB,,, | Performed by: STUDENT IN AN ORGANIZED HEALTH CARE EDUCATION/TRAINING PROGRAM

## 2022-07-18 PROCEDURE — 99999 PR PBB SHADOW E&M-EST. PATIENT-LVL III: CPT | Mod: PBBFAC,,, | Performed by: STUDENT IN AN ORGANIZED HEALTH CARE EDUCATION/TRAINING PROGRAM

## 2022-07-18 PROCEDURE — 99396 PR PREVENTIVE VISIT,EST,40-64: ICD-10-PCS | Mod: S$GLB,,, | Performed by: STUDENT IN AN ORGANIZED HEALTH CARE EDUCATION/TRAINING PROGRAM

## 2022-07-18 RX ORDER — LOSARTAN POTASSIUM AND HYDROCHLOROTHIAZIDE 12.5; 1 MG/1; MG/1
1 TABLET ORAL DAILY
Qty: 90 TABLET | Refills: 1 | Status: SHIPPED | OUTPATIENT
Start: 2022-07-18 | End: 2023-01-18 | Stop reason: SDUPTHER

## 2022-07-18 RX ORDER — ROSUVASTATIN CALCIUM 10 MG/1
10 TABLET, COATED ORAL DAILY
Qty: 90 TABLET | Refills: 3 | Status: ON HOLD | OUTPATIENT
Start: 2022-07-18 | End: 2022-09-16 | Stop reason: HOSPADM

## 2022-07-18 NOTE — PROGRESS NOTES
[facility]    Subjective:       Patient ID: Bhaskar Stiles is a 51 y.o. male.    Chief Complaint: Hypertension (F/u)    History was obtained from the patient and supplemented through chart review.  This patient is known to me.      HPI:    Patient is a 51 y.o. male who presents to f/u on HTN, HLD and for annual    htn  Controlled  Cont losartan-HCTZ 100-12.5 mg, amlodipine 10 mg daily    Myofascial pain  Cervical spondylosis  Cervical radiculopathy  Muscle relaxer, return PRN to pain management  Also sports med    Occasional diffuse muscle cramps- resolved, perhaps due to yard work  improved    Vision changes reported in the past  Appt November has to be rescheduled due to elderly mother, will do so    hld  Cont Crestor 10  Normal 6/2021    shantal  Having difficulty with CPAP; hates it  Dislikes our sleep clinic, already tried sleep med of [place],  Advised lose weight, talk to dentist, consider ENT  Allergies from carpet? Wife thinks, advised daily zyrtec, claritin, flonase  Was using, stopped, hates   Counseled extensively    Morbid Obesity  Counseled weight loss, especially to come off CPAP  Popeyes, bread, poor diet- working on changing diet  A1cs normal  Wt Readings from Last 15 Encounters:   07/18/22 122.4 kg (269 lb 13.5 oz)   06/14/22 122 kg (269 lb)   05/27/22 121 kg (266 lb 12.1 oz)   04/11/22 121 kg (266 lb 12.1 oz)   03/23/22 119.3 kg (263 lb)   02/22/22 120.7 kg (266 lb)   02/15/22 121.6 kg (268 lb)   01/18/22 122.5 kg (270 lb 1 oz)   10/18/21 121.2 kg (267 lb 3.2 oz)   10/15/21 120.1 kg (264 lb 12.4 oz)   10/13/21 120.1 kg (264 lb 12.4 oz)   10/08/21 120.7 kg (266 lb)   10/04/21 120.8 kg (266 lb 4.8 oz)   09/28/21 119.7 kg (263 lb 14.3 oz)   09/27/21 119.7 kg (264 lb)       Hx of vasovagal syncope 2018  Gets overheated easily  Was seen by neurology 6/2018 and cardiology (cards stopped lisinopril 20)  Cards though loop recorder might be helpful  Mildly abn findings on brain MRI (neuro  "thought perhaps 2/2 to HTN)  Not recent, encouraged hydration    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   HENT: Negative for trouble swallowing.    Eyes: Negative for visual disturbance.   Respiratory: Negative for shortness of breath.    Cardiovascular: Negative for chest pain.   Gastrointestinal: Negative for diarrhea, nausea and vomiting.   Musculoskeletal: Positive for myalgias (neck/shoulder) and neck pain. Negative for arthralgias, back pain and gait problem.   Neurological: Negative for dizziness and seizures.   Psychiatric/Behavioral: Negative for hallucinations and sleep disturbance.         Surgical hx, family hx, social hx   Have been reviewed       Current Outpatient Medications:     amLODIPine (NORVASC) 10 MG tablet, Take 1 tablet (10 mg total) by mouth once daily., Disp: 90 tablet, Rfl: 4    gabapentin (NEURONTIN) 300 MG capsule, Take 300 mg by mouth., Disp: , Rfl:     diazePAM (VALIUM) 10 MG Tab, Take 1 tablet (10 mg total) by mouth daily as needed (30-60 minutes prior to procedure to help anxiety). (Patient not taking: Reported on 5/27/2022), Disp: 1 tablet, Rfl: 0    losartan-hydrochlorothiazide 100-12.5 mg (HYZAAR) 100-12.5 mg Tab, Take 1 tablet by mouth once daily., Disp: 90 tablet, Rfl: 1    rosuvastatin (CRESTOR) 10 MG tablet, Take 1 tablet (10 mg total) by mouth once daily., Disp: 90 tablet, Rfl: 3    Objective:        Body mass index is 37.64 kg/m².  Vitals:    07/18/22 0829   BP: 122/84   Pulse: 87   SpO2: (!) 94%   Weight: 122.4 kg (269 lb 13.5 oz)   Height: 5' 11" (1.803 m) "   PainSc: 0-No pain     Physical Exam  Vitals and nursing note reviewed.   Constitutional:       General: He is not in acute distress.     Appearance: Normal appearance. He is not ill-appearing.      Comments: Obesity   HENT:      Head: Normocephalic and atraumatic.      Mouth/Throat:      Comments: Wearing mask  Eyes:      General: No scleral icterus.  Cardiovascular:      Rate and Rhythm: Normal rate and regular rhythm.      Heart sounds: Normal heart sounds.   Pulmonary:      Effort: Pulmonary effort is normal.   Abdominal:      General: There is no distension.   Musculoskeletal:         General: No deformity.      Cervical back: Normal range of motion.   Skin:     General: Skin is warm and dry.   Neurological:      Mental Status: He is alert and oriented to person, place, and time.   Psychiatric:         Behavior: Behavior normal.           Lab Results   Component Value Date    WBC 14.09 (H) 06/05/2018    HGB 13.9 (L) 06/05/2018    HCT 42.6 06/05/2018     06/05/2018    CHOL 146 06/23/2021    TRIG 76 06/23/2021    HDL 41 06/23/2021    ALT 33 06/23/2021    AST 22 06/23/2021     06/23/2021    K 4.3 06/23/2021     06/23/2021    CREATININE 1.1 06/23/2021    BUN 11 06/23/2021    CO2 27 06/23/2021    TSH 0.71 06/23/2021    INR 1.0 06/05/2018    HGBA1C 5.1 06/23/2021       The 10-year ASCVD risk score (Addi JOAQUIN Jr., et al., 2013) is: 7.8%    Values used to calculate the score:      Age: 51 years      Sex: Male      Is Non- : Yes      Diabetic: No      Tobacco smoker: No      Systolic Blood Pressure: 122 mmHg      Is BP treated: Yes      HDL Cholesterol: 41 mg/dL      Total Cholesterol: 146 mg/dL    On crestor 10 mg    (Imaging have been independently reviewed)  9/28/2019  MRI L spine  Terminated early due to severe pain  No acute fractures.     Moderate lumbar spondylosis which appears to be worst at L5-S1 with evidence of moderate to severe bilateral neural foraminal narrowing,  as above.  Consider repeat full examination when clinically appropriate.    Assessment:         1. Annual physical exam    2. Family history of colon cancer in father    3. Polyp of colon, unspecified part of colon, unspecified type    4. Hyperlipidemia, unspecified hyperlipidemia type    5. Essential hypertension    6. LAMONT (obstructive sleep apnea)    7. Atherosclerosis of native coronary artery of native heart without angina pectoris    8. Class 2 severe obesity due to excess calories with serious comorbidity and body mass index (BMI) of 37.0 to 37.9 in adult    9. Fatigue, unspecified type          Plan:     Bhaskar was seen today for hypertension.    Diagnoses and all orders for this visit:    Annual physical exam  -     Comprehensive Metabolic Panel; Future  -     Lipid Panel; Future  -     TSH; Future  -     CBC Auto Differential; Future  -     Hemoglobin A1C; Future  -     Comprehensive Metabolic Panel  -     Lipid Panel  -     TSH  -     CBC Auto Differential  -     Hemoglobin A1C    Family history of colon cancer in father  -     Case Request Endoscopy: COLONOSCOPY    Polyp of colon, unspecified part of colon, unspecified type  -     Case Request Endoscopy: COLONOSCOPY    Hyperlipidemia, unspecified hyperlipidemia type  -     rosuvastatin (CRESTOR) 10 MG tablet; Take 1 tablet (10 mg total) by mouth once daily.  -     Lipid Panel; Future  -     Lipid Panel    Essential hypertension  -     losartan-hydrochlorothiazide 100-12.5 mg (HYZAAR) 100-12.5 mg Tab; Take 1 tablet by mouth once daily.    LAMONT (obstructive sleep apnea)    Atherosclerosis of native coronary artery of native heart without angina pectoris    Class 2 severe obesity due to excess calories with serious comorbidity and body mass index (BMI) of 37.0 to 37.9 in adult  -     Hemoglobin A1C; Future  -     Hemoglobin A1C    Fatigue, unspecified type  -     TSH; Future  -     TSH        Health Maintenance  - Lipids: normal on rosuvastatin 6/23/2021  -  A1C: 5.1 6/23/2021  - Colon Ca Screen: need sept 2022, Was [place] (scanned in); ordered after sept 29th  - Immunizations: vaccinated (confirm), needs covid booster, counseled shingles. Pt will consider    Follow up in about 6 months (around 1/18/2023).     [facility]    Or sooner prn          All medications were reviewed including potential side effects and risks/benefits.  Pt was counseled to call back if anything worsens or if questions arise.    Fredy Rey MD  Family Medicine  [facility]  [address] [place] [address]  [address] [place] [id number]  Phone [phone]  Fax [phone]

## 2022-07-22 ENCOUNTER — TELEPHONE (OUTPATIENT)
Dept: ADMINISTRATIVE | Facility: OTHER | Age: 52
End: 2022-07-22
Payer: COMMERCIAL

## 2022-07-22 ENCOUNTER — OFFICE VISIT (OUTPATIENT)
Dept: PAIN MEDICINE | Facility: CLINIC | Age: 52
End: 2022-07-22
Payer: COMMERCIAL

## 2022-07-22 VITALS
DIASTOLIC BLOOD PRESSURE: 74 MMHG | HEART RATE: 70 BPM | WEIGHT: 270.06 LBS | SYSTOLIC BLOOD PRESSURE: 113 MMHG | BODY MASS INDEX: 37.81 KG/M2 | HEIGHT: 71 IN | TEMPERATURE: 98 F

## 2022-07-22 DIAGNOSIS — M79.18 MYOFASCIAL PAIN: Primary | ICD-10-CM

## 2022-07-22 DIAGNOSIS — M54.12 CERVICAL RADICULOPATHY: ICD-10-CM

## 2022-07-22 DIAGNOSIS — M47.812 CERVICAL SPONDYLOSIS: ICD-10-CM

## 2022-07-22 DIAGNOSIS — M51.37 DDD (DEGENERATIVE DISC DISEASE), LUMBOSACRAL: ICD-10-CM

## 2022-07-22 PROCEDURE — 99213 PR OFFICE/OUTPT VISIT, EST, LEVL III, 20-29 MIN: ICD-10-PCS | Mod: 25,S$GLB,, | Performed by: NURSE PRACTITIONER

## 2022-07-22 PROCEDURE — 20553 NJX 1/MLT TRIGGER POINTS 3/>: CPT | Mod: S$GLB,,, | Performed by: NURSE PRACTITIONER

## 2022-07-22 PROCEDURE — 99999 PR PBB SHADOW E&M-EST. PATIENT-LVL IV: ICD-10-PCS | Mod: PBBFAC,,, | Performed by: NURSE PRACTITIONER

## 2022-07-22 PROCEDURE — 99999 PR PBB SHADOW E&M-EST. PATIENT-LVL IV: CPT | Mod: PBBFAC,,, | Performed by: NURSE PRACTITIONER

## 2022-07-22 PROCEDURE — 20553 PR INJECT TRIGGER POINTS, > 3: ICD-10-PCS | Mod: S$GLB,,, | Performed by: NURSE PRACTITIONER

## 2022-07-22 PROCEDURE — 99213 OFFICE O/P EST LOW 20 MIN: CPT | Mod: 25,S$GLB,, | Performed by: NURSE PRACTITIONER

## 2022-07-22 RX ORDER — METHYLPREDNISOLONE ACETATE 40 MG/ML
40 INJECTION, SUSPENSION INTRA-ARTICULAR; INTRALESIONAL; INTRAMUSCULAR; SOFT TISSUE
Status: COMPLETED | OUTPATIENT
Start: 2022-07-22 | End: 2022-07-22

## 2022-07-22 RX ORDER — BUPIVACAINE HYDROCHLORIDE 2.5 MG/ML
9 INJECTION, SOLUTION EPIDURAL; INFILTRATION; INTRACAUDAL
Status: COMPLETED | OUTPATIENT
Start: 2022-07-22 | End: 2022-07-22

## 2022-07-22 RX ADMIN — METHYLPREDNISOLONE ACETATE 40 MG: 40 INJECTION, SUSPENSION INTRA-ARTICULAR; INTRALESIONAL; INTRAMUSCULAR; SOFT TISSUE at 08:07

## 2022-07-22 RX ADMIN — BUPIVACAINE HYDROCHLORIDE 22.5 MG: 2.5 INJECTION, SOLUTION EPIDURAL; INFILTRATION; INTRACAUDAL at 08:07

## 2022-07-22 NOTE — H&P (VIEW-ONLY)
Chronic Pain - Established visit    Referring Physician: Shauna Valencia    Chief Complaint   Patient presents with    Neck Pain            SUBJECTIVE: Disclaimer: This note has been generated using voice-recognition software. There may be typographical errors that have been missed during proof-reading    Interval History 7/22/2022:  The patient returns to clinic today for follow up of neck pain. He reports increased neck pain over the last few days. He reports neck pain that radiates into both shoulder. He denies any radicular arm pain. He does report muscle tightness and spasms. He is having trouble sleeping due to pain. He does use heat with benefit. He did not have to use medrol dose pack while on vacation. He continues to take Gabapentin, though not consistently. He also takes Flexeril as needed. He is frustrated by his persistent pain. He would like to find a more permanent solution. He has tried physical therapy in the past but this worsened his pain. He denies any other health changes. His pain today is 8/10.    Interval History 6/14/2022:  The patient returns to clinic today for follow up of neck pain. He reports limited relief with trigger point injections. He did have relief with Flexeril. He reports intermittent neck pain that radiates into both shoulder. He denies any radiating arm pain. He continues to report muscle tightness. He is going on vacation at the end of the month. He is worried about a flare of pain. He denies any other health changes. His pain today is 0/10.    Interval History 5/27/2022:  The patient returns to clinic today for follow up of neck pain. He reports increased neck pain that radiates into both shoulders. He denies any radiating arm pain. His pain is worse activity. He describes this pain as tight. He denies any weakness. He denies any other health changes His pain today is 6/10.    Interval History 04/11/2022:   Pt  returns to clinic for follow up of cervical pain. Pt is S/P  NEIDA C7-T1 on 03/23/22 with Dr. Schaefer. Pt states that his pain is much improved since NEIDA. Pt states that in after 1 week post NEIDA,  his pain got significantly better. Currently, pt denies any pain.Pt states he couldn't tolerate  physical therapy before receiving  NEIDA due to severe neck pain. Today, pt's level of pain is 0/10. Pt denies any numbness, tingling sensation to arms. Pt also denies any coordination problems, loss of bowel and bladder control.     Interval History 2/22/2022:  The patient is here for new onset neck pain. We did previously treat him for back pain which has overall been well controlled. He does have some right lateral leg numbness. However, his primary complaint today is neck pain with radiation into the right shoulder. He has significant right sided muscle tightness and pain. His cervical MRI does show severe stenosis. He has been evaluated by [facility] in the past. He recently saw his orthopedist, Dr. Eastman, who performed osteopathic manipulation with short term benefit only. He has intermittent numbness but denies weakness. No bowel or bladder changes. His pain today is 10/10.    Initial encounter:    Bhaskar Stiles presents to the clinic for the evaluation of back and leg pain. He is s/p left L5/S1 TF NEIDA with 80% relief of leg pain. This was a direct referral from Shauna Valencia. He also had benefit with right L5/S1 in the past.  Currently, he is having some back pain with radiation into the anterior thighs. He also has a bulge at L3/4 with NF narrowing on previous MRI.  He is currently in PT which he finds helpful. He feels as though this will be helpful long-term for him. The pain started years ago. He also has a history of right knee arthoscopic surgery last year.    Brief history:    Pain Description:    Pt denies any pain today.     Patient denies night fever/night sweats, urinary incontinence and bowel incontinence.  Patient denies any suicidal or homicidal  ideations    Pain Medications: None  Current:  None    Tried in Past:  NSAIDs - Mobic  TCA -Never  SNRI -Never  Anti-convulsants - Gabapentin  Muscle Relaxants - Robaxin  Opioids- [place]    Physical Therapy/Home Exercise: yes       report:  Not applicable    Pain Procedures:   10/9/20 right L5/S1 TF NEIDA- 80% relief  11/18/20 left L5/S1 TF NEIDA- 80% relief  03/23/22 C7-T1 NEIDA-100% relief.     Chiropractor -never  Acupuncture - never  TENS unit -never  Spinal decompression -never  Joint replacement -never    Imaging:     Narrative & Impression  EXAMINATION:  MRI CERVICAL SPINE WITHOUT CONTRAST     CLINICAL HISTORY:  Neck pain, normal neuro exam;C-spine canal stenosis;Cervical osteoarthritis;Cervical radiculopathy;concern for canal stenosis; Other spondylosis, cervical region     TECHNIQUE:  Multiplanar, multisequence MR images of the cervical spine were performed without the administration of contrast.     COMPARISON:  Prior radiograph     FINDINGS:  There is straightening and mild reversal of cervical lordosis.  Trace retrolisthesis of C3 on C4. No concerning marrow signal present.  Multilevel disc desiccation present with height loss most noted at C3-4, C5-6 and C6-7.     Posterior fossa is unremarkable.  Spinal cord demonstrates no concerning signal abnormality.     Neck soft tissue structures unremarkable.     C2-C3: No spinal canal stenosis or neural foraminal narrowing.     C3-C4: Posterior disc osteophyte complex present effacing the anterior thecal sac with mild spinal canal stenosis.  Right greater than left uncovertebral degenerative findings results in severe right and moderate to severe left neural foraminal narrowing.     C4-C5: No significant spinal canal stenosis or right neural foraminal narrowing.  Left uncovertebral and facet degenerative findings result in mild neural foraminal narrowing.     C5-C6: Posterior disc osteophyte complex present asymmetric to the right indenting the thecal sac without  high-grade spinal canal stenosis.  Facet and uncovertebral degenerative findings present with mild-to-moderate left and moderate to severe right neural foraminal narrowing.     C6-C7: Posterior disc osteophyte complex present asymmetric to the left indenting the thecal sac with mild spinal canal stenosis.  Uncovertebral degenerative findings greater on the left with moderate neural foraminal narrowing.  Minimal right neural foraminal narrowing present.     C7-T1: No spinal canal stenosis or neural foraminal narrowing.     Impression:     Multilevel degenerative findings as above.    Past Medical History:   Diagnosis Date    Family history of colon cancer in father     Hyperlipidemia     Hypertension     LAMONT (obstructive sleep apnea)     Syncope and collapse      Past Surgical History:   Procedure Laterality Date    CHONDROPLASTY OF KNEE Left 8/22/2019    Procedure: CHONDROPLASTY, KNEE;  Surgeon: Shelbi Hughes MD;  Location: [facility] OR;  Service: Orthopedics;  Laterality: Left;    EPIDURAL STEROID INJECTION N/A 3/23/2022    Procedure: INJECTION, STEROID, EPIDURAL, C7-T1 IL;  Surgeon: Anthony Schaefer MD;  Location: [facility] PAIN MGT;  Service: Pain Management;  Laterality: N/A;    HERNIA REPAIR      KNEE ARTHROSCOPY W/ MENISCECTOMY Left 8/22/2019    Procedure: ARTHROSCOPY, KNEE, WITH MEDIAL AND LATERAL MENISCECTOMY;  Surgeon: Shelbi Hughes MD;  Location: [facility] OR;  Service: Orthopedics;  Laterality: Left;    KNEE ARTHROSCOPY W/ PLICA EXCISION Left 8/22/2019    Procedure: EXCISION, MEDIAL PLICA, KNEE, ARTHROSCOPIC;  Surgeon: Shelbi Hughes MD;  Location: [facility] OR;  Service: Orthopedics;  Laterality: Left;    LIPOMA RESECTION      abdomen    REMOVAL OF FOREIGN BODY FROM LOWER EXTREMITY Left 8/22/2019    Procedure: REMOVAL, FOREIGN BODY, LOOSE BODY,  LOWER EXTREMITY;  Surgeon: Shelbi Hughes MD;  Location: [facility] OR;  Service: Orthopedics;  Laterality: Left;    SHOULDER SURGERY      left    SYNOVECTOMY OF KNEE Left 8/22/2019     Procedure: PARTIAL SYNOVECTOMY, KNEE;  Surgeon: Shelbi Hughes MD;  Location: [facility] OR;  Service: Orthopedics;  Laterality: Left;    TILT TABLE TEST N/A 7/9/2018    Procedure: TILT TABLE TEST;  Surgeon: Leonardo Whitfield MD;  Location: [facility] CATH LAB;  Service: Cardiology;  Laterality: N/A;  Syncope, HUT, DM, 3 PREP    TRANSFORAMINAL EPIDURAL INJECTION OF STEROID Right 10/9/2019    Procedure: LUMBAR TRANSFORAMINAL RIGHT L5/S1;  Surgeon: Anthony Schaefer MD;  Location: [facility] PAIN MGT;  Service: Pain Management;  Laterality: Right;  NEEDS CONSENT    TRANSFORAMINAL EPIDURAL INJECTION OF STEROID Left 11/18/2020    Procedure: LUMBAR TRANSFORAMINAL LEFT L5/S1 DIRECT REFERRAL;  Surgeon: Anthony Schaefer MD;  Location: [facility] PAIN MGT;  Service: Pain Management;  Laterality: Left;  NEEDS CONSENT     Social History     Socioeconomic History    Marital status:    Tobacco Use    Smoking status: Never Smoker    Smokeless tobacco: Never Used   Substance and Sexual Activity    Alcohol use: No    Drug use: No     Family History   Problem Relation Age of Onset    Colon cancer Father        Review of patient's allergies indicates:  No Known Allergies    Current Outpatient Medications   Medication Sig    amLODIPine (NORVASC) 10 MG tablet Take 1 tablet (10 mg total) by mouth once daily.    gabapentin (NEURONTIN) 300 MG capsule Take 300 mg by mouth.    losartan-hydrochlorothiazide 100-12.5 mg (HYZAAR) 100-12.5 mg Tab Take 1 tablet by mouth once daily.    rosuvastatin (CRESTOR) 10 MG tablet Take 1 tablet (10 mg total) by mouth once daily.    diazePAM (VALIUM) 10 MG Tab Take 1 tablet (10 mg total) by mouth daily as needed (30-60 minutes prior to procedure to help anxiety). (Patient not taking: No sig reported)     No current facility-administered medications for this visit.       REVIEW OF SYSTEMS:    GENERAL:  No weight loss, malaise or fevers.  HEENT:   No recent changes in vision or hearing  NECK:  Negative for  "lumps, no difficulty with swallowing.  RESPIRATORY:  Negative for cough, wheezing or shortness of breath, patient denies any recent URI.  CARDIOVASCULAR:  Negative for chest pain, leg swelling or palpitations. HTN  GI:  Negative for abdominal discomfort, blood in stools or black stools or change in bowel habits.  MUSCULOSKELETAL:  See HPI.  SKIN:  Negative for lesions, rash, and itching.  PSYCH:  No mood disorder or recent psychosocial stressors.  Patient's sleep is not disturbed secondary to pain.  HEMATOLOGY/LYMPHOLOGY:  Negative for prolonged bleeding, bruising easily or swollen nodes.  Patient is not currently taking any anti-coagulants  ENDO: No history of diabetes or thyroid dysfunction  NEURO:   No history of headaches, syncope, paralysis, seizures or tremors.  All other reviewed and negative other than HPI.    OBJECTIVE:    /74 (BP Location: Right arm, Patient Position: Sitting, BP Method: Large (Automatic))   Pulse 70   Temp 98 °F (36.7 °C)   Ht 5' 11" (1.803 m)   Wt 122.5 kg (270 lb 1 oz)   BMI 37.67 kg/m²     PHYSICAL EXAMINATION:    GENERAL: Well appearing, in no acute distress, alert and oriented x3.  PSYCH:  Mood and affect appropriate.  SKIN: Skin color, texture, turgor normal, no rashes or lesions.  HEAD/FACE:  Normocephalic, atraumatic. Cranial nerves grossly intact.  NECK:  There is pain with palpation over cervical paraspinals and trapezius muscles on the right. Full ROM with pain on lateral rotation. Spurling's negative bilaterally.    EXTREMITIES:  No deformities, edema, or skin discoloration. Good capillary refill.  MUSCULOSKELETAL:  Bilateral upper extremity strength is normal and symmetric.  No atrophy or tone abnormalities are noted.  NEURO: No clonus. No loss of sensation is noted. Negative Marcelino's bilaterally.  GAIT: Normal.    ASSESSMENT: 51 y.o. year old male with history of neck pain, consistent with     Encounter Diagnoses   Name Primary?    Myofascial pain Yes    " Cervical radiculopathy     Cervical spondylosis     DDD (degenerative disc disease), lumbosacral        PLAN:     - Previous imaging was reviewed and discussed with the patient today.    - Trigger point injections as per below.     - In two weeks, schedule for C7/T1 IL NEIDA.     - Consider cervical MBB in the future.     - Consult Neurosurgery.     - Continue Flexeril 5 mg TID PRN muscle pain.     - Continue Gabapentin.     - Continue home stretches.     - RTC 2 weeks after above procedure.     - Dr. Schaefer was consulted on the patient and agrees with this plan.    The above plan and management options were discussed at length with patient. Patient is in agreement with the above and verbalized understanding.     Leela Marshall  07/22/2022    Trigger Point Injection:   The procedure was discussed with the patient including complications of nerve damage,  bleeding, infection, and failure of pain relief. Consent signed.     Trigger points were identified by palpation and marked. Chlorhexidine prep of sites done. A mixture of 40 mg/ml Depomedrol + 9mL 0.25% bupivacaine (10 mL total).   A 27-gauge needle was advanced to the point of maximal tenderness, and  1.5mL  was injected after negative aspiration. All sites done in the same manner. Patient tolerated the procedure well and without complications. Sites injected included: paracervical, trapezius and paraspinal muscles on the right side     The patient was monitored for 10 minutes without adverse effects.

## 2022-07-22 NOTE — PROGRESS NOTES
Chronic Pain - Established visit    Referring Physician: Shauna Valencia    Chief Complaint   Patient presents with    Neck Pain            SUBJECTIVE: Disclaimer: This note has been generated using voice-recognition software. There may be typographical errors that have been missed during proof-reading    Interval History 7/22/2022:  The patient returns to clinic today for follow up of neck pain. He reports increased neck pain over the last few days. He reports neck pain that radiates into both shoulder. He denies any radicular arm pain. He does report muscle tightness and spasms. He is having trouble sleeping due to pain. He does use heat with benefit. He did not have to use medrol dose pack while on vacation. He continues to take Gabapentin, though not consistently. He also takes Flexeril as needed. He is frustrated by his persistent pain. He would like to find a more permanent solution. He has tried physical therapy in the past but this worsened his pain. He denies any other health changes. His pain today is 8/10.    Interval History 6/14/2022:  The patient returns to clinic today for follow up of neck pain. He reports limited relief with trigger point injections. He did have relief with Flexeril. He reports intermittent neck pain that radiates into both shoulder. He denies any radiating arm pain. He continues to report muscle tightness. He is going on vacation at the end of the month. He is worried about a flare of pain. He denies any other health changes. His pain today is 0/10.    Interval History 5/27/2022:  The patient returns to clinic today for follow up of neck pain. He reports increased neck pain that radiates into both shoulders. He denies any radiating arm pain. His pain is worse activity. He describes this pain as tight. He denies any weakness. He denies any other health changes His pain today is 6/10.    Interval History 04/11/2022:   Pt  returns to clinic for follow up of cervical pain. Pt is S/P  NEIDA C7-T1 on 03/23/22 with Dr. Schaefer. Pt states that his pain is much improved since NEIDA. Pt states that in after 1 week post NEIDA,  his pain got significantly better. Currently, pt denies any pain.Pt states he couldn't tolerate  physical therapy before receiving  NEIDA due to severe neck pain. Today, pt's level of pain is 0/10. Pt denies any numbness, tingling sensation to arms. Pt also denies any coordination problems, loss of bowel and bladder control.     Interval History 2/22/2022:  The patient is here for new onset neck pain. We did previously treat him for back pain which has overall been well controlled. He does have some right lateral leg numbness. However, his primary complaint today is neck pain with radiation into the right shoulder. He has significant right sided muscle tightness and pain. His cervical MRI does show severe stenosis. He has been evaluated by [facility] in the past. He recently saw his orthopedist, Dr. Eastman, who performed osteopathic manipulation with short term benefit only. He has intermittent numbness but denies weakness. No bowel or bladder changes. His pain today is 10/10.    Initial encounter:    Bhaskar Stiles presents to the clinic for the evaluation of back and leg pain. He is s/p left L5/S1 TF NEIDA with 80% relief of leg pain. This was a direct referral from Shauna Valencia. He also had benefit with right L5/S1 in the past.  Currently, he is having some back pain with radiation into the anterior thighs. He also has a bulge at L3/4 with NF narrowing on previous MRI.  He is currently in PT which he finds helpful. He feels as though this will be helpful long-term for him. The pain started years ago. He also has a history of right knee arthoscopic surgery last year.    Brief history:    Pain Description:    Pt denies any pain today.     Patient denies night fever/night sweats, urinary incontinence and bowel incontinence.  Patient denies any suicidal or homicidal  ideations    Pain Medications: None  Current:  None    Tried in Past:  NSAIDs - Mobic  TCA -Never  SNRI -Never  Anti-convulsants - Gabapentin  Muscle Relaxants - Robaxin  Opioids- [place]    Physical Therapy/Home Exercise: yes       report:  Not applicable    Pain Procedures:   10/9/20 right L5/S1 TF NEIDA- 80% relief  11/18/20 left L5/S1 TF NEIDA- 80% relief  03/23/22 C7-T1 NEIDA-100% relief.     Chiropractor -never  Acupuncture - never  TENS unit -never  Spinal decompression -never  Joint replacement -never    Imaging:     Narrative & Impression  EXAMINATION:  MRI CERVICAL SPINE WITHOUT CONTRAST     CLINICAL HISTORY:  Neck pain, normal neuro exam;C-spine canal stenosis;Cervical osteoarthritis;Cervical radiculopathy;concern for canal stenosis; Other spondylosis, cervical region     TECHNIQUE:  Multiplanar, multisequence MR images of the cervical spine were performed without the administration of contrast.     COMPARISON:  Prior radiograph     FINDINGS:  There is straightening and mild reversal of cervical lordosis.  Trace retrolisthesis of C3 on C4. No concerning marrow signal present.  Multilevel disc desiccation present with height loss most noted at C3-4, C5-6 and C6-7.     Posterior fossa is unremarkable.  Spinal cord demonstrates no concerning signal abnormality.     Neck soft tissue structures unremarkable.     C2-C3: No spinal canal stenosis or neural foraminal narrowing.     C3-C4: Posterior disc osteophyte complex present effacing the anterior thecal sac with mild spinal canal stenosis.  Right greater than left uncovertebral degenerative findings results in severe right and moderate to severe left neural foraminal narrowing.     C4-C5: No significant spinal canal stenosis or right neural foraminal narrowing.  Left uncovertebral and facet degenerative findings result in mild neural foraminal narrowing.     C5-C6: Posterior disc osteophyte complex present asymmetric to the right indenting the thecal sac without  high-grade spinal canal stenosis.  Facet and uncovertebral degenerative findings present with mild-to-moderate left and moderate to severe right neural foraminal narrowing.     C6-C7: Posterior disc osteophyte complex present asymmetric to the left indenting the thecal sac with mild spinal canal stenosis.  Uncovertebral degenerative findings greater on the left with moderate neural foraminal narrowing.  Minimal right neural foraminal narrowing present.     C7-T1: No spinal canal stenosis or neural foraminal narrowing.     Impression:     Multilevel degenerative findings as above.    Past Medical History:   Diagnosis Date    Family history of colon cancer in father     Hyperlipidemia     Hypertension     LAMONT (obstructive sleep apnea)     Syncope and collapse      Past Surgical History:   Procedure Laterality Date    CHONDROPLASTY OF KNEE Left 8/22/2019    Procedure: CHONDROPLASTY, KNEE;  Surgeon: Shelbi Hughes MD;  Location: [facility] OR;  Service: Orthopedics;  Laterality: Left;    EPIDURAL STEROID INJECTION N/A 3/23/2022    Procedure: INJECTION, STEROID, EPIDURAL, C7-T1 IL;  Surgeon: Anthony Schaefer MD;  Location: [facility] PAIN MGT;  Service: Pain Management;  Laterality: N/A;    HERNIA REPAIR      KNEE ARTHROSCOPY W/ MENISCECTOMY Left 8/22/2019    Procedure: ARTHROSCOPY, KNEE, WITH MEDIAL AND LATERAL MENISCECTOMY;  Surgeon: Shelbi Hughes MD;  Location: [facility] OR;  Service: Orthopedics;  Laterality: Left;    KNEE ARTHROSCOPY W/ PLICA EXCISION Left 8/22/2019    Procedure: EXCISION, MEDIAL PLICA, KNEE, ARTHROSCOPIC;  Surgeon: Shelbi Hughes MD;  Location: [facility] OR;  Service: Orthopedics;  Laterality: Left;    LIPOMA RESECTION      abdomen    REMOVAL OF FOREIGN BODY FROM LOWER EXTREMITY Left 8/22/2019    Procedure: REMOVAL, FOREIGN BODY, LOOSE BODY,  LOWER EXTREMITY;  Surgeon: Shelbi Hughes MD;  Location: [facility] OR;  Service: Orthopedics;  Laterality: Left;    SHOULDER SURGERY      left    SYNOVECTOMY OF KNEE Left 8/22/2019     Procedure: PARTIAL SYNOVECTOMY, KNEE;  Surgeon: Shelbi Hughes MD;  Location: [facility] OR;  Service: Orthopedics;  Laterality: Left;    TILT TABLE TEST N/A 7/9/2018    Procedure: TILT TABLE TEST;  Surgeon: Leonardo Whitfield MD;  Location: [facility] CATH LAB;  Service: Cardiology;  Laterality: N/A;  Syncope, HUT, DM, 3 PREP    TRANSFORAMINAL EPIDURAL INJECTION OF STEROID Right 10/9/2019    Procedure: LUMBAR TRANSFORAMINAL RIGHT L5/S1;  Surgeon: Anthony Schaefer MD;  Location: [facility] PAIN MGT;  Service: Pain Management;  Laterality: Right;  NEEDS CONSENT    TRANSFORAMINAL EPIDURAL INJECTION OF STEROID Left 11/18/2020    Procedure: LUMBAR TRANSFORAMINAL LEFT L5/S1 DIRECT REFERRAL;  Surgeon: Anthony Schaefer MD;  Location: [facility] PAIN MGT;  Service: Pain Management;  Laterality: Left;  NEEDS CONSENT     Social History     Socioeconomic History    Marital status:    Tobacco Use    Smoking status: Never Smoker    Smokeless tobacco: Never Used   Substance and Sexual Activity    Alcohol use: No    Drug use: No     Family History   Problem Relation Age of Onset    Colon cancer Father        Review of patient's allergies indicates:  No Known Allergies    Current Outpatient Medications   Medication Sig    amLODIPine (NORVASC) 10 MG tablet Take 1 tablet (10 mg total) by mouth once daily.    gabapentin (NEURONTIN) 300 MG capsule Take 300 mg by mouth.    losartan-hydrochlorothiazide 100-12.5 mg (HYZAAR) 100-12.5 mg Tab Take 1 tablet by mouth once daily.    rosuvastatin (CRESTOR) 10 MG tablet Take 1 tablet (10 mg total) by mouth once daily.    diazePAM (VALIUM) 10 MG Tab Take 1 tablet (10 mg total) by mouth daily as needed (30-60 minutes prior to procedure to help anxiety). (Patient not taking: No sig reported)     No current facility-administered medications for this visit.       REVIEW OF SYSTEMS:    GENERAL:  No weight loss, malaise or fevers.  HEENT:   No recent changes in vision or hearing  NECK:  Negative for  "lumps, no difficulty with swallowing.  RESPIRATORY:  Negative for cough, wheezing or shortness of breath, patient denies any recent URI.  CARDIOVASCULAR:  Negative for chest pain, leg swelling or palpitations. HTN  GI:  Negative for abdominal discomfort, blood in stools or black stools or change in bowel habits.  MUSCULOSKELETAL:  See HPI.  SKIN:  Negative for lesions, rash, and itching.  PSYCH:  No mood disorder or recent psychosocial stressors.  Patient's sleep is not disturbed secondary to pain.  HEMATOLOGY/LYMPHOLOGY:  Negative for prolonged bleeding, bruising easily or swollen nodes.  Patient is not currently taking any anti-coagulants  ENDO: No history of diabetes or thyroid dysfunction  NEURO:   No history of headaches, syncope, paralysis, seizures or tremors.  All other reviewed and negative other than HPI.    OBJECTIVE:    /74 (BP Location: Right arm, Patient Position: Sitting, BP Method: Large (Automatic))   Pulse 70   Temp 98 °F (36.7 °C)   Ht 5' 11" (1.803 m)   Wt 122.5 kg (270 lb 1 oz)   BMI 37.67 kg/m²     PHYSICAL EXAMINATION:    GENERAL: Well appearing, in no acute distress, alert and oriented x3.  PSYCH:  Mood and affect appropriate.  SKIN: Skin color, texture, turgor normal, no rashes or lesions.  HEAD/FACE:  Normocephalic, atraumatic. Cranial nerves grossly intact.  NECK:  There is pain with palpation over cervical paraspinals and trapezius muscles on the right. Full ROM with pain on lateral rotation. Spurling's negative bilaterally.    EXTREMITIES:  No deformities, edema, or skin discoloration. Good capillary refill.  MUSCULOSKELETAL:  Bilateral upper extremity strength is normal and symmetric.  No atrophy or tone abnormalities are noted.  NEURO: No clonus. No loss of sensation is noted. Negative Marcelino's bilaterally.  GAIT: Normal.    ASSESSMENT: 51 y.o. year old male with history of neck pain, consistent with     Encounter Diagnoses   Name Primary?    Myofascial pain Yes    " Cervical radiculopathy     Cervical spondylosis     DDD (degenerative disc disease), lumbosacral        PLAN:     - Previous imaging was reviewed and discussed with the patient today.    - Trigger point injections as per below.     - In two weeks, schedule for C7/T1 IL NEIDA.     - Consider cervical MBB in the future.     - Consult Neurosurgery.     - Continue Flexeril 5 mg TID PRN muscle pain.     - Continue Gabapentin.     - Continue home stretches.     - RTC 2 weeks after above procedure.     - Dr. Schaefer was consulted on the patient and agrees with this plan.    The above plan and management options were discussed at length with patient. Patient is in agreement with the above and verbalized understanding.     Leela Marshall  07/22/2022    Trigger Point Injection:   The procedure was discussed with the patient including complications of nerve damage,  bleeding, infection, and failure of pain relief. Consent signed.     Trigger points were identified by palpation and marked. Chlorhexidine prep of sites done. A mixture of 40 mg/ml Depomedrol + 9mL 0.25% bupivacaine (10 mL total).   A 27-gauge needle was advanced to the point of maximal tenderness, and  1.5mL  was injected after negative aspiration. All sites done in the same manner. Patient tolerated the procedure well and without complications. Sites injected included: paracervical, trapezius and paraspinal muscles on the right side     The patient was monitored for 10 minutes without adverse effects.

## 2022-07-25 ENCOUNTER — OFFICE VISIT (OUTPATIENT)
Dept: NEUROSURGERY | Facility: CLINIC | Age: 52
End: 2022-07-25
Payer: COMMERCIAL

## 2022-07-25 ENCOUNTER — PATIENT MESSAGE (OUTPATIENT)
Dept: PAIN MEDICINE | Facility: OTHER | Age: 52
End: 2022-07-25
Payer: COMMERCIAL

## 2022-07-25 ENCOUNTER — PATIENT MESSAGE (OUTPATIENT)
Dept: PAIN MEDICINE | Facility: CLINIC | Age: 52
End: 2022-07-25
Payer: COMMERCIAL

## 2022-07-25 VITALS
SYSTOLIC BLOOD PRESSURE: 124 MMHG | DIASTOLIC BLOOD PRESSURE: 77 MMHG | HEIGHT: 71 IN | WEIGHT: 270 LBS | HEART RATE: 101 BPM | BODY MASS INDEX: 37.8 KG/M2

## 2022-07-25 DIAGNOSIS — M79.18 MYOFASCIAL PAIN: ICD-10-CM

## 2022-07-25 DIAGNOSIS — M54.12 CERVICAL RADICULOPATHY: ICD-10-CM

## 2022-07-25 DIAGNOSIS — M54.12 CERVICAL RADICULOPATHY: Primary | ICD-10-CM

## 2022-07-25 PROCEDURE — 99204 OFFICE O/P NEW MOD 45 MIN: CPT | Mod: S$GLB,,, | Performed by: NURSE PRACTITIONER

## 2022-07-25 PROCEDURE — 99999 PR PBB SHADOW E&M-EST. PATIENT-LVL III: CPT | Mod: PBBFAC,,, | Performed by: NURSE PRACTITIONER

## 2022-07-25 PROCEDURE — 99204 PR OFFICE/OUTPT VISIT, NEW, LEVL IV, 45-59 MIN: ICD-10-PCS | Mod: S$GLB,,, | Performed by: NURSE PRACTITIONER

## 2022-07-25 PROCEDURE — 99999 PR PBB SHADOW E&M-EST. PATIENT-LVL III: ICD-10-PCS | Mod: PBBFAC,,, | Performed by: NURSE PRACTITIONER

## 2022-07-25 NOTE — PROGRESS NOTES
Neurosurgery  History & Physical    SUBJECTIVE:     Chief Complaint: Cervical Radiculopathy    History of Present Illness: Bhaskar Stiles is a 51 y.o. male with PMH of HTN, HLD, and LAMONT. He is being seen in clinic today as a referral from pain management to evaluate his cervical radiculopathy. States that for the past several months he has been struggling with debilitating neck pain despite conservative therapy. Denies trauma or inciting event. Describes the pain as sharp, stabbing, and constant down the neck extending into the Right shoulder as well as upward into the base of his skull. Denies left arm symptoms. Rates the pain as a 10/10. Aggravating factors include movement. Denies alleviating factors. Denies weakness, b/b dysfunction, saddle anesthesia, or gait instability. Endorses intermittent tingling in the right arm. He obtained PT, which exacerbated his symptoms. His last NEIDA C7-T1 on 03/23/22 provided relief for about 1 month. He is scheduled to undergo an additional injection.     Review of patient's allergies indicates:  No Known Allergies    Current Outpatient Medications   Medication Sig Dispense Refill    amLODIPine (NORVASC) 10 MG tablet Take 1 tablet (10 mg total) by mouth once daily. 90 tablet 4    losartan-hydrochlorothiazide 100-12.5 mg (HYZAAR) 100-12.5 mg Tab Take 1 tablet by mouth once daily. 90 tablet 1    rosuvastatin (CRESTOR) 10 MG tablet Take 1 tablet (10 mg total) by mouth once daily. 90 tablet 3    diazePAM (VALIUM) 10 MG Tab Take 1 tablet (10 mg total) by mouth daily as needed (30-60 minutes prior to procedure to help anxiety). (Patient not taking: No sig reported) 1 tablet 0    gabapentin (NEURONTIN) 300 MG capsule Take 300 mg by mouth.       No current facility-administered medications for this visit.       Past Medical History:   Diagnosis Date    Family history of colon cancer in father     Hyperlipidemia     Hypertension     LAMONT (obstructive sleep apnea)     Syncope  and collapse      Past Surgical History:   Procedure Laterality Date    CHONDROPLASTY OF KNEE Left 8/22/2019    Procedure: CHONDROPLASTY, KNEE;  Surgeon: Shelbi Hughes MD;  Location: [facility] OR;  Service: Orthopedics;  Laterality: Left;    EPIDURAL STEROID INJECTION N/A 3/23/2022    Procedure: INJECTION, STEROID, EPIDURAL, C7-T1 IL;  Surgeon: Anthony Schaefer MD;  Location: [facility] PAIN MGT;  Service: Pain Management;  Laterality: N/A;    HERNIA REPAIR      KNEE ARTHROSCOPY W/ MENISCECTOMY Left 8/22/2019    Procedure: ARTHROSCOPY, KNEE, WITH MEDIAL AND LATERAL MENISCECTOMY;  Surgeon: Shelbi Hughes MD;  Location: [facility] OR;  Service: Orthopedics;  Laterality: Left;    KNEE ARTHROSCOPY W/ PLICA EXCISION Left 8/22/2019    Procedure: EXCISION, MEDIAL PLICA, KNEE, ARTHROSCOPIC;  Surgeon: Shelbi Hughes MD;  Location: [facility] OR;  Service: Orthopedics;  Laterality: Left;    LIPOMA RESECTION      abdomen    REMOVAL OF FOREIGN BODY FROM LOWER EXTREMITY Left 8/22/2019    Procedure: REMOVAL, FOREIGN BODY, LOOSE BODY,  LOWER EXTREMITY;  Surgeon: Shelbi Hughes MD;  Location: [facility] OR;  Service: Orthopedics;  Laterality: Left;    SHOULDER SURGERY      left    SYNOVECTOMY OF KNEE Left 8/22/2019    Procedure: PARTIAL SYNOVECTOMY, KNEE;  Surgeon: Shelbi Hughes MD;  Location: [facility] OR;  Service: Orthopedics;  Laterality: Left;    TILT TABLE TEST N/A 7/9/2018    Procedure: TILT TABLE TEST;  Surgeon: Leonardo Whitfield MD;  Location: [facility] CATH LAB;  Service: Cardiology;  Laterality: N/A;  Syncope, HUT, DM, 3 PREP    TRANSFORAMINAL EPIDURAL INJECTION OF STEROID Right 10/9/2019    Procedure: LUMBAR TRANSFORAMINAL RIGHT L5/S1;  Surgeon: Anthony Schaefer MD;  Location: [facility] PAIN MGT;  Service: Pain Management;  Laterality: Right;  NEEDS CONSENT    TRANSFORAMINAL EPIDURAL INJECTION OF STEROID Left 11/18/2020    Procedure: LUMBAR TRANSFORAMINAL LEFT L5/S1 DIRECT REFERRAL;  Surgeon: Anthony Schaefer MD;  Location: [facility] PAIN MGT;  Service: Pain  "Management;  Laterality: Left;  NEEDS CONSENT     Family History     Problem Relation (Age of Onset)    Colon cancer Father        Social History     Socioeconomic History    Marital status:    Tobacco Use    Smoking status: Never Smoker    Smokeless tobacco: Never Used   Substance and Sexual Activity    Alcohol use: No    Drug use: No       Review of Systems   Constitutional: Negative for activity change, appetite change and fever.   HENT: Negative for hearing loss and postnasal drip.    Eyes: Negative for pain and visual disturbance.   Respiratory: Negative for shortness of breath.    Cardiovascular: Negative for chest pain and palpitations.   Gastrointestinal: Negative for abdominal pain.   Endocrine: Negative for polydipsia, polyphagia and polyuria.   Musculoskeletal: Positive for arthralgias, myalgias, neck pain and neck stiffness. Negative for back pain and gait problem.   Neurological: Positive for numbness. Negative for dizziness, weakness and headaches.   Psychiatric/Behavioral: Negative for confusion and dysphoric mood.       OBJECTIVE:     Vital Signs  Pulse: 101  BP: 124/77  Pain Score: 10-Worst pain ever  Height: 5' 11" (180.3 cm)  Weight: 122.5 kg (270 lb)  Body mass index is 37.66 kg/m².      Neurosurgery Physical Exam  General: well developed, well nourished, no distress.   Head: normocephalic, atraumatic  Neurologic: Alert and oriented. Thought content appropriate.  GCS: Motor: 6/Verbal: 5/Eyes: 4 GCS Total: 15  Mental Status: Awake, Alert, Oriented x 4  Language: No aphasia  Speech: No dysarthria  Cranial nerves: face symmetric, tongue midline, CN II-XII grossly intact.   Eyes: pupils equal, round, reactive to light with accomodation, EOMI.   Pulmonary: normal respirations, no signs of respiratory distress  Abdomen: soft, non-distended  Skin: Skin is warm, dry and intact.  Sensory: intact to light touch throughout  Motor Strength:Moves all extremities spontaneously with good tone.  "     Strength  Deltoids Triceps Biceps Wrist Extension Wrist Flexion Hand    Upper: R 5/5 5/5 5/5 5/5 5/5 5/5    L 5/5 5/5 5/5 5/5 5/5 5/5     HF KE KF DF PF EHL   Lower: R 5/5 5/5 5/5 5/5 5/5 5/5    L 5/5 5/5 5/5 5/5 5/5 5/5     Reflexes:   Marcelino's: Negative.  Clonus: Negative.     Cerebellar:   Gait stable, fluid.   Tandem Gait: No difficulty  Able to walk on heels & toes     Cervical:   ROM: Pain limited with flexion, extension, lateral rotation and ear-to-shoulder bend.   Midline TTP: Positive. Pain with palpation to paraspinal muscles on the right.      Thoracic:  Midline TTP: Negative.     Lumbar:  Midline TTP: Negative.    Diagnostic Results:  I have personally reviewed the MRI cervical spine dated 10/9/21. The imaging shows multilevel degenerative changes most pronounced C3/4 with severe right and moderate to severe left neural foraminal narrowing. C5/6 with mild-to-moderate left and moderate to severe right neural foraminal narrowing.    ASSESSMENT/PLAN:   Bhaskar Stiles is a 51 y.o. male with PMH of HTN, HLD, and LAMONT. He was seen in clinic today as a referral from pain management to evaluate his cervical radiculopathy. States that for the past several months he has been struggling with debilitating neck pain despite conservative therapy. He was encouraged to obtain the additional injection with pain management in the interim. A dynamic x-ray has been ordered for instability. A CT cervical spine for surgical planning. I would like the patient to follow-up in clinic with Dr. Painting to review the findings and discuss the next steps. I have encouraged him to contact the clinic with any questions, concerns, or adverse clinical changes. He verbalized understanding.     WENDY Adrian  Neurosurgery  [facility].     Note dictated with voice recognition software, please excuse any grammatical errors.

## 2022-07-27 ENCOUNTER — HOSPITAL ENCOUNTER (OUTPATIENT)
Dept: RADIOLOGY | Facility: OTHER | Age: 52
Discharge: HOME | End: 2022-07-27
Attending: NURSE PRACTITIONER
Payer: COMMERCIAL

## 2022-07-27 ENCOUNTER — TELEPHONE (OUTPATIENT)
Dept: NEUROSURGERY | Facility: CLINIC | Age: 52
End: 2022-07-27
Payer: COMMERCIAL

## 2022-07-27 DIAGNOSIS — M54.12 CERVICAL RADICULOPATHY: ICD-10-CM

## 2022-07-27 LAB
ALBUMIN SERPL-MCNC: 4.6 G/DL (ref 3.6–5.1)
ALBUMIN/GLOB SERPL: 1.5 (CALC) (ref 1–2.5)
ALP SERPL-CCNC: 61 U/L (ref 35–144)
ALT SERPL-CCNC: 25 U/L (ref 9–46)
AST SERPL-CCNC: 16 U/L (ref 10–35)
BASOPHILS # BLD AUTO: 58 CELLS/UL (ref 0–200)
BASOPHILS NFR BLD AUTO: 0.8 %
BILIRUB SERPL-MCNC: 0.6 MG/DL (ref 0.2–1.2)
BUN SERPL-MCNC: 19 MG/DL (ref 7–25)
BUN/CREAT SERPL: ABNORMAL (CALC) (ref 6–22)
CALCIUM SERPL-MCNC: 9.7 MG/DL (ref 8.6–10.3)
CHLORIDE SERPL-SCNC: 102 MMOL/L (ref 98–110)
CHOLEST SERPL-MCNC: 159 MG/DL
CHOLEST/HDLC SERPL: 3.6 (CALC)
CO2 SERPL-SCNC: 28 MMOL/L (ref 20–32)
CREAT SERPL-MCNC: 1.21 MG/DL (ref 0.7–1.3)
EGFR: 73 ML/MIN/1.73M2
EOSINOPHIL # BLD AUTO: 131 CELLS/UL (ref 15–500)
EOSINOPHIL NFR BLD AUTO: 1.8 %
ERYTHROCYTE [DISTWIDTH] IN BLOOD BY AUTOMATED COUNT: 13.2 % (ref 11–15)
GLOBULIN SER CALC-MCNC: 3 G/DL (CALC) (ref 1.9–3.7)
GLUCOSE SERPL-MCNC: 102 MG/DL (ref 65–99)
HBA1C MFR BLD: 5 % OF TOTAL HGB
HCT VFR BLD AUTO: 46.7 % (ref 38.5–50)
HDLC SERPL-MCNC: 44 MG/DL
HGB BLD-MCNC: 14.8 G/DL (ref 13.2–17.1)
LDLC SERPL CALC-MCNC: 95 MG/DL (CALC)
LYMPHOCYTES # BLD AUTO: 2351 CELLS/UL (ref 850–3900)
LYMPHOCYTES NFR BLD AUTO: 32.2 %
MCH RBC QN AUTO: 27.2 PG (ref 27–33)
MCHC RBC AUTO-ENTMCNC: 31.7 G/DL (ref 32–36)
MCV RBC AUTO: 85.8 FL (ref 80–100)
MONOCYTES # BLD AUTO: 642 CELLS/UL (ref 200–950)
MONOCYTES NFR BLD AUTO: 8.8 %
NEUTROPHILS # BLD AUTO: 4117 CELLS/UL (ref 1500–7800)
NEUTROPHILS NFR BLD AUTO: 56.4 %
NONHDLC SERPL-MCNC: 115 MG/DL (CALC)
PLATELET # BLD AUTO: 278 THOUSAND/UL (ref 140–400)
PMV BLD REES-ECKER: 10.6 FL (ref 7.5–12.5)
POTASSIUM SERPL-SCNC: 3.8 MMOL/L (ref 3.5–5.3)
PROT SERPL-MCNC: 7.6 G/DL (ref 6.1–8.1)
RBC # BLD AUTO: 5.44 MILLION/UL (ref 4.2–5.8)
SODIUM SERPL-SCNC: 141 MMOL/L (ref 135–146)
TRIGL SERPL-MCNC: 102 MG/DL
TSH SERPL-ACNC: 1.05 MIU/L (ref 0.4–4.5)
WBC # BLD AUTO: 7.3 THOUSAND/UL (ref 3.8–10.8)

## 2022-07-27 PROCEDURE — 72040 X-RAY EXAM NECK SPINE 2-3 VW: CPT | Mod: TC,FY

## 2022-07-27 PROCEDURE — 72040 X-RAY EXAM NECK SPINE 2-3 VW: CPT | Mod: 26,,, | Performed by: RADIOLOGY

## 2022-07-27 PROCEDURE — 72040 XR CERVICAL SPINE FLEXION  AND EXTENSION ONLY: ICD-10-PCS | Mod: 26,,, | Performed by: RADIOLOGY

## 2022-07-27 NOTE — TELEPHONE ENCOUNTER
Called pt to schedule CT for 8/5 at 730am. Informed pt that I will send a message to Dr Painting's staff and someone should reach out to him to schedule his f/u appt.

## 2022-07-28 ENCOUNTER — TELEPHONE (OUTPATIENT)
Dept: INTERNAL MEDICINE | Facility: CLINIC | Age: 52
End: 2022-07-28
Payer: COMMERCIAL

## 2022-07-28 RX ORDER — GABAPENTIN 300 MG/1
300 CAPSULE ORAL
Qty: 30 CAPSULE | Status: CANCELLED | OUTPATIENT
Start: 2022-07-28

## 2022-07-28 NOTE — TELEPHONE ENCOUNTER
Spoke to Mr. Stiles and informed him per Dr. Rey that Please contact the patient and let them know that their labs were fine and do not require any change in treatment.  Patient states understanding with no further questions or concerns.

## 2022-07-28 NOTE — TELEPHONE ENCOUNTER
----- Message from Fredy Rey MD sent at 7/26/2022  5:59 PM CDT -----  Please contact the patient and let them know that their labs were fine and do not require any change in treatment.

## 2022-07-29 RX ORDER — GABAPENTIN 300 MG/1
300 CAPSULE ORAL
Qty: 30 CAPSULE | Refills: 2 | Status: ON HOLD | OUTPATIENT
Start: 2022-07-29 | End: 2022-09-16 | Stop reason: HOSPADM

## 2022-07-29 NOTE — TELEPHONE ENCOUNTER
Staff called pt and inform pt that his prescription was approved and he can contact his pharmacy. Pt verbal;ized understanding and confirmed. sG

## 2022-08-03 ENCOUNTER — TELEPHONE (OUTPATIENT)
Dept: NEUROSURGERY | Facility: CLINIC | Age: 52
End: 2022-08-03
Payer: COMMERCIAL

## 2022-08-03 NOTE — TELEPHONE ENCOUNTER
Called to schedule patient an appointment with Dr. Painting patient states that he will give me a call when he's ready to schedule

## 2022-08-03 NOTE — TELEPHONE ENCOUNTER
----- Message from Tawanda Marshall MA sent at 7/27/2022  9:13 AM CDT -----  Regarding: Schedule follow-up  Tammie Bynum    I scheduled this patient's CT for 8/5 at 730am. Can you schedule him for a f/u appt w Dr Painting some time after that?     Thanks  Dylan

## 2022-08-04 ENCOUNTER — TELEPHONE (OUTPATIENT)
Dept: PAIN MEDICINE | Facility: CLINIC | Age: 52
End: 2022-08-04
Payer: COMMERCIAL

## 2022-08-04 NOTE — TELEPHONE ENCOUNTER
Staff spoke with regarding appointment on 08/05/22 with Leela Marshall NP. Pt stated he would like to cancel appointment with Leela due to him having an appointment with her on 08/23/22. Pt stated he will wait to see provider, staff canceled pt appointment.

## 2022-08-10 ENCOUNTER — HOSPITAL ENCOUNTER (OUTPATIENT)
Facility: OTHER | Age: 52
Discharge: HOME | End: 2022-08-10
Attending: ANESTHESIOLOGY | Admitting: ANESTHESIOLOGY
Payer: COMMERCIAL

## 2022-08-10 VITALS
HEIGHT: 71 IN | WEIGHT: 267 LBS | TEMPERATURE: 98 F | SYSTOLIC BLOOD PRESSURE: 125 MMHG | BODY MASS INDEX: 37.38 KG/M2 | DIASTOLIC BLOOD PRESSURE: 75 MMHG | OXYGEN SATURATION: 93 % | RESPIRATION RATE: 14 BRPM | HEART RATE: 78 BPM

## 2022-08-10 DIAGNOSIS — M50.30 DDD (DEGENERATIVE DISC DISEASE), CERVICAL: Primary | ICD-10-CM

## 2022-08-10 DIAGNOSIS — G89.29 CHRONIC PAIN: ICD-10-CM

## 2022-08-10 DIAGNOSIS — M54.12 CERVICAL RADICULOPATHY: ICD-10-CM

## 2022-08-10 PROCEDURE — 62321 NJX INTERLAMINAR CRV/THRC: CPT | Performed by: ANESTHESIOLOGY

## 2022-08-10 PROCEDURE — 63600175 PHARM REV CODE 636 W HCPCS: Performed by: ANESTHESIOLOGY

## 2022-08-10 PROCEDURE — 25000003 PHARM REV CODE 250: Performed by: STUDENT IN AN ORGANIZED HEALTH CARE EDUCATION/TRAINING PROGRAM

## 2022-08-10 PROCEDURE — 25000003 PHARM REV CODE 250: Performed by: ANESTHESIOLOGY

## 2022-08-10 PROCEDURE — 62321 PR INJ CERV/THORAC, W/GUIDANCE: ICD-10-PCS | Mod: ,,, | Performed by: ANESTHESIOLOGY

## 2022-08-10 PROCEDURE — 25500020 PHARM REV CODE 255: Performed by: ANESTHESIOLOGY

## 2022-08-10 PROCEDURE — 62321 NJX INTERLAMINAR CRV/THRC: CPT | Mod: ,,, | Performed by: ANESTHESIOLOGY

## 2022-08-10 RX ORDER — DEXAMETHASONE SODIUM PHOSPHATE 10 MG/ML
INJECTION INTRAMUSCULAR; INTRAVENOUS
Status: DISCONTINUED | OUTPATIENT
Start: 2022-08-10 | End: 2022-08-10 | Stop reason: HOSPADM

## 2022-08-10 RX ORDER — LIDOCAINE HYDROCHLORIDE 10 MG/ML
INJECTION, SOLUTION EPIDURAL; INFILTRATION; INTRACAUDAL; PERINEURAL
Status: DISCONTINUED | OUTPATIENT
Start: 2022-08-10 | End: 2022-08-10 | Stop reason: HOSPADM

## 2022-08-10 RX ORDER — FENTANYL CITRATE 50 UG/ML
INJECTION, SOLUTION INTRAMUSCULAR; INTRAVENOUS
Status: DISCONTINUED | OUTPATIENT
Start: 2022-08-10 | End: 2022-08-10 | Stop reason: HOSPADM

## 2022-08-10 RX ORDER — LIDOCAINE HYDROCHLORIDE 20 MG/ML
INJECTION, SOLUTION INFILTRATION; PERINEURAL
Status: DISCONTINUED | OUTPATIENT
Start: 2022-08-10 | End: 2022-08-10 | Stop reason: HOSPADM

## 2022-08-10 RX ORDER — SODIUM CHLORIDE 9 MG/ML
500 INJECTION, SOLUTION INTRAVENOUS CONTINUOUS
Status: DISCONTINUED | OUTPATIENT
Start: 2022-08-10 | End: 2022-08-10 | Stop reason: HOSPADM

## 2022-08-10 RX ORDER — MIDAZOLAM HYDROCHLORIDE 1 MG/ML
INJECTION INTRAMUSCULAR; INTRAVENOUS
Status: DISCONTINUED | OUTPATIENT
Start: 2022-08-10 | End: 2022-08-10 | Stop reason: HOSPADM

## 2022-08-10 NOTE — DISCHARGE INSTRUCTIONS

## 2022-08-10 NOTE — OP NOTE
Cervical Interlaminar Epidural Steroid Injection under Fluoroscopic Guidance    The procedure, risks, benefits, and options were discussed with the patient. There are no contraindications to the procedure. The patent expressed understanding and agreed to the procedure. Informed written consent was obtained prior to the start of the procedure and can be found in the patient's chart.     PATIENT NAME: Bhaskar Stlies   MRN: [id number]     DATE OF PROCEDURE: 08/10/2022    PROCEDURE: Cervical Interlaminar Epidural Steroid Injection C7/T1 under Fluoroscopic Guidance    PRE-OP DIAGNOSIS: Cervical radiculopathy [M54.12] Cervical radiculopathy [M54.12]    POST-OP DIAGNOSIS: Same    PHYSICIAN: Anthony Schaefer MD     ASSISTANTS: Raymundo     MEDICATIONS INJECTED: Preservative-free Decadron 10mg with 1cc of Lidocaine 1% MPF and preservative free normal saline    LOCAL ANESTHETIC INJECTED: Xylocaine 2%     SEDATION:   Versed 2mg and Fentanyl 50mcg                                                                                                                                                                                     Conscious sedation ordered by M.D. Patient re-evaluation prior to administration of conscious sedation. No changes noted in patient's status from initial evaluation. The patient's vital signs were monitored by RN and patient remained hemodynamically stable throughout the procedure.    Event Time In   Sedation Start 0954   Sedation End 1006       ESTIMATED BLOOD LOSS: None    COMPLICATIONS: None    TECHNIQUE: Time-out was performed to identify the patient and procedure to be performed. With the patient laying in a prone position, the surgical area was prepped and draped in the usual sterile fashion using ChloraPrep and a fenestrated drape. The level was determined under fluoroscopy guidance. Skin anesthesia was achieved by injecting Lidocaine 2% over the injection site.  The interlaminar space was then approached  with a 18 gauge, 3.5 inch Tuohy needle that was introduced under fluoroscopic guidance with AP, lateral and/or contralateral oblique imaging. Once the Ligamentum flavum was encountered loss of resistance to saline was used to enter the epidural space. With positive loss of resistance and negative aspiration for CSF or Blood, contrast dye  Omnipaque (240mg/mL) was injected to confirm placement and there was no vascular runoff. Then 5 mL of the medication mixture listed above was then injected slowly. Displacement of the radio opaque contrast after injection of the medication confirmed that the medication went into the area of the epidural space. The needles were removed, and bleeding was nil. A sterile dressing was applied. No specimens collected. The patient tolerated the procedure well.       The patient was monitored after the procedure in the recovery area. They were given post-procedure and discharge instructions to follow at home. The patient was discharged in a stable condition.    Tristan Leyva DO     I reviewed and edited the fellow's note. I conducted my own interview and physical examination. I agree with the findings. I was present and supervising all critical portions of the procedure.        Anthony Schaefer MD

## 2022-08-10 NOTE — DISCHARGE SUMMARY
Discharge Note  Short Stay      SUMMARY     Admit Date: 8/10/2022    Attending Physician: Tristan Leyva      Discharge Physician: Tristan Leyva      Discharge Date: 8/10/2022 10:08 AM    Procedure(s) (LRB):  INJECTION, STEROID, EPIDURAL,  C7-T1 IL CONTRAST (N/A)    Final Diagnosis: Cervical radiculopathy [M54.12]    Disposition: Home or self care    Patient Instructions:   Current Discharge Medication List      CONTINUE these medications which have NOT CHANGED    Details   amLODIPine (NORVASC) 10 MG tablet Take 1 tablet (10 mg total) by mouth once daily.  Qty: 90 tablet, Refills: 4    Comments: .  Associated Diagnoses: Essential hypertension      diazePAM (VALIUM) 10 MG Tab Take 1 tablet (10 mg total) by mouth daily as needed (30-60 minutes prior to procedure to help anxiety).  Qty: 1 tablet, Refills: 0    Associated Diagnoses: Other spondylosis, cervical region; Radiculopathy, cervical region; Spinal stenosis, cervical region; Cervicalgia      gabapentin (NEURONTIN) 300 MG capsule Take 1 capsule (300 mg total) by mouth as needed (pain).  Qty: 30 capsule, Refills: 2      losartan-hydrochlorothiazide 100-12.5 mg (HYZAAR) 100-12.5 mg Tab Take 1 tablet by mouth once daily.  Qty: 90 tablet, Refills: 1    Comments: .  Associated Diagnoses: Essential hypertension      rosuvastatin (CRESTOR) 10 MG tablet Take 1 tablet (10 mg total) by mouth once daily.  Qty: 90 tablet, Refills: 3    Associated Diagnoses: Hyperlipidemia, unspecified hyperlipidemia type                 Discharge Diagnosis: Cervical radiculopathy [M54.12]  Condition on Discharge: Stable with no complications to procedure   Diet on Discharge: Same as before.  Activity: as per instruction sheet.  Discharge to: Home with a responsible adult.  Follow up: 2-4 weeks       Please call my office or pager at [phone] if experienced any weakness or loss of sensation, fever > 101.5, pain uncontrolled with oral medications, persistent nausea/vomiting/or  diarrhea, redness or drainage from the incisions, or any other worrisome concerns. If physician on call was not reached or could not communicate with our office for any reason please go to the nearest emergency department        Tristan Leyva DO

## 2022-08-15 ENCOUNTER — TELEPHONE (OUTPATIENT)
Dept: SLEEP MEDICINE | Facility: CLINIC | Age: 52
End: 2022-08-15
Payer: COMMERCIAL

## 2022-08-23 ENCOUNTER — OFFICE VISIT (OUTPATIENT)
Dept: PAIN MEDICINE | Facility: CLINIC | Age: 52
End: 2022-08-23
Payer: COMMERCIAL

## 2022-08-23 DIAGNOSIS — M54.12 CERVICAL RADICULOPATHY: Primary | ICD-10-CM

## 2022-08-23 DIAGNOSIS — M50.30 DDD (DEGENERATIVE DISC DISEASE), CERVICAL: ICD-10-CM

## 2022-08-23 DIAGNOSIS — M47.812 CERVICAL SPONDYLOSIS: ICD-10-CM

## 2022-08-23 DIAGNOSIS — M79.18 MYOFASCIAL PAIN: ICD-10-CM

## 2022-08-23 PROCEDURE — 99213 PR OFFICE/OUTPT VISIT, EST, LEVL III, 20-29 MIN: ICD-10-PCS | Mod: 95,,, | Performed by: NURSE PRACTITIONER

## 2022-08-23 PROCEDURE — 99213 OFFICE O/P EST LOW 20 MIN: CPT | Mod: 95,,, | Performed by: NURSE PRACTITIONER

## 2022-08-23 NOTE — PROGRESS NOTES
Chronic Pain - Established visit  Chronic Pain-Tele-Medicine-Established Note (Follow up visit)        The patient location is: Home  The chief complaint leading to consultation is: pain  Visit type: Virtual visit with synchronous audio and video  Total time spent with patient: 25 min  Each patient to whom he or she provides medical services by telemedicine is:  (1) informed of the relationship between the physician and patient and the respective role of any other health care provider with respect to management of the patient; and (2) notified that he or she may decline to receive medical services by telemedicine and may withdraw from such care at any time.      Referring Physician: Shauna Valencia    Chief Complaint   Patient presents with    Neck Pain        SUBJECTIVE: Disclaimer: This note has been generated using voice-recognition software. There may be typographical errors that have been missed during proof-reading    Interval History 8/23/2022:  The patient returns to clinic today for follow up of neck pain via virtual visit. He is s/p C7/T1 IL NEIDA on 8/10/2022. He reports 100% relief of his pain. He denies any neck or radicular arm pain at this time. He denies any muscle spasms. He has been able to discontinue Gabapentin and Flexeril. He continues to perform a home exercise routine. He did see Neurosurgery but does not wish to pursue this at this time. He denies any other health changes.     Interval History 7/22/2022:  The patient returns to clinic today for follow up of neck pain. He reports increased neck pain over the last few days. He reports neck pain that radiates into both shoulder. He denies any radicular arm pain. He does report muscle tightness and spasms. He is having trouble sleeping due to pain. He does use heat with benefit. He did not have to use medrol dose pack while on vacation. He continues to take Gabapentin, though not consistently. He also takes Flexeril as needed. He is frustrated  by his persistent pain. He would like to find a more permanent solution. He has tried physical therapy in the past but this worsened his pain. He denies any other health changes. His pain today is 8/10.    Interval History 6/14/2022:  The patient returns to clinic today for follow up of neck pain. He reports limited relief with trigger point injections. He did have relief with Flexeril. He reports intermittent neck pain that radiates into both shoulder. He denies any radiating arm pain. He continues to report muscle tightness. He is going on vacation at the end of the month. He is worried about a flare of pain. He denies any other health changes. His pain today is 0/10.    Interval History 5/27/2022:  The patient returns to clinic today for follow up of neck pain. He reports increased neck pain that radiates into both shoulders. He denies any radiating arm pain. His pain is worse activity. He describes this pain as tight. He denies any weakness. He denies any other health changes His pain today is 6/10.    Interval History 04/11/2022:   Pt  returns to clinic for follow up of cervical pain. Pt is S/P NEIDA C7-T1 on 03/23/22 with Dr. Schaefer. Pt states that his pain is much improved since NEIDA. Pt states that in after 1 week post NEIDA,  his pain got significantly better. Currently, pt denies any pain.Pt states he couldn't tolerate  physical therapy before receiving  NEIDA due to severe neck pain. Today, pt's level of pain is 0/10. Pt denies any numbness, tingling sensation to arms. Pt also denies any coordination problems, loss of bowel and bladder control.     Interval History 2/22/2022:  The patient is here for new onset neck pain. We did previously treat him for back pain which has overall been well controlled. He does have some right lateral leg numbness. However, his primary complaint today is neck pain with radiation into the right shoulder. He has significant right sided muscle tightness and pain. His cervical MRI  does show severe stenosis. He has been evaluated by [facility] in the past. He recently saw his orthopedist, Dr. Eastman, who performed osteopathic manipulation with short term benefit only. He has intermittent numbness but denies weakness. No bowel or bladder changes. His pain today is 10/10.    Initial encounter:    Bhaskar Stiles presents to the clinic for the evaluation of back and leg pain. He is s/p left L5/S1 TF NEIDA with 80% relief of leg pain. This was a direct referral from Shauna Valencia. He also had benefit with right L5/S1 in the past.  Currently, he is having some back pain with radiation into the anterior thighs. He also has a bulge at L3/4 with NF narrowing on previous MRI.  He is currently in PT which he finds helpful. He feels as though this will be helpful long-term for him. The pain started years ago. He also has a history of right knee arthoscopic surgery last year.    Brief history:    Pain Description:    Pt denies any pain today.     Patient denies night fever/night sweats, urinary incontinence and bowel incontinence.  Patient denies any suicidal or homicidal ideations    Pain Medications: None  Current:  None    Tried in Past:  NSAIDs - Mobic  TCA -Never  SNRI -Never  Anti-convulsants - Gabapentin  Muscle Relaxants - Robaxin  Opioids- [place]    Physical Therapy/Home Exercise: yes       report:  Not applicable    Pain Procedures:   10/9/20 right L5/S1 TF NEIDA- 80% relief  11/18/20 left L5/S1 TF NEIDA- 80% relief  03/23/22 C7-T1 NEIDA-100% relief.   8/10/2022- C7/T1 IL NEIDA    Chiropractor -never  Acupuncture - never  TENS unit -never  Spinal decompression -never  Joint replacement -never    Imaging:     Narrative & Impression  EXAMINATION:  MRI CERVICAL SPINE WITHOUT CONTRAST     CLINICAL HISTORY:  Neck pain, normal neuro exam;C-spine canal stenosis;Cervical osteoarthritis;Cervical radiculopathy;concern for canal stenosis; Other spondylosis, cervical region     TECHNIQUE:  Multiplanar,  multisequence MR images of the cervical spine were performed without the administration of contrast.     COMPARISON:  Prior radiograph     FINDINGS:  There is straightening and mild reversal of cervical lordosis.  Trace retrolisthesis of C3 on C4. No concerning marrow signal present.  Multilevel disc desiccation present with height loss most noted at C3-4, C5-6 and C6-7.     Posterior fossa is unremarkable.  Spinal cord demonstrates no concerning signal abnormality.     Neck soft tissue structures unremarkable.     C2-C3: No spinal canal stenosis or neural foraminal narrowing.     C3-C4: Posterior disc osteophyte complex present effacing the anterior thecal sac with mild spinal canal stenosis.  Right greater than left uncovertebral degenerative findings results in severe right and moderate to severe left neural foraminal narrowing.     C4-C5: No significant spinal canal stenosis or right neural foraminal narrowing.  Left uncovertebral and facet degenerative findings result in mild neural foraminal narrowing.     C5-C6: Posterior disc osteophyte complex present asymmetric to the right indenting the thecal sac without high-grade spinal canal stenosis.  Facet and uncovertebral degenerative findings present with mild-to-moderate left and moderate to severe right neural foraminal narrowing.     C6-C7: Posterior disc osteophyte complex present asymmetric to the left indenting the thecal sac with mild spinal canal stenosis.  Uncovertebral degenerative findings greater on the left with moderate neural foraminal narrowing.  Minimal right neural foraminal narrowing present.     C7-T1: No spinal canal stenosis or neural foraminal narrowing.     Impression:     Multilevel degenerative findings as above.    Past Medical History:   Diagnosis Date    Family history of colon cancer in father     Hyperlipidemia     Hypertension     LAMONT (obstructive sleep apnea)     Syncope and collapse      Past Surgical History:   Procedure  Laterality Date    CHONDROPLASTY OF KNEE Left 8/22/2019    Procedure: CHONDROPLASTY, KNEE;  Surgeon: Shelbi Hughes MD;  Location: [facility] OR;  Service: Orthopedics;  Laterality: Left;    EPIDURAL STEROID INJECTION N/A 3/23/2022    Procedure: INJECTION, STEROID, EPIDURAL, C7-T1 IL;  Surgeon: Anthony Schaefer MD;  Location: [facility] PAIN MGT;  Service: Pain Management;  Laterality: N/A;    EPIDURAL STEROID INJECTION N/A 8/10/2022    Procedure: INJECTION, STEROID, EPIDURAL,  C7-T1 IL CONTRAST;  Surgeon: Anthony Schaefer MD;  Location: [facility] PAIN MGT;  Service: Pain Management;  Laterality: N/A;    HERNIA REPAIR      KNEE ARTHROSCOPY W/ MENISCECTOMY Left 8/22/2019    Procedure: ARTHROSCOPY, KNEE, WITH MEDIAL AND LATERAL MENISCECTOMY;  Surgeon: Shelbi Hughes MD;  Location: [facility] OR;  Service: Orthopedics;  Laterality: Left;    KNEE ARTHROSCOPY W/ PLICA EXCISION Left 8/22/2019    Procedure: EXCISION, MEDIAL PLICA, KNEE, ARTHROSCOPIC;  Surgeon: Shelbi Hughes MD;  Location: [facility] OR;  Service: Orthopedics;  Laterality: Left;    LIPOMA RESECTION      abdomen    REMOVAL OF FOREIGN BODY FROM LOWER EXTREMITY Left 8/22/2019    Procedure: REMOVAL, FOREIGN BODY, LOOSE BODY,  LOWER EXTREMITY;  Surgeon: Shelbi Hughes MD;  Location: [facility] OR;  Service: Orthopedics;  Laterality: Left;    SHOULDER SURGERY      left    SYNOVECTOMY OF KNEE Left 8/22/2019    Procedure: PARTIAL SYNOVECTOMY, KNEE;  Surgeon: Shelbi Hughes MD;  Location: [facility] OR;  Service: Orthopedics;  Laterality: Left;    TILT TABLE TEST N/A 7/9/2018    Procedure: TILT TABLE TEST;  Surgeon: Leonardo Whitfield MD;  Location: [facility] CATH LAB;  Service: Cardiology;  Laterality: N/A;  Syncope, HUT, DM, 3 PREP    TRANSFORAMINAL EPIDURAL INJECTION OF STEROID Right 10/9/2019    Procedure: LUMBAR TRANSFORAMINAL RIGHT L5/S1;  Surgeon: Anthony Schaefer MD;  Location: [facility] PAIN MGT;  Service: Pain Management;  Laterality: Right;  NEEDS CONSENT    TRANSFORAMINAL EPIDURAL INJECTION OF STEROID  Left 11/18/2020    Procedure: LUMBAR TRANSFORAMINAL LEFT L5/S1 DIRECT REFERRAL;  Surgeon: Anthony Schaefer MD;  [facility];  Service: Pain Management;  Laterality: Left;  NEEDS CONSENT     Social History     Socioeconomic History    Marital status:    Tobacco Use    Smoking status: Never Smoker    Smokeless tobacco: Never Used   Substance and Sexual Activity    Alcohol use: No    Drug use: No     Family History   Problem Relation Age of Onset    Colon cancer Father        Review of patient's allergies indicates:  No Known Allergies    Current Outpatient Medications   Medication Sig    amLODIPine (NORVASC) 10 MG tablet Take 1 tablet (10 mg total) by mouth once daily.    diazePAM (VALIUM) 10 MG Tab Take 1 tablet (10 mg total) by mouth daily as needed (30-60 minutes prior to procedure to help anxiety). (Patient not taking: No sig reported)    gabapentin (NEURONTIN) 300 MG capsule Take 1 capsule (300 mg total) by mouth as needed (pain).    losartan-hydrochlorothiazide 100-12.5 mg (HYZAAR) 100-12.5 mg Tab Take 1 tablet by mouth once daily.    rosuvastatin (CRESTOR) 10 MG tablet Take 1 tablet (10 mg total) by mouth once daily.     No current facility-administered medications for this visit.       REVIEW OF SYSTEMS:    GENERAL:  No weight loss, malaise or fevers.  HEENT:   No recent changes in vision or hearing  NECK:  Negative for lumps, no difficulty with swallowing.  RESPIRATORY:  Negative for cough, wheezing or shortness of breath, patient denies any recent URI.  CARDIOVASCULAR:  Negative for chest pain, leg swelling or palpitations. HTN  GI:  Negative for abdominal discomfort, blood in stools or black stools or change in bowel habits.  MUSCULOSKELETAL:  See HPI.  SKIN:  Negative for lesions, rash, and itching.  PSYCH:  No mood disorder or recent psychosocial stressors.  Patient's sleep is not disturbed secondary to pain.  HEMATOLOGY/LYMPHOLOGY:  Negative for prolonged bleeding,  bruising easily or swollen nodes.  Patient is not currently taking any anti-coagulants  ENDO: No history of diabetes or thyroid dysfunction  NEURO:   No history of headaches, syncope, paralysis, seizures or tremors.  All other reviewed and negative other than HPI.    OBJECTIVE:    Exam limited due to virtual visit:  GENERAL: Well appearing, in no acute distress, alert and oriented x3.  PSYCH:  Mood and affect appropriate.    Previous physical exam:  There were no vitals taken for this visit.    PHYSICAL EXAMINATION:    GENERAL: Well appearing, in no acute distress, alert and oriented x3.  PSYCH:  Mood and affect appropriate.  SKIN: Skin color, texture, turgor normal, no rashes or lesions.  HEAD/FACE:  Normocephalic, atraumatic. Cranial nerves grossly intact.  NECK:  There is pain with palpation over cervical paraspinals and trapezius muscles on the right. Full ROM with pain on lateral rotation. Spurling's negative bilaterally.    EXTREMITIES:  No deformities, edema, or skin discoloration. Good capillary refill.  MUSCULOSKELETAL:  Bilateral upper extremity strength is normal and symmetric.  No atrophy or tone abnormalities are noted.  NEURO: No clonus. No loss of sensation is noted. Negative Marcelino's bilaterally.  GAIT: Normal.    ASSESSMENT: 51 y.o. year old male with history of neck pain, consistent with     Encounter Diagnoses   Name Primary?    Cervical radiculopathy Yes    Cervical spondylosis     DDD (degenerative disc disease), cervical     Myofascial pain        PLAN:     - Previous imaging was reviewed and discussed with the patient today.    - He is s/p C7/T1 IL NEIDA with significant relief.     - Consider cervical MBB in the future.     - Continue follow up with Neurosurgery as needed.     - Continue Flexeril 5 mg TID PRN muscle pain.      - Continue home stretches.     - RTC PRN.     - Dr. Schaefer was consulted on the patient and agrees with this plan.    The above plan and management options were  discussed at length with patient. Patient is in agreement with the above and verbalized understanding.     Leela Marshall  08/23/2022

## 2022-09-09 ENCOUNTER — PATIENT MESSAGE (OUTPATIENT)
Dept: ENDOSCOPY | Facility: HOSPITAL | Age: 52
End: 2022-09-09
Payer: COMMERCIAL

## 2022-09-09 DIAGNOSIS — Z12.11 SPECIAL SCREENING FOR MALIGNANT NEOPLASMS, COLON: Primary | ICD-10-CM

## 2022-09-09 RX ORDER — POLYETHYLENE GLYCOL 3350, SODIUM SULFATE ANHYDROUS, SODIUM BICARBONATE, SODIUM CHLORIDE, POTASSIUM CHLORIDE 236; 22.74; 6.74; 5.86; 2.97 G/4L; G/4L; G/4L; G/4L; G/4L
4 POWDER, FOR SOLUTION ORAL ONCE
Qty: 4000 ML | Refills: 0 | Status: SHIPPED | OUTPATIENT
Start: 2022-09-09 | End: 2022-09-09

## 2022-09-12 ENCOUNTER — PATIENT MESSAGE (OUTPATIENT)
Dept: RESEARCH | Facility: HOSPITAL | Age: 52
End: 2022-09-12
Payer: COMMERCIAL

## 2022-09-13 ENCOUNTER — PATIENT MESSAGE (OUTPATIENT)
Dept: RESEARCH | Facility: HOSPITAL | Age: 52
End: 2022-09-13
Payer: COMMERCIAL

## 2022-09-13 ENCOUNTER — RESEARCH ENCOUNTER (OUTPATIENT)
Dept: RESEARCH | Facility: HOSPITAL | Age: 52
End: 2022-09-13
Payer: COMMERCIAL

## 2022-09-13 ENCOUNTER — PATIENT MESSAGE (OUTPATIENT)
Dept: INTERNAL MEDICINE | Facility: CLINIC | Age: 52
End: 2022-09-13
Payer: COMMERCIAL

## 2022-09-13 NOTE — PROGRESS NOTES
The Patient was called today regarding the patient's participation in (IRB #[id number] PI: Cyrus).   The Verbal Informed Consent was read and discussed by the consenter. The following was discussed:  Types of specimens to be collected  All medical information released to researchers will be stripped of identifiers and no patient information will be given to anyone outside of this research project.   Participating in a research study is not the same as getting regular medical care and will not improve the patient's health. The purpose of a research study is to gather information.  Being in this study does not interfere with your regular medical care.  The patient/LAR does not have to participate in this study. If they do not join, their care at [facility] will not be affected.  The person granting permission was provided adequate time to ask questions regarding the scope and purpose of the study.  Permission was obtained by telephone.   The above statements were read by the person obtaining permission to the person granting permission and witnessed by Di Loo , who also confirmed the patient's consent to the study. The witness information was documented on the verbal consent form as well.  This Verbal Informed Consent process was conducted prior to initiation of any study procedures.

## 2022-09-14 ENCOUNTER — RESEARCH ENCOUNTER (OUTPATIENT)
Dept: RESEARCH | Facility: HOSPITAL | Age: 52
End: 2022-09-14

## 2022-09-14 NOTE — PROGRESS NOTES
Subject came in today for lab draw for the [facility] study (IRB [id number] PI: Benja).       Patient was consented for the study on September 13,2022. Labs were drawn September 14,2022 at 08:00 per protocol

## 2022-09-16 ENCOUNTER — HOSPITAL ENCOUNTER (OUTPATIENT)
Facility: HOSPITAL | Age: 52
Discharge: HOME | End: 2022-09-16
Attending: COLON & RECTAL SURGERY | Admitting: COLON & RECTAL SURGERY
Payer: COMMERCIAL

## 2022-09-16 ENCOUNTER — ANESTHESIA EVENT (OUTPATIENT)
Dept: ENDOSCOPY | Facility: HOSPITAL | Age: 52
End: 2022-09-16
Payer: COMMERCIAL

## 2022-09-16 ENCOUNTER — ANESTHESIA (OUTPATIENT)
Dept: ENDOSCOPY | Facility: HOSPITAL | Age: 52
End: 2022-09-16
Payer: COMMERCIAL

## 2022-09-16 VITALS
RESPIRATION RATE: 23 BRPM | OXYGEN SATURATION: 97 % | SYSTOLIC BLOOD PRESSURE: 140 MMHG | DIASTOLIC BLOOD PRESSURE: 80 MMHG | HEART RATE: 69 BPM

## 2022-09-16 DIAGNOSIS — Z80.0 FAMILY HISTORY OF COLON CANCER IN FATHER: Primary | Chronic | ICD-10-CM

## 2022-09-16 DIAGNOSIS — Z86.010 HISTORY OF COLON POLYPS: ICD-10-CM

## 2022-09-16 PROCEDURE — 37000009 HC ANESTHESIA EA ADD 15 MINS: Performed by: COLON & RECTAL SURGERY

## 2022-09-16 PROCEDURE — 45385 COLONOSCOPY W/LESION REMOVAL: CPT | Mod: PT | Performed by: COLON & RECTAL SURGERY

## 2022-09-16 PROCEDURE — 37000008 HC ANESTHESIA 1ST 15 MINUTES: Performed by: COLON & RECTAL SURGERY

## 2022-09-16 PROCEDURE — 45385 PR COLONOSCOPY,REMV LESN,SNARE: ICD-10-PCS | Mod: 33,,, | Performed by: COLON & RECTAL SURGERY

## 2022-09-16 PROCEDURE — 63600175 PHARM REV CODE 636 W HCPCS: Performed by: NURSE ANESTHETIST, CERTIFIED REGISTERED

## 2022-09-16 PROCEDURE — 45385 COLONOSCOPY W/LESION REMOVAL: CPT | Mod: 33,,, | Performed by: COLON & RECTAL SURGERY

## 2022-09-16 PROCEDURE — 25000003 PHARM REV CODE 250: Performed by: NURSE ANESTHETIST, CERTIFIED REGISTERED

## 2022-09-16 PROCEDURE — E9220 PRA ENDO ANESTHESIA: ICD-10-PCS | Mod: 33,,, | Performed by: NURSE ANESTHETIST, CERTIFIED REGISTERED

## 2022-09-16 PROCEDURE — E9220 PRA ENDO ANESTHESIA: HCPCS | Mod: 33,,, | Performed by: NURSE ANESTHETIST, CERTIFIED REGISTERED

## 2022-09-16 PROCEDURE — 88305 TISSUE EXAM BY PATHOLOGIST: CPT | Mod: 26,,, | Performed by: PATHOLOGY

## 2022-09-16 PROCEDURE — 88305 TISSUE EXAM BY PATHOLOGIST: ICD-10-PCS | Mod: 26,,, | Performed by: PATHOLOGY

## 2022-09-16 PROCEDURE — 88305 TISSUE EXAM BY PATHOLOGIST: CPT | Performed by: PATHOLOGY

## 2022-09-16 PROCEDURE — 27201089 HC SNARE, DISP (ANY): Performed by: COLON & RECTAL SURGERY

## 2022-09-16 RX ORDER — PROPOFOL 10 MG/ML
VIAL (ML) INTRAVENOUS
Status: DISCONTINUED | OUTPATIENT
Start: 2022-09-16 | End: 2022-09-16

## 2022-09-16 RX ORDER — LIDOCAINE HYDROCHLORIDE 20 MG/ML
INJECTION INTRAVENOUS
Status: DISCONTINUED | OUTPATIENT
Start: 2022-09-16 | End: 2022-09-16

## 2022-09-16 RX ORDER — SODIUM CHLORIDE 9 MG/ML
INJECTION, SOLUTION INTRAVENOUS CONTINUOUS
Status: DISCONTINUED | OUTPATIENT
Start: 2022-09-16 | End: 2022-09-16 | Stop reason: HOSPADM

## 2022-09-16 RX ADMIN — SODIUM CHLORIDE: 0.9 INJECTION, SOLUTION INTRAVENOUS at 07:09

## 2022-09-16 RX ADMIN — PROPOFOL 50 MG: 10 INJECTION, EMULSION INTRAVENOUS at 07:09

## 2022-09-16 RX ADMIN — GLYCOPYRROLATE 0.1 MG: 0.2 INJECTION, SOLUTION INTRAMUSCULAR; INTRAVITREAL at 07:09

## 2022-09-16 RX ADMIN — PROPOFOL 150 MCG/KG/MIN: 10 INJECTION, EMULSION INTRAVENOUS at 07:09

## 2022-09-16 RX ADMIN — PROPOFOL 30 MG: 10 INJECTION, EMULSION INTRAVENOUS at 07:09

## 2022-09-16 RX ADMIN — LIDOCAINE HYDROCHLORIDE 40 MG: 20 INJECTION INTRAVENOUS at 07:09

## 2022-09-16 NOTE — TRANSFER OF CARE
Anesthesia Transfer of Care Note    Patient: Bhaskar Stiles    Procedure(s) Performed: Procedure(s) (LRB):  COLONOSCOPY (N/A)    Patient location: PACU    Anesthesia Type: general    Transport from OR: Transported from OR on room air with adequate spontaneous ventilation    Post pain: adequate analgesia    Post assessment: no apparent anesthetic complications    Post vital signs: stable    Level of consciousness: awake    Nausea/Vomiting: no nausea/vomiting    Complications: none    Transfer of care protocol was followed      Last vitals:   Visit Vitals  BP (!) 101/59 (BP Location: Left arm, Patient Position: Lying)   Pulse 86   Resp 12   SpO2 97%

## 2022-09-16 NOTE — ANESTHESIA POSTPROCEDURE EVALUATION
Anesthesia Post Evaluation    Patient: Bhaskar Stiles    Procedure(s) Performed: Procedure(s) (LRB):  COLONOSCOPY (N/A)    Final Anesthesia Type: general      Patient location during evaluation: PACU  Patient participation: Yes- Able to Participate  Level of consciousness: awake and alert and oriented  Pain management: adequate  Airway patency: patent    PONV status at discharge: No PONV  Anesthetic complications: no      Cardiovascular status: blood pressure returned to baseline and hemodynamically stable  Respiratory status: unassisted  Hydration status: euvolemic  Follow-up not needed.          Vitals Value Taken Time   /80 09/16/22 0840     09/16/22 1113   Pulse 69 09/16/22 0840   Resp 23 09/16/22 0840   SpO2 97 % 09/16/22 0840         Event Time   Out of Recovery 08:45:18         Pain/Betty Score: Betty Score: 9 (9/16/2022  8:10 AM)

## 2022-09-16 NOTE — PROVATION PATIENT INSTRUCTIONS
Discharge Summary/Instructions after an Endoscopic Procedure  Patient Name: Bhaskar Stiles  Patient MRN: [id number]  Patient YOB: 1970 Friday, September 16, 2022  Augusto Long MD  Dear patient,  As a result of recent federal legislation (The Federal Cures Act), you may   receive lab or pathology results from your procedure in your [facility]   account before your physician is able to contact you. Your physician or   their representative will relay the results to you with their   recommendations at their soonest availability.  Thank you,  RESTRICTIONS:  During your procedure today, you received medications for sedation.  These   medications may affect your judgment, balance and coordination.  Therefore,   for 24 hours, you have the following restrictions:   - DO NOT drive a car, operate machinery, make legal/financial decisions,   sign important papers or drink alcohol.    ACTIVITY:  Today: no heavy lifting, straining or running due to procedural   sedation/anesthesia.  The following day: return to full activity including work.  DIET:  Eat and drink normally unless instructed otherwise.     TREATMENT FOR COMMON SIDE EFFECTS:  - Mild abdominal pain, nausea, belching, bloating or excessive gas:  rest,   eat lightly and use a heating pad.  - Sore Throat: treat with throat lozenges and/or gargle with warm salt   water.  - Because air was used during the procedure, expelling large amounts of air   from your rectum or belching is normal.  - If a bowel prep was taken, you may not have a bowel movement for 1-3 days.    This is normal.  SYMPTOMS TO WATCH FOR AND REPORT TO YOUR PHYSICIAN:  1. Abdominal pain or bloating, other than gas cramps.  2. Chest pain.  3. Back pain.  4. Signs of infection such as: chills or fever occurring within 24 hours   after the procedure.  5. Rectal bleeding, which would show as bright red, maroon, or black stools.   (A tablespoon of blood from the rectum is not serious, especially  if   hemorrhoids are present.)  6. Vomiting.  7. Weakness or dizziness.  GO DIRECTLY TO THE NEAREST EMERGENCY ROOM IF YOU HAVE ANY OF THE FOLLOWING:      Difficulty breathing              Chills and/or fever over 101 F   Persistent vomiting and/or vomiting blood   Severe abdominal pain   Severe chest pain   Black, tarry stools   Bleeding- more than one tablespoon   Any other symptom or condition that you feel may need urgent attention  Your doctor recommends these additional instructions:  If any biopsies were taken, your doctors clinic will contact you in 1 to 2   weeks with any results.  - Discharge patient to home (ambulatory).   - Resume previous diet.   - Continue present medications.   - Await pathology results.   - Repeat colonoscopy in 5 years for surveillance.  For questions, problems or results please call your physician - Augusto Long MD at Work:  [phone].  [facility] [place], EMERGENCY ROOM PHONE NUMBER: [phone]  IF A COMPLICATION OR EMERGENCY SITUATION ARISES AND YOU ARE UNABLE TO REACH   YOUR PHYSICIAN - GO DIRECTLY TO THE EMERGENCY ROOM.  Augusto Long MD  9/16/2022 8:04:14 AM  This report has been verified and signed electronically.  Dear patient,  As a result of recent federal legislation (The Federal Cures Act), you may   receive lab or pathology results from your procedure in your [facility]   account before your physician is able to contact you. Your physician or   their representative will relay the results to you with their   recommendations at their soonest availability.  Thank you,  PROVATION

## 2022-09-16 NOTE — ANESTHESIA PREPROCEDURE EVALUATION
09/16/2022  Bhaskar Stiles is a 51 y.o., male.    Patient Active Problem List   Diagnosis    Abnormal finding on MRI of brain    History of syncope    LAMONT (obstructive sleep apnea)    Chronic pain    Essential hypertension    Family history of colon cancer in father       Pre-op Assessment    I have reviewed the Patient Summary Reports.     I have reviewed the Nursing Notes. I have reviewed the NPO Status.      Review of Systems      Physical Exam  General: Well nourished    Airway:  Mallampati: II / II  Mouth Opening: Normal  TM Distance: Normal  Tongue: Normal  Neck ROM: Normal ROM    Chest/Lungs:  Clear to auscultation    Heart:  Rate: Normal  Rhythm: Regular Rhythm        Anesthesia Plan  Type of Anesthesia, risks & benefits discussed:    Anesthesia Type: Gen ETT, MAC  Intra-op Monitoring Plan: Standard ASA Monitors  Post Op Pain Control Plan: IV/PO Opioids PRN and multimodal analgesia  Induction:  IV  Airway Plan: Direct  Informed Consent: Informed consent signed with the Patient and all parties understand the risks and agree with anesthesia plan.  All questions answered.   ASA Score: 2  Day of Surgery Review of History & Physical: H&P Update referred to the surgeon/provider.    Ready For Surgery From Anesthesia Perspective.     .

## 2022-09-16 NOTE — H&P
COLONOSCOPY HISTORY AND PHYSICAL EXAM    Procedure : Colonoscopy      INDICATIONS: family history of colon cancer (father) and personal history of colon polyps    Family Hx of CRC: father in  his 60s    Last Colonoscopy:  2017  Findings: 3 polyps       Past Medical History:   Diagnosis Date    Family history of colon cancer in father     Hyperlipidemia     Hypertension     LAMONT (obstructive sleep apnea)     Syncope and collapse      Sedation Problems: NO  Family History   Problem Relation Age of Onset    Colon cancer Father      Fam Hx of Sedation Problems: NO  Social History     Socioeconomic History    Marital status:    Tobacco Use    Smoking status: Never    Smokeless tobacco: Never   Substance and Sexual Activity    Alcohol use: No    Drug use: No       Review of Systems - Negative except   Respiratory ROS: no dyspnea  Cardiovascular ROS: no exertional chest pain  Gastrointestinal ROS: NO abdominal discomfort,  NO rectal bleeding  Musculoskeletal ROS: no muscular pain  Neurological ROS: no recent stroke    Physical Exam:  There were no vitals taken for this visit.  General: no distress  Head: normocephalic  Mallampati Score   Neck: supple, symmetrical, trachea midline  Lungs:  clear to auscultation bilaterally and normal respiratory effort  Heart: regular rate and rhythm and no murmur  Abdomen: soft, non-tender non-distented; bowel sounds normal; no masses,  no organomegaly  Extremities: no cyanosis or edema, or clubbing    ASA:  II    PLAN  COLONOSCOPY.    SedationPlan :MAC    The details of the procedure, the possible need for biopsy or polypectomy and the potential risks including bleeding, perforation, missed polyps were discussed in detail.

## 2022-09-21 LAB
FINAL PATHOLOGIC DIAGNOSIS: NORMAL
GROSS: NORMAL
Lab: NORMAL

## 2022-09-27 ENCOUNTER — TELEPHONE (OUTPATIENT)
Dept: SLEEP MEDICINE | Facility: CLINIC | Age: 52
End: 2022-09-27
Payer: COMMERCIAL

## 2022-09-28 ENCOUNTER — OFFICE VISIT (OUTPATIENT)
Dept: SLEEP MEDICINE | Facility: CLINIC | Age: 52
End: 2022-09-28
Payer: COMMERCIAL

## 2022-09-28 VITALS
HEART RATE: 79 BPM | DIASTOLIC BLOOD PRESSURE: 75 MMHG | WEIGHT: 268.94 LBS | BODY MASS INDEX: 37.65 KG/M2 | HEIGHT: 71 IN | SYSTOLIC BLOOD PRESSURE: 126 MMHG

## 2022-09-28 DIAGNOSIS — G47.33 OSA (OBSTRUCTIVE SLEEP APNEA): Primary | ICD-10-CM

## 2022-09-28 DIAGNOSIS — I10 ESSENTIAL HYPERTENSION: ICD-10-CM

## 2022-09-28 PROCEDURE — 99214 PR OFFICE/OUTPT VISIT, EST, LEVL IV, 30-39 MIN: ICD-10-PCS | Mod: S$GLB,,, | Performed by: NURSE PRACTITIONER

## 2022-09-28 PROCEDURE — 99214 OFFICE O/P EST MOD 30 MIN: CPT | Mod: S$GLB,,, | Performed by: NURSE PRACTITIONER

## 2022-09-28 PROCEDURE — 99999 PR PBB SHADOW E&M-EST. PATIENT-LVL III: CPT | Mod: PBBFAC,,, | Performed by: NURSE PRACTITIONER

## 2022-09-28 PROCEDURE — 99999 PR PBB SHADOW E&M-EST. PATIENT-LVL III: ICD-10-PCS | Mod: PBBFAC,,, | Performed by: NURSE PRACTITIONER

## 2022-09-28 NOTE — PROGRESS NOTES
"Cc: LAMONT, initial visit with me, last seen sleep clinic 10/2021     Dreamstation 2 set 10-18 cm . Has ongoing struggle keeping mask on at night when used, maybe 7d since seen. He is sleepy when sedentary. Loves bread and works long days/eats late. Interested in alternative therapies.Using nose pillows (preferred over triangular FFM) but is mouth breather night. Initially felt pressure sensation over chest with use and pressure settings were reduced supposedly to 5-12cm and encouraged to improve nasal patency. Motivated to retrial pap  BPstable  1 night remote review in 2019 ~7h kept on, ahi 1.0 and 90% tile 11cm    Baseline Sleep Study: 10/05/2018  lb. The overall AHI was 10 and overall RDI was 15. The oxygen max was 88.7 % and % time < 90% SpO2 was 0.2 %. Most likely underestimate of severity       Physical Exam:   /75   Pulse 79   Ht 5' 11" (1.803 m)   Wt 122 kg (268 lb 15.4 oz)   BMI 37.51 kg/m²   GENERAL: Well groomed    Assessment:   LAMONT, mild. Continued struggle to acclimate to PAP/keep mask on effectively. Has ongoing symptoms, motivated to retrial PAP  HTN, stable      Plan:  Remotely adjusted apap to 5-11cm and rtc 5-wks re-eval adherence with low profile FFM. If ongoing pressure intolerance consider bipap titration study.   We discussed potential treatment options, which could include weight loss , mandibular advancement splint by dentist, or referral for surgical consideration (not candidate due to BMI) Discussed control of LAMONT when effectively used  See pcp re: HTN mgt/continue meds          "

## 2022-12-14 ENCOUNTER — PATIENT MESSAGE (OUTPATIENT)
Dept: PAIN MEDICINE | Facility: CLINIC | Age: 52
End: 2022-12-14
Payer: COMMERCIAL

## 2022-12-21 ENCOUNTER — OFFICE VISIT (OUTPATIENT)
Dept: PAIN MEDICINE | Facility: CLINIC | Age: 52
End: 2022-12-21
Payer: COMMERCIAL

## 2022-12-21 VITALS
SYSTOLIC BLOOD PRESSURE: 136 MMHG | DIASTOLIC BLOOD PRESSURE: 90 MMHG | WEIGHT: 265.63 LBS | HEIGHT: 71 IN | HEART RATE: 89 BPM | BODY MASS INDEX: 37.19 KG/M2

## 2022-12-21 DIAGNOSIS — M79.18 MYOFASCIAL PAIN: ICD-10-CM

## 2022-12-21 DIAGNOSIS — M47.812 CERVICAL SPONDYLOSIS: ICD-10-CM

## 2022-12-21 DIAGNOSIS — M50.30 DDD (DEGENERATIVE DISC DISEASE), CERVICAL: ICD-10-CM

## 2022-12-21 DIAGNOSIS — M54.12 CERVICAL RADICULOPATHY: Primary | ICD-10-CM

## 2022-12-21 PROCEDURE — 99213 PR OFFICE/OUTPT VISIT, EST, LEVL III, 20-29 MIN: ICD-10-PCS | Mod: S$GLB,,, | Performed by: NURSE PRACTITIONER

## 2022-12-21 PROCEDURE — 99213 OFFICE O/P EST LOW 20 MIN: CPT | Mod: S$GLB,,, | Performed by: NURSE PRACTITIONER

## 2022-12-21 PROCEDURE — 99999 PR PBB SHADOW E&M-EST. PATIENT-LVL III: ICD-10-PCS | Mod: PBBFAC,,, | Performed by: NURSE PRACTITIONER

## 2022-12-21 PROCEDURE — 99999 PR PBB SHADOW E&M-EST. PATIENT-LVL III: CPT | Mod: PBBFAC,,, | Performed by: NURSE PRACTITIONER

## 2022-12-21 RX ORDER — METHYLPREDNISOLONE 4 MG/1
TABLET ORAL
Qty: 1 EACH | Refills: 0 | Status: SHIPPED | OUTPATIENT
Start: 2022-12-21 | End: 2023-01-11

## 2022-12-21 NOTE — PROGRESS NOTES
Chronic Pain - Established visit        Referring Physician: Shauna Valencia    Chief Complaint   Patient presents with    Neck Pain        SUBJECTIVE: Disclaimer: This note has been generated using voice-recognition software. There may be typographical errors that have been missed during proof-reading    Interval History 12/21/2022:  The patient returns to clinic today for follow up of neck pain. He reports increased pain over the last month. He denies any inciting event. He reports increased neck pain that radiates into right shoulder to his arm to his hand. He describes this pain as tingling in nature. He denies any left arm pain. He does feel as though his pain has improved over the last few days. He is no longer taking Gabapentin or Flexeril. He states that these were not very helpful. He did take Tramadol from a previous prescription. He would like a refill for this. He denies any other health changes. His pain today is 0/10.    Interval History 8/23/2022:  The patient returns to clinic today for follow up of neck pain via virtual visit. He is s/p C7/T1 IL NEIDA on 8/10/2022. He reports 100% relief of his pain. He denies any neck or radicular arm pain at this time. He denies any muscle spasms. He has been able to discontinue Gabapentin and Flexeril. He continues to perform a home exercise routine. He did see Neurosurgery but does not wish to pursue this at this time. He denies any other health changes.     Interval History 7/22/2022:  The patient returns to clinic today for follow up of neck pain. He reports increased neck pain over the last few days. He reports neck pain that radiates into both shoulder. He denies any radicular arm pain. He does report muscle tightness and spasms. He is having trouble sleeping due to pain. He does use heat with benefit. He did not have to use medrol dose pack while on vacation. He continues to take Gabapentin, though not consistently. He also takes Flexeril as needed. He is  frustrated by his persistent pain. He would like to find a more permanent solution. He has tried physical therapy in the past but this worsened his pain. He denies any other health changes. His pain today is 8/10.    Interval History 6/14/2022:  The patient returns to clinic today for follow up of neck pain. He reports limited relief with trigger point injections. He did have relief with Flexeril. He reports intermittent neck pain that radiates into both shoulder. He denies any radiating arm pain. He continues to report muscle tightness. He is going on vacation at the end of the month. He is worried about a flare of pain. He denies any other health changes. His pain today is 0/10.    Interval History 5/27/2022:  The patient returns to clinic today for follow up of neck pain. He reports increased neck pain that radiates into both shoulders. He denies any radiating arm pain. His pain is worse activity. He describes this pain as tight. He denies any weakness. He denies any other health changes His pain today is 6/10.    Interval History 04/11/2022:   Pt  returns to clinic for follow up of cervical pain. Pt is S/P NEIDA C7-T1 on 03/23/22 with Dr. Schaefer. Pt states that his pain is much improved since NEIDA. Pt states that in after 1 week post NEIDA,  his pain got significantly better. Currently, pt denies any pain.Pt states he couldn't tolerate  physical therapy before receiving  NEIDA due to severe neck pain. Today, pt's level of pain is 0/10. Pt denies any numbness, tingling sensation to arms. Pt also denies any coordination problems, loss of bowel and bladder control.     Interval History 2/22/2022:  The patient is here for new onset neck pain. We did previously treat him for back pain which has overall been well controlled. He does have some right lateral leg numbness. However, his primary complaint today is neck pain with radiation into the right shoulder. He has significant right sided muscle tightness and pain. His  cervical MRI does show severe stenosis. He has been evaluated by [facility] Sp[facility] in the past. He recently saw his orthopedist, Dr. Eastman, who performed osteopathic manipulation with short term benefit only. He has intermittent numbness but denies weakness. No bowel or bladder changes. His pain today is 10/10.    Initial encounter:    Bhaskar Stiles presents to the clinic for the evaluation of back and leg pain. He is s/p left L5/S1 TF NEIDA with 80% relief of leg pain. This was a direct referral from Shauna Valencia. He also had benefit with right L5/S1 in the past.  Currently, he is having some back pain with radiation into the anterior thighs. He also has a bulge at L3/4 with NF narrowing on previous MRI.  He is currently in PT which he finds helpful. He feels as though this will be helpful long-term for him. The pain started years ago. He also has a history of right knee arthoscopic surgery last year.    Brief history:    Pain Description:    Pt denies any pain today.     Patient denies night fever/night sweats, urinary incontinence and bowel incontinence.  Patient denies any suicidal or homicidal ideations    Pain Medications: None  Current:  None    Tried in Past:  NSAIDs - Mobic  TCA -Never  SNRI -Never  Anti-convulsants - Gabapentin  Muscle Relaxants - Robaxin  Opioids- [place]    Physical Therapy/Home Exercise: yes       report:  Not applicable    Pain Procedures:   10/9/20 right L5/S1 TF NEIDA- 80% relief  11/18/20 left L5/S1 TF NEIDA- 80% relief  03/23/22 C7-T1 NEIDA-100% relief.   8/10/2022- C7/T1 IL NEIDA    Chiropractor -never  Acupuncture - never  TENS unit -never  Spinal decompression -never  Joint replacement -never    Imaging:     Narrative & Impression  EXAMINATION:  MRI CERVICAL SPINE WITHOUT CONTRAST     CLINICAL HISTORY:  Neck pain, normal neuro exam;C-spine canal stenosis;Cervical osteoarthritis;Cervical radiculopathy;concern for canal stenosis; Other spondylosis, cervical region      TECHNIQUE:  Multiplanar, multisequence MR images of the cervical spine were performed without the administration of contrast.     COMPARISON:  Prior radiograph     FINDINGS:  There is straightening and mild reversal of cervical lordosis.  Trace retrolisthesis of C3 on C4. No concerning marrow signal present.  Multilevel disc desiccation present with height loss most noted at C3-4, C5-6 and C6-7.     Posterior fossa is unremarkable.  Spinal cord demonstrates no concerning signal abnormality.     Neck soft tissue structures unremarkable.     C2-C3: No spinal canal stenosis or neural foraminal narrowing.     C3-C4: Posterior disc osteophyte complex present effacing the anterior thecal sac with mild spinal canal stenosis.  Right greater than left uncovertebral degenerative findings results in severe right and moderate to severe left neural foraminal narrowing.     C4-C5: No significant spinal canal stenosis or right neural foraminal narrowing.  Left uncovertebral and facet degenerative findings result in mild neural foraminal narrowing.     C5-C6: Posterior disc osteophyte complex present asymmetric to the right indenting the thecal sac without high-grade spinal canal stenosis.  Facet and uncovertebral degenerative findings present with mild-to-moderate left and moderate to severe right neural foraminal narrowing.     C6-C7: Posterior disc osteophyte complex present asymmetric to the left indenting the thecal sac with mild spinal canal stenosis.  Uncovertebral degenerative findings greater on the left with moderate neural foraminal narrowing.  Minimal right neural foraminal narrowing present.     C7-T1: No spinal canal stenosis or neural foraminal narrowing.     Impression:     Multilevel degenerative findings as above.    Past Medical History:   Diagnosis Date    Family history of colon cancer in father     Hyperlipidemia     Hypertension     LAMONT (obstructive sleep apnea)     Syncope and collapse      Past Surgical  History:   Procedure Laterality Date    CHONDROPLASTY OF KNEE Left 8/22/2019    Procedure: CHONDROPLASTY, KNEE;  Surgeon: Shelbi Hughes MD;  Location: [facility] OR;  Service: Orthopedics;  Laterality: Left;    COLONOSCOPY N/A 9/16/2022    Procedure: COLONOSCOPY;  Surgeon: RYAN Long MD;  Location: [facility] ENDO (4TH FLR);  Service: Endoscopy;  Laterality: N/A;  fully vaccinated, clears 4 hrs prior-am prep, inst portal-MS    EPIDURAL STEROID INJECTION N/A 3/23/2022    Procedure: INJECTION, STEROID, EPIDURAL, C7-T1 IL;  Surgeon: Anthony Schaefer MD;  Location: [facility] PAIN MGT;  Service: Pain Management;  Laterality: N/A;    EPIDURAL STEROID INJECTION N/A 8/10/2022    Procedure: INJECTION, STEROID, EPIDURAL,  C7-T1 IL CONTRAST;  Surgeon: Anthony Schaefer MD;  Location: [facility] PAIN MGT;  Service: Pain Management;  Laterality: N/A;    HERNIA REPAIR      KNEE ARTHROSCOPY W/ MENISCECTOMY Left 8/22/2019    Procedure: ARTHROSCOPY, KNEE, WITH MEDIAL AND LATERAL MENISCECTOMY;  Surgeon: Shelbi Hughes MD;  Location: [facility] OR;  Service: Orthopedics;  Laterality: Left;    KNEE ARTHROSCOPY W/ PLICA EXCISION Left 8/22/2019    Procedure: EXCISION, MEDIAL PLICA, KNEE, ARTHROSCOPIC;  Surgeon: Shelbi Hughes MD;  Location: [facility] OR;  Service: Orthopedics;  Laterality: Left;    LIPOMA RESECTION      abdomen    REMOVAL OF FOREIGN BODY FROM LOWER EXTREMITY Left 8/22/2019    Procedure: REMOVAL, FOREIGN BODY, LOOSE BODY,  LOWER EXTREMITY;  Surgeon: Shelbi Hughes MD;  Location: [facility] OR;  Service: Orthopedics;  Laterality: Left;    SHOULDER SURGERY      left    SYNOVECTOMY OF KNEE Left 8/22/2019    Procedure: PARTIAL SYNOVECTOMY, KNEE;  Surgeon: Shelbi Hughes MD;  Location: [facility] OR;  Service: Orthopedics;  Laterality: Left;    TILT TABLE TEST N/A 7/9/2018    Procedure: TILT TABLE TEST;  Surgeon: Leonardo Whitfield MD;  Location: [facility] CATH LAB;  Service: Cardiology;  Laterality: N/A;  Syncope, HUT, DM, 3 PREP    TRANSFORAMINAL EPIDURAL INJECTION OF STEROID  Right 10/9/2019    Procedure: LUMBAR TRANSFORAMINAL RIGHT L5/S1;  Surgeon: Anthony Schaefer MD;  [place] [facility] [place] [facility] [place]  Service: Pain Management;  Laterality: Right;  NEEDS CONSENT    TRANSFORAMINAL EPIDURAL INJECTION OF STEROID Left 11/18/2020    Procedure: LUMBAR TRANSFORAMINAL LEFT L5/S1 DIRECT REFERRAL;  Surgeon: Anthony Schaefer MD;  Location: [facility] PAIN MGT;  Service: Pain Management;  Laterality: Left;  NEEDS CONSENT     Social History     Socioeconomic History    Marital status:    Tobacco Use    Smoking status: Never    Smokeless tobacco: Never   Substance and Sexual Activity    Alcohol use: No    Drug use: No     Family History   Problem Relation Age of Onset    Colon cancer Father        Review of patient's allergies indicates:  No Known Allergies    Current Outpatient Medications   Medication Sig    amLODIPine (NORVASC) 10 MG tablet Take 1 tablet (10 mg total) by mouth once daily.    losartan-hydrochlorothiazide 100-12.5 mg (HYZAAR) 100-12.5 mg Tab Take 1 tablet by mouth once daily.     No current facility-administered medications for this visit.       REVIEW OF SYSTEMS:    GENERAL:  No weight loss, malaise or fevers.  HEENT:   No recent changes in vision or hearing  NECK:  Negative for lumps, no difficulty with swallowing.  RESPIRATORY:  Negative for cough, wheezing or shortness of breath, patient denies any recent URI.  CARDIOVASCULAR:  Negative for chest pain, leg swelling or palpitations. HTN  GI:  Negative for abdominal discomfort, blood in stools or black stools or change in bowel habits.  MUSCULOSKELETAL:  See HPI.  SKIN:  Negative for lesions, rash, and itching.  PSYCH:  No mood disorder or recent psychosocial stressors.  Patient's sleep is not disturbed secondary to pain.  HEMATOLOGY/LYMPHOLOGY:  Negative for prolonged bleeding, bruising easily or swollen nodes.  Patient is not currently taking any anti-coagulants  ENDO: No history of diabetes or thyroid dysfunction  NEURO:   No  "history of headaches, syncope, paralysis, seizures or tremors.  All other reviewed and negative other than HPI.    OBJECTIVE:    BP (!) 136/90 (BP Location: Left arm, Patient Position: Sitting, BP Method: Large (Automatic))   Pulse 89   Ht 5' 11" (1.803 m)   Wt 120.5 kg (265 lb 10.5 oz)   BMI 37.05 kg/m²     PHYSICAL EXAMINATION:    GENERAL: Well appearing, in no acute distress, alert and oriented x3.  PSYCH:  Mood and affect appropriate.  SKIN: Skin color, texture, turgor normal, no rashes or lesions.  HEAD/FACE:  Normocephalic, atraumatic. Cranial nerves grossly intact.  NECK:  There is no pain with palpation over cervical paraspinals and trapezius muscles on the right. Full ROM with mild pain on lateral rotation. Spurling's negative bilaterally.    EXTREMITIES:  No deformities, edema, or skin discoloration. Good capillary refill.  MUSCULOSKELETAL:  Bilateral upper extremity strength is normal and symmetric.  No atrophy or tone abnormalities are noted.  NEURO: No clonus. No loss of sensation is noted. Negative Amrcelino's bilaterally.  GAIT: Normal.    ASSESSMENT: 52 y.o. year old male with history of neck pain, consistent with     Encounter Diagnoses   Name Primary?    Cervical radiculopathy Yes    Cervical spondylosis     DDD (degenerative disc disease), cervical     Myofascial pain          PLAN:     - Previous imaging was reviewed and discussed with the patient today.    - We discussed repeat cervical NEIDA, he declined at this time.     - Medrol dose pack.     - We discussed resuming Gabapentin and Flexeril, he declined.     - We discussed that we do not recommend long term opioid medication management. Any opioid initiation requires a discussion with the physician. He verbalized understanding.     - Continue follow up with Neurosurgery as needed.     - Continue home stretches.     - RTC in 1 month with Dr. Schaefer.     The above plan and management options were discussed at length with patient. Patient is " in agreement with the above and verbalized understanding.     Leela Marshall  12/21/2022

## 2022-12-21 NOTE — PROGRESS NOTES
Chronic Pain - Established visit        Referring Physician: Shauna Valencia    No chief complaint on file.       SUBJECTIVE: Disclaimer: This note has been generated using voice-recognition software. There may be typographical errors that have been missed during proof-reading    Interval History 12/21/2022:  The patient returns to clinic today for follow up of neck pain. He reports increased pain He reports increased neck pain that radiates into right shoulder to his arm to his hand. He describes this pain as tingling in nature. He denies any left arm pain. He     Interval History 8/23/2022:  The patient returns to clinic today for follow up of neck pain via virtual visit. He is s/p C7/T1 IL NEIDA on 8/10/2022. He reports 100% relief of his pain. He denies any neck or radicular arm pain at this time. He denies any muscle spasms. He has been able to discontinue Gabapentin and Flexeril. He continues to perform a home exercise routine. He did see Neurosurgery but does not wish to pursue this at this time. He denies any other health changes.     Interval History 7/22/2022:  The patient returns to clinic today for follow up of neck pain. He reports increased neck pain over the last few days. He reports neck pain that radiates into both shoulder. He denies any radicular arm pain. He does report muscle tightness and spasms. He is having trouble sleeping due to pain. He does use heat with benefit. He did not have to use medrol dose pack while on vacation. He continues to take Gabapentin, though not consistently. He also takes Flexeril as needed. He is frustrated by his persistent pain. He would like to find a more permanent solution. He has tried physical therapy in the past but this worsened his pain. He denies any other health changes. His pain today is 8/10.    Interval History 6/14/2022:  The patient returns to clinic today for follow up of neck pain. He reports limited relief with trigger point injections. He did have  relief with Flexeril. He reports intermittent neck pain that radiates into both shoulder. He denies any radiating arm pain. He continues to report muscle tightness. He is going on vacation at the end of the month. He is worried about a flare of pain. He denies any other health changes. His pain today is 0/10.    Interval History 5/27/2022:  The patient returns to clinic today for follow up of neck pain. He reports increased neck pain that radiates into both shoulders. He denies any radiating arm pain. His pain is worse activity. He describes this pain as tight. He denies any weakness. He denies any other health changes His pain today is 6/10.    Interval History 04/11/2022:   Pt  returns to clinic for follow up of cervical pain. Pt is S/P NEIDA C7-T1 on 03/23/22 with Dr. Schaefer. Pt states that his pain is much improved since NEIDA. Pt states that in after 1 week post NEIDA,  his pain got significantly better. Currently, pt denies any pain.Pt states he couldn't tolerate  physical therapy before receiving  NEIDA due to severe neck pain. Today, pt's level of pain is 0/10. Pt denies any numbness, tingling sensation to arms. Pt also denies any coordination problems, loss of bowel and bladder control.     Interval History 2/22/2022:  The patient is here for new onset neck pain. We did previously treat him for back pain which has overall been well controlled. He does have some right lateral leg numbness. However, his primary complaint today is neck pain with radiation into the right shoulder. He has significant right sided muscle tightness and pain. His cervical MRI does show severe stenosis. He has been evaluated by [facility] in the past. He recently saw his orthopedist, Dr. Eastman, who performed osteopathic manipulation with short term benefit only. He has intermittent numbness but denies weakness. No bowel or bladder changes. His pain today is 10/10.    Initial encounter:    Bhaskar Stiles presents to the clinic for the  evaluation of back and leg pain. He is s/p left L5/S1 TF NEIDA with 80% relief of leg pain. This was a direct referral from Shauna Valencia. He also had benefit with right L5/S1 in the past.  Currently, he is having some back pain with radiation into the anterior thighs. He also has a bulge at L3/4 with NF narrowing on previous MRI.  He is currently in PT which he finds helpful. He feels as though this will be helpful long-term for him. The pain started years ago. He also has a history of right knee arthoscopic surgery last year.    Brief history:    Pain Description:    Pt denies any pain today.     Patient denies night fever/night sweats, urinary incontinence and bowel incontinence.  Patient denies any suicidal or homicidal ideations    Pain Medications: None  Current:  None    Tried in Past:  NSAIDs - Mobic  TCA -Never  SNRI -Never  Anti-convulsants - Gabapentin  Muscle Relaxants - Robaxin  Opioids- [place]    Physical Therapy/Home Exercise: yes       report:  Not applicable    Pain Procedures:   10/9/20 right L5/S1 TF NEIDA- 80% relief  11/18/20 left L5/S1 TF NEIDA- 80% relief  03/23/22 C7-T1 NEIDA-100% relief.   8/10/2022- C7/T1 IL NEIDA    Chiropractor -never  Acupuncture - never  TENS unit -never  Spinal decompression -never  Joint replacement -never    Imaging:     Narrative & Impression  EXAMINATION:  MRI CERVICAL SPINE WITHOUT CONTRAST     CLINICAL HISTORY:  Neck pain, normal neuro exam;C-spine canal stenosis;Cervical osteoarthritis;Cervical radiculopathy;concern for canal stenosis; Other spondylosis, cervical region     TECHNIQUE:  Multiplanar, multisequence MR images of the cervical spine were performed without the administration of contrast.     COMPARISON:  Prior radiograph     FINDINGS:  There is straightening and mild reversal of cervical lordosis.  Trace retrolisthesis of C3 on C4. No concerning marrow signal present.  Multilevel disc desiccation present with height loss most noted at C3-4, C5-6 and C6-7.      Posterior fossa is unremarkable.  Spinal cord demonstrates no concerning signal abnormality.     Neck soft tissue structures unremarkable.     C2-C3: No spinal canal stenosis or neural foraminal narrowing.     C3-C4: Posterior disc osteophyte complex present effacing the anterior thecal sac with mild spinal canal stenosis.  Right greater than left uncovertebral degenerative findings results in severe right and moderate to severe left neural foraminal narrowing.     C4-C5: No significant spinal canal stenosis or right neural foraminal narrowing.  Left uncovertebral and facet degenerative findings result in mild neural foraminal narrowing.     C5-C6: Posterior disc osteophyte complex present asymmetric to the right indenting the thecal sac without high-grade spinal canal stenosis.  Facet and uncovertebral degenerative findings present with mild-to-moderate left and moderate to severe right neural foraminal narrowing.     C6-C7: Posterior disc osteophyte complex present asymmetric to the left indenting the thecal sac with mild spinal canal stenosis.  Uncovertebral degenerative findings greater on the left with moderate neural foraminal narrowing.  Minimal right neural foraminal narrowing present.     C7-T1: No spinal canal stenosis or neural foraminal narrowing.     Impression:     Multilevel degenerative findings as above.    Past Medical History:   Diagnosis Date    Family history of colon cancer in father     Hyperlipidemia     Hypertension     LAMONT (obstructive sleep apnea)     Syncope and collapse      Past Surgical History:   Procedure Laterality Date    CHONDROPLASTY OF KNEE Left 8/22/2019    Procedure: CHONDROPLASTY, KNEE;  Surgeon: Shelbi Hughes MD;  Location: [facility];  Service: Orthopedics;  Laterality: Left;    COLONOSCOPY N/A 9/16/2022    Procedure: COLONOSCOPY;  Surgeon: RYAN Long MD;  Location: [address];  Service: Endoscopy;  Laterality: N/A;  fully vaccinated, clears 4 hrs prior-am  prep, inst portal-MS    EPIDURAL STEROID INJECTION N/A 3/23/2022    Procedure: INJECTION, STEROID, EPIDURAL, C7-T1 IL;  Surgeon: Anthony Schaefer MD;  Location: [facility] PAIN MGT;  Service: Pain Management;  Laterality: N/A;    EPIDURAL STEROID INJECTION N/A 8/10/2022    Procedure: INJECTION, STEROID, EPIDURAL,  C7-T1 IL CONTRAST;  Surgeon: Anthony Schaefer MD;  Location: [facility] PAIN [facility]GT;  Service: Pain Management;  Laterality: N/A;    HERNIA REPAIR      KNEE ARTHROSCOPY W/ MENISCECTOMY Left 8/22/2019    Procedure: ARTHROSCOPY, KNEE, WITH MEDIAL AND LATERAL MENISCECTOMY;  Surgeon: Shelbi Hughes MD;  Location: [facility] OR;  Service: Orthopedics;  Laterality: Left;    KNEE ARTHROSCOPY W/ PLICA EXCISION Left 8/22/2019    Procedure: EXCISION, MEDIAL PLICA, KNEE, ARTHROSCOPIC;  Surgeon: Shelbi Hughes MD;  Location: [facility] OR;  Service: Orthopedics;  Laterality: Left;    LIPOMA RESECTION      abdomen    REMOVAL OF FOREIGN BODY FROM LOWER EXTREMITY Left 8/22/2019    Procedure: REMOVAL, FOREIGN BODY, LOOSE BODY,  LOWER EXTREMITY;  Surgeon: Shelbi Hughes MD;  Location: [facility] OR;  Service: Orthopedics;  Laterality: Left;    SHOULDER SURGERY      left    SYNOVECTOMY OF KNEE Left 8/22/2019    Procedure: PARTIAL SYNOVECTOMY, KNEE;  Surgeon: Shelbi Hughes MD;  Location: [facility] OR;  Service: Orthopedics;  Laterality: Left;    TILT TABLE TEST N/A 7/9/2018    Procedure: TILT TABLE TEST;  Surgeon: Leonardo Whitfield MD;  Location: [facility] CATH LAB;  Service: Cardiology;  Laterality: N/A;  Syncope, HUT, DM, 3 PREP    TRANSFORAMINAL EPIDURAL INJECTION OF STEROID Right 10/9/2019    Procedure: LUMBAR TRANSFORAMINAL RIGHT L5/S1;  Surgeon: Anthony Schaefer MD;  Location: [facility] PAIN [facility]GT;  Service: Pain Management;  Laterality: Right;  NEEDS CONSENT    TRANSFORAMINAL EPIDURAL INJECTION OF STEROID Left 11/18/2020    Procedure: LUMBAR TRANSFORAMINAL LEFT L5/S1 DIRECT REFERRAL;  Surgeon: Anthony Schaefer MD;  Location: [facility] PAIN [facility]GT;  Service: Pain Management;   Laterality: Left;  NEEDS CONSENT     Social History     Socioeconomic History    Marital status:    Tobacco Use    Smoking status: Never    Smokeless tobacco: Never   Substance and Sexual Activity    Alcohol use: No    Drug use: No     Family History   Problem Relation Age of Onset    Colon cancer Father        Review of patient's allergies indicates:  No Known Allergies    Current Outpatient Medications   Medication Sig    amLODIPine (NORVASC) 10 MG tablet Take 1 tablet (10 mg total) by mouth once daily.    losartan-hydrochlorothiazide 100-12.5 mg (HYZAAR) 100-12.5 mg Tab Take 1 tablet by mouth once daily.     No current facility-administered medications for this visit.       REVIEW OF SYSTEMS:    GENERAL:  No weight loss, malaise or fevers.  HEENT:   No recent changes in vision or hearing  NECK:  Negative for lumps, no difficulty with swallowing.  RESPIRATORY:  Negative for cough, wheezing or shortness of breath, patient denies any recent URI.  CARDIOVASCULAR:  Negative for chest pain, leg swelling or palpitations. HTN  GI:  Negative for abdominal discomfort, blood in stools or black stools or change in bowel habits.  MUSCULOSKELETAL:  See HPI.  SKIN:  Negative for lesions, rash, and itching.  PSYCH:  No mood disorder or recent psychosocial stressors.  Patient's sleep is not disturbed secondary to pain.  HEMATOLOGY/LYMPHOLOGY:  Negative for prolonged bleeding, bruising easily or swollen nodes.  Patient is not currently taking any anti-coagulants  ENDO: No history of diabetes or thyroid dysfunction  NEURO:   No history of headaches, syncope, paralysis, seizures or tremors.  All other reviewed and negative other than HPI.    OBJECTIVE:    Exam limited due to virtual visit:  GENERAL: Well appearing, in no acute distress, alert and oriented x3.  PSYCH:  Mood and affect appropriate.    Previous physical exam:  BP (!) 136/90 (BP Location: Left arm, Patient Position: Sitting, BP Method: Large (Automatic))   Pulse  "89   Ht 5' 11" (1.803 m)   Wt 120.5 kg (265 lb 10.5 oz)   BMI 37.05 kg/m²     PHYSICAL EXAMINATION:    GENERAL: Well appearing, in no acute distress, alert and oriented x3.  PSYCH:  Mood and affect appropriate.  SKIN: Skin color, texture, turgor normal, no rashes or lesions.  HEAD/FACE:  Normocephalic, atraumatic. Cranial nerves grossly intact.  NECK:  There is pain with palpation over cervical paraspinals and trapezius muscles on the right. Full ROM with pain on lateral rotation. Spurling's negative bilaterally.    EXTREMITIES:  No deformities, edema, or skin discoloration. Good capillary refill.  MUSCULOSKELETAL:  Bilateral upper extremity strength is normal and symmetric.  No atrophy or tone abnormalities are noted.  NEURO: No clonus. No loss of sensation is noted. Negative Marcelino's bilaterally.  GAIT: Normal.    ASSESSMENT: 52 y.o. year old male with history of neck pain, consistent with     No diagnosis found.      PLAN:     - Previous imaging was reviewed and discussed with the patient today.    - He is s/p C7/T1 IL NEIDA with significant relief.     - Consider cervical MBB in the future.     - Continue follow up with Neurosurgery as needed.     - Continue Flexeril 5 mg TID PRN muscle pain.      - Continue home stretches.     - RTC PRN.     - Dr. Schaefer was consulted on the patient and agrees with this plan.    The above plan and management options were discussed at length with patient. Patient is in agreement with the above and verbalized understanding.     Leela Marshall  12/21/2022              "

## 2023-01-16 NOTE — PROGRESS NOTES
[facility]    Subjective:       Patient ID: Bhaskar Stiles is a 52 y.o. male.    Chief Complaint: Hypertension (F/u)    History was obtained from the patient and supplemented through chart review.  This patient is known to me.      HPI:    Patient is a 52 y.o. male w/ pmhx of HTN, HLD, LAMONT and for BP f/u.    htn  Uncontrolled  Not taking losartan-HCTZ 100-12.5 mg, ran out and did not ask for refill  Cont amlodipine 10 mg daily  High stress in life; not caring for self, but will get back in routine    Myofascial pain, cervical radiculopathy, cervical spondylosis  Following with pain management/neurosurg/sports med  Potential role for surgery? Hesistant for pt to continue injections  Likes the PO steroids for PRN use.  ADvised needs to see pain management physician for more involvement      Vision changes reported in the past    hld  Cont Crestor 10 (had stopped also due to refill issues    Family hx of colon cancer  Performed 9/2022, repeat due in 2027    lamont  Having difficulty with CPAP; hates it  Hasn't been using  Disliked his experience in [facility] sleep clinic, tried sleep [facility] of [place],  Advised lose weight, talk to dentist, consider ENT- encouraged  Allergies from carpet? Wife thinks, advised daily zyrtec, claritin, flonase  Was using, stopped, hates   Counseled extensively  Will try astelin spray as well    Morbid Obesity  Counseled weight loss, especially to come off CPAP  Popeyes, bread, poor diet- working on changing diet  A1cs normal  Wt Readings from Last 15 Encounters:   01/18/23 120.3 kg (265 lb 3.4 oz)   12/21/22 120.5 kg (265 lb 10.5 oz)   09/28/22 122 kg (268 lb 15.4 oz)   08/10/22 121.1 kg (267 lb)   07/25/22 122.5 kg (270 lb)   07/22/22 122.5 kg (270 lb 1 oz)   07/18/22 122.4 kg (269 lb 13.5 oz)   06/14/22 122 kg (269 lb)   05/27/22 121 kg (266 lb 12.1 oz)   04/11/22 121 kg (266 lb 12.1 oz)   03/23/22 119.3 kg (263 lb)   02/22/22 120.7 kg (266 lb)   02/15/22 121.6 kg (268 lb)  "  01/18/22 122.5 kg (270 lb 1 oz)   10/18/21 121.2 kg (267 lb 3.2 oz)       Hx of vasovagal syncope 2018  Gets overheated easily  Saw neurology 6/2018, cardiology (cards stopped lisinopril 20)  Cards though loop recorder might be helpful  Mildly abn findings on brain MRI (neuro thought perhaps 2/2 to HTN)  Not recent, encouraged hydration    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   HENT:  Negative for trouble swallowing.    Eyes:  Negative for visual disturbance.   Respiratory:  Negative for shortness of breath.    Cardiovascular:  Negative for chest pain.   Gastrointestinal:  Negative for diarrhea, nausea and vomiting.   Musculoskeletal:  Positive for myalgias (neck/shoulder) and neck pain. Negative for arthralgias, back pain and gait problem.   Neurological:  Negative for dizziness and seizures.   Psychiatric/Behavioral:  Negative for hallucinations and sleep disturbance.        Surgical hx, family hx, social hx   Have been reviewed       Current Outpatient Medications:     amLODIPine (NORVASC) 10 MG tablet, Take 1 tablet (10 mg total) by mouth once daily., Disp: 90 tablet, Rfl: 4    losartan-hydrochlorothiazide 100-12.5 mg (HYZAAR) 100-12.5 mg Tab, Take 1 tablet by mouth once daily., Disp: 90 tablet, Rfl: 1    rosuvastatin (CRESTOR) 10 MG tablet, Take 1 tablet (10 mg total) by mouth once daily., Disp: 90 tablet, Rfl: 3    Objective:        Body mass index is 36.99 kg/m².  Vitals:    01/18/23 0832   BP: (!) 138/90   Pulse: 75   SpO2: (!) 94%   Weight: 120.3 kg (265 lb 3.4 oz)   Height: 5' 11" (1.803 m) "   PainSc: 0-No pain       Physical Exam  Vitals and nursing note reviewed.   Constitutional:       General: He is not in acute distress.     Appearance: Normal appearance. He is not ill-appearing.      Comments: Obesity   HENT:      Head: Normocephalic and atraumatic.   Eyes:      General: No scleral icterus.  Cardiovascular:      Rate and Rhythm: Normal rate and regular rhythm.      Heart sounds: Normal heart sounds.   Pulmonary:      Effort: Pulmonary effort is normal.   Abdominal:      General: There is no distension.   Musculoskeletal:         General: No deformity.      Cervical back: Normal range of motion.   Skin:     General: Skin is warm and dry.   Neurological:      Mental Status: He is alert and oriented to person, place, and time.   Psychiatric:         Behavior: Behavior normal.         Lab Results   Component Value Date    WBC 7.3 07/25/2022    HGB 14.8 07/25/2022    HCT 46.7 07/25/2022     07/25/2022    CHOL 159 07/25/2022    TRIG 102 07/25/2022    HDL 44 07/25/2022    ALT 25 07/25/2022    AST 16 07/25/2022     07/25/2022    K 3.8 07/25/2022     07/25/2022    CREATININE 1.21 07/25/2022    BUN 19 07/25/2022    CO2 28 07/25/2022    TSH 1.05 07/25/2022    INR 1.0 06/05/2018    HGBA1C 5.0 07/25/2022       The 10-year ASCVD risk score (Hernán ANN, et al., 2019) is: 10.2%    Values used to calculate the score:      Age: 52 years      Sex: Male      Is Non- : Yes      Diabetic: No      Tobacco smoker: No      Systolic Blood Pressure: 138 mmHg      Is BP treated: Yes      HDL Cholesterol: 44 mg/dL      Total Cholesterol: 159 mg/dL    On crestor 10 mg    (Imaging have been independently reviewed)  9/28/2019  MRI L spine  Terminated early due to severe pain  No acute fractures.     Moderate lumbar spondylosis which appears to be worst at L5-S1 with evidence of moderate to severe bilateral neural foraminal narrowing, as above.  Consider repeat full examination when  clinically appropriate.    Assessment:         1. Essential hypertension    2. Family history of colon cancer in father    3. LAMONT (obstructive sleep apnea)    4. ASCVD (arteriosclerotic cardiovascular disease)    5. History of syncope            Plan:     Bhaskar was seen today for hypertension.    Diagnoses and all orders for this visit:    Essential hypertension  -     amLODIPine (NORVASC) 10 MG tablet; Take 1 tablet (10 mg total) by mouth once daily.  -     losartan-hydrochlorothiazide 100-12.5 mg (HYZAAR) 100-12.5 mg Tab; Take 1 tablet by mouth once daily.    Family history of colon cancer in father    LAMONT (obstructive sleep apnea)    ASCVD (arteriosclerotic cardiovascular disease)  -     rosuvastatin (CRESTOR) 10 MG tablet; Take 1 tablet (10 mg total) by mouth once daily.    History of syncope          Health Maintenance  - Lipids: normal on rosuvastatin 6/23/2021  - A1C: 5.1 6/23/2021  - Colon Ca Screen: 9/2022, due in 2027; fam hx of colon cancer  - Immunizations: vaccinated (confirmed), needs covid booster, counseled shingles. Pt will consider    Follow up in about 3 months (around 4/18/2023).     [facility]    Or sooner prn          All medications were reviewed including potential side effects and risks/benefits.  Pt was counseled to call back if anything worsens or if questions arise.    Fredy Rey MD  Family Medicine  [facility]  [address]  [address]  [place] [address]  Phone [phone]  Fax [phone]

## 2023-01-18 ENCOUNTER — OFFICE VISIT (OUTPATIENT)
Dept: INTERNAL MEDICINE | Facility: CLINIC | Age: 53
End: 2023-01-18
Payer: COMMERCIAL

## 2023-01-18 VITALS
SYSTOLIC BLOOD PRESSURE: 138 MMHG | HEART RATE: 75 BPM | OXYGEN SATURATION: 94 % | DIASTOLIC BLOOD PRESSURE: 90 MMHG | WEIGHT: 265.19 LBS | HEIGHT: 71 IN | BODY MASS INDEX: 37.13 KG/M2

## 2023-01-18 DIAGNOSIS — I25.10 ASCVD (ARTERIOSCLEROTIC CARDIOVASCULAR DISEASE): ICD-10-CM

## 2023-01-18 DIAGNOSIS — G47.33 OSA (OBSTRUCTIVE SLEEP APNEA): ICD-10-CM

## 2023-01-18 DIAGNOSIS — I10 ESSENTIAL HYPERTENSION: Primary | ICD-10-CM

## 2023-01-18 DIAGNOSIS — Z87.898 HISTORY OF SYNCOPE: Chronic | ICD-10-CM

## 2023-01-18 DIAGNOSIS — Z80.0 FAMILY HISTORY OF COLON CANCER IN FATHER: Chronic | ICD-10-CM

## 2023-01-18 PROCEDURE — 99214 PR OFFICE/OUTPT VISIT, EST, LEVL IV, 30-39 MIN: ICD-10-PCS | Mod: S$GLB,,, | Performed by: STUDENT IN AN ORGANIZED HEALTH CARE EDUCATION/TRAINING PROGRAM

## 2023-01-18 PROCEDURE — 99999 PR PBB SHADOW E&M-EST. PATIENT-LVL III: ICD-10-PCS | Mod: PBBFAC,,, | Performed by: STUDENT IN AN ORGANIZED HEALTH CARE EDUCATION/TRAINING PROGRAM

## 2023-01-18 PROCEDURE — 99999 PR PBB SHADOW E&M-EST. PATIENT-LVL III: CPT | Mod: PBBFAC,,, | Performed by: STUDENT IN AN ORGANIZED HEALTH CARE EDUCATION/TRAINING PROGRAM

## 2023-01-18 PROCEDURE — 99214 OFFICE O/P EST MOD 30 MIN: CPT | Mod: S$GLB,,, | Performed by: STUDENT IN AN ORGANIZED HEALTH CARE EDUCATION/TRAINING PROGRAM

## 2023-01-18 RX ORDER — AMLODIPINE BESYLATE 10 MG/1
10 TABLET ORAL DAILY
Qty: 90 TABLET | Refills: 4 | Status: SHIPPED | OUTPATIENT
Start: 2023-01-18 | End: 2024-01-10

## 2023-01-18 RX ORDER — ROSUVASTATIN CALCIUM 10 MG/1
10 TABLET, COATED ORAL DAILY
Qty: 90 TABLET | Refills: 3 | Status: SHIPPED | OUTPATIENT
Start: 2023-01-18 | End: 2023-11-19

## 2023-01-18 RX ORDER — LOSARTAN POTASSIUM AND HYDROCHLOROTHIAZIDE 12.5; 1 MG/1; MG/1
1 TABLET ORAL DAILY
Qty: 90 TABLET | Refills: 1 | Status: SHIPPED | OUTPATIENT
Start: 2023-01-18 | End: 2023-04-21 | Stop reason: SDUPTHER

## 2023-02-01 ENCOUNTER — TELEPHONE (OUTPATIENT)
Dept: INTERNAL MEDICINE | Facility: CLINIC | Age: 53
End: 2023-02-01
Payer: COMMERCIAL

## 2023-02-01 ENCOUNTER — TELEPHONE (OUTPATIENT)
Dept: INTERNAL MEDICINE | Facility: CLINIC | Age: 53
End: 2023-02-01

## 2023-02-01 ENCOUNTER — CLINICAL SUPPORT (OUTPATIENT)
Dept: INTERNAL MEDICINE | Facility: CLINIC | Age: 53
End: 2023-02-01
Payer: COMMERCIAL

## 2023-02-01 VITALS — DIASTOLIC BLOOD PRESSURE: 85 MMHG | SYSTOLIC BLOOD PRESSURE: 136 MMHG

## 2023-02-01 PROCEDURE — 99999 PR PBB SHADOW E&M-EST. PATIENT-LVL I: CPT | Mod: PBBFAC,,,

## 2023-02-01 PROCEDURE — 99999 PR PBB SHADOW E&M-EST. PATIENT-LVL I: ICD-10-PCS | Mod: PBBFAC,,,

## 2023-02-01 NOTE — TELEPHONE ENCOUNTER
If pt willing to take hyzaar, please set up remote BP visit 2 weeks to monitor BP on the new replacement medication    (Still high with prior value)  T  thanks

## 2023-02-01 NOTE — TELEPHONE ENCOUNTER
Spoke to Mr. Stiles and completed Nurse blood pressure appt.  Blood pressure  136/85.  Placed in the remote blood pressure reading slot

## 2023-02-01 NOTE — TELEPHONE ENCOUNTER
----- Message from Regino Jurado sent at 2/1/2023 10:49 AM CST -----   Name of Who is Calling:     What is the request in detail:  patient request call back  NFI Please contact to further discuss and advise      Can the clinic reply by [facility]:     What Number to Call Back if not in [facility]:  [phone]

## 2023-02-01 NOTE — TELEPHONE ENCOUNTER
Bhaskar Stiles 52 y.o. male is here for Blood Pressure check. remote    RemoteBlood pressure reading was  136/85, Pulse NA. (Checked at the end of the visit)    If high, was it repeated after 15 minutes? no    Diagnosed with Hypertension yes.    Patient took blood pressure medication today no.  Last dose of blood pressure medication was taken at 01/31/23 @ 9pm. Patient took Amlodipine 10 mg, and Rosuvastatin 10 mg.  Patient states that he did not know he was suppose to be taking Hyzaar. .     All Medications and OTC medication updated yes    Does patient have record of home blood pressure readings / Blood Pressure Log no.     Does the pt have any complaints today in regards to their blood pressure medication? no. Complains of NA. Patient is asymptomatic.     Were you sitting still for 5-10 minutes prior to taking your Blood pressure? yes     Has your blood pressure monitor ever been checked? no When was last time we checked your blood pressure monitor? NA    Updated vitals yes    Follow up date is 04/21/23.     Dr. Rey notified.     Creatinine   Date Value Ref Range Status   07/25/2022 1.21 0.70 - 1.30 mg/dL Final     Sodium   Date Value Ref Range Status   07/25/2022 141 135 - 146 mmol/L Final     Potassium   Date Value Ref Range Status   07/25/2022 3.8 3.5 - 5.3 mmol/L Final

## 2023-02-01 NOTE — TELEPHONE ENCOUNTER
Spoke to Mr. Stiles and informed him per Dr. Rey that If pt willing to take hyzaar, please set up remote BP visit 2 weeks to monitor BP on the new replacement medication     (Still high with prior value)    Patient states understanding and that he will start taking the Hyzaar.  Confirmed remote blood pressure appt date and time.  l

## 2023-02-01 NOTE — TELEPHONE ENCOUNTER
Called for BP check. He is awaiting his wife's return to take him BP. Returned call in 5 minutes as requested. Patient will call back today to discuss his BP numbers

## 2023-02-01 NOTE — TELEPHONE ENCOUNTER
Bhaskar Stiles 52 y.o. male is here for Blood Pressure check. remote     RemoteBlood pressure reading was  136/85, Pulse NA. (Checked at the end of the visit)     If high, was it repeated after 15 minutes? no     Diagnosed with Hypertension yes.     Patient took blood pressure medication today no.  Last dose of blood pressure medication was taken at 01/31/23 @ 9pm. Patient took Amlodipine 10 mg, and Rosuvastatin 10 mg.  Patient states that he did not know he was suppose to be taking Hyzaar. .      All Medications and OTC medication updated yes     Does patient have record of home blood pressure readings / Blood Pressure Log no.      Does the pt have any complaints today in regards to their blood pressure medication? no. Complains of NA. Patient is asymptomatic.      Were you sitting still for 5-10 minutes prior to taking your Blood pressure? yes      Has your blood pressure monitor ever been checked? no When was last time we checked your blood pressure monitor? NA     Updated vitals yes     Follow up date is 04/21/23.      Dr. Rey notified.

## 2023-02-03 ENCOUNTER — TELEPHONE (OUTPATIENT)
Dept: PAIN MEDICINE | Facility: CLINIC | Age: 53
End: 2023-02-03
Payer: COMMERCIAL

## 2023-02-06 ENCOUNTER — OFFICE VISIT (OUTPATIENT)
Dept: PAIN MEDICINE | Facility: CLINIC | Age: 53
End: 2023-02-06
Attending: ANESTHESIOLOGY
Payer: COMMERCIAL

## 2023-02-06 VITALS
HEART RATE: 75 BPM | HEIGHT: 71 IN | DIASTOLIC BLOOD PRESSURE: 78 MMHG | BODY MASS INDEX: 37.1 KG/M2 | WEIGHT: 265 LBS | SYSTOLIC BLOOD PRESSURE: 122 MMHG

## 2023-02-06 DIAGNOSIS — M54.12 CERVICAL RADICULOPATHY: ICD-10-CM

## 2023-02-06 DIAGNOSIS — M50.30 DDD (DEGENERATIVE DISC DISEASE), CERVICAL: Primary | ICD-10-CM

## 2023-02-06 PROCEDURE — 99999 PR PBB SHADOW E&M-EST. PATIENT-LVL III: CPT | Mod: PBBFAC,,, | Performed by: ANESTHESIOLOGY

## 2023-02-06 PROCEDURE — 99214 OFFICE O/P EST MOD 30 MIN: CPT | Mod: S$GLB,,, | Performed by: ANESTHESIOLOGY

## 2023-02-06 PROCEDURE — 99999 PR PBB SHADOW E&M-EST. PATIENT-LVL III: ICD-10-PCS | Mod: PBBFAC,,, | Performed by: ANESTHESIOLOGY

## 2023-02-06 PROCEDURE — 99214 PR OFFICE/OUTPT VISIT, EST, LEVL IV, 30-39 MIN: ICD-10-PCS | Mod: S$GLB,,, | Performed by: ANESTHESIOLOGY

## 2023-02-06 RX ORDER — DULOXETIN HYDROCHLORIDE 30 MG/1
30 CAPSULE, DELAYED RELEASE ORAL 2 TIMES DAILY
Qty: 60 CAPSULE | Refills: 0 | Status: SHIPPED | OUTPATIENT
Start: 2023-02-06 | End: 2023-03-02

## 2023-02-06 NOTE — PROGRESS NOTES
Chronic patient Established Note (Follow up visit)      SUBJECTIVE:    Interval History 02/06/2023:  Bhaskar Stiles returns to clinic today for follow up of neck pain. Patient reports intermittent neck pain with sharp, stabbing sensation to his right upper extremity. He describes the pain as tingling in nature. Pain aggravated by stress. Pain alleviated with ice pack and showering. He reports he took a one time dose of prednisone, which helped his pain. He denies any other health changes. His pain today is 0/10. Patient would like to discuss alternative options for managing pain flares.     Pain Disability Index Review:  Last 3 PDI Scores 2/6/2023 12/21/2022 7/22/2022   Pain Disability Index (PDI) 0 0 0         Interval History 12/21/2022:  The patient returns to clinic today for follow up of neck pain. He reports increased pain over the last month. He denies any inciting event. He reports increased neck pain that radiates into right shoulder to his arm to his hand. He describes this pain as tingling in nature. He denies any left arm pain. He does feel as though his pain has improved over the last few days. He is no longer taking Gabapentin or Flexeril. He states that these were not very helpful. He did take Tramadol from a previous prescription. He would like a refill for this. He denies any other health changes. His pain today is 0/10.    Interval History 8/23/2022:  The patient returns to clinic today for follow up of neck pain via virtual visit. He is s/p C7/T1 IL NEIDA on 8/10/2022. He reports 100% relief of his pain. He denies any neck or radicular arm pain at this time. He denies any muscle spasms. He has been able to discontinue Gabapentin and Flexeril. He continues to perform a home exercise routine. He did see Neurosurgery but does not wish to pursue this at this time. He denies any other health changes.     Interval History 7/22/2022:  The patient returns to clinic today for follow up of neck pain. He  reports increased neck pain over the last few days. He reports neck pain that radiates into both shoulder. He denies any radicular arm pain. He does report muscle tightness and spasms. He is having trouble sleeping due to pain. He does use heat with benefit. He did not have to use medrol dose pack while on vacation. He continues to take Gabapentin, though not consistently. He also takes Flexeril as needed. He is frustrated by his persistent pain. He would like to find a more permanent solution. He has tried physical therapy in the past but this worsened his pain. He denies any other health changes. His pain today is 8/10.    Interval History 6/14/2022:  The patient returns to clinic today for follow up of neck pain. He reports limited relief with trigger point injections. He did have relief with Flexeril. He reports intermittent neck pain that radiates into both shoulder. He denies any radiating arm pain. He continues to report muscle tightness. He is going on vacation at the end of the month. He is worried about a flare of pain. He denies any other health changes. His pain today is 0/10.    Interval History 5/27/2022:  The patient returns to clinic today for follow up of neck pain. He reports increased neck pain that radiates into both shoulders. He denies any radiating arm pain. His pain is worse activity. He describes this pain as tight. He denies any weakness. He denies any other health changes His pain today is 6/10.    Interval History 04/11/2022:   Pt  returns to clinic for follow up of cervical pain. Pt is S/P NEIDA C7-T1 on 03/23/22 with Dr. Schaefer. Pt states that his pain is much improved since NEIDA. Pt states that in after 1 week post NEIDA,  his pain got significantly better. Currently, pt denies any pain.Pt states he couldn't tolerate  physical therapy before receiving  NEIDA due to severe neck pain. Today, pt's level of pain is 0/10. Pt denies any numbness, tingling sensation to arms. Pt also denies any  coordination problems, loss of bowel and bladder control.     Interval History 2/22/2022:  The patient is here for new onset neck pain. We did previously treat him for back pain which has overall been well controlled. He does have some right lateral leg numbness. However, his primary complaint today is neck pain with radiation into the right shoulder. He has significant right sided muscle tightness and pain. His cervical MRI does show severe stenosis. He has been evaluated by [facility] in the past. He recently saw his orthopedist, Dr. Eastman, who performed osteopathic manipulation with short term benefit only. He has intermittent numbness but denies weakness. No bowel or bladder changes. His pain today is 10/10.    Initial encounter:    Bhaskar Stiles presents to the clinic for the evaluation of back and leg pain. He is s/p left L5/S1 TF NEIDA with 80% relief of leg pain. This was a direct referral from Shauna Valencia. He also had benefit with right L5/S1 in the past.  Currently, he is having some back pain with radiation into the anterior thighs. He also has a bulge at L3/4 with NF narrowing on previous MRI.  He is currently in PT which he finds helpful. He feels as though this will be helpful long-term for him. The pain started years ago. He also has a history of right knee arthoscopic surgery last year.    Brief history:    Pain Description:    Pt denies any pain today.     Patient denies night fever/night sweats, urinary incontinence and bowel incontinence.  Patient denies any suicidal or homicidal ideations    Pain Medications: None  Current:  None    Tried in Past:  NSAIDs - Mobic  TCA -Never  SNRI -Never  Anti-convulsants - Gabapentin  Muscle Relaxants - Robaxin  Opioids- Norco, Tramadol   Prednisone x1     Physical Therapy/Home Exercise: yes       report:  Not applicable    Pain Procedures:   10/9/20 right L5/S1 TF NEIDA- 80% relief  11/18/20 left L5/S1 TF NEIDA- 80% relief  03/23/22 C7-T1 NEIDA-100% relief.    8/10/2022- C7/T1 IL NEIDA    Chiropractor -never  Acupuncture - never  TENS unit -never  Spinal decompression -never  Joint replacement -never    Imaging    TECHNIQUE  Multiplanar, multisequence MR images of the cervical spine were performed without the administration of contrast.     COMPARISON:  Prior radiograph     FINDINGS:  There is straightening and mild reversal of cervical lordosis.  Trace retrolisthesis of C3 on C4. No concerning marrow signal present.  Multilevel disc desiccation present with height loss most noted at C3-4, C5-6 and C6-7.     Posterior fossa is unremarkable.  Spinal cord demonstrates no concerning signal abnormality.     Neck soft tissue structures unremarkable.     C2-C3: No spinal canal stenosis or neural foraminal narrowing.     C3-C4: Posterior disc osteophyte complex present effacing the anterior thecal sac with mild spinal canal stenosis.  Right greater than left uncovertebral degenerative findings results in severe right and moderate to severe left neural foraminal narrowing.     C4-C5: No significant spinal canal stenosis or right neural foraminal narrowing.  Left uncovertebral and facet degenerative findings result in mild neural foraminal narrowing.     C5-C6: Posterior disc osteophyte complex present asymmetric to the right indenting the thecal sac without high-grade spinal canal stenosis.  Facet and uncovertebral degenerative findings present with mild-to-moderate left and moderate to severe right neural foraminal narrowing.     C6-C7: Posterior disc osteophyte complex present asymmetric to the left indenting the thecal sac with mild spinal canal stenosis.  Uncovertebral degenerative findings greater on the left with moderate neural foraminal narrowing.  Minimal right neural foraminal narrowing present.     C7-T1: No spinal canal stenosis or neural foraminal narrowing.     Impression:     Multilevel degenerative findings as above.    Allergies: Review of patient's allergies  indicates:  No Known Allergies    Current Medications:   Current Outpatient Medications   Medication Sig Dispense Refill    amLODIPine (NORVASC) 10 MG tablet Take 1 tablet (10 mg total) by mouth once daily. 90 tablet 4    rosuvastatin (CRESTOR) 10 MG tablet Take 1 tablet (10 mg total) by mouth once daily. 90 tablet 3    DULoxetine (CYMBALTA) 30 MG capsule Take 1 capsule (30 mg total) by mouth 2 (two) times daily. 60 capsule 0    losartan-hydrochlorothiazide 100-12.5 mg (HYZAAR) 100-12.5 mg Tab Take 1 tablet by mouth once daily. (Patient not taking: Reported on 2/1/2023) 90 tablet 1     No current facility-administered medications for this visit.       REVIEW OF SYSTEMS:    GENERAL:  No weight loss, malaise or fevers.  HEENT:  Negative for frequent or significant headaches.  NECK:  Negative for lumps, goiter, pain and significant neck swelling.  RESPIRATORY:  Negative for cough, wheezing or shortness of breath.  CARDIOVASCULAR:  Negative for chest pain, leg swelling or palpitations. HTN  GI:  Negative for abdominal discomfort, blood in stools or black stools or change in bowel habits.  MUSCULOSKELETAL:  See HPI.  SKIN:  Negative for lesions, rash, and itching.  PSYCH:  Positive for sleep disturbance, mood disorder and recent psychosocial stressors.  HEMATOLOGY/LYMPHOLOGY:  Negative for prolonged bleeding, bruising easily or swollen nodes.  NEURO:   No history of headaches, syncope, paralysis, seizures or tremors.  All other reviewed and negative other than HPI.    Past Medical History:  Past Medical History:   Diagnosis Date    Family history of colon cancer in father     Hyperlipidemia     Hypertension     LAMONT (obstructive sleep apnea)     Syncope and collapse        Past Surgical History:  Past Surgical History:   Procedure Laterality Date    CHONDROPLASTY OF KNEE Left 8/22/2019    Procedure: CHONDROPLASTY, KNEE;  Surgeon: Shelbi Hughes MD;  Location: [facility];  Service: Orthopedics;  Laterality: Left;    COLONOSCOPY  N/A 9/16/2022    Procedure: COLONOSCOPY;  Surgeon: RYAN oLng MD;  Location: [facility] ENDO (4TH FLR);  Service: Endoscopy;  Laterality: N/A;  fully vaccinated, clears 4 hrs prior-am prep, inst portal-MS    EPIDURAL STEROID INJECTION N/A 3/23/2022    Procedure: INJECTION, STEROID, EPIDURAL, C7-T1 IL;  Surgeon: Anthony Schaefer MD;  Location: [facility] PAIN MGT;  Service: Pain Management;  Laterality: N/A;    EPIDURAL STEROID INJECTION N/A 8/10/2022    Procedure: INJECTION, STEROID, EPIDURAL,  C7-T1 IL CONTRAST;  Surgeon: Anthony Schaefer MD;  Location: [facility] PAIN MGT;  Service: Pain Management;  Laterality: N/A;    HERNIA REPAIR      KNEE ARTHROSCOPY W/ MENISCECTOMY Left 8/22/2019    Procedure: ARTHROSCOPY, KNEE, WITH MEDIAL AND LATERAL MENISCECTOMY;  Surgeon: Shelbi Hughes MD;  Location: [facility] OR;  Service: Orthopedics;  Laterality: Left;    KNEE ARTHROSCOPY W/ PLICA EXCISION Left 8/22/2019    Procedure: EXCISION, MEDIAL PLICA, KNEE, ARTHROSCOPIC;  Surgeon: Shelbi Hughes MD;  Location: [facility] OR;  Service: Orthopedics;  Laterality: Left;    LIPOMA RESECTION      abdomen    REMOVAL OF FOREIGN BODY FROM LOWER EXTREMITY Left 8/22/2019    Procedure: REMOVAL, FOREIGN BODY, LOOSE BODY,  LOWER EXTREMITY;  Surgeon: Shelbi Hughes MD;  Location: [facility] OR;  Service: Orthopedics;  Laterality: Left;    SHOULDER SURGERY      left    SYNOVECTOMY OF KNEE Left 8/22/2019    Procedure: PARTIAL SYNOVECTOMY, KNEE;  Surgeon: Shelbi Hughes MD;  Location: [facility] OR;  Service: Orthopedics;  Laterality: Left;    TILT TABLE TEST N/A 7/9/2018    Procedure: TILT TABLE TEST;  Surgeon: Leonardo Whitfield MD;  Location: [facility] CATH LAB;  Service: Cardiology;  Laterality: N/A;  Syncope, HUT, DM, 3 PREP    TRANSFORAMINAL EPIDURAL INJECTION OF STEROID Right 10/9/2019    Procedure: LUMBAR TRANSFORAMINAL RIGHT L5/S1;  Surgeon: Anthony Schaefer MD;  Location: [facility] PAIN MGT;  Service: Pain Management;  Laterality: Right;  NEEDS CONSENT    TRANSFORAMINAL EPIDURAL  "INJECTION OF STEROID Left 11/18/2020    Procedure: LUMBAR TRANSFORAMINAL LEFT L5/S1 DIRECT REFERRAL;  Surgeon: Anthony Schaefer MD;  Location: [facility];  Service: Pain Management;  Laterality: Left;  NEEDS CONSENT       Family History:  Family History   Problem Relation Age of Onset    Colon cancer Father        Social History:  Social History     Socioeconomic History    Marital status:    Tobacco Use    Smoking status: Never    Smokeless tobacco: Never   Substance and Sexual Activity    Alcohol use: No    Drug use: No       OBJECTIVE:    /78 (BP Location: Right arm, Patient Position: Sitting, BP Method: Large (Automatic))   Pulse 75   Ht 5' 11" (1.803 m)   Wt 120.2 kg (264 lb 15.9 oz)   BMI 36.96 kg/m²     PHYSICAL EXAMINATION:    General appearance: Well appearing, in no acute distress, alert and oriented x3.  Psych:  Mood and affect appropriate.  Skin: Skin color, texture, turgor normal, no rashes or lesions, in both upper and lower body.  Head/face:  Atraumatic, normocephalic. No palpable lymph nodes  Neck:  Moderate pain to palpation over the cervical paraspinous muscles.Mild discomfort with right lateral flexion and compression, positive Spurling's on the right with ROM normal.  Cor: RRR  Pulm: CTA  GI: Abdomen soft and non-tender.  Back: Straight leg raising in the sitting and supine positions is negative to radicular pain. No pain to palpation over the spine or costovertebral angles. Normal range of motion without pain reproduction.  Extremities: Peripheral joint ROM is full and pain free without obvious instability or laxity in all four extremities. No deformities, edema, or skin discoloration. Good capillary refill.  Musculoskeletal: Shoulder, hip, sacroiliac and knee provocative maneuvers are negative. Bilateral upper and lower extremity strength is normal and symmetric.  No atrophy or tone abnormalities are noted.  Neuro: Bilateral upper and lower extremity coordination and " muscle stretch reflexes are physiologic and symmetric.  Plantar response are downgoing. No loss of sensation is noted.  Gait: Normal.    ASSESSMENT: 52 y.o. year old male with neck pain, consistent with:     1. DDD (degenerative disc disease), cervical  Ambulatory referral/consult to [facility]      2. Cervical radiculopathy  Ambulatory referral/consult to [facility]            PLAN:   - I have stressed the importance of physical activity and a home exercise plan to help with pain and improve health.  - Patient can continue with medications for now since they are providing benefits, using them appropriately, and without side effects.  - Imaging reviewed with patient, suspect symptoms likely secondary to joint space narrowing and neural foraminal narrowing at C5-C6  - Patient was prescribed a Medrol dose pack x1 at previous visit, however did not end up using the prescription. Instead he obtained prednisone tablets from his pcp  - Instructed patient on various treatment modalities including NEIDA, however, he is not interested in NEIDA at this time  - Was previously on Gabapentin, however, was only taking as needed instead of scheduled due to concern of drowsiness while at work.   - Start Cymbalta 30 mg bid  - Referral to [facility] [place] for physical therapy   - surgical options and referral to spine surgery was discussed with patient, but he would like to try Cymbalta first   - RTC 4 weeks  - Counseled patient regarding the importance of activity modification and physical therapy.    The above plan and management options were discussed at length with patient. Patient is in agreement with the above and verbalized understanding.    Yared Williamson, DO  PGY III Anesthesiology   [facility]   I have personally reviewed the history and exam of this patient and agree with the resident/fellow/NPs note as stated above.    Anthony Schaefer MD  2/6/23

## 2023-02-15 ENCOUNTER — CLINICAL SUPPORT (OUTPATIENT)
Dept: INTERNAL MEDICINE | Facility: CLINIC | Age: 53
End: 2023-02-15
Payer: COMMERCIAL

## 2023-02-15 ENCOUNTER — TELEPHONE (OUTPATIENT)
Dept: INTERNAL MEDICINE | Facility: CLINIC | Age: 53
End: 2023-02-15

## 2023-02-15 VITALS — OXYGEN SATURATION: 95 % | SYSTOLIC BLOOD PRESSURE: 126 MMHG | HEART RATE: 78 BPM | DIASTOLIC BLOOD PRESSURE: 78 MMHG

## 2023-02-15 PROCEDURE — 99999 PR PBB SHADOW E&M-EST. PATIENT-LVL II: ICD-10-PCS | Mod: PBBFAC,,,

## 2023-02-15 PROCEDURE — 99999 PR PBB SHADOW E&M-EST. PATIENT-LVL II: CPT | Mod: PBBFAC,,,

## 2023-02-15 NOTE — PROGRESS NOTES
Bhaskar Stiles 52 y.o. male is here for Blood Pressure check. in person    Manual Blood pressure reading was  126/78, Pulse 78. (Checked at the end of the visit)    If high, was it repeated after 15 minutes? no    Pt's Home blood pressure machine read in office NO Pulse No.     Diagnosed with Hypertension yes.    Patient took blood pressure medication today no.  Last dose of blood pressure medication was taken at Yesterday at 10 PM ( takes medication in evening). Patient took 1. Amlodipine 10 mg daily 2. Losartan- hydrochlorothiazide 100-12.5 mg daily.     All Medications and OTC medication updated yes    Does patient have record of home blood pressure readings / Blood Pressure Log no. 02/06 122/78    Does the pt have any complaints today in regards to their blood pressure medication? no. Complains of None. Patient is asymptomatic.     Were you sitting still for 5-10 minutes prior to taking your Blood pressure? yes     Has your blood pressure monitor ever been checked? no When was last time we checked your blood pressure monitor? NO  Given BP log sheet that contains information on BP ranges  Updated vitals yes    Follow up date is 04/21/2023.     Dr. Rey notified.     Creatinine   Date Value Ref Range Status   07/25/2022 1.21 0.70 - 1.30 mg/dL Final     Sodium   Date Value Ref Range Status   07/25/2022 141 135 - 146 mmol/L Final     Potassium   Date Value Ref Range Status   07/25/2022 3.8 3.5 - 5.3 mmol/L Final

## 2023-02-15 NOTE — TELEPHONE ENCOUNTER
2 y.o. male is here for Blood Pressure check. in person    Manual Blood pressure reading was  126/78, Pulse 78. (Checked at the end of the visit)    If high, was it repeated after 15 minutes? no    Pt's Home blood pressure machine read in office NO Pulse No.     Diagnosed with Hypertension yes.    Patient took blood pressure medication today no.  Last dose of blood pressure medication was taken at Yesterday at 10 PM ( takes medication in evening). Patient took 1. Amlodipine 10 mg daily 2. Losartan- hydrochlorothiazide 100-12.5 mg daily.     All Medications and OTC medication updated yes    Does patient have record of home blood pressure readings / Blood Pressure Log no. 02/06 122/78    Does the pt have any complaints today in regards to their blood pressure medication? no. Complains of None. Patient is asymptomatic.     Were you sitting still for 5-10 minutes prior to taking your Blood pressure? yes     Has your blood pressure monitor ever been checked? no When was last time we checked your blood pressure monitor? NO  Given BP log sheet that contains information on BP ranges  Updated vitals yes    Follow up date is 04/21/2023.     Dr. Rey notified.     Creatinine   Date Value Ref Range Status   07/25/2022 1.21 0.70 - 1.30 mg/dL Final     Sodium   Date Value Ref Range Status   07/25/2022 141 135 - 146 mmol/L Final     Potassium   Date Value Ref Range Status   07/25/2022 3.8 3.5 - 5.3 mmol/L Final

## 2023-03-01 ENCOUNTER — CLINICAL SUPPORT (OUTPATIENT)
Dept: REHABILITATION | Facility: OTHER | Age: 53
End: 2023-03-01
Attending: ANESTHESIOLOGY
Payer: COMMERCIAL

## 2023-03-01 DIAGNOSIS — M54.2 NECK PAIN: ICD-10-CM

## 2023-03-01 DIAGNOSIS — M50.30 DDD (DEGENERATIVE DISC DISEASE), CERVICAL: ICD-10-CM

## 2023-03-01 DIAGNOSIS — M54.12 CERVICAL RADICULOPATHY: ICD-10-CM

## 2023-03-01 DIAGNOSIS — R53.1 DECREASED STRENGTH: ICD-10-CM

## 2023-03-01 PROCEDURE — 97140 MANUAL THERAPY 1/> REGIONS: CPT

## 2023-03-01 PROCEDURE — 97112 NEUROMUSCULAR REEDUCATION: CPT

## 2023-03-01 PROCEDURE — 97161 PT EVAL LOW COMPLEX 20 MIN: CPT
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
## 2023-03-02 ENCOUNTER — TELEPHONE (OUTPATIENT)
Dept: REHABILITATION | Facility: OTHER | Age: 53
End: 2023-03-02
Payer: COMMERCIAL

## 2023-03-03 ENCOUNTER — TELEPHONE (OUTPATIENT)
Dept: PAIN MEDICINE | Facility: OTHER | Age: 53
End: 2023-03-03
Payer: COMMERCIAL

## 2023-03-06 ENCOUNTER — OFFICE VISIT (OUTPATIENT)
Dept: PAIN MEDICINE | Facility: CLINIC | Age: 53
End: 2023-03-06
Payer: COMMERCIAL

## 2023-03-06 VITALS
RESPIRATION RATE: 18 BRPM | WEIGHT: 260.13 LBS | BODY MASS INDEX: 36.42 KG/M2 | HEIGHT: 71 IN | SYSTOLIC BLOOD PRESSURE: 115 MMHG | HEART RATE: 81 BPM | DIASTOLIC BLOOD PRESSURE: 71 MMHG

## 2023-03-06 DIAGNOSIS — M50.30 DDD (DEGENERATIVE DISC DISEASE), CERVICAL: Primary | ICD-10-CM

## 2023-03-06 DIAGNOSIS — M54.12 CERVICAL RADICULOPATHY: ICD-10-CM

## 2023-03-06 PROCEDURE — 99213 PR OFFICE/OUTPT VISIT, EST, LEVL III, 20-29 MIN: ICD-10-PCS | Mod: S$GLB,,, | Performed by: NURSE PRACTITIONER

## 2023-03-06 PROCEDURE — 99213 OFFICE O/P EST LOW 20 MIN: CPT | Mod: S$GLB,,, | Performed by: NURSE PRACTITIONER

## 2023-03-06 PROCEDURE — 99999 PR PBB SHADOW E&M-EST. PATIENT-LVL IV: CPT | Mod: PBBFAC,,, | Performed by: NURSE PRACTITIONER

## 2023-03-06 PROCEDURE — 99999 PR PBB SHADOW E&M-EST. PATIENT-LVL IV: ICD-10-PCS | Mod: PBBFAC,,, | Performed by: NURSE PRACTITIONER

## 2023-03-06 NOTE — PROGRESS NOTES
Chronic patient Established Note (Follow up visit)      SUBJECTIVE:    Interval History 3/6/2023:  Patient returns to clinic today for follow up of neck pain. His pain is located from his upper neck to right shoulder without radiation into his arm. Pain is intermittent, not aggravated by movement or particular activities. He was started on Cymbalta at his last visit and has only been taking it when his pain flares. He started taking it more consistently within the last week. He reports side effects of feeling more calm and apathetic, but thinks this is something he could get used too. Reports he only had a months worth of relief after the C7/T1 IL NEIDA in August 2022 and is not interested in further injections. He enjoyed his first visit at [facility], [place]. His pain today is 7/10    Pain Disability Index Review:  Last 3 PDI Scores 3/6/2023 2/6/2023 12/21/2022   Pain Disability Index (PDI) 49 0 0     Interval History 02/06/2023:  Bhaskar Stiles returns to clinic today for follow up of neck pain. Patient reports intermittent neck pain with sharp, stabbing sensation to his right upper extremity. He describes the pain as tingling in nature. Pain aggravated by stress. Pain alleviated with ice pack and showering. He reports he took a one time dose of prednisone, which helped his pain. He denies any other health changes. His pain today is 0/10. Patient would like to discuss alternative options for managing pain flares.     Interval History 12/21/2022:  The patient returns to clinic today for follow up of neck pain. He reports increased pain over the last month. He denies any inciting event. He reports increased neck pain that radiates into right shoulder to his arm to his hand. He describes this pain as tingling in nature. He denies any left arm pain. He does feel as though his pain has improved over the last few days. He is no longer taking Gabapentin or Flexeril. He states that these were not very helpful. He did take  Tramadol from a previous prescription. He would like a refill for this. He denies any other health changes. His pain today is 0/10.    Interval History 8/23/2022:  The patient returns to clinic today for follow up of neck pain via virtual visit. He is s/p C7/T1 IL NEIDA on 8/10/2022. He reports 100% relief of his pain. He denies any neck or radicular arm pain at this time. He denies any muscle spasms. He has been able to discontinue Gabapentin and Flexeril. He continues to perform a home exercise routine. He did see Neurosurgery but does not wish to pursue this at this time. He denies any other health changes.     Interval History 7/22/2022:  The patient returns to clinic today for follow up of neck pain. He reports increased neck pain over the last few days. He reports neck pain that radiates into both shoulder. He denies any radicular arm pain. He does report muscle tightness and spasms. He is having trouble sleeping due to pain. He does use heat with benefit. He did not have to use medrol dose pack while on vacation. He continues to take Gabapentin, though not consistently. He also takes Flexeril as needed. He is frustrated by his persistent pain. He would like to find a more permanent solution. He has tried physical therapy in the past but this worsened his pain. He denies any other health changes. His pain today is 8/10.    Interval History 6/14/2022:  The patient returns to clinic today for follow up of neck pain. He reports limited relief with trigger point injections. He did have relief with Flexeril. He reports intermittent neck pain that radiates into both shoulder. He denies any radiating arm pain. He continues to report muscle tightness. He is going on vacation at the end of the month. He is worried about a flare of pain. He denies any other health changes. His pain today is 0/10.    Interval History 5/27/2022:  The patient returns to clinic today for follow up of neck pain. He reports increased neck pain  that radiates into both shoulders. He denies any radiating arm pain. His pain is worse activity. He describes this pain as tight. He denies any weakness. He denies any other health changes His pain today is 6/10.    Interval History 04/11/2022:   Pt  returns to clinic for follow up of cervical pain. Pt is S/P NEIDA C7-T1 on 03/23/22 with Dr. Schaefer. Pt states that his pain is much improved since NEIDA. Pt states that in after 1 week post NEIDA,  his pain got significantly better. Currently, pt denies any pain.Pt states he couldn't tolerate  physical therapy before receiving  NEIDA due to severe neck pain. Today, pt's level of pain is 0/10. Pt denies any numbness, tingling sensation to arms. Pt also denies any coordination problems, loss of bowel and bladder control.     Interval History 2/22/2022:  The patient is here for new onset neck pain. We did previously treat him for back pain which has overall been well controlled. He does have some right lateral leg numbness. However, his primary complaint today is neck pain with radiation into the right shoulder. He has significant right sided muscle tightness and pain. His cervical MRI does show severe stenosis. He has been evaluated by [facility] in the past. He recently saw his orthopedist, Dr. Eastman, who performed osteopathic manipulation with short term benefit only. He has intermittent numbness but denies weakness. No bowel or bladder changes. His pain today is 10/10.    Initial encounter:    Bhaskar Stiles presents to the clinic for the evaluation of back and leg pain. He is s/p left L5/S1 TF NEIDA with 80% relief of leg pain. This was a direct referral from Shauna Valencia. He also had benefit with right L5/S1 in the past.  Currently, he is having some back pain with radiation into the anterior thighs. He also has a bulge at L3/4 with NF narrowing on previous MRI.  He is currently in PT which he finds helpful. He feels as though this will be helpful long-term for him.  The pain started years ago. He also has a history of right knee arthoscopic surgery last year.      Patient denies night fever/night sweats, urinary incontinence and bowel incontinence.  Patient denies any suicidal or homicidal ideations    Current Pain Medications:   Cymbalta    Tried in Past:  NSAIDs - Mobic  TCA -Never  SNRI - Cymbalta  Anti-convulsants - Gabapentin (side effects)  Muscle Relaxants - Robaxin  Opioids- Norco, Tramadol   Prednisone x1- limited relief    Physical Therapy/Home Exercise: yes       report:  Not applicable    Pain Procedures:   10/9/20 right L5/S1 TF NEIDA- 80% relief  11/18/20 left L5/S1 TF NEIDA- 80% relief  03/23/22 C7-T1 NEIDA-100% relief.   8/10/2022- C7/T1 IL NEIDA- 100% relief for one month    Chiropractor -never  Acupuncture - never  TENS unit -never  Spinal decompression -never  Joint replacement -never    Imaging    EXAMINATION:  XR CERVICAL SPINE FLEXION  AND EXTENSION ONLY 7/27/2022     CLINICAL HISTORY:  Radiculopathy, cervical region     TECHNIQUE:  Flexion and extension radiographs of the cervical spine obtained in the lateral projection.     COMPARISON:  09/28/2021     FINDINGS:  Vertebral body heights are maintained.     Disc space narrowing and endplate changes most pronounced C3-4, C5-6, C6-7.     Grade 1 retrolisthesis C3-4 with no significant change with flexion or extension.     No prevertebral soft tissue edema.     Impression:     Degenerative change as above.    EXAMINATION:  MRI CERVICAL SPINE WITHOUT CONTRAST 10/09/2021     CLINICAL HISTORY:  Neck pain, normal neuro exam;C-spine canal stenosis;Cervical osteoarthritis;Cervical radiculopathy;concern for canal stenosis; Other spondylosis, cervical region     TECHNIQUE  Multiplanar, multisequence MR images of the cervical spine were performed without the administration of contrast.     COMPARISON:  Prior radiograph     FINDINGS:  There is straightening and mild reversal of cervical lordosis.  Trace retrolisthesis of C3  on C4. No concerning marrow signal present.  Multilevel disc desiccation present with height loss most noted at C3-4, C5-6 and C6-7.     Posterior fossa is unremarkable.  Spinal cord demonstrates no concerning signal abnormality.     Neck soft tissue structures unremarkable.     C2-C3: No spinal canal stenosis or neural foraminal narrowing.     C3-C4: Posterior disc osteophyte complex present effacing the anterior thecal sac with mild spinal canal stenosis.  Right greater than left uncovertebral degenerative findings results in severe right and moderate to severe left neural foraminal narrowing.     C4-C5: No significant spinal canal stenosis or right neural foraminal narrowing.  Left uncovertebral and facet degenerative findings result in mild neural foraminal narrowing.     C5-C6: Posterior disc osteophyte complex present asymmetric to the right indenting the thecal sac without high-grade spinal canal stenosis.  Facet and uncovertebral degenerative findings present with mild-to-moderate left and moderate to severe right neural foraminal narrowing.     C6-C7: Posterior disc osteophyte complex present asymmetric to the left indenting the thecal sac with mild spinal canal stenosis.  Uncovertebral degenerative findings greater on the left with moderate neural foraminal narrowing.  Minimal right neural foraminal narrowing present.     C7-T1: No spinal canal stenosis or neural foraminal narrowing.     Impression:     Multilevel degenerative findings as above.    Allergies: Review of patient's allergies indicates:  No Known Allergies    Current Medications:   Current Outpatient Medications   Medication Sig Dispense Refill    amLODIPine (NORVASC) 10 MG tablet Take 1 tablet (10 mg total) by mouth once daily. 90 tablet 4    DULoxetine (CYMBALTA) 30 MG capsule TAKE 1 CAPSULE BY MOUTH 2 TIMES DAILY. 60 capsule 0    losartan-hydrochlorothiazide 100-12.5 mg (HYZAAR) 100-12.5 mg Tab Take 1 tablet by mouth once daily. 90 tablet 1     rosuvastatin (CRESTOR) 10 MG tablet Take 1 tablet (10 mg total) by mouth once daily. 90 tablet 3     No current facility-administered medications for this visit.       REVIEW OF SYSTEMS:    GENERAL:  No weight loss, malaise or fevers.  HEENT:  Negative for frequent or significant headaches.  NECK:  Negative for lumps, goiter, pain and significant neck swelling.  RESPIRATORY:  Negative for cough, wheezing or shortness of breath.  CARDIOVASCULAR:  Negative for chest pain, leg swelling or palpitations. HTN  GI:  Negative for abdominal discomfort, blood in stools or black stools or change in bowel habits.  MUSCULOSKELETAL:  See HPI.  SKIN:  Negative for lesions, rash, and itching.  PSYCH:  Positive for sleep disturbance, mood disorder and recent psychosocial stressors.  HEMATOLOGY/LYMPHOLOGY:  Negative for prolonged bleeding, bruising easily or swollen nodes.  NEURO:   No history of headaches, syncope, paralysis, seizures or tremors.  All other reviewed and negative other than HPI.    Past Medical History:  Past Medical History:   Diagnosis Date    Family history of colon cancer in father     Hyperlipidemia     Hypertension     LAMONT (obstructive sleep apnea)     Syncope and collapse        Past Surgical History:  Past Surgical History:   Procedure Laterality Date    CHONDROPLASTY OF KNEE Left 8/22/2019    Procedure: CHONDROPLASTY, KNEE;  Surgeon: Shelbi Hughes MD;  Location: [facility] OR;  Service: Orthopedics;  Laterality: Left;    COLONOSCOPY N/A 9/16/2022    Procedure: COLONOSCOPY;  Surgeon: RYAN Long MD;  Location: [address]);  Service: Endoscopy;  Laterality: N/A;  fully vaccinated, clears 4 hrs prior-am prep, inst portal-MS    EPIDURAL STEROID INJECTION N/A 3/23/2022    Procedure: INJECTION, STEROID, EPIDURAL, C7-T1 IL;  Surgeon: Anthony Schaefer MD;  Location: [facility] PAIN MGT;  Service: Pain Management;  Laterality: N/A;    EPIDURAL STEROID INJECTION N/A 8/10/2022    Procedure: INJECTION, STEROID,  EPIDURAL,  C7-T1 IL CONTRAST;  Surgeon: Anthony Schaefer MD;  Location: [facility] PAIN MGT;  Service: Pain Management;  Laterality: N/A;    HERNIA REPAIR      KNEE ARTHROSCOPY W/ MENISCECTOMY Left 8/22/2019    Procedure: ARTHROSCOPY, KNEE, WITH MEDIAL AND LATERAL MENISCECTOMY;  Surgeon: Shelbi Hughes MD;  Location: [facility];  Service: Orthopedics;  Laterality: Left;    KNEE ARTHROSCOPY W/ PLICA EXCISION Left 8/22/2019    Procedure: EXCISION, MEDIAL PLICA, KNEE, ARTHROSCOPIC;  Surgeon: Shelbi Hughes MD;  Location: [facility] OR;  Service: Orthopedics;  Laterality: Left;    LIPOMA RESECTION      abdomen    REMOVAL OF FOREIGN BODY FROM LOWER EXTREMITY Left 8/22/2019    Procedure: REMOVAL, FOREIGN BODY, LOOSE BODY,  LOWER EXTREMITY;  Surgeon: Shelbi Hughes MD;  Location: [facility] OR;  Service: Orthopedics;  Laterality: Left;    SHOULDER SURGERY      left    SYNOVECTOMY OF KNEE Left 8/22/2019    Procedure: PARTIAL SYNOVECTOMY, KNEE;  Surgeon: Shelbi Hughes MD;  Location: [facility] OR;  Service: Orthopedics;  Laterality: Left;    TILT TABLE TEST N/A 7/9/2018    Procedure: TILT TABLE TEST;  Surgeon: Leonardo Whitfield MD;  Location: [facility] CATH LAB;  Service: Cardiology;  Laterality: N/A;  Syncope, HUT, DM, 3 PREP    TRANSFORAMINAL EPIDURAL INJECTION OF STEROID Right 10/9/2019    Procedure: LUMBAR TRANSFORAMINAL RIGHT L5/S1;  Surgeon: Anthony Schaefer MD;  Location: [facility] PAIN MGT;  Service: Pain Management;  Laterality: Right;  NEEDS CONSENT    TRANSFORAMINAL EPIDURAL INJECTION OF STEROID Left 11/18/2020    Procedure: LUMBAR TRANSFORAMINAL LEFT L5/S1 DIRECT REFERRAL;  Surgeon: Anthony Schaefer MD;  Location: [facility] PAIN MGT;  Service: Pain Management;  Laterality: Left;  NEEDS CONSENT       Family History:  Family History   Problem Relation Age of Onset    Colon cancer Father        Social History:  Social History     Socioeconomic History    Marital status:    Tobacco Use    Smoking status: Never    Smokeless tobacco: Never   Substance and  "Sexual Activity    Alcohol use: No    Drug use: No       OBJECTIVE:    /71   Pulse 81   Resp 18   Ht 5' 11" (1.803 m)   Wt 118 kg (260 lb 2.3 oz)   BMI 36.28 kg/m²     PHYSICAL EXAMINATION:    General appearance: Well appearing, in no acute distress, alert and oriented x3.  Psych:  Mood and affect appropriate.  Skin: Skin color, texture, turgor normal, no rashes or lesions, in both upper and lower body.  Head/face:  Atraumatic, normocephalic. No palpable lymph nodes  Neck:  No TTP over cervical spinous processes and paraspinous muscles. No pain with forward flexion, extension. Negative Spurling. Normal ROM  Back: Normal range of motion without pain reproduction.  Extremities: Peripheral joint ROM is full and pain free without obvious instability or laxity in all four extremities. No deformities, edema, or skin discoloration. Good capillary refill.  Musculoskeletal: Bilateral upper extremity strength is normal and symmetric.  No atrophy or tone abnormalities are noted.  Neuro: Bilateral upper and lower extremity coordination and muscle stretch reflexes are physiologic and symmetric. Negative Marcelino  Gait: Normal.    ASSESSMENT: 52 y.o. year old male with neck pain, consistent with:     1. DDD (degenerative disc disease), cervical        2. Cervical radiculopathy          PLAN:   - Previous imaging reviewed.  - Continue Cymbalta 30 mg BID. Patient would like to give Cymbalta more time to have an effect since he recently started taking it consistently. Directed patient to call if he experiences worsening side effects.   - Continue [facility] physical therapy program.  - I have stressed the importance of physical activity and a home exercise plan to help with pain and improve health.  - RTC 2 months or prn  - Counseled patient regarding the importance of activity modification and physical therapy.    The above plan and management options were discussed at length with patient. Patient is in agreement with " the above and verbalized understanding.    Cherelle Calvo, AMINA  03/06/2023

## 2023-03-16 NOTE — PROGRESS NOTES
[facility]      Name: Bhaskar Stiles  Clinic Number: [id number]    Therapy Diagnosis:   Encounter Diagnoses   Name Primary?    Neck pain Yes    Decreased strength      Physician: Anthony Schaefer MD    Visit Date: 3/17/2023    Physician Orders: PT Eval and Treat   Medical Diagnosis from Referral: M50.30 (ICD-10-CM) - DDD (degenerative disc disease), cervical , M54.12 (ICD-10-CM) - Cervical radiculopathy   Evaluation Date: 3/1/2023  Authorization Period Expiration: 02/06/2024   Plan of Care Expiration: 6/1/2023  Reassessment Due: 4/1/2023  Visit # / Visits authorized: 2/20     Time In: 8:05  Time Out: 9:00am  Total Billable Time: 55 minutes  Insurance:[employer] Benefit Patient        Precautions: Standard, Steroid and epidural injections  Subjective   Bhaskar reports he is feeling pretty good today, has been taking pain medication.     Patient reports tolerating previous visit was sore for about a week after  Patient reports their pain to be 4/10 on a 0-10 scale with 0 being no pain and 10 being the worst pain imaginable.  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   FOTO documents entered into [employer] - see Media section.     NDI Score: 34%         Treatment    Bhaskar received the treatments listed below:      Bhaskar received neuromuscular education  to isolate and engage spinal stabilization musculature correctly for motor control and coordination to aid in function and posture for 10 minutes on the Medical Medx Machine.   "Patient performed MedX dynamic exercise with emphasis on spinal muscular control using pacer throughout  active range of motion. Therapist assisted patient in achieving optimal exertion for neural reeducation and endurance training by using the  Marit Exertion Rating scale, by instructing the patient to aim for mid range of exertion, performing 15-20 repetitions, slowly, correctly,and safely.   Additional neuromuscular activities  to improve: included:    [facility] - Short 3/17/2023   Visit Number 2   VAS Pain Rating 4   Time 5   Retraction in Lying 10   Retraction in Sitting 10   Cervical Weight 189   Repetitions 20   Rating of Perceived Exertion 3        therapeutic exercises to develop for 25 minutes including:  Supine cervical retraction with overpressure  Supine cervical retraction with overpressure into extension  Seated cervical retraction with overpressure  Seated cervical retraction with extension  +No money w/RTB against half FR x10,3"  +Seated thoracic extension x10,3    Peripheral muscle strengthening which included 1 set of 15-20 repetitions at a slow, controlled 10-13 second per rep pace focused on strengthening supporting musculature for improved body mechanics and functional mobility.  Pt and therapist focused on proper form during treatment to ensure optimal strengthening of each targeted muscle group.  Machines were utilized including torso rotation, leg extension, leg curl, chest press, upright row. Tricep extension, bicep curl, leg press, and hip abduction added visit 3    therapeutic activities  manual therapy techniques: for 10 minutes, including:  Cervical distraction  Occipital release  Cervical distraction with R side glides  Manual cervical retraction with cervical extension off the table with overpressure    cold pack for 5 minutes to cervical spine    Home Exercises Provided and Patient Education Provided   Home exercises include:  Supine cervical retraction with " overpressure  Supine cervical retraction with overpressure into extension  Seated cervical retraction with overpressure  Seated cervical retraction with extension  Cardio program:5th visit  Lifting education date:11th visit  Posture/Lumbar roll: not yet obtained  Fridge Magnet Discharge handout (date given):    Education provided:   HEP review, postural education    Written Home Exercises Provided:   Exercises were reviewed and Bhaskar was able to demonstrate them prior to the end of the session.  Bhaskar demonstrated good  understanding of the education provided.     See EMR under Patient Instructions for exercises provided prior visit    Assessment   Pt presents to second healthy back visit reporting he was sore for about a week after initial evaluation. Patient reports he has started taking pain medication 2x per day. Bhaskar was able to demo HEP with Min VC for form. Pt was able to start strengthening and endurance training on the cervical MedX at 80% of max peak torque according to the initial visit isometric test. Pt was able to complete 20 reps, with 3/10 RPE. Consider a 10% increase NV. Patient required max cues to keep cervical spine neutral when attempting to look at the screen.  Pt was also able to complete half of the peripheral strengthening exercises without increased discomfort and will complete the complete circuit next visit as tolerated.     Patient is making good progress towards established goals.  Pt will continue to benefit from skilled outpatient physical therapy to address the deficits stated in the impairment chart, provide pt/family education and to maximize pt's level of independence in the home and community environment.       Pt prognosis is Good.     Anticipated Barriers for therapy: Chronic pain  Pt's spiritual, cultural and educational needs considered and pt agreeable to plan of care and goals as stated below:             Goals: Pt is in agreement with the following goals.     Short  term goals: 6 weeks or 10 visits   1.  Pt will demonstratte increased cervical ROM as measured by med ex by 6 degrees from initial test which results in improved  ROM of neck for ease with ADLs and driving  (approp and ongoing)  2. Pt will demonstrate independence with reducing or controlling symptoms with ther ex, movement, or position independently, able to reduce pain 1-2 points on pain scale using strategies taught in therapy  (approp and ongoing)  3. Pt will demonstrate increased MedX average isometric strength value by 10% with  when compared to the initial testing resulting in improved ability to perform bending, lifting, and carrying activities safely, confidently.  (approp and ongoing)           Long term goals: 10 weeks or 20 visits  1. Pt will demonstratte increased cervical ROM as measured by med ex by 12 degrees from initial test which results in functional ROM of neck for ease with ADLs and driving  (approp and ongoing)  2. Pt will demonstrate increased MedX average isometric strength value  by 20% from initial test to improve ability to lift and carry, and sustain good posture while performing ADL's  (approp and ongoing)  3.Pt will demonstrate reduced pain and improved functional outcomes as reported on the Neck Disability Index by reaching a score of 20 or less in order to demonstrate subjective improvement in pt's condition.    (approp and ongoing)  4. Pt will demonstrate independence with reducing or controlling symptoms with ther ex, movement, or position independently, able to reduce pain 2-4 points on pain scale using strategies taught in therapy  (approp and ongoing)  5. Pt will demonstrate independence with the HEP at discharge.   (approp and ongoing)  6.  Pt will be able to sleep through the night without waking up with neck pain (patient goal)  (approp and ongoing)        Plan   Outpatient physical therapy 2x week for 10 weeks or 20 visits to include the following:   - Patient education  -  Therapeutic exercise  - Manual therapy  - Performance testing   - Neuromuscular Re-education  - Therapeutic activity   - Modalities     Pt may be seen by PTA as part of the rehabilitation team.       Therapist: Shauna Santiago PTA  3/17/2023

## 2023-03-17 ENCOUNTER — CLINICAL SUPPORT (OUTPATIENT)
Dept: REHABILITATION | Facility: OTHER | Age: 53
End: 2023-03-17
Attending: STUDENT IN AN ORGANIZED HEALTH CARE EDUCATION/TRAINING PROGRAM
Payer: COMMERCIAL

## 2023-03-17 DIAGNOSIS — M54.2 NECK PAIN: Primary | ICD-10-CM

## 2023-03-17 DIAGNOSIS — R53.1 DECREASED STRENGTH: ICD-10-CM

## 2023-03-17 PROCEDURE — 97750 PHYSICAL PERFORMANCE TEST: CPT | Mod: 32

## 2023-03-24 ENCOUNTER — CLINICAL SUPPORT (OUTPATIENT)
Dept: REHABILITATION | Facility: OTHER | Age: 53
End: 2023-03-24
Attending: STUDENT IN AN ORGANIZED HEALTH CARE EDUCATION/TRAINING PROGRAM
Payer: COMMERCIAL

## 2023-03-24 DIAGNOSIS — R53.1 DECREASED STRENGTH: ICD-10-CM

## 2023-03-24 DIAGNOSIS — M54.2 NECK PAIN: Primary | ICD-10-CM

## 2023-03-24 PROCEDURE — 97110 THERAPEUTIC EXERCISES: CPT | Mod: CQ

## 2023-03-24 PROCEDURE — 97112 NEUROMUSCULAR REEDUCATION: CPT | Mod: CQ

## 2023-03-24 NOTE — PROGRESS NOTES
[facility]      Name: Bhaskar Stiles  Clinic Number: [id number]    Therapy Diagnosis:   Encounter Diagnoses   Name Primary?    Neck pain Yes    Decreased strength        Physician: Anthony Schaefer MD    Visit Date: 3/24/2023    Physician Orders: PT Eval and Treat   Medical Diagnosis from Referral: M50.30 (ICD-10-CM) - DDD (degenerative disc disease), cervical , M54.12 (ICD-10-CM) - Cervical radiculopathy   Evaluation Date: 3/1/2023  Authorization Period Expiration: 02/06/2024   Plan of Care Expiration: 6/1/2023  Reassessment Due: 4/1/2023  Visit # / Visits authorized: 3/20     Time In: 8:05  Time Out: 9:00am  Total Billable Time: 55 minutes  Insurance:[employer] Benefit Patient        Precautions: Standard, Steroid and epidural injections  Subjective   Bhaskar reports he is feeling pretty good today, has been taking pain medication.     Patient reports tolerating previous visit was sore for about a week after  Patient reports their pain to be 2/10 on a 0-10 scale with 0 being no pain and 10 being the worst pain imaginable.  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   FOTO documents entered into [employer] - see Media section.     NDI Score: 34%         Treatment    Bhaskar received the treatments listed below:      Bhaskar received neuromuscular re-education  to isolate and engage spinal stabilization musculature correctly for motor control and coordination to aid in function and posture for 10 minutes on the Medical Medx Machine.   "Patient performed MedX dynamic exercise with emphasis on spinal muscular control using pacer throughout  active range of motion. Therapist assisted patient in achieving optimal exertion for neural reeducation and endurance training by using the  Amrit Exertion Rating scale, by instructing the patient to aim for mid range of exertion, performing 15-20 repetitions, slowly, correctly,and safely.        [facility] 3/24/2023   Visit Number 3   VAS Pain Rating 2   Time 5   Cervical Stretches - Retraction In Lying 10   Retraction in Sitting 10   Retraction with Extension 10   Rotation 10   Scapular Retraction 10   Cervical Weight 198   Repetitions 20   Rating of Perceived Exertion 2   Ice - Z Lie (in min.) 5        Bhaskar received therapeutic exercises to develop/improve posture, thoracic/cervical ROM, strength and muscular endurance for 45 minutes including the following exercises:    Supine cervical retraction with overpressure x10  Supine cervical retraction with overpressure into extension over edge of  table x10  Seated cervical retraction with overpressure x10  Seated cervical retraction with extension c/ towel x10  +Seated cervical rotation stretch c/ towel x10  No money w/RTB against half FR x12,3"  Seated thoracic extension x10,3    Peripheral muscle strengthening which included 1 set of 15-20 repetitions at a slow, controlled 10-13 second per rep pace focused on strengthening supporting musculature for improved body mechanics and functional mobility.  Pt and therapist focused on proper form during treatment to ensure optimal strengthening of each targeted muscle group.  Machines were utilized including torso rotation, leg extension, leg curl, chest press, upright row. Tricep extension, bicep curl, leg press, and hip abduction added visit 3    therapeutic activities  manual therapy techniques: for 0 minutes, including:  Cervical distraction  Occipital release  Cervical distraction with R side " blanca  Manual cervical retraction with cervical extension off the table with overpressure    cold pack for 5 minutes to cervical spine    Home Exercises Provided and Patient Education Provided   Home exercises include:  Supine cervical retraction with overpressure  Supine cervical retraction with overpressure into extension  Seated cervical retraction with overpressure  Seated cervical retraction with extension  Cardio program:5th visit  Lifting education date:11th visit  Posture/Lumbar roll: not yet obtained  Fridge Magnet Discharge handout (date given):    Education provided:   HEP review, postural education    Written Home Exercises Provided:   Exercises were reviewed and Bhaskar was able to demonstrate them prior to the end of the session.  Bhaskar demonstrated good  understanding of the education provided.     See EMR under Patient Instructions for exercises provided prior visit    Assessment   Pt presents to 3rd HB visit reporting a decrease in his symptoms.  Treatment continued with thoracic/cervical flexibility and strengthening ex's with the addition of a rotation stretch to improve ROM which he was able to perform without complaints. Cervical MedX progressed to 198 in/lbs. Pt was able to complete 20 reps, with 2/10 RPE. Consider a 10% increase NV. Patient required max cues to keep cervical spine neutral when attempting to look at the screen.  Pt was also able to complete all the peripheral strengthening exercises without increased discomfort. Progress per HB protocol and pt's tolerance.     Patient is making good progress towards established goals.  Pt will continue to benefit from skilled outpatient physical therapy to address the deficits stated in the impairment chart, provide pt/family education and to maximize pt's level of independence in the home and community environment.       Pt prognosis is Good.     Anticipated Barriers for therapy: Chronic pain    Pt's spiritual, cultural and educational needs  considered and pt agreeable to plan of care and goals as stated below:     Goals: Pt is in agreement with the following goals.     Short term goals: 6 weeks or 10 visits   1.  Pt will demonstratte increased cervical ROM as measured by med ex by 6 degrees from initial test which results in improved  ROM of neck for ease with ADLs and driving  (approp and ongoing)  2. Pt will demonstrate independence with reducing or controlling symptoms with ther ex, movement, or position independently, able to reduce pain 1-2 points on pain scale using strategies taught in therapy  (approp and ongoing)  3. Pt will demonstrate increased MedX average isometric strength value by 10% with  when compared to the initial testing resulting in improved ability to perform bending, lifting, and carrying activities safely, confidently.  (approp and ongoing)           Long term goals: 10 weeks or 20 visits  1. Pt will demonstratte increased cervical ROM as measured by med ex by 12 degrees from initial test which results in functional ROM of neck for ease with ADLs and driving  (approp and ongoing)  2. Pt will demonstrate increased MedX average isometric strength value  by 20% from initial test to improve ability to lift and carry, and sustain good posture while performing ADL's  (approp and ongoing)  3.Pt will demonstrate reduced pain and improved functional outcomes as reported on the Neck Disability Index by reaching a score of 20 or less in order to demonstrate subjective improvement in pt's condition.    (approp and ongoing)  4. Pt will demonstrate independence with reducing or controlling symptoms with ther ex, movement, or position independently, able to reduce pain 2-4 points on pain scale using strategies taught in therapy  (approp and ongoing)  5. Pt will demonstrate independence with the HEP at discharge.   (approp and ongoing)  6.  Pt will be able to sleep through the night without waking up with neck pain (patient goal)  (approp and  ongoing)        Plan   Outpatient physical therapy 2x week for 10 weeks or 20 visits to include the following:   - Patient education  - Therapeutic exercise  - Manual therapy  - Performance testing   - Neuromuscular Re-education  - Therapeutic activity   - Modalities     Pt may be seen by PTA as part of the rehabilitation team.       Therapist: Nicole Allen PTA  3/24/2023

## 2023-03-31 ENCOUNTER — CLINICAL SUPPORT (OUTPATIENT)
Dept: REHABILITATION | Facility: OTHER | Age: 53
End: 2023-03-31
Attending: STUDENT IN AN ORGANIZED HEALTH CARE EDUCATION/TRAINING PROGRAM
Payer: COMMERCIAL

## 2023-03-31 DIAGNOSIS — M54.2 NECK PAIN: Primary | ICD-10-CM

## 2023-03-31 DIAGNOSIS — R53.1 DECREASED STRENGTH: ICD-10-CM

## 2023-03-31 PROCEDURE — 97110 THERAPEUTIC EXERCISES: CPT

## 2023-03-31 NOTE — PROGRESS NOTES
[facility]      Name: Bhaskar Stiles  Clinic Number: [id number]    Therapy Diagnosis:   Encounter Diagnoses   Name Primary?    Neck pain Yes    Decreased strength        Physician: Anthony Schaefer MD    Visit Date: 3/31/2023    Physician Orders: PT Eval and Treat   Medical Diagnosis from Referral: M50.30 (ICD-10-CM) - DDD (degenerative disc disease), cervical , M54.12 (ICD-10-CM) - Cervical radiculopathy   Evaluation Date: 3/1/2023  Authorization Period Expiration: 12/31/2023  Plan of Care Expiration: 6/1/2023  Reassessment Due: 4/30/2023  Visit # / Visits authorized: 4/20     Time In:7:55  Time Out: 8:50am  Total Billable Time: 55 minutes  Insurance:[employer] Patient        Precautions: Standard, Steroid and epidural injections  Subjective   Bhaskar reports is not having any pain at this time and reports he has been feeling good lately. Taking medications twice a day    Patient reports tolerating previous visit was sore for about a week after  Patient reports their pain to be 0/10 on a 0-10 scale with 0 being no pain and 10 being the worst pain imaginable.  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   FOTO documents entered into EPIC - see Media section.     NDI Score: 34%         Treatment    Bhaskar received the treatments listed below:      Bhaskar received neuromuscular re-education  to isolate and engage spinal stabilization musculature correctly for motor control and coordination to aid in function  "and posture for 00 minutes on the Medical Medx Machine.  Patient performed MedX dynamic exercise with emphasis on spinal muscular control using pacer throughout  active range of motion. Therapist assisted patient in achieving optimal exertion for neural reeducation and endurance training by using the  Amrit Exertion Rating scale, by instructing the patient to aim for mid range of exertion, performing 15-20 repetitions, slowly, correctly,and safely.          Bhaskar received therapeutic exercises to develop/improve posture, thoracic/cervical ROM, strength and muscular endurance for 45 minutes including the following exercises:    Supine self cervical retraction with overpressure x10  Supine self cervical retraction with overpressure into extension over edge of  table x10  Seated cervical retraction with overpressure x10  Seated cervical retraction with extension c/ towel x10 with head shake  +Supine on half foam horizontal GTB 10x2  +Supine serratus punch with 5# dowel 10x3"  No money w/GTB standing against half FR 2x10,3"  Seated thoracic extension x10,3    [facility] 3/31/2023   Visit Number 4   VAS Pain Rating 0   Time -   Counterweight -   Cervical Flexion 99   Cervical Extension 30   Cervical Peak Torque -   Cervical Weight 231   Repetitions 20   Rating of Perceived Exertion 3   Ice - Z Lie (in min.) 5         Peripheral muscle strengthening which included 1 set of 15-20 repetitions at a slow, controlled 10-13 second per rep pace focused on strengthening supporting musculature for improved body mechanics and functional mobility.  Pt and therapist focused on proper form during treatment to ensure optimal strengthening of each targeted muscle group.  Machines were utilized including torso rotation, leg extension, leg curl, chest press, upright row. Tricep extension, bicep curl, leg press, and hip abduction added visit 3    therapeutic activities  manual therapy techniques: for 0 minutes, " including:  Cervical distraction  Occipital release  Cervical distraction with R side glides  Manual cervical retraction with cervical extension off the table with overpressure    cold pack for 5 minutes to cervical spine    Home Exercises Provided and Patient Education Provided   Home exercises include:  Supine cervical retraction with overpressure  Supine cervical retraction with overpressure into extension  Seated cervical retraction with overpressure  Seated cervical retraction with extension  Cardio program:5th visit  Lifting education date:11th visit  Posture/Lumbar roll: not yet obtained  Fridge Magnet Discharge handout (date given):    Education provided:   HEP review, postural education    Written Home Exercises Provided:   Exercises were reviewed and Bhaskar was able to demonstrate them prior to the end of the session.  Bhaskar demonstrated good  understanding of the education provided.     See EMR under Patient Instructions for exercises provided prior visit    Assessment   Pt presents to 3rd  visit reporting a decrease in his symptoms. Treatment continued with thoracic/cervical flexibility and strengthening ex's with the addition of scapular stability and upper thoracic strengthening in supine and standing. Cervical MedX flexion ROM increased to 99 deg and progressed resistance +10%. Pt was able to complete 20 reps, with 3/10 RPE. Patient did well and did not. Pt was also able to complete all the peripheral strengthening exercises without increased discomfort and cues to slow pacing for some machines. Progress per HB protocol and pt's tolerance.     Patient is making good progress towards established goals.  Pt will continue to benefit from skilled outpatient physical therapy to address the deficits stated in the impairment chart, provide pt/family education and to maximize pt's level of independence in the home and community environment.       Pt prognosis is Good.     Anticipated Barriers for therapy:  Chronic pain    Pt's spiritual, cultural and educational needs considered and pt agreeable to plan of care and goals as stated below:     Goals: Pt is in agreement with the following goals.     Short term goals: 6 weeks or 10 visits   1.  Pt will demonstratte increased cervical ROM as measured by med ex by 6 degrees from initial test which results in improved  ROM of neck for ease with ADLs and driving  (approp and ongoing)  2. Pt will demonstrate independence with reducing or controlling symptoms with ther ex, movement, or position independently, able to reduce pain 1-2 points on pain scale using strategies taught in therapy  (approp and ongoing)  3. Pt will demonstrate increased MedX average isometric strength value by 10% with  when compared to the initial testing resulting in improved ability to perform bending, lifting, and carrying activities safely, confidently.  (approp and ongoing)           Long term goals: 10 weeks or 20 visits  1. Pt will demonstratte increased cervical ROM as measured by med ex by 12 degrees from initial test which results in functional ROM of neck for ease with ADLs and driving  (approp and ongoing)  2. Pt will demonstrate increased MedX average isometric strength value  by 20% from initial test to improve ability to lift and carry, and sustain good posture while performing ADL's  (approp and ongoing)  3.Pt will demonstrate reduced pain and improved functional outcomes as reported on the Neck Disability Index by reaching a score of 20 or less in order to demonstrate subjective improvement in pt's condition.    (approp and ongoing)  4. Pt will demonstrate independence with reducing or controlling symptoms with ther ex, movement, or position independently, able to reduce pain 2-4 points on pain scale using strategies taught in therapy  (approp and ongoing)  5. Pt will demonstrate independence with the HEP at discharge.   (approp and ongoing)  6.  Pt will be able to sleep through the  night without waking up with neck pain (patient goal)  (approp and ongoing)        Plan   Outpatient physical therapy 2x week for 10 weeks or 20 visits to include the following:   - Patient education  - Therapeutic exercise  - Manual therapy  - Performance testing   - Neuromuscular Re-education  - Therapeutic activity   - Modalities     Pt may be seen by PTA as part of the rehabilitation team.       Therapist: Lawrence Castro, PT  3/31/2023

## 2023-04-05 ENCOUNTER — CLINICAL SUPPORT (OUTPATIENT)
Dept: REHABILITATION | Facility: OTHER | Age: 53
End: 2023-04-05
Attending: STUDENT IN AN ORGANIZED HEALTH CARE EDUCATION/TRAINING PROGRAM
Payer: COMMERCIAL

## 2023-04-05 DIAGNOSIS — M54.2 NECK PAIN: Primary | ICD-10-CM

## 2023-04-05 DIAGNOSIS — R53.1 DECREASED STRENGTH: ICD-10-CM

## 2023-04-05 PROCEDURE — 97110 THERAPEUTIC EXERCISES: CPT

## 2023-04-05 PROCEDURE — 97750 PHYSICAL PERFORMANCE TEST: CPT | Mod: 32

## 2023-04-05 NOTE — PROGRESS NOTES
[facility]      Name: Bhaskar Stiles  Clinic Number: [id number]    Therapy Diagnosis:   Encounter Diagnoses   Name Primary?    Neck pain Yes    Decreased strength          Physician: Anthony Schaefer MD    Visit Date: 4/5/2023    Physician Orders: PT Eval and Treat   Medical Diagnosis from Referral: M50.30 (ICD-10-CM) - DDD (degenerative disc disease), cervical , M54.12 (ICD-10-CM) - Cervical radiculopathy   Evaluation Date: 3/1/2023  Authorization Period Expiration: 12/31/2023  Plan of Care Expiration: 6/1/2023  Reassessment Due: 4/30/2023  Visit # / Visits authorized: 5/20     Time In:8:05  Time Out: 9:00 am  Total Billable Time: 50 minutes  Insurance:[employer] Patient        Precautions: Standard, Steroid and epidural injections  Subjective   Bhaskar reports no pain in his neck today.    Patient reports tolerating previous visit was sore for about a week after  Patient reports their pain to be 0/10 on a 0-10 scale with 0 being no pain and 10 being the worst pain imaginable.  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   FOTO documents entered into EPIC - see Media section.     NDI Score: 34%         Treatment    Bhaskar received the treatments listed below:      Bhaskar received neuromuscular re-education  to isolate and engage spinal stabilization musculature correctly for motor control and coordination to aid in function and posture for 00 minutes on the Medical Medx Machine.  Patient performed MedX  "dynamic exercise with emphasis on spinal muscular control using pacer throughout  active range of motion. Therapist assisted patient in achieving optimal exertion for neural reeducation and endurance training by using the  Amrit Exertion Rating scale, by instructing the patient to aim for mid range of exertion, performing 15-20 repetitions, slowly, correctly,and safely.          Bhaskar received therapeutic exercises to develop/improve posture, thoracic/cervical ROM, strength and muscular endurance for 50 minutes including the following exercises:    Supine self cervical retraction with overpressure x10  Supine self cervical retraction with overpressure into extension over edge of  table x10  Seated cervical retraction with overpressure x10  Seated cervical retraction with extension c/ towel x10 with head shake  Supine on half foam horizontal GTB 10x2  Supine serratus punch with 5# dowel 10x3"  No money w/GTB standing against half FR 2x10,3"- NP, resume next visit  Seated thoracic extension x10,3- NP, resume next visit    [facility] 4/5/2023   Visit Number 5   VAS Pain Rating 0   Time 5   Cervical Stretches - Retraction In Lying 10   Retraction in Sitting 10   Retraction with Extension 10   Rotation -   Scapular Retraction -   Cervical Extension Seat Pad -   Seat Adjustment -   Top Dead Center -   Counterweight -   Cervical Flexion -   Cervical Extension -   Cervical Peak Torque -   Cervical Weight 243   Repetitions 15   Rating of Perceived Exertion 3   Ice - Z Lie (in min.) 5             Peripheral muscle strengthening which included 1 set of 15-20 repetitions at a slow, controlled 10-13 second per rep pace focused on strengthening supporting musculature for improved body mechanics and functional mobility.  Pt and therapist focused on proper form during treatment to ensure optimal strengthening of each targeted muscle group.  Machines were utilized including torso rotation, leg extension, leg curl, chest " press, upright row. Tricep extension, bicep curl, leg press, and hip abduction added visit 3    therapeutic activities  manual therapy techniques: for 0 minutes, including:  Cervical distraction  Occipital release  Cervical distraction with R side glides  Manual cervical retraction with cervical extension off the table with overpressure    cold pack for 5 minutes to cervical spine    Home Exercises Provided and Patient Education Provided   Home exercises include:  Supine cervical retraction with overpressure  Supine cervical retraction with overpressure into extension  Seated cervical retraction with overpressure  Seated cervical retraction with extension  Cardio program: handout provided 4/5/23  Lifting education date:11th visit  Posture/Lumbar roll: not yet obtained  Fridge Magnet Discharge handout (date given):    Education provided:   HEP review, postural education    Written Home Exercises Provided:   Exercises were reviewed and Bhaskar was able to demonstrate them prior to the end of the session.  Bhaskar demonstrated good  understanding of the education provided.     See EMR under Patient Instructions for exercises provided prior visit    Assessment   Pt arrived to therapy reporting no pain in his neck today. Continued with cervical mobility, but did not get through all of his usual exercises due to education provided on cardio program- will resume next visit. Cervical MedX resistance increased by 5% to 243 in/lbs, and pt was able to complete 15 reps, with 3/10 RPE. Pt was also able to complete all the peripheral strengthening exercises without increased discomfort and cues to slow pacing for some machines. Progress per HB protocol and pt's tolerance.     Patient is making good progress towards established goals.  Pt will continue to benefit from skilled outpatient physical therapy to address the deficits stated in the impairment chart, provide pt/family education and to maximize pt's level of independence in  the home and community environment.       Pt prognosis is Good.     Anticipated Barriers for therapy: Chronic pain    Pt's spiritual, cultural and educational needs considered and pt agreeable to plan of care and goals as stated below:     Goals: Pt is in agreement with the following goals.     Short term goals: 6 weeks or 10 visits   1.  Pt will demonstratte increased cervical ROM as measured by med ex by 6 degrees from initial test which results in improved  ROM of neck for ease with ADLs and driving  (approp and ongoing)  2. Pt will demonstrate independence with reducing or controlling symptoms with ther ex, movement, or position independently, able to reduce pain 1-2 points on pain scale using strategies taught in therapy  (approp and ongoing)  3. Pt will demonstrate increased MedX average isometric strength value by 10% with  when compared to the initial testing resulting in improved ability to perform bending, lifting, and carrying activities safely, confidently.  (approp and ongoing)           Long term goals: 10 weeks or 20 visits  1. Pt will demonstratte increased cervical ROM as measured by med ex by 12 degrees from initial test which results in functional ROM of neck for ease with ADLs and driving  (approp and ongoing)  2. Pt will demonstrate increased MedX average isometric strength value  by 20% from initial test to improve ability to lift and carry, and sustain good posture while performing ADL's  (approp and ongoing)  3.Pt will demonstrate reduced pain and improved functional outcomes as reported on the Neck Disability Index by reaching a score of 20 or less in order to demonstrate subjective improvement in pt's condition.    (approp and ongoing)  4. Pt will demonstrate independence with reducing or controlling symptoms with ther ex, movement, or position independently, able to reduce pain 2-4 points on pain scale using strategies taught in therapy  (approp and ongoing)  5. Pt will demonstrate  independence with the HEP at discharge.   (approp and ongoing)  6.  Pt will be able to sleep through the night without waking up with neck pain (patient goal)  (approp and ongoing)        Plan   Outpatient physical therapy 2x week for 10 weeks or 20 visits to include the following:   - Patient education  - Therapeutic exercise  - Manual therapy  - Performance testing   - Neuromuscular Re-education  - Therapeutic activity   - Modalities     Pt may be seen by PTA as part of the rehabilitation team.       Therapist: Thad Up, PT  4/5/2023

## 2023-04-10 ENCOUNTER — DOCUMENTATION ONLY (OUTPATIENT)
Dept: REHABILITATION | Facility: OTHER | Age: 53
End: 2023-04-10
Payer: COMMERCIAL

## 2023-04-10 NOTE — PROGRESS NOTES
Called patient about missed 8:00 appt. Left voicemail that next appt is Wednesday 4/12 at 8:00 am    Lawrence Castro PT

## 2023-04-12 ENCOUNTER — CLINICAL SUPPORT (OUTPATIENT)
Dept: REHABILITATION | Facility: OTHER | Age: 53
End: 2023-04-12
Attending: STUDENT IN AN ORGANIZED HEALTH CARE EDUCATION/TRAINING PROGRAM
Payer: COMMERCIAL

## 2023-04-12 DIAGNOSIS — R53.1 DECREASED STRENGTH: ICD-10-CM

## 2023-04-12 DIAGNOSIS — M54.2 NECK PAIN: Primary | ICD-10-CM

## 2023-04-12 PROCEDURE — 97750 PHYSICAL PERFORMANCE TEST: CPT | Mod: 32

## 2023-04-12 NOTE — PROGRESS NOTES
[facility]      Name: Bhaskar Stiles  Clinic Number: [id number]    Therapy Diagnosis:   Encounter Diagnoses   Name Primary?    Neck pain Yes    Decreased strength          Physician: Anthony Schaefer MD    Visit Date: 4/12/2023    Physician Orders: PT Eval and Treat   Medical Diagnosis from Referral: M50.30 (ICD-10-CM) - DDD (degenerative disc disease), cervical , M54.12 (ICD-10-CM) - Cervical radiculopathy   Evaluation Date: 3/1/2023  Authorization Period Expiration: 12/31/2023  Plan of Care Expiration: 6/1/2023  Reassessment Due: 4/30/2023  Visit # / Visits authorized: 6/20     Time In: 7:50 AM  Time Out: 8:50 AM  Total Billable Time: 50 minutes  Insurance:[employer] [employer] Benefit Patient        Precautions: Standard, Steroid and epidural injections  Subjective   Bhaskar reports no new complaints this session.     Patient reports tolerating previous visit was sore for about a week after  Patient reports their pain to be 0/10 on a 0-10 scale with 0 being no pain and 10 being the worst pain imaginable.  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   FOTO documents entered into [employer] - see Media section.     NDI Score: 34%         Treatment    Bhaskar received the treatments listed below:      Bhaskar received neuromuscular re-education  to isolate and engage spinal stabilization musculature correctly for motor control and coordination to aid in function and posture for 00 minutes on the [employer] Machine.  Patient performed  "MedX dynamic exercise with emphasis on spinal muscular control using pacer throughout  active range of motion. Therapist assisted patient in achieving optimal exertion for neural reeducation and endurance training by using the  Amrit Exertion Rating scale, by instructing the patient to aim for mid range of exertion, performing 15-20 repetitions, slowly, correctly,and safely.          Bhaskar received therapeutic exercises to develop/improve posture, thoracic/cervical ROM, strength and muscular endurance for 50 minutes including the following exercises:    Performed 4/12/23:  Open books x 10   Cervical retraction add extension with towel for over pressure 3'' x 10   1/2 foam horizontal ABD RTB x 15   Standing cervical and thoracic wall slides ( slides on elbows up the wall) x 15  Brueggers "no money" GTB standing against wall foam 2 x 12   Standing wall clocks YTB 1 min R<>L each side       Not performed 4/12/23   Supine self cervical retraction with overpressure x10  Supine self cervical retraction with overpressure into extension over edge of  table x10  Seated cervical retraction with overpressure x10  Seated cervical retraction with extension c/ towel x10 with head shake  Supine on half foam horizontal GTB 10x2  Supine serratus punch with 5# dowel 10x3"  No money w/GTB standing against half FR 2x10,3"- NP, resume next visit  Seated thoracic extension x10,3- NP, resume next visit    [facility] Therapy - Short 4/12/2023   Visit Number 6   VAS Pain Rating 0   Time 5   Retraction in Lying -   Retraction in Sitting -   Retraction with Extension -   Rotation -   Scapular Retraction -   Cervical Extension - Seat Pad -   Seat Adjustment -   Top Dead Center -   Counterweight -   Cervical Flexion -   Cervical Extension -   Cervical Peak Torque -   Cervical Weight 243   Repetitions 20   Rating of Perceived Exertion 3              Peripheral muscle strengthening which included 1 set of 15-20 repetitions at a slow, controlled " 10-13 second per rep pace focused on strengthening supporting musculature for improved body mechanics and functional mobility.  Pt and therapist focused on proper form during treatment to ensure optimal strengthening of each targeted muscle group.  Machines were utilized including torso rotation, leg extension, leg curl, chest press, upright row. Tricep extension, bicep curl, leg press, and hip abduction added visit 3    therapeutic activities  manual therapy techniques: for 0 minutes, including:  Cervical distraction  Occipital release  Cervical distraction with R side glides  Manual cervical retraction with cervical extension off the table with overpressure    cold pack for 5 minutes to cervical spine    Home Exercises Provided and Patient Education Provided   Home exercises include:  Supine cervical retraction with overpressure  Supine cervical retraction with overpressure into extension  Seated cervical retraction with overpressure  Seated cervical retraction with extension  Cardio program: handout provided 4/5/23  Lifting education date:11th visit  Posture/Lumbar roll: not yet obtained  Fridge Magnet Discharge handout (date given):    Education provided:   HEP review, postural education    Written Home Exercises Provided:   Exercises were reviewed and Bhaskar was able to demonstrate them prior to the end of the session.  Bhaskar demonstrated good  understanding of the education provided.     See EMR under Patient Instructions for exercises provided prior visit    Assessment   Bhaskar return to  with no new complaints. Patient education updated HEP provided. New exercises are introduced which are listed above in bold he tolerates all well reporting a good response following. Patient tolerates continued weight on lumbar med x completing 20 reps with RPE of 3. Patient will continue to progress per  protocol. Patient is making good progress towards established goals.  Pt will continue to benefit from skilled  outpatient physical therapy to address the deficits stated in the impairment chart, provide pt/family education and to maximize pt's level of independence in the home and community environment.       Pt prognosis is Good.     Anticipated Barriers for therapy: Chronic pain    Pt's spiritual, cultural and educational needs considered and pt agreeable to plan of care and goals as stated below:     Goals: Pt is in agreement with the following goals.     Short term goals: 6 weeks or 10 visits   1.  Pt will demonstratte increased cervical ROM as measured by med ex by 6 degrees from initial test which results in improved  ROM of neck for ease with ADLs and driving  (approp and ongoing)  2. Pt will demonstrate independence with reducing or controlling symptoms with ther ex, movement, or position independently, able to reduce pain 1-2 points on pain scale using strategies taught in therapy  (approp and ongoing)  3. Pt will demonstrate increased MedX average isometric strength value by 10% with  when compared to the initial testing resulting in improved ability to perform bending, lifting, and carrying activities safely, confidently.  (approp and ongoing)           Long term goals: 10 weeks or 20 visits  1. Pt will demonstratte increased cervical ROM as measured by med ex by 12 degrees from initial test which results in functional ROM of neck for ease with ADLs and driving  (approp and ongoing)  2. Pt will demonstrate increased MedX average isometric strength value  by 20% from initial test to improve ability to lift and carry, and sustain good posture while performing ADL's  (approp and ongoing)  3.Pt will demonstrate reduced pain and improved functional outcomes as reported on the Neck Disability Index by reaching a score of 20 or less in order to demonstrate subjective improvement in pt's condition.    (approp and ongoing)  4. Pt will demonstrate independence with reducing or controlling symptoms with ther ex, movement, or  position independently, able to reduce pain 2-4 points on pain scale using strategies taught in therapy  (approp and ongoing)  5. Pt will demonstrate independence with the HEP at discharge.   (approp and ongoing)  6.  Pt will be able to sleep through the night without waking up with neck pain (patient goal)  (approp and ongoing)        Plan   Outpatient physical therapy 2x week for 10 weeks or 20 visits to include the following:   - Patient education  - Therapeutic exercise  - Manual therapy  - Performance testing   - Neuromuscular Re-education  - Therapeutic activity   - Modalities     Pt may be seen by PTA as part of the rehabilitation team.       Therapist: Richard Walton PTA  4/12/2023

## 2023-04-14 ENCOUNTER — CLINICAL SUPPORT (OUTPATIENT)
Dept: REHABILITATION | Facility: OTHER | Age: 53
End: 2023-04-14
Attending: STUDENT IN AN ORGANIZED HEALTH CARE EDUCATION/TRAINING PROGRAM
Payer: COMMERCIAL

## 2023-04-14 DIAGNOSIS — R53.1 DECREASED STRENGTH: ICD-10-CM

## 2023-04-14 DIAGNOSIS — M54.2 NECK PAIN: Primary | ICD-10-CM

## 2023-04-14 PROCEDURE — 97110 THERAPEUTIC EXERCISES: CPT

## 2023-04-14 NOTE — PROGRESS NOTES
[facility] Therapy Treatment      Name: Bhaskar Stiles  Clinic Number: [id number]    Therapy Diagnosis:   Encounter Diagnoses   Name Primary?    Neck pain Yes    Decreased strength      Physician: Anthony Schaefer MD    Visit Date: 4/14/2023    Physician Orders: PT Eval and Treat   Medical Diagnosis from Referral: M50.30 (ICD-10-CM) - DDD (degenerative disc disease), cervical , M54.12 (ICD-10-CM) - Cervical radiculopathy   Evaluation Date: 3/1/2023  Authorization Period Expiration: 12/31/2023  Plan of Care Expiration: 6/1/2023  Reassessment Due: 4/30/2023  Visit # / Visits authorized: 7 / 20     Time In: 0758  Time Out: 0856  Total Billable Time: 53 minutes  Insurance: Fee for Service Patient     Precautions: Standard, Steroid and epidural injections  Subjective   Bhaskar reports no pain at start of session, has felt good for a few days now, compliant with home program.     Patient reports tolerating previous visit was sore for about a week after  Patient reports their pain to be 0/10 on a 0-10 scale with 0 being no pain and 10 being the worst pain imaginable.  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   FOTO documents entered into [employer] - see Media section.     NDI Score: 34%  Visit 6: 15%  Goal: 20%         Treatment    Bhaskar received the treatments listed below:      Bhaskar received therapeutic exercises to develop/improve posture, thoracic/cervical ROM, strength and muscular endurance for 53 minutes including the following  "exercises:    Performed 4/12/23:  Open books x 10   Cervical retraction add extension with towel for over pressure 3'' x 15  +Cervical SNAGs with towel 10x10s hold   1/2 foam      - horizontal ABD RTB x 15      - horizontal ABD with flexion RTB x15   Standing cervical and thoracic wall slides ( slides on elbows up the wall) x 15  Brueggers "no money" GTB standing against wall foam 2 x 12      - diagonals x10 ea GTB   Standing wall clocks YTB 1 min R<>L each side     [facility] 4/14/2023   Visit Number 7   VAS Pain Rating 0   Time 5   Cervical Stretches - Retraction In Lying -   Retraction in Sitting -   Retraction with Extension 15   Rotation 15   Scapular Retraction 20   Cervical Extension Seat Pad -   Seat Adjustment -   Top Dead Center -   Counterweight -   Cervical Flexion -   Cervical Extension -   Cervical Peak Torque -   Cervical Weight 255   Repetitions 20   Rating of Perceived Exertion 3   Ice - Z Lie (in min.) 5     Not performed 4/12/23   Supine self cervical retraction with overpressure x10  Supine self cervical retraction with overpressure into extension over edge of  table x10  Seated cervical retraction with overpressure x10  Seated cervical retraction with extension c/ towel x10 with head shake  Supine on half foam horizontal GTB 10x2  Supine serratus punch with 5# dowel 10x3"  No money w/GTB standing against half FR 2x10,3"- NP, resume next visit  Seated thoracic extension x10,3- NP, resume next visit     Peripheral muscle strengthening which included 1 set of 15-20 repetitions at a slow, controlled 10-13 second per rep pace focused on strengthening supporting musculature for improved body mechanics and functional mobility.  Pt and therapist focused on proper form during treatment to ensure optimal strengthening of each targeted muscle group.  Machines were utilized including torso rotation, leg extension, leg curl, chest press, upright row. Tricep extension, bicep curl, leg press, and hip " abduction added visit 3    therapeutic activities  manual therapy techniques: for 0 minutes, including:  Cervical distraction  Occipital release  Cervical distraction with R side glides  Manual cervical retraction with cervical extension off the table with overpressure    cold pack for 5 minutes to cervical spine    Home Exercises Provided and Patient Education Provided   Home exercises include:  Initial evaluation: 03/01/2023  Supine cervical retraction with overpressure  Supine cervical retraction with overpressure into extension  Seated cervical retraction with overpressure  Seated cervical retraction with extension    HEP updated 4/12/2023:   -Claire cervical progression  -Chin tuck in seated  -Upper trap stretch     Cardio program: handout provided 4/5/23  Lifting education date:11th visit  Posture/Lumbar roll: not yet obtained  Fridge Magnet Discharge handout (date given):    Education provided:   HEP review, postural education    Written Home Exercises Provided:   Exercises were reviewed and Bhaskar was able to demonstrate them prior to the end of the session.  Bhaskar demonstrated good  understanding of the education provided.     See EMR under Patient Instructions for exercises provided prior visit    Assessment   Bhaskar presented to clinic with no subjective complaints of pain in the neck and has been compliant with home program. Continued with postural stabilization and strengthening exercises this session educating on proper form and sequencing. Requiring some cues for form with horizontal abduction, going into bilateral external rotation. Will benefit from increasing functional activities next session. Increased resistance on cervical MedX to 255in-lbs for 20 repetitions with RPE of 3/10. Able to perform peripheral machines without difficulty this session.     Patient is making good progress towards established goals.  Pt will continue to benefit from skilled outpatient physical therapy to address the  deficits stated in the impairment chart, provide pt/family education and to maximize pt's level of independence in the home and community environment.   Pt prognosis is Good.     Anticipated Barriers for therapy: Chronic pain    Pt's spiritual, cultural and educational needs considered and pt agreeable to plan of care and goals as stated below:     Goals: Pt is in agreement with the following goals.     Short term goals: 6 weeks or 10 visits   1.  Pt will demonstratte increased cervical ROM as measured by med ex by 6 degrees from initial test which results in improved  ROM of neck for ease with ADLs and driving  (approp and ongoing)  2. Pt will demonstrate independence with reducing or controlling symptoms with ther ex, movement, or position independently, able to reduce pain 1-2 points on pain scale using strategies taught in therapy  (approp and ongoing)  3. Pt will demonstrate increased MedX average isometric strength value by 10% with  when compared to the initial testing resulting in improved ability to perform bending, lifting, and carrying activities safely, confidently.  (approp and ongoing)     Long term goals: 10 weeks or 20 visits  1. Pt will demonstratte increased cervical ROM as measured by med ex by 12 degrees from initial test which results in functional ROM of neck for ease with ADLs and driving  (approp and ongoing)  2. Pt will demonstrate increased MedX average isometric strength value  by 20% from initial test to improve ability to lift and carry, and sustain good posture while performing ADL's  (approp and ongoing)  3.Pt will demonstrate reduced pain and improved functional outcomes as reported on the Neck Disability Index by reaching a score of 20 or less in order to demonstrate subjective improvement in pt's condition.    (approp and ongoing)  4. Pt will demonstrate independence with reducing or controlling symptoms with ther ex, movement, or position independently, able to reduce pain 2-4  points on pain scale using strategies taught in therapy  (approp and ongoing)  5. Pt will demonstrate independence with the HEP at discharge.   (approp and ongoing)  6.  Pt will be able to sleep through the night without waking up with neck pain (patient goal)  (approp and ongoing)    Plan   Outpatient physical therapy 2x week for 10 weeks or 20 visits to include the following:   - Patient education  - Therapeutic exercise  - Manual therapy  - Performance testing   - Neuromuscular Re-education  - Therapeutic activity   - Modalities     Pt may be seen by PTA as part of the rehabilitation team.       Therapist: Mireya Benson, PT  4/14/2023

## 2023-04-17 ENCOUNTER — CLINICAL SUPPORT (OUTPATIENT)
Dept: REHABILITATION | Facility: OTHER | Age: 53
End: 2023-04-17
Attending: STUDENT IN AN ORGANIZED HEALTH CARE EDUCATION/TRAINING PROGRAM
Payer: COMMERCIAL

## 2023-04-17 DIAGNOSIS — M54.2 NECK PAIN: Primary | ICD-10-CM

## 2023-04-17 DIAGNOSIS — R53.1 DECREASED STRENGTH: ICD-10-CM

## 2023-04-17 PROCEDURE — 97110 THERAPEUTIC EXERCISES: CPT

## 2023-04-17 NOTE — PROGRESS NOTES
[facility]      Name: Bhaskar Stiles  Clinic Number: [id number]    Therapy Diagnosis:   Encounter Diagnoses   Name Primary?    Neck pain Yes    Decreased strength      Physician: Anthony Schaefer MD    Visit Date: 4/17/2023    Physician Orders: PT Eval and Treat   Medical Diagnosis from Referral: M50.30 (ICD-10-CM) - DDD (degenerative disc disease), cervical , M54.12 (ICD-10-CM) - Cervical radiculopathy   Evaluation Date: 3/1/2023  Authorization Period Expiration: 12/31/2023  Plan of Care Expiration: 6/1/2023  Reassessment Due: 4/30/2023  Visit # / Visits authorized: 8 / 20 (D/C Visit 10-11)     Time In: 0800  Time Out: 0905  Total Billable Time: 60 minutes  Insurance: Fee for Service Patient     Precautions: Standard, Steroid and epidural injections  Subjective   Bhaskar reports no pain at start of session, has felt good for a few days now, compliant with home program.     Patient reports tolerating previous visit was sore for about a week after  Patient reports their pain to be 0/10 on a 0-10 scale with 0 being no pain and 10 being the worst pain imaginable.  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   FOTO documents entered into [facility] - see Media section.     NDI Score: 34%  Visit 6: 15%  Goal: 20%             Treatment    Bhaskar received the treatments listed below:      Bhaskar received therapeutic exercises to develop/improve posture, thoracic/cervical ROM, strength and muscular endurance for 53 minutes including  "the following exercises:    Performed 4/12/23:      Cervical retraction add extension with towel for over pressure 3'' x 15  +Cervical SNAGs with towel 10x10s hold   \Standing cervical and thoracic wall slides ( slides on elbows up the wall) x 15  Brueggers "no money" GTB standing against wall foam 2 x 12      - diagonals x10 ea GTB   Standing wall clocks RTB 1 min R<>L each side   +ITY with head in cervical pillow x10    [facility] 4/17/2023   Visit Number 8   VAS Pain Rating 1   Time 5   Cervical Stretches - Retraction In Lying -   Retraction in Sitting -   Retraction with Extension 15   Rotation 10   Scapular Retraction 30   Cervical Extension Seat Pad -   Seat Adjustment -   Top Dead Center -   Counterweight -   Cervical Flexion 105   Cervical Extension 30   Cervical Peak Torque -   Cervical Weight 270   Repetitions 20   Rating of Perceived Exertion 3   Ice - Z Lie (in min.) 5       Not performed 4/12/23   Supine self cervical retraction with overpressure x10  Supine self cervical retraction with overpressure into extension over edge of  table x10  Seated cervical retraction with overpressure x10  Seated cervical retraction with extension c/ towel x10 with head shake  Supine on half foam horizontal GTB 10x2  Supine serratus punch with 5# dowel 10x3"  No money w/GTB standing against half FR 2x10,3"- NP, resume next visit  Seated thoracic extension x10,3- NP, resume next visit  1/2 foam      - horizontal ABD RTB x 15      - horizontal ABD with flexion RTB x15   Open books x 10       Peripheral muscle strengthening which included 1 set of 15-20 repetitions at a slow, controlled 10-13 second per rep pace focused on strengthening supporting musculature for improved body mechanics and functional mobility.  Pt and therapist focused on proper form during treatment to ensure optimal strengthening of each targeted muscle group.  Machines were utilized including torso rotation, leg extension, leg curl, chest " press, upright row. Tricep extension, bicep curl, leg press, and hip abduction added visit 3    therapeutic activities  manual therapy techniques: for 0 minutes, including:  Cervical distraction  Occipital release  Cervical distraction with R side glides  Manual cervical retraction with cervical extension off the table with overpressure    cold pack for 5 minutes to cervical spine    Home Exercises Provided and Patient Education Provided   Home exercises include:  Initial evaluation: 03/01/2023  Supine cervical retraction with overpressure  Supine cervical retraction with overpressure into extension  Seated cervical retraction with overpressure  Seated cervical retraction with extension    HEP updated 4/12/2023:   -Claire cervical progression  -Chin tuck in seated  -Upper trap stretch     Cardio program: handout provided 4/5/23  Lifting education date:11th visit  Posture/Lumbar roll: not yet obtained  Fridge Magnet Discharge handout (date given):    Education provided:   HEP review, postural education    Written Home Exercises Provided:   Exercises were reviewed and Bhaskar was able to demonstrate them prior to the end of the session.  Bhaskar demonstrated good  understanding of the education provided.     See EMR under Patient Instructions for exercises provided prior visit    Assessment   Bhaskar presented to clinic with some tension on the R side of the neck that he thinks is from a change in sleeping. Continued with postural stabilization and strengthening exercises this session educating on proper form and sequencing. Requiring some cues for form with horizontal abduction, going into bilateral external rotation. Increased ROM and resistance on cervical MedX to 270 in-lbs for 20 repetitions with RPE of 3/10. Able to perform peripheral machines without difficulty this session. Discussed benefits of continued mobility and exercise post therapy and that the pain may come back but continue with the tools that he has  learned [facility]. Possible d/c at visit 10 or 11    Patient is making good progress towards established goals.  Pt will continue to benefit from skilled outpatient physical therapy to address the deficits stated in the impairment chart, provide pt/family education and to maximize pt's level of independence in the home and community environment.   Pt prognosis is Good.     Anticipated Barriers for therapy: Chronic pain    Pt's spiritual, cultural and educational needs considered and pt agreeable to plan of care and goals as stated below:     Goals: Pt is in agreement with the following goals.     Short term goals: 6 weeks or 10 visits   1.  Pt will demonstratte increased cervical ROM as measured by med ex by 6 degrees from initial test which results in improved  ROM of neck for ease with ADLs and driving  (approp and ongoing)  2. Pt will demonstrate independence with reducing or controlling symptoms with ther ex, movement, or position independently, able to reduce pain 1-2 points on pain scale using strategies taught in therapy  (approp and ongoing)  3. Pt will demonstrate increased MedX average isometric strength value by 10% with  when compared to the initial testing resulting in improved ability to perform bending, lifting, and carrying activities safely, confidently.  (approp and ongoing)     Long term goals: 10 weeks or 20 visits  1. Pt will demonstratte increased cervical ROM as measured by med ex by 12 degrees from initial test which results in functional ROM of neck for ease with ADLs and driving  (approp and ongoing)  2. Pt will demonstrate increased MedX average isometric strength value  by 20% from initial test to improve ability to lift and carry, and sustain good posture while performing ADL's  (approp and ongoing)  3.Pt will demonstrate reduced pain and improved functional outcomes as reported on the Neck Disability Index by reaching a score of 20 or less in order to demonstrate subjective  improvement in pt's condition.    (approp and ongoing)  4. Pt will demonstrate independence with reducing or controlling symptoms with ther ex, movement, or position independently, able to reduce pain 2-4 points on pain scale using strategies taught in therapy  (approp and ongoing)  5. Pt will demonstrate independence with the HEP at discharge.   (approp and ongoing)  6.  Pt will be able to sleep through the night without waking up with neck pain (patient goal)  (approp and ongoing)    Plan   Outpatient physical therapy 2x week for 10 weeks or 20 visits to include the following:   - Patient education  - Therapeutic exercise  - Manual therapy  - Performance testing   - Neuromuscular Re-education  - Therapeutic activity   - Modalities     Pt may be seen by PTA as part of the rehabilitation team.       Therapist: Lawrence Castro, PT  4/17/2023

## 2023-04-19 ENCOUNTER — CLINICAL SUPPORT (OUTPATIENT)
Dept: REHABILITATION | Facility: OTHER | Age: 53
End: 2023-04-19
Attending: STUDENT IN AN ORGANIZED HEALTH CARE EDUCATION/TRAINING PROGRAM
Payer: COMMERCIAL

## 2023-04-19 DIAGNOSIS — R53.1 DECREASED STRENGTH: ICD-10-CM

## 2023-04-19 DIAGNOSIS — M54.2 NECK PAIN: Primary | ICD-10-CM

## 2023-04-19 PROCEDURE — 97110 THERAPEUTIC EXERCISES: CPT

## 2023-04-19 NOTE — PROGRESS NOTES
[facility]      Name: Bhaskar Stiles  Clinic Number: [id number]    Therapy Diagnosis:   Encounter Diagnoses   Name Primary?    Neck pain Yes    Decreased strength        Physician: Anthony Schaefer MD    Visit Date: 4/19/2023    Physician Orders: PT Eval and Treat   Medical Diagnosis from Referral: M50.30 (ICD-10-CM) - DDD (degenerative disc disease), cervical , M54.12 (ICD-10-CM) - Cervical radiculopathy   Evaluation Date: 3/1/2023  Authorization Period Expiration: 12/31/2023  Plan of Care Expiration: 6/1/2023  Reassessment Due: 4/30/2023  Visit # / Visits authorized: 9 / 20 (D/C Visit 10-11)     Time In: 0805  Time Out: 0905  Total Billable Time: 55 minutes  Insurance: Fee for Service Patient     Precautions: Standard, Steroid and epidural injections  Subjective   Bhaskar reports some tightness this morning which he attributes to how he slept but no pain.      Patient reports tolerating previous visit was sore for about a week after  Patient reports their pain to be 0/10 on a 0-10 scale with 0 being no pain and 10 being the worst pain imaginable.  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   FOTO documents entered into [employer] - see Media section.     NDI Score: 34%  Visit 6: 15%  Goal: 20%             Treatment    Bhaskar received the treatments listed below:      Bhaskar received therapeutic exercises to develop/improve posture, thoracic/cervical ROM, strength and muscular endurance for 55 minutes including the  "following exercises:    Performed 4/19/2023:    Cervical retraction add extension with towel for over pressure 3'' x 15  Cervical SNAGs with towel 10x10s hold   Standing cervical and thoracic wall slides w/ RTB + lift off x 15  Seated thoracic extension x10,3  Brueggers "no money" GTB standing against wall foam 2 x 12   W,T,Y with head in cervical pillow x10  Standing Open books x 10     [facility] - Short 4/19/2023   Visit Number 9   VAS Pain Rating 0   Time 5   Retraction in Lying -   Retraction in Sitting -   Retraction with Extension 20   Rotation 10   Scapular Retraction 30   Cervical Extension - Seat Pad -   Seat Adjustment -   Top Dead Center -   Counterweight -   Cervical Flexion -   Cervical Extension -   Cervical Peak Torque -   Cervical Weight 285   Repetitions 20   Rating of Perceived Exertion 3       Not performed 4/12/23     - diagonals x10 ea GTB   Supine self cervical retraction with overpressure x10  Supine self cervical retraction with overpressure into extension over edge of  table x10  Seated cervical retraction with overpressure x10  Seated cervical retraction with extension c/ towel x10 with head shake  Supine on half foam horizontal GTB 10x2  Supine serratus punch with 5# dowel 10x3"  No money w/GTB standing against half FR 2x10,3"- NP, resume next visit  1/2 foam      - horizontal ABD RTB x 15      - horizontal ABD with flexion RTB x15   Standing wall clocks RTB 1 min R<>L each side         Peripheral muscle strengthening which included 1 set of 15-20 repetitions at a slow, controlled 10-13 second per rep pace focused on strengthening supporting musculature for improved body mechanics and functional mobility.  Pt and therapist focused on proper form during treatment to ensure optimal strengthening of each targeted muscle group.  Machines were utilized including torso rotation, leg extension, leg curl, chest press, upright row. Tricep extension, bicep curl, leg press, and hip " abduction added visit 3    therapeutic activities  manual therapy techniques: for 0 minutes, including:  Cervical distraction  Occipital release  Cervical distraction with R side glides  Manual cervical retraction with cervical extension off the table with overpressure    cold pack for 5 minutes to cervical spine    Home Exercises Provided and Patient Education Provided   Home exercises include:  Initial evaluation: 03/01/2023  Supine cervical retraction with overpressure  Supine cervical retraction with overpressure into extension  Seated cervical retraction with overpressure  Seated cervical retraction with extension    HEP updated 4/12/2023:   -Claire cervical progression  -Chin tuck in seated  -Upper trap stretch     Cardio program: handout provided 4/5/23  Lifting education date:11th visit  Posture/Lumbar roll: not yet obtained  Fridge Magnet Discharge handout (date given):    Education provided:   HEP review, postural education    Written Home Exercises Provided:   Exercises were reviewed and Bhaskar was able to demonstrate them prior to the end of the session.  Bhaskar demonstrated good  understanding of the education provided.     See EMR under Patient Instructions for exercises provided prior visit    Assessment   Bhaskar presents today without complaints of pain. Continuation and progression of dynamic cervicothoracic and periscapular strengthening and mobility exercises with good tolerance and no adverse effects. Decreased tightness reported post therex. Medx resistance progressed to 285 in/lbs and pt was able to complete 20 reps with 3/10 RPE. Retesting to be completed next visit.     Patient is making good progress towards established goals.  Pt will continue to benefit from skilled outpatient physical therapy to address the deficits stated in the impairment chart, provide pt/family education and to maximize pt's level of independence in the home and community environment.   Pt prognosis is Good.      Anticipated Barriers for therapy: Chronic pain    Pt's spiritual, cultural and educational needs considered and pt agreeable to plan of care and goals as stated below:     Goals: Pt is in agreement with the following goals.     Short term goals: 6 weeks or 10 visits   1.  Pt will demonstratte increased cervical ROM as measured by med ex by 6 degrees from initial test which results in improved  ROM of neck for ease with ADLs and driving  (approp and ongoing)  2. Pt will demonstrate independence with reducing or controlling symptoms with ther ex, movement, or position independently, able to reduce pain 1-2 points on pain scale using strategies taught in therapy  (approp and ongoing)  3. Pt will demonstrate increased MedX average isometric strength value by 10% with  when compared to the initial testing resulting in improved ability to perform bending, lifting, and carrying activities safely, confidently.  (approp and ongoing)     Long term goals: 10 weeks or 20 visits  1. Pt will demonstratte increased cervical ROM as measured by med ex by 12 degrees from initial test which results in functional ROM of neck for ease with ADLs and driving  (approp and ongoing)  2. Pt will demonstrate increased MedX average isometric strength value  by 20% from initial test to improve ability to lift and carry, and sustain good posture while performing ADL's  (approp and ongoing)  3.Pt will demonstrate reduced pain and improved functional outcomes as reported on the Neck Disability Index by reaching a score of 20 or less in order to demonstrate subjective improvement in pt's condition.    (approp and ongoing)  4. Pt will demonstrate independence with reducing or controlling symptoms with ther ex, movement, or position independently, able to reduce pain 2-4 points on pain scale using strategies taught in therapy  (approp and ongoing)  5. Pt will demonstrate independence with the HEP at discharge.   (approp and ongoing)  6.  Pt will  be able to sleep through the night without waking up with neck pain (patient goal)  (approp and ongoing)    Plan   Outpatient physical therapy 2x week for 10 weeks or 20 visits to include the following:   - Patient education  - Therapeutic exercise  - Manual therapy  - Performance testing   - Neuromuscular Re-education  - Therapeutic activity   - Modalities     Pt may be seen by PTA as part of the rehabilitation team.       Therapist: Bubba Lund, PT  4/19/2023

## 2023-04-21 ENCOUNTER — OFFICE VISIT (OUTPATIENT)
Dept: INTERNAL MEDICINE | Facility: CLINIC | Age: 53
End: 2023-04-21
Payer: COMMERCIAL

## 2023-04-21 VITALS
OXYGEN SATURATION: 92 % | HEIGHT: 71 IN | HEART RATE: 85 BPM | BODY MASS INDEX: 37.28 KG/M2 | DIASTOLIC BLOOD PRESSURE: 86 MMHG | WEIGHT: 266.31 LBS | SYSTOLIC BLOOD PRESSURE: 120 MMHG

## 2023-04-21 DIAGNOSIS — M50.30 DDD (DEGENERATIVE DISC DISEASE), CERVICAL: ICD-10-CM

## 2023-04-21 DIAGNOSIS — M54.12 CERVICAL RADICULOPATHY: ICD-10-CM

## 2023-04-21 DIAGNOSIS — Z80.0 FAMILY HISTORY OF COLON CANCER IN FATHER: Chronic | ICD-10-CM

## 2023-04-21 DIAGNOSIS — G89.29 OTHER CHRONIC PAIN: ICD-10-CM

## 2023-04-21 DIAGNOSIS — I10 ESSENTIAL HYPERTENSION: Primary | ICD-10-CM

## 2023-04-21 PROCEDURE — 99214 OFFICE O/P EST MOD 30 MIN: CPT | Mod: S$GLB,,, | Performed by: STUDENT IN AN ORGANIZED HEALTH CARE EDUCATION/TRAINING PROGRAM

## 2023-04-21 PROCEDURE — 99999 PR PBB SHADOW E&M-EST. PATIENT-LVL III: ICD-10-PCS | Mod: PBBFAC,,, | Performed by: STUDENT IN AN ORGANIZED HEALTH CARE EDUCATION/TRAINING PROGRAM

## 2023-04-21 PROCEDURE — 99214 PR OFFICE/OUTPT VISIT, EST, LEVL IV, 30-39 MIN: ICD-10-PCS | Mod: S$GLB,,, | Performed by: STUDENT IN AN ORGANIZED HEALTH CARE EDUCATION/TRAINING PROGRAM

## 2023-04-21 PROCEDURE — 99999 PR PBB SHADOW E&M-EST. PATIENT-LVL III: CPT | Mod: PBBFAC,,, | Performed by: STUDENT IN AN ORGANIZED HEALTH CARE EDUCATION/TRAINING PROGRAM

## 2023-04-21 RX ORDER — LOSARTAN POTASSIUM AND HYDROCHLOROTHIAZIDE 12.5; 1 MG/1; MG/1
1 TABLET ORAL DAILY
Qty: 90 TABLET | Refills: 3 | Status: SHIPPED | OUTPATIENT
Start: 2023-04-21 | End: 2024-03-15

## 2023-04-21 NOTE — PROGRESS NOTES
[facility]    Subjective:       Patient ID: Bhaskar Stiles is a 52 y.o. male.    Chief Complaint: Hypertension    History was obtained from the patient and supplemented through chart review.  This patient is known to me.      HPI:    Patient is a 52 y.o. male w/ pmhx of HTN, HLD, LAMONT and for BP f/u.    Htn  Borderline controlled  losar-HCTZ 100-12.5 mg, amlodipine 10 mg daily  Will check at home   Nurse remote bp visit 1 month    Myofascial pain, cervical radiculopathy, cervical spondylosis  Cymbalta helps  Following with pain management/neurosurg/sports med  Better  In [facility]    lamont  Wearing cpap, working on it  Gained weight      Not addressed  Vision changes reported in the past    hld  Cont Crestor 10    Family hx of colon cancer  Performed 9/2022, repeat due in 2027      Morbid Obesity  Counseled weight loss, especially to come off CPAP  Popeyes, bread, poor diet- working on changing diet  A1cs normal  Wt Readings from Last 15 Encounters:   04/21/23 120.8 kg (266 lb 5.1 oz)   03/06/23 118 kg (260 lb 2.3 oz)   02/06/23 120.2 kg (264 lb 15.9 oz)   01/18/23 120.3 kg (265 lb 3.4 oz)   12/21/22 120.5 kg (265 lb 10.5 oz)   09/28/22 122 kg (268 lb 15.4 oz)   08/10/22 121.1 kg (267 lb)   07/25/22 122.5 kg (270 lb)   07/22/22 122.5 kg (270 lb 1 oz)   07/18/22 122.4 kg (269 lb 13.5 oz)   06/14/22 122 kg (269 lb)   05/27/22 121 kg (266 lb 12.1 oz)   04/11/22 121 kg (266 lb 12.1 oz)   03/23/22 119.3 kg (263 lb)   02/22/22 120.7 kg (266 lb)       Hx of vasovagal syncope 2018  Gets overheated easily  Saw neurology 6/2018, cardiology (cards stopped lisinopril 20)  Cards though loop recorder might be helpful  Mildly abn findings on brain MRI (neuro thought perhaps 2/2 to HTN)  Not recent, encouraged hydration    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   HENT:  Negative for trouble swallowing.    Eyes:  Negative for visual disturbance.   Respiratory:  Negative for shortness of breath.    Cardiovascular:  Negative for chest pain.   Gastrointestinal:  Negative for diarrhea, nausea and vomiting.   Musculoskeletal:  Positive for myalgias (neck/shoulder) and neck pain. Negative for arthralgias, back pain and gait problem.   Neurological:  Negative for dizziness and seizures.   Psychiatric/Behavioral:  Negative for hallucinations and sleep disturbance.        Surgical hx, family hx, social hx   Have been reviewed       Current Outpatient Medications:     amLODIPine (NORVASC) 10 MG tablet, Take 1 tablet (10 mg total) by mouth once daily., Disp: 90 tablet, Rfl: 4    DULoxetine (CYMBALTA) 30 MG capsule, TAKE 1 CAPSULE BY MOUTH TWICE A DAY, Disp: 180 capsule, Rfl: 1    rosuvastatin (CRESTOR) 10 MG tablet, Take 1 tablet (10 mg total) by mouth once daily., Disp: 90 tablet, Rfl: 3    losartan-hydrochlorothiazide 100-12.5 mg (HYZAAR) 100-12.5 mg Tab, Take 1 tablet by mouth once daily., Disp: 90 tablet, Rfl: 3    Objective:        Body mass index is 37.14 kg/m².  Vitals:    04/21/23 0819   BP: 120/86   Pulse: 85   SpO2: (!) 92%   Weight: 120.8 kg (266 lb 5.1 oz)   Height: 5' 11" (1.803 m)   PainSc: 0-No pain         Physical Exam  Vitals and nursing note reviewed.   Constitutional:       General: He is not in acute distress.     Appearance: Normal appearance. He is not ill-appearing.      Comments: Obesity   HENT:      Head: Normocephalic and atraumatic.   Eyes:      General: No scleral icterus.  Cardiovascular:      Rate and Rhythm: Normal rate and regular rhythm.      Heart sounds: Normal heart " sounds.   Pulmonary:      Effort: Pulmonary effort is normal.   Abdominal:      General: There is no distension.   Musculoskeletal:         General: No deformity.      Cervical back: Normal range of motion.   Skin:     General: Skin is warm and dry.   Neurological:      Mental Status: He is alert and oriented to person, place, and time.   Psychiatric:         Behavior: Behavior normal.         Lab Results   Component Value Date    WBC 7.3 07/25/2022    HGB 14.8 07/25/2022    HCT 46.7 07/25/2022     07/25/2022    CHOL 159 07/25/2022    TRIG 102 07/25/2022    HDL 44 07/25/2022    ALT 25 07/25/2022    AST 16 07/25/2022     07/25/2022    K 3.8 07/25/2022     07/25/2022    CREATININE 1.21 07/25/2022    BUN 19 07/25/2022    CO2 28 07/25/2022    TSH 1.05 07/25/2022    INR 1.0 06/05/2018    HGBA1C 5.0 07/25/2022       The 10-year ASCVD risk score (Hernán ANN, et al., 2019) is: 7.9%    Values used to calculate the score:      Age: 52 years      Sex: Male      Is Non- : Yes      Diabetic: No      Tobacco smoker: No      Systolic Blood Pressure: 120 mmHg      Is BP treated: Yes      HDL Cholesterol: 44 mg/dL      Total Cholesterol: 159 mg/dL    On crestor 10 mg    (Imaging have been independently reviewed)  9/28/2019  MRI L spine  Terminated early due to severe pain  No acute fractures.     Moderate lumbar spondylosis which appears to be worst at L5-S1 with evidence of moderate to severe bilateral neural foraminal narrowing, as above.  Consider repeat full examination when clinically appropriate.    Assessment:         1. Essential hypertension    2. Other chronic pain    3. DDD (degenerative disc disease), cervical    4. Cervical radiculopathy    5. Family history of colon cancer in father              Plan:     Bhaskar was seen today for hypertension.    Diagnoses and all orders for this visit:    Essential hypertension  -     losartan-hydrochlorothiazide 100-12.5 mg (HYZAAR) 100-12.5  mg Tab; Take 1 tablet by mouth once daily.    Other chronic pain    DDD (degenerative disc disease), cervical    Cervical radiculopathy    Family history of colon cancer in father            Health Maintenance  - Lipids: normal on rosuvastatin 6/23/2021  - A1C: 5.1 6/23/2021  - Colon Ca Screen: 9/2022, due in 2027; fam hx of colon cancer  - Immunizations: vaccinated, needs covid booster, shingles. hesitant    Follow up in about 6 months (around 10/21/2023) for annual or sooner if needed.     [employer] labs    Or sooner prn          All medications were reviewed including potential side effects and risks/benefits.  Pt was counseled to call back if anything worsens or if questions arise.    Fredy Rey MD  Family Medicine  [facility]  [facility] [address] [address]  Phone [phone]  Fax [phone]

## 2023-05-22 ENCOUNTER — TELEPHONE (OUTPATIENT)
Dept: INTERNAL MEDICINE | Facility: CLINIC | Age: 53
End: 2023-05-22

## 2023-05-22 ENCOUNTER — CLINICAL SUPPORT (OUTPATIENT)
Dept: INTERNAL MEDICINE | Facility: CLINIC | Age: 53
End: 2023-05-22
Payer: COMMERCIAL

## 2023-05-22 VITALS — SYSTOLIC BLOOD PRESSURE: 129 MMHG | DIASTOLIC BLOOD PRESSURE: 81 MMHG

## 2023-05-22 PROCEDURE — 99999 PR PBB SHADOW E&M-EST. PATIENT-LVL II: ICD-10-PCS | Mod: PBBFAC,,,

## 2023-05-22 PROCEDURE — 99999 PR PBB SHADOW E&M-EST. PATIENT-LVL II: CPT | Mod: PBBFAC,,,

## 2023-05-22 NOTE — TELEPHONE ENCOUNTER
Bhaskar Stiles 52 y.o. male is here for Blood Pressure check. remote 129/81    Manual Blood pressure reading was  See Above , Pulse See Above . (Checked at the end of the visit)    If high, was it repeated after 15 minutes? no    Pt's Home blood pressure machine read in office NO Pulse No.     Diagnosed with Hypertension yes.  Patient took blood pressure medication today no.  Last dose of blood pressure medication was taken at Yesterday at 10 PM ( takes at night ). Patient took 1. Amlodipine 10 mg daily ( yesterday at 10 PM ) 2. Losartan-hydrochlorothiazide 100-12.5 mg daily ( Yesterday at 10 PM).   All Medications and OTC medication updated yes  Does patient have record of home blood pressure readings / Blood Pressure Log no. Forgot his paper  ( log )and can't remember  Does the pt have any complaints today in regards to their blood pressure medication? no. Complains of None. Patient is asymptomatic.   Were you sitting still for 5-10 minutes prior to taking your Blood pressure? yes   Has your blood pressure monitor ever been checked? no When was last time we checked your blood pressure monitor? No  Updated vitals yes  Follow up date is 10/25/2023.   Dr. Rey notified.     Creatinine   Date Value Ref Range Status   07/25/2022 1.21 0.70 - 1.30 mg/dL Final     Sodium   Date Value Ref Range Status   07/25/2022 141 135 - 146 mmol/L Final     Potassium   Date Value Ref Range Status   07/25/2022 3.8 3.5 - 5.3 mmol/L Final

## 2023-05-22 NOTE — PROGRESS NOTES
Bhaskar Stiles 52 y.o. male is here for Blood Pressure check. remote 129/81    Manual Blood pressure reading was  See Above , Pulse See Above . (Checked at the end of the visit)    If high, was it repeated after 15 minutes? no    Pt's Home blood pressure machine read in office NO Pulse No.     Diagnosed with Hypertension yes.  Patient took blood pressure medication today no.  Last dose of blood pressure medication was taken at Yesterday at 10 PM ( takes at night ). Patient took 1. Amlodipine 10 mg daily ( yesterday at 10 PM ) 2. Losartan-hydrochlorothiazide 100-12.5 mg daily ( Yesterday at 10 PM).   All Medications and OTC medication updated yes  Does patient have record of home blood pressure readings / Blood Pressure Log no. Forgot his paper and can't remember  Does the pt have any complaints today in regards to their blood pressure medication? no. Complains of None. Patient is asymptomatic.   Were you sitting still for 5-10 minutes prior to taking your Blood pressure? yes   Has your blood pressure monitor ever been checked? no When was last time we checked your blood pressure monitor? No  Updated vitals yes  Follow up date is 10/25/2023.   Dr. Rey notified.     Creatinine   Date Value Ref Range Status   07/25/2022 1.21 0.70 - 1.30 mg/dL Final     Sodium   Date Value Ref Range Status   07/25/2022 141 135 - 146 mmol/L Final     Potassium   Date Value Ref Range Status   07/25/2022 3.8 3.5 - 5.3 mmol/L Final

## 2023-09-07 RX ORDER — DULOXETIN HYDROCHLORIDE 30 MG/1
CAPSULE, DELAYED RELEASE ORAL
Qty: 180 CAPSULE | Refills: 1 | Status: SHIPPED | OUTPATIENT
Start: 2023-09-07

## 2023-10-16 ENCOUNTER — HOSPITAL ENCOUNTER (EMERGENCY)
Facility: HOSPITAL | Age: 53
Discharge: HOME | End: 2023-10-16
Attending: EMERGENCY MEDICINE
Payer: COMMERCIAL

## 2023-10-16 VITALS
SYSTOLIC BLOOD PRESSURE: 130 MMHG | OXYGEN SATURATION: 95 % | TEMPERATURE: 98 F | RESPIRATION RATE: 22 BRPM | HEART RATE: 74 BPM | DIASTOLIC BLOOD PRESSURE: 72 MMHG

## 2023-10-16 DIAGNOSIS — R55 SYNCOPE: Primary | ICD-10-CM

## 2023-10-16 DIAGNOSIS — R55 SYNCOPE, UNSPECIFIED SYNCOPE TYPE: ICD-10-CM

## 2023-10-16 DIAGNOSIS — R40.20 LOSS OF CONSCIOUSNESS: ICD-10-CM

## 2023-10-16 DIAGNOSIS — R55 SYNCOPE AND COLLAPSE: ICD-10-CM

## 2023-10-16 LAB
ALBUMIN SERPL BCP-MCNC: 3.9 G/DL (ref 3.5–5.2)
ALLENS TEST: ABNORMAL
ALLENS TEST: NORMAL
ALP SERPL-CCNC: 60 U/L (ref 55–135)
ALT SERPL W/O P-5'-P-CCNC: 31 U/L (ref 10–44)
ANION GAP SERPL CALC-SCNC: 12 MMOL/L (ref 8–16)
AST SERPL-CCNC: 21 U/L (ref 10–40)
BASOPHILS # BLD AUTO: 0.07 K/UL (ref 0–0.2)
BASOPHILS NFR BLD: 1 % (ref 0–1.9)
BILIRUB SERPL-MCNC: 0.5 MG/DL (ref 0.1–1)
BNP SERPL-MCNC: <10 PG/ML (ref 0–99)
BUN SERPL-MCNC: 16 MG/DL (ref 6–20)
CALCIUM SERPL-MCNC: 9.2 MG/DL (ref 8.7–10.5)
CHLORIDE SERPL-SCNC: 107 MMOL/L (ref 95–110)
CO2 SERPL-SCNC: 22 MMOL/L (ref 23–29)
CREAT SERPL-MCNC: 1.1 MG/DL (ref 0.5–1.4)
DIFFERENTIAL METHOD: ABNORMAL
EOSINOPHIL # BLD AUTO: 0.2 K/UL (ref 0–0.5)
EOSINOPHIL NFR BLD: 3 % (ref 0–8)
ERYTHROCYTE [DISTWIDTH] IN BLOOD BY AUTOMATED COUNT: 13.5 % (ref 11.5–14.5)
EST. GFR  (NO RACE VARIABLE): >60 ML/MIN/1.73 M^2
GLUCOSE SERPL-MCNC: 135 MG/DL (ref 70–110)
HCT VFR BLD AUTO: 42.1 % (ref 40–54)
HGB BLD-MCNC: 13.5 G/DL (ref 14–18)
IMM GRANULOCYTES # BLD AUTO: 0.02 K/UL (ref 0–0.04)
IMM GRANULOCYTES NFR BLD AUTO: 0.3 % (ref 0–0.5)
LDH SERPL L TO P-CCNC: 0.87 MMOL/L (ref 0.5–2.2)
LDH SERPL L TO P-CCNC: 3 MMOL/L (ref 0.5–2.2)
LYMPHOCYTES # BLD AUTO: 2.4 K/UL (ref 1–4.8)
LYMPHOCYTES NFR BLD: 34.6 % (ref 18–48)
MAGNESIUM SERPL-MCNC: 1.9 MG/DL (ref 1.6–2.6)
MCH RBC QN AUTO: 27.4 PG (ref 27–31)
MCHC RBC AUTO-ENTMCNC: 32.1 G/DL (ref 32–36)
MCV RBC AUTO: 86 FL (ref 82–98)
MONOCYTES # BLD AUTO: 0.8 K/UL (ref 0.3–1)
MONOCYTES NFR BLD: 11.2 % (ref 4–15)
NEUTROPHILS # BLD AUTO: 3.5 K/UL (ref 1.8–7.7)
NEUTROPHILS NFR BLD: 49.9 % (ref 38–73)
NRBC BLD-RTO: 0 /100 WBC
PLATELET # BLD AUTO: 251 K/UL (ref 150–450)
PMV BLD AUTO: 10.3 FL (ref 9.2–12.9)
POC CARDIAC TROPONIN I: 0 NG/ML (ref 0–0.08)
POC CARDIAC TROPONIN I: 0 NG/ML (ref 0–0.08)
POTASSIUM SERPL-SCNC: 3.3 MMOL/L (ref 3.5–5.1)
PROT SERPL-MCNC: 7.3 G/DL (ref 6–8.4)
RBC # BLD AUTO: 4.92 M/UL (ref 4.6–6.2)
SAMPLE: ABNORMAL
SAMPLE: NORMAL
SITE: ABNORMAL
SITE: NORMAL
SODIUM SERPL-SCNC: 141 MMOL/L (ref 136–145)
TROPONIN I SERPL DL<=0.01 NG/ML-MCNC: <0.006 NG/ML (ref 0–0.03)
WBC # BLD AUTO: 6.97 K/UL (ref 3.9–12.7)

## 2023-10-16 PROCEDURE — 25000003 PHARM REV CODE 250

## 2023-10-16 PROCEDURE — 83605 ASSAY OF LACTIC ACID: CPT

## 2023-10-16 PROCEDURE — 83735 ASSAY OF MAGNESIUM: CPT

## 2023-10-16 PROCEDURE — 80053 COMPREHEN METABOLIC PANEL: CPT

## 2023-10-16 PROCEDURE — 63600175 PHARM REV CODE 636 W HCPCS

## 2023-10-16 PROCEDURE — 93010 EKG 12-LEAD: ICD-10-PCS | Mod: ,,, | Performed by: INTERNAL MEDICINE

## 2023-10-16 PROCEDURE — 99285 EMERGENCY DEPT VISIT HI MDM: CPT | Mod: 25

## 2023-10-16 PROCEDURE — 94761 N-INVAS EAR/PLS OXIMETRY MLT: CPT

## 2023-10-16 PROCEDURE — 99291 PR CRITICAL CARE, E/M 30-74 MINUTES: ICD-10-PCS | Mod: ,,, | Performed by: CLINICAL NURSE SPECIALIST

## 2023-10-16 PROCEDURE — 93010 ELECTROCARDIOGRAM REPORT: CPT | Mod: ,,, | Performed by: INTERNAL MEDICINE

## 2023-10-16 PROCEDURE — 99291 CRITICAL CARE FIRST HOUR: CPT | Mod: ,,, | Performed by: CLINICAL NURSE SPECIALIST

## 2023-10-16 PROCEDURE — 83880 ASSAY OF NATRIURETIC PEPTIDE: CPT

## 2023-10-16 PROCEDURE — 99900035 HC TECH TIME PER 15 MIN (STAT)

## 2023-10-16 PROCEDURE — 96360 HYDRATION IV INFUSION INIT: CPT

## 2023-10-16 PROCEDURE — 84484 ASSAY OF TROPONIN QUANT: CPT

## 2023-10-16 PROCEDURE — 93005 ELECTROCARDIOGRAM TRACING: CPT

## 2023-10-16 PROCEDURE — 85025 COMPLETE CBC W/AUTO DIFF WBC: CPT

## 2023-10-16 RX ORDER — POTASSIUM CHLORIDE 20 MEQ/1
40 TABLET, EXTENDED RELEASE ORAL ONCE
Status: COMPLETED | OUTPATIENT
Start: 2023-10-16 | End: 2023-10-16

## 2023-10-16 RX ADMIN — SODIUM CHLORIDE, SODIUM LACTATE, POTASSIUM CHLORIDE, AND CALCIUM CHLORIDE 1000 ML: .6; .31; .03; .02 INJECTION, SOLUTION INTRAVENOUS at 09:10

## 2023-10-16 RX ADMIN — POTASSIUM CHLORIDE 40 MEQ: 1500 TABLET, EXTENDED RELEASE ORAL at 11:10

## 2023-10-16 NOTE — RESPIRATORY THERAPY
"RAPID RESPONSE RESPIRATORY THERAPY NOTE             Code Status: Prior   : 1970  Bed: ED :   MRN: [id number]  Time page Received: 08  Time Rapid Response RT at Bedside: 08  Time Rapid Response RT left Bedside: 0900    SITUATION    Evaluated patient for: Loss of consciousness    BACKGROUND    Why is the patient in the hospital?: <principal problem not specified>    Patient has a past medical history of Family history of colon cancer in father, Hyperlipidemia, Hypertension, LAMONT (obstructive sleep apnea), and Syncope and collapse.    24 Hours Vitals Range:  Temp:  [97.7 °F (36.5 °C)]   Pulse:  [72-75]   Resp:  [18]   BP: (123)/(77)   SpO2:  [95 %-97 %]     Labs:    Recent Labs     10/16/23  09      K 3.3*      CO2 22*   BUN 16   CREATININE 1.1   *   MG 1.9        No results for input(s): "PH", "PCO2", "PO2", "HCO3", "POCSATURATED", "BE" in the last 72 hours.    ASSESSMENT/INTERVENTIONS  Patient being moved from ground to stretcher upon arrival. Patient appears lethargic, diaphoretic, and only responds to voice. During transportation and ED admittance, patient kept falling asleep. On RA SpO2 95%, HR 80s, RR 16, no respiratory distress and no adverse events during transport.     Last Vitals: Temp: 97.7 °F (36.5 °C) (10/16 0820)  Pulse: 75 (10/16 0911)  Resp: 18 (10/16 0911)  BP: 123/77 (10/16 0820)  SpO2: 97 % (10/16 0911)  Level of Consciousness: Level of Consciousness (AVPU): alert  Respiratory Effort: Respiratory Effort: Unlabored  Expansion/Accessory Muscle Usage: Expansion/Accessory Muscles/Retractions: no use of accessory muscles, no retractions, expansion symmetric  All Lung Field Breath Sounds:    O2 Device/Concentration: RA  NIPPV: No Surgical airway: No Vent: No  ETCO2 monitored:    Ambu at bedside:      Active Orders   Respiratory Care    Pulse Oximetry Continuous     Frequency: Continuous     Number of Occurrences: Until Specified       RECOMMENDATIONS  ?  We recommend: RRT " Recs: Continue POC per primary team.      FOLLOW-UP    Disposition: Tx to ER bed 16.    Please call back the Rapid Response RT, Ly Mims, RRT at x [phone] for any questions or concerns.

## 2023-10-16 NOTE — SIGNIFICANT EVENT
Medical Emergency Team Consult Note  Critical Care Medicine    CC: Syncope  Date: 10/16/2023  Admit Date: 10/16/2023  Hospital Length of Stay: 0  MRN: [id number]  The patient location is: Gastroenterology Waiting room to Bed ED 16/16  Dx: Syncope  Code Status: Prior   Chart Reviewed: 10/16/2023, 8:25 AM      SUBJECTIVE:     HPI:  Bhaskar Stiles has a past medical history of Family history of colon cancer in father, Hyperlipidemia, Hypertension, LAMONT (obstructive sleep apnea), and Syncope and collapse.    Significant Events: Called urgently to an overhead code blue page to evaluate the patient for syncope. The patient was accompanying his wife to a GI appointment when he was found on the ground snoring loudly after reportedly falling from the chair. A code blue was called. The Critical Care Medicine team responded as part of the Code team to 4th floor Gastroenterology waiting room. When the code team arrived, the patient was already on a stretcher but was still lethargic and diaphoretic.    OBJECTIVE:     Patient is lethargic but able to answer simple questions but still snoring intermittently. He is also diaphoretic  Blood glucose at the event was 106  VS in ED: /77   Pulse 73   Temp 97.7 °F (36.5 °C) (Oral)   Resp 18   SpO2 97%     Patient reports he is not diabetic but does take antihypertensives. He reports he did not eat yet today.    ASSESSMENT AND PLAN :     1) Syncope  -- Emergency response to [place] wide Code Blue  -- Patient unresponsive and snoring but protecting his airway  -- Critical Care transport to ED with Rapid Response team, Critical Care Provider, Respiratory Therapist, House Supervisor, and security as patient is at high risk on repeat syncope and airway obstruction.      Uninterrupted Critical Care/Counseling Time (not including procedures): 30 mins  Critical care was time spent personally by me on the following activities: development of treatment plan with patient or surrogate and  bedside caregivers, discussions with consultants, evaluation of patient's response to treatment, examination of patient, ordering and performing treatments and interventions, ordering and review of laboratory studies, ordering and review of radiographic studies, pulse oximetry, re-evaluation of patient's condition. This critical care time did not overlap with that of any other provider or involve time for any procedures.    Fiona Winterbottom APRN, CNS  Critical Care Medicine

## 2023-10-16 NOTE — CODE/ RAPID DOCUMENTATION
"Patient called with c/o vaginal discomfort. Stated that she was on an abx for BV ""a few weeks ago\"" and is now experiencing yeast infection symptoms. Please advise.    " RAPID RESPONSE NURSE NOTE        Admit Date: (Not on file)  LOS: 0  Code Status: Prior   Date of Consult: 10/16/2023  : 1970  Age: 53 y.o.  Weight:   Wt Readings from Last 1 Encounters:   23 120.8 kg (266 lb 5.1 oz)     Sex: male  Race: Black or    Bed: Room/bed info not found:   MRN: [id number]  Time Rapid Response Team page Received: 0805  Time Rapid Response Team at Bedside: 0808  Time Rapid Response Team left Bedside: 08  Was the patient discharged from an ICU this admission? No  Was the patient discharged from a PACU within last 24 hours? No   Did the patient receive conscious sedation/general anesthesia in last 24 hours? No  Was the patient in the ED within the past 24 hours? No  Was the patient on NIPPV within the past 24 hours? No   Did this progress into an ARC or CPA: No  Attending Physician: MELINA  Primary Service: NA    SITUATION    Notified by overhead page.  Reason for alert: SYncope  Called to evaluate the patient for Circulatory    BACKGROUND     Why is the patient in the hospital?: Here with family member for an EGD.    Patient has a past medical history of Family history of colon cancer in father, Hyperlipidemia, Hypertension, LAMONT (obstructive sleep apnea), and Syncope and collapse.    ASSESSMENT/INTERVENTIONS    What did you find: Patient found in waiting area lying on stretcher. Bystander staff report patient is here with his mother for an EGD and had a syncopal episode. Patient responsive to voice and protecting airway. Transported to ED for further evaluation. No interventions performed.    RECOMMENDATIONS    We recommend: ED transfer and evaluation.    PROVIDER ESCALATION    Orders received and case discussed with  Winterbottom, NP.    FOLLOW UP    Call the Rapid Response Nurse, Patricia Polanco RN at [phone] for additional questions or concerns.

## 2023-10-16 NOTE — DISCHARGE INSTRUCTIONS
He was seen thank you for come to the ER today for evaluation.  You were seen and evaluated for your syncope/passing out.  We gave you some IV fluids which improved her symptoms.  We did not find any acute abnormalities in your workup.  Her cardiac workup was reassuring.  Her electrolytes are reassuring.  Your chest x-ray and head CT are also reassuring.  You will need to follow up with Cardiology and with your primary care provider to continue following up these symptoms.

## 2023-10-16 NOTE — ED TRIAGE NOTES
Pt. Is a 53 yr old -American male presenting to the ED after visiting his wife in the clinic.  He went to the bathroom after feeling hot and splashed some water on his face and he passed out.

## 2023-10-16 NOTE — ED PROVIDER NOTES
Encounter Date: 10/16/2023       History     Chief Complaint   Patient presents with    Loss of Consciousness     Arrives via RR with c/o LOC while with family in clinic, pt is lethargic upon arrival to ED      Patient is a 53-year-old male with past medical history of hypertension and hyperlipidemia that presents to the ER with syncopal episode this morning.  Patient states that he has been feeling normal for the past week without any new symptoms.  He was with his wife at the GI clinic as she was having outpatient colonoscopy.  He states that he began to feel warm went to the bathroom displaced cold water on his hands and face when he passes out.  Was activated by the hospitalist who evaluated the patient and brought down to the ER for evaluation.  On initial evaluation patient states that he would generalized weakness and some fatigue.  States that he did not eat or drink any food this morning.  States that he is compliant with his medications which includes blood pressure medications and hyperlipidemia medication.  States that he takes some every evening and took them yesterday evening.  Patient does admit history of syncope with heat.  States that he is to be careful with outside exposure or any time he feels warm as he might have syncopal event.  States that he used to follow with cardiology for this symptom.  Denies any chest pain, chest pressure, lower extremity swelling, orthopnea.  Denies any urinary tract frequency or dysuria.  Denies diarrhea or fevers at home.    The history is provided by the patient and medical records.     Review of patient's allergies indicates:  No Known Allergies  Past Medical History:   Diagnosis Date    Family history of colon cancer in father     Hyperlipidemia     Hypertension     LAMONT (obstructive sleep apnea)     Syncope and collapse      Past Surgical History:   Procedure Laterality Date    CHONDROPLASTY OF KNEE Left 8/22/2019    Procedure: CHONDROPLASTY, KNEE;  Surgeon:  Shelbi Hughes MD;  Location: [facility] OR;  Service: Orthopedics;  Laterality: Left;    COLONOSCOPY N/A 9/16/2022    Procedure: COLONOSCOPY;  Surgeon: RYAN Long MD;  Location: [facility] ENDO (4TH FLR);  Service: Endoscopy;  Laterality: N/A;  fully vaccinated, clears 4 hrs prior-am prep, inst portal-MS    EPIDURAL STEROID INJECTION N/A 3/23/2022    Procedure: INJECTION, STEROID, EPIDURAL, C7-T1 IL;  Surgeon: Anthony Schaefer MD;  Location: [facility] PAIN MGT;  Service: Pain Management;  Laterality: N/A;    EPIDURAL STEROID INJECTION N/A 8/10/2022    Procedure: INJECTION, STEROID, EPIDURAL,  C7-T1 IL CONTRAST;  Surgeon: Anthony Schaefer MD;  Location: [facility] PAIN MGT;  Service: Pain Management;  Laterality: N/A;    HERNIA REPAIR      KNEE ARTHROSCOPY W/ MENISCECTOMY Left 8/22/2019    Procedure: ARTHROSCOPY, KNEE, WITH MEDIAL AND LATERAL MENISCECTOMY;  Surgeon: Shelbi Hughes MD;  Location: [facility] OR;  Service: Orthopedics;  Laterality: Left;    KNEE ARTHROSCOPY W/ PLICA EXCISION Left 8/22/2019    Procedure: EXCISION, MEDIAL PLICA, KNEE, ARTHROSCOPIC;  Surgeon: Shelbi Hughes MD;  Location: [facility] OR;  Service: Orthopedics;  Laterality: Left;    LIPOMA RESECTION      abdomen    REMOVAL OF FOREIGN BODY FROM LOWER EXTREMITY Left 8/22/2019    Procedure: REMOVAL, FOREIGN BODY, LOOSE BODY,  LOWER EXTREMITY;  Surgeon: Shelbi Hughes MD;  Location: [facility] OR;  Service: Orthopedics;  Laterality: Left;    SHOULDER SURGERY      left    SYNOVECTOMY OF KNEE Left 8/22/2019    Procedure: PARTIAL SYNOVECTOMY, KNEE;  Surgeon: Shelbi Hughes MD;  Location: [facility] OR;  Service: Orthopedics;  Laterality: Left;    TILT TABLE TEST N/A 7/9/2018    Procedure: TILT TABLE TEST;  Surgeon: Leonardo Whitfiled MD;  Location: [facility] CATH LAB;  Service: Cardiology;  Laterality: N/A;  Syncope, HUT, DM, 3 PREP    TRANSFORAMINAL EPIDURAL INJECTION OF STEROID Right 10/9/2019    Procedure: LUMBAR TRANSFORAMINAL RIGHT L5/S1;  Surgeon: Anthony Schaefer MD;  Location: [facility] PAIN MGT;   Service: Pain Management;  Laterality: Right;  NEEDS CONSENT    TRANSFORAMINAL EPIDURAL INJECTION OF STEROID Left 11/18/2020    Procedure: LUMBAR TRANSFORAMINAL LEFT L5/S1 DIRECT REFERRAL;  Surgeon: Anthony Schaefer MD;  Location: [facility];  Service: Pain Management;  Laterality: Left;  NEEDS CONSENT     Family History   Problem Relation Age of Onset    Colon cancer Father      Social History     Tobacco Use    Smoking status: Never    Smokeless tobacco: Never   Substance Use Topics    Alcohol use: No    Drug use: No     Review of Systems  ROS negative except as noted in HPI    Physical Exam     Initial Vitals   BP Pulse Resp Temp SpO2   10/16/23 0820 10/16/23 0812 10/16/23 0812 10/16/23 0820 10/16/23 0812   123/77 72 18 97.7 °F (36.5 °C) 95 %      MAP       --                Physical Exam    Nursing note and vitals reviewed.  Constitutional: Vital signs are normal. He appears well-developed and well-nourished. He is Obese . He is easily aroused.  Non-toxic appearance. He does not have a sickly appearance.   HENT:   Head: Normocephalic and atraumatic.   Right Ear: External ear normal.   Left Ear: External ear normal.   Nose: Nose normal.   Eyes: Conjunctivae and EOM are normal. Pupils are equal, round, and reactive to light. Right eye exhibits no discharge. Left eye exhibits no discharge. No scleral icterus.   Neck: Neck supple.   Normal range of motion.  Cardiovascular:  Normal rate, regular rhythm, normal heart sounds and intact distal pulses.           No murmur heard.  Pulmonary/Chest: Breath sounds normal. No respiratory distress. He has no wheezes.   Abdominal: Abdomen is soft and protuberant. There is no abdominal tenderness. There is no rebound and no guarding.   Musculoskeletal:         General: No tenderness or edema. Normal range of motion.      Cervical back: Normal range of motion and neck supple.     Neurological: He is alert, oriented to person, place, and time and easily aroused. He has  normal strength. No cranial nerve deficit or sensory deficit. GCS score is 15. GCS eye subscore is 4. GCS verbal subscore is 5. GCS motor subscore is 6.   Skin: Skin is warm and dry. Capillary refill takes less than 2 seconds. No rash noted. No erythema.   Psychiatric: He has a normal mood and affect. Thought content normal.         ED Course   Procedures  Labs Reviewed   CBC W/ AUTO DIFFERENTIAL - Abnormal; Notable for the following components:       Result Value    Hemoglobin 13.5 (*)     All other components within normal limits   COMPREHENSIVE METABOLIC PANEL - Abnormal; Notable for the following components:    Potassium 3.3 (*)     CO2 22 (*)     Glucose 135 (*)     All other components within normal limits   ISTAT LACTATE - Abnormal; Notable for the following components:    POC Lactate 3.00 (*)     All other components within normal limits   TROPONIN I   B-TYPE NATRIURETIC PEPTIDE   MAGNESIUM   TROPONIN ISTAT   TROPONIN ISTAT   ISTAT LACTATE   POCT GLUCOSE MONITORING CONTINUOUS   POCT TROPONIN   POCT TROPONIN        ECG Results              EKG 12-lead (Final result)  Result time 10/16/23 13:52:09      Final result by Interface, Lab In [facility] (10/16/23 13:52:09)                   Narrative:    Test Reason : R40.20,    Vent. Rate : 078 BPM     Atrial Rate : 078 BPM     P-R Int : 158 ms          QRS Dur : 090 ms      QT Int : 442 ms       P-R-T Axes : 023 055 -04 degrees     QTc Int : 503 ms    Normal sinus rhythm  Prolonged QT  Abnormal ECG  When compared with ECG of 01-AUG-2018 08:02,  QT has lengthened  Confirmed by Carmenza Reyes MD ([id number]) on 10/16/2023 1:52:02 PM    Referred By: System System           Confirmed By:Carmenza Reyes MD                                  Imaging Results              CT Head Without Contrast (Final result)  Result time 10/16/23 10:13:02      Final result by Curt Ellison MD (10/16/23 10:13:02)                   Impression:      No acute intracranial process.  Stable appearance  of the brain      Electronically signed by: Curt Ellison  Date:    10/16/2023  Time:    10:13               Narrative:    EXAMINATION:  CT HEAD WITHOUT CONTRAST    CLINICAL HISTORY:  Mental status change, unknown cause;    TECHNIQUE:  Low dose axial images were obtained through the head.  Coronal and sagittal reformations were also performed. Contrast was not administered.    COMPARISON:  CT/MRI 06/05/2018    FINDINGS:  There is a stable appearance of the brain.  No hydrocephalus mass effect intracranial hemorrhage or acute territorial infarct.  The brain parenchyma again demonstrates normal attenuation other than for a small 8 mm cyst again identified within the right parietal subcortical white matter unchanged from prior studies presumably representing an underlying neuroepithelial cyst.    The calvarium is intact.    The visualized sinuses and mastoid air cells are clear other than for a chronic left maxillary retention cyst.                                       X-Ray Chest AP Portable (Final result)  Result time 10/16/23 10:28:49      Final result by Billy Gold MD (10/16/23 10:28:49)                   Impression:      Radiographically more prominent right hilum; related to differences in projection and patient positioning, versus new underlying right hilar abnormality.    Newly elevated appearance of the right hemidiaphragm.    Correlate clinically, and with follow-up PA and lateral chest x-rays within 3 months.      Electronically signed by: Billy Gold  Date:    10/16/2023  Time:    10:28               Narrative:    EXAMINATION:  XR CHEST AP PORTABLE    CLINICAL HISTORY:  Chest Pain;    TECHNIQUE:  Single frontal view of the chest was performed.    COMPARISON:  PA and lateral chest x-ray 06/05/2018    FINDINGS:  Midline thoracic trachea.    Newly elevated appearance of the right hemidiaphragm, possibly related to a suboptimal inspiratory result.  Phrenic nerve palsy or other diaphoretic and well  not excluded.    There is some increased prominence of the right hilum.    Otherwise, the lungs, pleura, cardiomediastinal silhouette, and suboptimally visualized skeleton demonstrate no adverse interval radiographic changes.  EKG leads project upon the chest.                                       Medications   lactated ringers bolus 1,000 mL (0 mLs Intravenous Stopped 10/16/23 1000)   potassium chloride SA CR tablet 40 mEq (40 mEq Oral Given 10/16/23 1150)     Medical Decision Making  Patient is a 53-year-old male with past medical history of hypertension and hyperlipidemia that presents to the ER with syncopal episode this morning.  Initial evaluation patient is sleepy but easily aroused.  Vital signs were normal.  He was cooperative with interview and physical exam.    Differential diagnosis:  Patient does have known past medical history of syncope, however this time we will have a broad differential.  Considering cardiac arrhythmias, neurologic etiologies.  However less likely stroke or TIA given normal physical exam at this time.  No deficits at time of syncope.  Less likely infectious etiology given history of suspected source.  Considering metabolic etiologies and electrolyte derangements.  Workup to include metabolic workup, cardiac workup, chest x-ray.  Will provide IV fluids given history of poor oral intake.    Lab work was not significant for any acute abnormalities.  CT head was also normal.  Given previous history of syncopal episodes without clear etiology at this time.  Doubt that it is secondary to cardiac given normal troponins x2.  Etiology given normal physical exam.  Discussed with patient to follow up with Cardiology for Holter monitor as well as PCP for broader workup including thyroid or other pathologies.    Stable for discharge at this time. All questions answered to the best of our abilities prior to discharge.     Amount and/or Complexity of Data Reviewed  Labs: ordered. Decision-making  details documented in ED Course.  Radiology: ordered. Decision-making details documented in ED Course.    Risk  Prescription drug management.              Attending Attestation:   Physician Attestation Statement for Resident:  As the supervising MD   Physician Attestation Statement: I have personally seen and examined this patient.   I agree with the above history.  -:   As the supervising MD I agree with the above PE.     As the supervising MD I agree with the above treatment, course, plan, and disposition.                    ED Course as of 10/16/23 1542   Mon Oct 16, 2023   0920 ISTAT Lactate(!)  Noted, no infectious etiology suspected. Suspect lactate related to the Syncopal event/hypovolemia. Will recheck after IV Fluids.   [TK]   0925 CBC auto differential(!)  No leukocytosis, Anemia is at baseline. Doubt infectious etiology, doubt symptomatic anemia [TK]   1052 CT Head Without Contrast  CT with out acute intracranial process [TK]   1057 ISTAT Lactate  Lactate now improved after fluid resuscitation consistent with hypovolemia [TK]   1137 Troponin I  First troponin was negative - which is reassuring  . Will obtain second troponin.  [TK]   1137 Comprehensive metabolic panel(!)  CMP with low potassium. Replaced orally [TK]   1215 Troponin ISTAT  Second troponin negative.  [TK]      ED Course User Index  [TK] Gwyn Coleman DO                      Clinical Impression:   Final diagnoses:  [R40.20] Loss of consciousness  [R55] Syncope (Primary)        ED Disposition Condition    Discharge Stable          ED Prescriptions    None       Follow-up Information       Follow up With Specialties Details Why Contact Info    Fredy Rey MD Family Medicine In 1 week  [address]  [address]  [facility] [address]  [phone]      [facility] - Emergency Dept Emergency Medicine  If symptoms worsen [address] [address] [phone]             Gwyn Coleman DO  Resident  10/16/23  1541       Agustín Mccall MD  10/16/23 1579

## 2023-10-18 ENCOUNTER — OFFICE VISIT (OUTPATIENT)
Dept: INTERNAL MEDICINE | Facility: CLINIC | Age: 53
End: 2023-10-18
Payer: COMMERCIAL

## 2023-10-18 VITALS
SYSTOLIC BLOOD PRESSURE: 128 MMHG | BODY MASS INDEX: 38.73 KG/M2 | HEIGHT: 71 IN | WEIGHT: 276.69 LBS | HEART RATE: 96 BPM | DIASTOLIC BLOOD PRESSURE: 84 MMHG | OXYGEN SATURATION: 95 %

## 2023-10-18 DIAGNOSIS — I10 ESSENTIAL HYPERTENSION: ICD-10-CM

## 2023-10-18 DIAGNOSIS — G89.29 OTHER CHRONIC PAIN: ICD-10-CM

## 2023-10-18 DIAGNOSIS — Z80.0 FAMILY HISTORY OF COLON CANCER IN FATHER: Chronic | ICD-10-CM

## 2023-10-18 DIAGNOSIS — G47.33 OSA (OBSTRUCTIVE SLEEP APNEA): ICD-10-CM

## 2023-10-18 DIAGNOSIS — E78.5 HYPERLIPIDEMIA, UNSPECIFIED HYPERLIPIDEMIA TYPE: ICD-10-CM

## 2023-10-18 DIAGNOSIS — M54.12 CERVICAL RADICULOPATHY: ICD-10-CM

## 2023-10-18 DIAGNOSIS — R55 SYNCOPE, UNSPECIFIED SYNCOPE TYPE: ICD-10-CM

## 2023-10-18 DIAGNOSIS — Z00.00 ANNUAL PHYSICAL EXAM: Primary | ICD-10-CM

## 2023-10-18 PROCEDURE — 99999 PR PBB SHADOW E&M-EST. PATIENT-LVL III: CPT | Mod: PBBFAC,,, | Performed by: STUDENT IN AN ORGANIZED HEALTH CARE EDUCATION/TRAINING PROGRAM

## 2023-10-18 PROCEDURE — 99396 PREV VISIT EST AGE 40-64: CPT | Mod: S$GLB,,, | Performed by: STUDENT IN AN ORGANIZED HEALTH CARE EDUCATION/TRAINING PROGRAM

## 2023-10-18 PROCEDURE — 99396 PR PREVENTIVE VISIT,EST,40-64: ICD-10-PCS | Mod: S$GLB,,, | Performed by: STUDENT IN AN ORGANIZED HEALTH CARE EDUCATION/TRAINING PROGRAM

## 2023-10-18 PROCEDURE — 99999 PR PBB SHADOW E&M-EST. PATIENT-LVL III: ICD-10-PCS | Mod: PBBFAC,,, | Performed by: STUDENT IN AN ORGANIZED HEALTH CARE EDUCATION/TRAINING PROGRAM

## 2023-10-18 NOTE — LETTER
October 18, 2023    Bhaskar Stiles  [address] [address]             [facility] [place], [facility] by [facility]  Internal Medicine  [address] [facility] [address]  Phone: [phone]  Fax: [phone]   October 18, 2023     Patient: Bhaskar Stiles   YOB: 1970   Date of Visit: 10/18/2023       To Whom it May Concern:    Bhaskar Stiles was seen in my clinic on 10/18/2023. He may return to work on 10/18/2023 .    Please excuse him from work missed.    If you have any questions or concerns, please don't hesitate to call.    Sincerely,         Fredy Rey MD

## 2023-10-18 NOTE — PROGRESS NOTES
[facility]    Subjective:       Patient ID: Bhaskar Stiles is a 53 y.o. male.    Chief Complaint: Follow-up (Passed out in ER on 10/16/23)    History was obtained from the patient and supplemented through chart review.  This patient is known to me.      HPI:    Patient is a 53 y.o. male w/ pmhx of HTN, HLD, LAMONT presents for f/u from ED syn cope visit    Hx of vasovagal syncope 2018, now again 2023  Gets overheated easily, recently   Saw neurology 6/2018, cardiology (cards stopped lisinopril 20)  S/p tilt table test 7/2018, normal EEG 7/2018, holter monitor 6/15/2018  Cards though loop recorder might be helpful  Mildly abn findings on brain MRI (neuro thought perhaps 2/2 to HTN)  Has cardiology appt upcoming  Frustrated because this has been happening since 2017  Cardio appt upcoming    Anxiety sometimes with people  hesitant    Htn  losar-HCTZ 100-12.5 mg, amlodipine 10 mg daily  controlled    Myofascial pain, cervical radiculopathy, cervical spondylosis  Cymbalta helps, gets sleepy if taken early in day, advised to take higher dose in evening  Following with pain management/neurosurg/sports med  Better  In healthy back program  Pt unsure what next steps should be    lamont  Wearing cpap, working on it  Gained weight  Appt upcoming virtual with sleep med doc    Vision changes reported in the past, outside [facility]  Doc retired  Given [employer] phone number    hld  Cont Crestor 10    Family hx of colon cancer  Performed 9/2022, repeat due in 2027    Morbid Obesity  Counseled weight loss, especially to come off CPAP  Popeyes, bread, poor diet- working on changing diet  A1cs normal  Wt Readings from Last 15 Encounters:   10/18/23 125.5 kg (276 lb 10.8 oz)   04/21/23 120.8 kg (266 lb 5.1 oz)   03/06/23 118 kg (260 lb 2.3 oz)   02/06/23 120.2 kg (264 lb 15.9 oz)   01/18/23 120.3 kg (265 lb 3.4 oz)   12/21/22 120.5 kg (265 lb 10.5 oz)   09/28/22 122 kg (268 lb 15.4 oz)   08/10/22 121.1 kg (267 lb)    07/25/22 122.5 kg (270 lb)   07/22/22 122.5 kg (270 lb 1 oz)   07/18/22 122.4 kg (269 lb 13.5 oz)   06/14/22 122 kg (269 lb)   05/27/22 121 kg (266 lb 12.1 oz)   04/11/22 121 kg (266 lb 12.1 oz)   03/23/22 119.3 kg (263 lb)       Check labs      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   HENT:  Negative for trouble swallowing.    Eyes:  Negative for visual disturbance.   Respiratory:  Negative for shortness of breath.    Cardiovascular:  Negative for chest pain.   Gastrointestinal:  Negative for diarrhea, nausea and vomiting.   Musculoskeletal:  Positive for arthralgias, myalgias (neck/shoulder) and neck pain. Negative for back pain and gait problem.   Neurological:  Positive for syncope. Negative for dizziness and seizures.   Psychiatric/Behavioral:  Negative for hallucinations and sleep disturbance.          Surgical hx, family hx, social hx   Have been reviewed       Current Outpatient Medications:     amLODIPine (NORVASC) 10 MG tablet, Take 1 tablet (10 mg total) by mouth once daily., Disp: 90 tablet, Rfl: 4    DULoxetine (CYMBALTA) 30 MG capsule, TAKE 1 CAPSULE BY MOUTH TWICE A DAY, Disp: 180 capsule, Rfl: 1    losartan-hydrochlorothiazide 100-12.5 mg (HYZAAR) 100-12.5 mg Tab, Take 1 tablet by mouth once daily., Disp: 90 tablet, Rfl: 3    rosuvastatin (CRESTOR) 10 MG tablet, Take 1 tablet (10 mg total) by mouth once daily., Disp: 90 tablet, Rfl: 3    Objective:        Body mass index is 38.59 kg/m².  Vitals:    10/18/23 1252   BP: 128/84   Pulse: 96   SpO2: 95%   Weight: 125.5 kg (276 lb  "10.8 oz)   Height: 5' 11" (1.803 m)   PainSc: 0-No pain         Physical Exam  Vitals and nursing note reviewed.   Constitutional:       General: He is not in acute distress.     Appearance: Normal appearance. He is not ill-appearing.      Comments: Obesity   HENT:      Head: Normocephalic and atraumatic.   Eyes:      General: No scleral icterus.  Cardiovascular:      Rate and Rhythm: Normal rate and regular rhythm.      Heart sounds: Normal heart sounds.   Pulmonary:      Effort: Pulmonary effort is normal.   Abdominal:      General: There is no distension.   Musculoskeletal:         General: No deformity.      Cervical back: Normal range of motion.   Skin:     General: Skin is warm and dry.   Neurological:      Mental Status: He is alert and oriented to person, place, and time.   Psychiatric:         Behavior: Behavior normal.         Lab Results   Component Value Date    WBC 6.97 10/16/2023    HGB 13.5 (L) 10/16/2023    HCT 42.1 10/16/2023     10/16/2023    CHOL 159 07/25/2022    TRIG 102 07/25/2022    HDL 44 07/25/2022    ALT 31 10/16/2023    AST 21 10/16/2023     10/16/2023    K 3.3 (L) 10/16/2023     10/16/2023    CREATININE 1.1 10/16/2023    BUN 16 10/16/2023    CO2 22 (L) 10/16/2023    TSH 1.05 07/25/2022    INR 1.0 06/05/2018    HGBA1C 5.0 07/25/2022       The 10-year ASCVD risk score (Hernán ANN, et al., 2019) is: 9.3%    Values used to calculate the score:      Age: 53 years      Sex: Male      Is Non- : Yes      Diabetic: No      Tobacco smoker: No      Systolic Blood Pressure: 128 mmHg      Is BP treated: Yes      HDL Cholesterol: 44 mg/dL      Total Cholesterol: 159 mg/dL    On crestor 10 mg    (Imaging have been independently reviewed)  9/28/2019  MRI L spine  Terminated early due to severe pain  No acute fractures.     Moderate lumbar spondylosis which appears to be worst at L5-S1 with evidence of moderate to severe bilateral neural foraminal narrowing, as " above.  Consider repeat full examination when clinically appropriate.    Assessment:         1. Annual physical exam    2. Hyperlipidemia, unspecified hyperlipidemia type    3. Syncope, unspecified syncope type    4. LAMONT (obstructive sleep apnea)    5. Family history of colon cancer in father    6. Essential hypertension    7. Cervical radiculopathy    8. Other chronic pain                Plan:     Bhaskar was seen today for follow-up.    Diagnoses and all orders for this visit:    Annual physical exam  -     Cancel: CBC Without Differential; Future  -     Cancel: Comprehensive Metabolic Panel; Future  -     Cancel: Lipid Panel; Future  -     Cancel: TSH; Future  -     Cancel: Hemoglobin A1C; Future  -     TSH; Future  -     Lipid Panel; Future  -     Hemoglobin A1C; Future  -     Comprehensive Metabolic Panel; Future  -     CBC Without Differential; Future  -     TSH  -     Lipid Panel  -     Hemoglobin A1C  -     Comprehensive Metabolic Panel  -     CBC Without Differential    Hyperlipidemia, unspecified hyperlipidemia type  -     Cancel: Lipid Panel; Future  -     Lipid Panel; Future  -     Lipid Panel    Syncope, unspecified syncope type    LAMONT (obstructive sleep apnea)    Family history of colon cancer in father    Essential hypertension    Cervical radiculopathy    Other chronic pain        Pt aware not a full year.  Just  wants to condense appts.      Health Maintenance  - Lipids:   - A1C:   - Colon Ca Screen: 9/2022, due in 2027; fam hx of colon cancer  - Immunizations: vaccinated, needs covid booster, shingles.     Follow up in about 6 months (around 4/18/2024).     Myxer labs    Or sooner prn      37 min were used in chart review, evaluation and counseling of patient, documentation and review of results on same day of service     All medications were reviewed including potential side effects and risks/benefits.  Pt was counseled to call back if anything worsens or if questions arise.    Fredy Rey,  MD  Family Medicine  [facility]  [address]  [address]  [place] [address]  Phone [phone]  Fax [phone]

## 2023-10-26 LAB
ALBUMIN SERPL-MCNC: 4.4 G/DL (ref 3.6–5.1)
ALBUMIN/GLOB SERPL: 1.6 (CALC) (ref 1–2.5)
ALP SERPL-CCNC: 59 U/L (ref 35–144)
ALT SERPL-CCNC: 28 U/L (ref 9–46)
AST SERPL-CCNC: 16 U/L (ref 10–35)
BILIRUB SERPL-MCNC: 0.6 MG/DL (ref 0.2–1.2)
BUN SERPL-MCNC: 19 MG/DL (ref 7–25)
BUN/CREAT SERPL: NORMAL (CALC) (ref 6–22)
CALCIUM SERPL-MCNC: 9.5 MG/DL (ref 8.6–10.3)
CHLORIDE SERPL-SCNC: 102 MMOL/L (ref 98–110)
CHOLEST SERPL-MCNC: 151 MG/DL
CHOLEST/HDLC SERPL: 3.6 (CALC)
CO2 SERPL-SCNC: 25 MMOL/L (ref 20–32)
CREAT SERPL-MCNC: 1.14 MG/DL (ref 0.7–1.3)
EGFR: 77 ML/MIN/1.73M2
ERYTHROCYTE [DISTWIDTH] IN BLOOD BY AUTOMATED COUNT: 12.8 % (ref 11–15)
GLOBULIN SER CALC-MCNC: 2.8 G/DL (CALC) (ref 1.9–3.7)
GLUCOSE SERPL-MCNC: 93 MG/DL (ref 65–99)
HBA1C MFR BLD: 5 % OF TOTAL HGB
HCT VFR BLD AUTO: 42.1 % (ref 38.5–50)
HDLC SERPL-MCNC: 42 MG/DL
HGB BLD-MCNC: 13.5 G/DL (ref 13.2–17.1)
LDLC SERPL CALC-MCNC: 88 MG/DL (CALC)
MCH RBC QN AUTO: 27.6 PG (ref 27–33)
MCHC RBC AUTO-ENTMCNC: 32.1 G/DL (ref 32–36)
MCV RBC AUTO: 85.9 FL (ref 80–100)
NONHDLC SERPL-MCNC: 109 MG/DL (CALC)
PLATELET # BLD AUTO: 265 THOUSAND/UL (ref 140–400)
PMV BLD REES-ECKER: 11 FL (ref 7.5–12.5)
POTASSIUM SERPL-SCNC: 3.8 MMOL/L (ref 3.5–5.3)
PROT SERPL-MCNC: 7.2 G/DL (ref 6.1–8.1)
RBC # BLD AUTO: 4.9 MILLION/UL (ref 4.2–5.8)
SODIUM SERPL-SCNC: 139 MMOL/L (ref 135–146)
TRIGL SERPL-MCNC: 109 MG/DL
TSH SERPL-ACNC: 1.09 MIU/L (ref 0.4–4.5)
WBC # BLD AUTO: 6.7 THOUSAND/UL (ref 3.8–10.8)

## 2023-10-30 ENCOUNTER — OFFICE VISIT (OUTPATIENT)
Dept: CARDIOLOGY | Facility: CLINIC | Age: 53
End: 2023-10-30
Payer: COMMERCIAL

## 2023-10-30 VITALS
SYSTOLIC BLOOD PRESSURE: 139 MMHG | HEART RATE: 90 BPM | HEIGHT: 71 IN | BODY MASS INDEX: 39.02 KG/M2 | WEIGHT: 278.69 LBS | DIASTOLIC BLOOD PRESSURE: 80 MMHG

## 2023-10-30 DIAGNOSIS — E78.5 HYPERLIPIDEMIA, UNSPECIFIED HYPERLIPIDEMIA TYPE: ICD-10-CM

## 2023-10-30 DIAGNOSIS — G47.33 OSA (OBSTRUCTIVE SLEEP APNEA): ICD-10-CM

## 2023-10-30 DIAGNOSIS — R55 SYNCOPE, UNSPECIFIED SYNCOPE TYPE: Primary | ICD-10-CM

## 2023-10-30 DIAGNOSIS — E66.09 CLASS 2 OBESITY DUE TO EXCESS CALORIES WITHOUT SERIOUS COMORBIDITY WITH BODY MASS INDEX (BMI) OF 38.0 TO 38.9 IN ADULT: ICD-10-CM

## 2023-10-30 DIAGNOSIS — I10 ESSENTIAL HYPERTENSION: ICD-10-CM

## 2023-10-30 PROCEDURE — 99204 OFFICE O/P NEW MOD 45 MIN: CPT | Mod: S$GLB,,, | Performed by: INTERNAL MEDICINE

## 2023-10-30 PROCEDURE — 99999 PR PBB SHADOW E&M-EST. PATIENT-LVL IV: ICD-10-PCS | Mod: PBBFAC,,, | Performed by: INTERNAL MEDICINE

## 2023-10-30 PROCEDURE — 99999 PR PBB SHADOW E&M-EST. PATIENT-LVL IV: CPT | Mod: PBBFAC,,, | Performed by: INTERNAL MEDICINE

## 2023-10-30 PROCEDURE — 99204 PR OFFICE/OUTPT VISIT, NEW, LEVL IV, 45-59 MIN: ICD-10-PCS | Mod: S$GLB,,, | Performed by: INTERNAL MEDICINE

## 2023-10-30 NOTE — PROGRESS NOTES
Chart has been dictated using voice recognition software.  It is not been reviewed carefully for any transcriptional errors due to this technology.   Subjective:   Patient ID:  Bhaskar Stiles is a 53 y.o. male who presents for evaluation of Dizziness and Loss of Consciousness      HPI: Patient with hypertension, hyperlipidemia, and LAMONT on CPap referred after a syncopal episode.     Patient was waiting while wife was having a procedure, became diaphoretic, went to restroom and put cold water on his wrist and then passed out.  Took him to the ED where troponin were negative and ECG was normal (QT not prolonged although computer read it as such).  Has been happening for over 30 years.  Full evaluation in 2018 showed normal ECG, normal echo, normal 30 day event monitor and normal tilt table test.  Notes that he will feel bad when he gets overheated.  A cold shower will prevent syncope.  No dyspnea, chest pain or palpitations preceding syncope. Exercise, including sexual intercourse, will give him presyncopal symptoms    Patient denies any chest discomfort on exertion or at rest.  Patient denies any dyspnea at rest or on exertion, orthopnea, or PND. Patient denies any palpitations or lightheadedness.      Cardiac risk factors: hyperlipidemia, hypertension, obesity, positive family history, sedentary lifestyle    Past Medical History:   Diagnosis Date    Family history of colon cancer in father     Hyperlipidemia     Hypertension     LAMONT (obstructive sleep apnea)     Syncope and collapse        Past Surgical History:   Procedure Laterality Date    CHONDROPLASTY OF KNEE Left 8/22/2019    Procedure: CHONDROPLASTY, KNEE;  Surgeon: Shelbi Hughes MD;  Location: [facility];  Service: Orthopedics;  Laterality: Left;    COLONOSCOPY N/A 9/16/2022    Procedure: COLONOSCOPY;  Surgeon: RYAN Long MD;  Location: [address]);  Service: Endoscopy;  Laterality: N/A;  fully vaccinated, clears 4 hrs prior-am prep, inst  portal-MS    EPIDURAL STEROID INJECTION N/A 3/23/2022    Procedure: INJECTION, STEROID, EPIDURAL, C7-T1 IL;  Surgeon: Anthony Schaefer MD;  Location: [facility];  Service: Pain Management;  Laterality: N/A;    EPIDURAL STEROID INJECTION N/A 8/10/2022    Procedure: INJECTION, STEROID, EPIDURAL,  C7-T1 IL CONTRAST;  Surgeon: Anthony Schaefer MD;  Location: [facility];  Service: Pain Management;  Laterality: N/A;    HERNIA REPAIR      KNEE ARTHROSCOPY W/ MENISCECTOMY Left 8/22/2019    Procedure: ARTHROSCOPY, KNEE, WITH MEDIAL AND LATERAL MENISCECTOMY;  Surgeon: Shelbi Hughes MD;  Location: [facility] OR;  Service: Orthopedics;  Laterality: Left;    KNEE ARTHROSCOPY W/ PLICA EXCISION Left 8/22/2019    Procedure: EXCISION, MEDIAL PLICA, KNEE, ARTHROSCOPIC;  Surgeon: Shelbi Hughes MD;  Location: [facility] OR;  Service: Orthopedics;  Laterality: Left;    LIPOMA RESECTION      abdomen    REMOVAL OF FOREIGN BODY FROM LOWER EXTREMITY Left 8/22/2019    Procedure: REMOVAL, FOREIGN BODY, LOOSE BODY,  LOWER EXTREMITY;  Surgeon: Shelbi Hughes MD;  Location: [facility] OR;  Service: Orthopedics;  Laterality: Left;    SHOULDER SURGERY      left    SYNOVECTOMY OF KNEE Left 8/22/2019    Procedure: PARTIAL SYNOVECTOMY, KNEE;  Surgeon: Shelbi Hughes MD;  Location: [facility] OR;  Service: Orthopedics;  Laterality: Left;    TILT TABLE TEST N/A 7/9/2018    Procedure: TILT TABLE TEST;  Surgeon: Leonardo Whitfield MD;  Location: [facility] CATH LAB;  Service: Cardiology;  Laterality: N/A;  Syncope, HUT, DM, 3 PREP    TRANSFORAMINAL EPIDURAL INJECTION OF STEROID Right 10/9/2019    Procedure: LUMBAR TRANSFORAMINAL RIGHT L5/S1;  Surgeon: Anthony Schaefer MD;  Location: [facility];  Service: Pain Management;  Laterality: Right;  NEEDS CONSENT    TRANSFORAMINAL EPIDURAL INJECTION OF STEROID Left 11/18/2020    Procedure: LUMBAR TRANSFORAMINAL LEFT L5/S1 DIRECT REFERRAL;  Surgeon: Anthony Schaefer MD;  Location: [facility];  Service: Pain Management;  Laterality:  "Left;  NEEDS CONSENT       Social History     Tobacco Use    Smoking status: Never    Smokeless tobacco: Never   Substance Use Topics    Alcohol use: No    Drug use: No       Outpatient Medications Prior to Visit   Medication Sig Dispense Refill    amLODIPine (NORVASC) 10 MG tablet Take 1 tablet (10 mg total) by mouth once daily. 90 tablet 4    DULoxetine (CYMBALTA) 30 MG capsule TAKE 1 CAPSULE BY MOUTH TWICE A  capsule 1    losartan-hydrochlorothiazide 100-12.5 mg (HYZAAR) 100-12.5 mg Tab Take 1 tablet by mouth once daily. 90 tablet 3    rosuvastatin (CRESTOR) 10 MG tablet Take 1 tablet (10 mg total) by mouth once daily. 90 tablet 3     No facility-administered medications prior to visit.       Review of patient's allergies indicates:  No Known Allergies    ROS - - The patient's review of systems is negative for any significant constitutional, respiratory, endocrine, hematologic, musculoskeletal or neurologic symptoms. Patient has gained 16 pounds over past year.    Objective:   Physical Exam  Constitutional:       Appearance: He is well-developed. He is obese.      Comments: /80   Pulse 90   Ht 5' 11" (1.803 m)   Wt 126.4 kg (278 lb 10.6 oz)   BMI 38.87 kg/m²      Neck:      Vascular: No carotid bruit or JVD.   Cardiovascular:      Rate and Rhythm: Normal rate and regular rhythm.      Pulses: Intact distal pulses.      Heart sounds: Normal heart sounds. No murmur heard.     No friction rub. No gallop.   Pulmonary:      Effort: Pulmonary effort is normal.      Breath sounds: Normal breath sounds. No wheezing or rales.   Abdominal:      General: Bowel sounds are normal. There is no abdominal bruit.      Palpations: Abdomen is soft. There is no hepatomegaly.      Tenderness: There is no abdominal tenderness.   Musculoskeletal:      Cervical back: Neck supple.      Right lower leg: No edema.      Left lower leg: No edema.   Skin:     Nails: There is no clubbing.   Neurological:      Mental Status: He " is alert and oriented to person, place, and time.         Lab Results   Component Value Date    WBC 6.7 10/25/2023    HGB 13.5 10/25/2023    HCT 42.1 10/25/2023    MCV 85.9 10/25/2023     10/25/2023       Lab Results   Component Value Date     10/25/2023    K 3.8 10/25/2023    BUN 19 10/25/2023    BUN 11 06/23/2021    CREATININE 1.14 10/25/2023    GLU 93 10/25/2023    HGBA1C 5.0 10/25/2023    CHOL 151 10/25/2023    HDL 42 10/25/2023    LDLCALC 88 10/25/2023    LDLCALC 93 06/23/2021    TRIG 109 10/25/2023    CHOLHDL 3.6 10/25/2023    HGB 13.5 10/25/2023    HCT 42.1 10/25/2023     10/25/2023    INR 1.0 06/05/2018       Assessment:     1. Syncope    2. Hyperlipidemia, unspecified hyperlipidemia type    3. Class 2 obesity due to excess calories without serious comorbidity with body mass index (BMI) of 38.0 to 38.9 in adult    4. Essential hypertension    5. LAMONT (obstructive sleep apnea)      Patient has syncope of unclear etiology.  A prior cardiovascular evaluation for similar symptoms was completely normal with normal electrocardiogram, normal echocardiogram, normal tilt-table test, and a 30 day monitor that showed at worst sinus tachycardia.  The patient supposedly has seen a neurologist who does not believe there is a neurologic cause for the syncope.  The patient is reluctant to have an internal loop recorder (ILR) at this time.  Therefore, will refer the patient to Dr. Terrell in [place] for further evaluation of syncope.  Therefore, repeating all the cardiac testing that was on 2 years ago will probably be unrewarding and will be deferred at this time.  The patient has no symptoms of ischemia or heart failure.  Patient's physical exam is entirely benign.    The patient's cholesterol is adequately controlled for primary prevention.  Weight loss was discussed with the patient he was given a copy of the Mediterranean and dash diets.  He was cautioned about continuing his [place] diet  given the high sodium and cholesterol content.  The patient has been using his CPAP at least 4 hours a night.    Unless there are intervening problems, patient should return for re-evaluation in 6 months.   Plan:     Bhaskar was seen today for dizziness and loss of consciousness.    Diagnoses and all orders for this visit:    Syncope  -     Ambulatory referral/consult to Cardiology  -     Ambulatory referral/consult to Electrophysiology; Future; Expected date: 11/06/2023    Hyperlipidemia, unspecified hyperlipidemia type    Class 2 obesity due to excess calories without serious comorbidity with body mass index (BMI) of 38.0 to 38.9 in adult    Essential hypertension    LAMONT (obstructive sleep apnea)          Austin Edmond MD  Consultative Cardiology

## 2023-10-30 NOTE — Clinical Note
Your patient, Bhaskar Stiles , was referred by the emergency department for evaluation of syncope. Please see my note for details of this encounter. If you have any questions, please contact me.  Thank you for allowing me to participate in care of this patient.

## 2023-11-13 ENCOUNTER — OFFICE VISIT (OUTPATIENT)
Dept: SLEEP MEDICINE | Facility: CLINIC | Age: 53
End: 2023-11-13
Payer: COMMERCIAL

## 2023-11-13 DIAGNOSIS — G47.33 OSA (OBSTRUCTIVE SLEEP APNEA): Primary | ICD-10-CM

## 2023-11-13 DIAGNOSIS — Z78.9 INTOLERANCE OF CONTINUOUS POSITIVE AIRWAY PRESSURE (CPAP) VENTILATION: ICD-10-CM

## 2023-11-13 PROCEDURE — 99214 OFFICE O/P EST MOD 30 MIN: CPT | Mod: 95,,, | Performed by: PHYSICIAN ASSISTANT

## 2023-11-13 PROCEDURE — 99214 PR OFFICE/OUTPT VISIT, EST, LEVL IV, 30-39 MIN: ICD-10-PCS | Mod: 95,,, | Performed by: PHYSICIAN ASSISTANT

## 2023-11-13 NOTE — PROGRESS NOTES
The chief complaint leading to consultation is: sleep problems    Visit type: audiovisual     The patient location is: [place]    16 minutes of total time spent on the encounter, which includes face to face time and non-face to face time preparing to see the patient (eg, review of tests), Obtaining and/or reviewing separately obtained history, Documenting clinical information in the electronic or other health record, Independently interpreting results (not separately reported) and communicating results to the patient/family/caregiver, or Care coordination (not separately reported).     Each patient to whom he or she provides medical services by telemedicine is:  (1) informed of the relationship between the physician and patient and the respective role of any other health care provider with respect to management of the patient; and (2) notified that he or she may decline to receive medical services by telemedicine and may withdraw from such care at any time.          Referred by Self, Aaareferral     ESTABLISHED PATIENT VISIT  New to me. Previously followed by several sleep providers, most recently seen by NP Anderson    Bhaskar Stiles  is a pleasant 53 y.o. male  with PMH significant for HTN, HLD, DDD, chronic pain, BMI 38+, LAMONT       Here today for: CPAP follow-up     PLAN last visit 9/28/22:   Remotely adjusted apap to 5-11cm and rtc 5-wks re-eval adherence with low profile FFM. If ongoing pressure intolerance consider bipap titration study.   We discussed potential treatment options, which could include weight loss , mandibular advancement splint by dentist, or referral for surgical consideration (not candidate due to BMI) Discussed control of LAMONT when effectively used  See pcp re: HTN mgt/continue meds      Since last visit:   Reports using more regularly since pressure adjustment last year. However, wife reports residual snoring now with current pressures. Still quite sleepy.      PAP history   Problems Residual  snoring   Mask Nasal pillows   Pressure 5-11cwp (previously 10-18cwp)   DME HME   Machine age DS2 12/3/21   Download 11/13/23: 21/30 x 5hrs 9mins, 5-11cwp (11cwp), leak (10), AHI 10.5         Past Medical History:   Diagnosis Date    Family history of colon cancer in father     Hyperlipidemia     Hypertension     LAMONT (obstructive sleep apnea)     Syncope and collapse      Patient Active Problem List   Diagnosis    Abnormal finding on MRI of brain    History of syncope    Syncope    LAMONT (obstructive sleep apnea)    Chronic pain    Essential hypertension    Family history of colon cancer in father    DDD (degenerative disc disease), cervical    Cervical radiculopathy    Neck pain    Decreased strength    Hyperlipidemia    Class 2 obesity due to excess calories with body mass index (BMI) of 38.0 to 38.9 in adult       Current Outpatient Medications:     amLODIPine (NORVASC) 10 MG tablet, Take 1 tablet (10 mg total) by mouth once daily., Disp: 90 tablet, Rfl: 4    DULoxetine (CYMBALTA) 30 MG capsule, TAKE 1 CAPSULE BY MOUTH TWICE A DAY, Disp: 180 capsule, Rfl: 1    losartan-hydrochlorothiazide 100-12.5 mg (HYZAAR) 100-12.5 mg Tab, Take 1 tablet by mouth once daily., Disp: 90 tablet, Rfl: 3    rosuvastatin (CRESTOR) 10 MG tablet, Take 1 tablet (10 mg total) by mouth once daily., Disp: 90 tablet, Rfl: 3     There were no vitals filed for this visit.    Physical Exam:    GEN:   Well-appearing  Psych:  Appropriate affect, demonstrates insight  SKIN:  No rash on the face or bridge of the nose      LABS:   Lab Results   Component Value Date    HGB 13.5 10/25/2023    CO2 25 10/25/2023       RECORDS REVIEWED PREVIOUSLY:    Baseline Sleep Study: 10/05/2018  lb. The overall AHI was 10 and overall RDI was 15. The oxygen max was 88.7 % and % time < 90% SpO2 was 0.2 %. Most likely underestimate of severity    ASSESSMENT        2/2/2021    10:19 PM   EPWORTH SLEEPINESS SCALE   Sitting and reading 2   Watching TV 3   Sitting,  inactive in a public place (e.g. a theatre or a meeting) 2   As a passenger in a car for an hour without a break 2   Lying down to rest in the afternoon when circumstances permit 2   Sitting and talking to someone 1   Sitting quietly after a lunch without alcohol 2   In a car, while stopped for a few minutes in traffic 1   Total score 15     PROBLEM DESCRIPTION/ Sx on Presentation Interval Hx STATUS    LAMONT   + snoring, + witnessed apneas  + wakes feeling un-refreshed  Dx 2018 AHI 10, RDI 15 Usage increased significantly since last year, residual AHI 10.5 Not yet controlled   Daytime Sx   + sleepiness when inactive   ESS 15/24 on intake (reviewed from 9/6/18) Still quite sleepy; ESS 12//24 today Slight improvement   Insomnia   Trouble falling asleep: no issues  Maintenance:         wakes frequently, not difficult at all to return to sleep  Prior hypnotics:        Current hypnotics:    Still waking occasionally for nocturia persists   Nocturia   x 2 per sleep period persists stable   AM Headaches Multiple times weekly, does not last long  Resolves without medication  No longer getting frequent AM headaches improved   Other issues:     PLAN     -using and benefiting from CPAP therapy  -recommended BiPAP titration due to residual AHI, persistent sleepiness, and history of pressure intolerance at higher pressures on CPAP  -patient wishes to trial pressure increase first as he is not keen on in-lab titration study  -adjusted pressures 8-16cwp, ramp 6cwp x 30mins  -continue CPAP nightly  -CPAP supplies ordered    -discussed LAMONT and CPAP with patient in detail, including possible complications of untreated LAMONT like heart attack/stroke  -advised on strict driving precautions; advised never to drive drowsy    Advised on plan of care. Answered all patient questions. Patient verbalized understanding and voiced agreement with plan of care.     RTC follow up in 2 months, CPAP titration if residual AHI and pressure intolerance  remain     The patient was given open opportunity to ask questions and/or express concerns about treatment plan. All questions/concerns were discussed.     Two patient identifiers used prior to evaluation.

## 2023-11-19 DIAGNOSIS — I25.10 ASCVD (ARTERIOSCLEROTIC CARDIOVASCULAR DISEASE): ICD-10-CM

## 2023-11-19 RX ORDER — ROSUVASTATIN CALCIUM 10 MG/1
10 TABLET, COATED ORAL
Qty: 90 TABLET | Refills: 3 | Status: SHIPPED | OUTPATIENT
Start: 2023-11-19

## 2023-11-19 NOTE — TELEPHONE ENCOUNTER
No care due was identified.  [facility] Embedded Care Due Messages. Reference number: [id number].   11/19/2023 2:23:41 PM CST

## 2023-11-20 NOTE — TELEPHONE ENCOUNTER
Refill Decision Note   Bhaskar Go  is requesting a refill authorization.  Brief Assessment and Rationale for Refill:  Approve     Medication Therapy Plan:         Comments:     Note composed:9:08 PM 11/19/2023

## 2023-12-05 ENCOUNTER — PATIENT MESSAGE (OUTPATIENT)
Dept: CARDIOLOGY | Facility: CLINIC | Age: 53
End: 2023-12-05
Payer: COMMERCIAL

## 2023-12-15 ENCOUNTER — OFFICE VISIT (OUTPATIENT)
Dept: CARDIOLOGY | Facility: CLINIC | Age: 53
End: 2023-12-15
Payer: COMMERCIAL

## 2023-12-15 VITALS
SYSTOLIC BLOOD PRESSURE: 154 MMHG | WEIGHT: 281.31 LBS | HEIGHT: 71 IN | OXYGEN SATURATION: 95 % | DIASTOLIC BLOOD PRESSURE: 96 MMHG | BODY MASS INDEX: 39.38 KG/M2 | HEART RATE: 97 BPM

## 2023-12-15 DIAGNOSIS — R55 SYNCOPE, UNSPECIFIED SYNCOPE TYPE: Primary | ICD-10-CM

## 2023-12-15 DIAGNOSIS — G47.33 OSA (OBSTRUCTIVE SLEEP APNEA): ICD-10-CM

## 2023-12-15 DIAGNOSIS — I10 ESSENTIAL HYPERTENSION: ICD-10-CM

## 2023-12-15 DIAGNOSIS — E78.5 HYPERLIPIDEMIA, UNSPECIFIED HYPERLIPIDEMIA TYPE: ICD-10-CM

## 2023-12-15 DIAGNOSIS — E66.09 CLASS 2 OBESITY DUE TO EXCESS CALORIES WITHOUT SERIOUS COMORBIDITY WITH BODY MASS INDEX (BMI) OF 38.0 TO 38.9 IN ADULT: ICD-10-CM

## 2023-12-15 PROCEDURE — 99999 PR PBB SHADOW E&M-EST. PATIENT-LVL V: ICD-10-PCS | Mod: PBBFAC,,, | Performed by: INTERNAL MEDICINE

## 2023-12-15 PROCEDURE — 99999 PR PBB SHADOW E&M-EST. PATIENT-LVL V: CPT | Mod: PBBFAC,,, | Performed by: INTERNAL MEDICINE

## 2023-12-15 PROCEDURE — 99204 OFFICE O/P NEW MOD 45 MIN: CPT | Mod: S$GLB,,, | Performed by: INTERNAL MEDICINE

## 2023-12-15 PROCEDURE — 99204 PR OFFICE/OUTPT VISIT, NEW, LEVL IV, 45-59 MIN: ICD-10-PCS | Mod: S$GLB,,, | Performed by: INTERNAL MEDICINE

## 2023-12-15 NOTE — PROGRESS NOTES
Subjective:   Patient ID:  Bhaskar Stiles is a 53 y.o. male     Chief complaint:syncope    HPI  New patient to me. (12/16/2023 )  Referred by Dr Edmond for evaluation and management of syncope   --   Background as gleaned from patient's records :    hypertension, hyperlipidemia, and LAMONT on CPap referred after a syncopal episode.      Patient was waiting while wife was having a procedure, became diaphoretic, went to restroom and put cold water on his wrist and then passed out.  Took him to the ED where troponin were negative and ECG was normal (QT not prolonged although computer read it as such).  Has been happening for over 30 years.  Full evaluation in 2018 showed normal ECG, normal echo, normal 30 day event monitor and normal tilt table test.  Notes that he will feel bad when he gets overheated.  A cold shower will prevent syncope.  No dyspnea, chest pain or palpitations preceding syncope. Exercise, including sexual intercourse, will give him presyncopal symptoms     Patient denies any chest discomfort on exertion or at rest.  Patient denies any dyspnea at rest or on exertion, orthopnea, or PND. Patient denies any palpitations or lightheadedness.        Cardiac risk factors: hyperlipidemia, hypertension, obesity, positive family history, sedentary lifestyl  Tilt test in 2018:  1. Baseline values: blood pressure of 117/81 mmHg and heart rate of 68 bpm.   2. Normal response to head up tilt.    3. The maximum heart rate noted was 80 bpm at the 24th minute of tilting.   4. Normal blood pressure response to head up tilt.     Additional details gleaned from today's interview :  On BP meds - amlodipine 10, Hyzaar 100/12.5. never on BBs  He started having syncopal spells since age 19.  He thinks that these happen mostly when he was overheated.  However there were some unusual episodes;   Once, for instance, sometime before 2015, he woke up from sleep feeling lightheaded weak and dizzy and near syncopal.  Another event  occurred while he was playing basketball-perhaps a little too intensely-but not it is friend feeling extremely exhausted.  Most recent episodes occurred in October and he had urinary incontinence as a result.  He has syncope episodes roughly every 2 years.  He has near syncopal episodes about 4 to 5 times per month.  He handles does by sitting down and I do not see to full syncope.  He gets near-syncope with  pain.    Current Outpatient Medications   Medication Sig    amLODIPine (NORVASC) 10 MG tablet Take 1 tablet (10 mg total) by mouth once daily.    DULoxetine (CYMBALTA) 30 MG capsule TAKE 1 CAPSULE BY MOUTH TWICE A DAY    losartan-hydrochlorothiazide 100-12.5 mg (HYZAAR) 100-12.5 mg Tab Take 1 tablet by mouth once daily.    rosuvastatin (CRESTOR) 10 MG tablet TAKE 1 TABLET BY MOUTH EVERY DAY     No current facility-administered medications for this visit.       Review of Systems     Constitutional: Reviewed  for decreased appetite, weight gain and weight loss.   HENT: Reviewed for nosebleeds.    Eyes:  Reviewed for blurred vision and visual disturbance.   Cardiovascular: Reviewed for chest pain, claudication, cyanosis,dyspnea on exertion, leg swelling, orthopnea,paroxysmal nocturnal dyspnearregular heartbeats, palpitations, near-syncope, and syncope.   Respiratory: Reviewed for cough, shortness of breath, wheezing, sleep disturbances due to breathing and snoring, .    Endocrine: Reviewed for heat intolerance.   Hematologic/Lymphatic: Reviewed for easy bruisability/bleeding.   Skin: Reviewed for rash.   Musculoskeletal: Reviewed for muscle weakness and myalgias.   Gastrointestinal: Reviewed for abdominal pain, anorexia, melena, nausea and vomiting.   Genitourinary: Reviewed for hesitancy, frequency, nocturia and incontinence.   Neurological: Reviewed for excessive daytime sleepiness, dizziness, vertigo, weakness, headaches, loss of balance and seizures,   Psychiatric/Behavioral:  Reviewed for insomnia, altered  mental status, depression, anxiety and nervousness.       All symptoms reviewed above were negative except for daytime sleepiness (he has sleep apnea).     Social History     Tobacco Use   Smoking Status Never   Smokeless Tobacco Never       reports no history of alcohol use.   Past Medical History:   Diagnosis Date    Family history of colon cancer in father     Hyperlipidemia     Hypertension     LAMONT (obstructive sleep apnea)     Syncope and collapse      Family History   Problem Relation Age of Onset    Colon cancer Father      Social History     Socioeconomic History    Marital status:    Tobacco Use    Smoking status: Never    Smokeless tobacco: Never   Substance and Sexual Activity    Alcohol use: No    Drug use: No     Past Surgical History:   Procedure Laterality Date    CHONDROPLASTY OF KNEE Left 8/22/2019    Procedure: CHONDROPLASTY, KNEE;  Surgeon: Shelbi Hughes MD;  Location: [facility];  Service: Orthopedics;  Laterality: Left;    COLONOSCOPY N/A 9/16/2022    Procedure: COLONOSCOPY;  Surgeon: RYAN Long MD;  Location: [address]);  Service: Endoscopy;  Laterality: N/A;  fully vaccinated, clears 4 hrs prior-am prep, inst portal-MS    EPIDURAL STEROID INJECTION N/A 3/23/2022    Procedure: INJECTION, STEROID, EPIDURAL, C7-T1 IL;  Surgeon: Anthony Schaefer MD;  Location: [facility] PAIN MGT;  Service: Pain Management;  Laterality: N/A;    EPIDURAL STEROID INJECTION N/A 8/10/2022    Procedure: INJECTION, STEROID, EPIDURAL,  C7-T1 IL CONTRAST;  Surgeon: Anthony Schaefer MD;  Location: [facility] PAIN [facility]GT;  Service: Pain Management;  Laterality: N/A;    HERNIA REPAIR      KNEE ARTHROSCOPY W/ MENISCECTOMY Left 8/22/2019    Procedure: ARTHROSCOPY, KNEE, WITH MEDIAL AND LATERAL MENISCECTOMY;  Surgeon: Shelbi Hughes MD;  Location: [facility];  Service: Orthopedics;  Laterality: Left;    KNEE ARTHROSCOPY W/ PLICA EXCISION Left 8/22/2019    Procedure: EXCISION, MEDIAL PLICA, KNEE, ARTHROSCOPIC;  Surgeon:  Shelbi Hughes MD;  Location: [facility] OR;  Service: Orthopedics;  Laterality: Left;    LIPOMA RESECTION      abdomen    REMOVAL OF FOREIGN BODY FROM LOWER EXTREMITY Left 8/22/2019    Procedure: REMOVAL, FOREIGN BODY, LOOSE BODY,  LOWER EXTREMITY;  Surgeon: Shelbi Hughes MD;  Location: [facility] OR;  Service: Orthopedics;  Laterality: Left;    SHOULDER SURGERY      left    SYNOVECTOMY OF KNEE Left 8/22/2019    Procedure: PARTIAL SYNOVECTOMY, KNEE;  Surgeon: Shelbi Hughes MD;  Location: [facility] OR;  Service: Orthopedics;  Laterality: Left;    TILT TABLE TEST N/A 7/9/2018    Procedure: TILT TABLE TEST;  Surgeon: Leonardo Whitfield MD;  Location: [facility] CATH LAB;  Service: Cardiology;  Laterality: N/A;  Syncope, HUT, DM, 3 PREP    TRANSFORAMINAL EPIDURAL INJECTION OF STEROID Right 10/9/2019    Procedure: LUMBAR TRANSFORAMINAL RIGHT L5/S1;  Surgeon: Anthony Schaefer MD;  Location: [facility] PAIN MGT;  Service: Pain Management;  Laterality: Right;  NEEDS CONSENT    TRANSFORAMINAL EPIDURAL INJECTION OF STEROID Left 11/18/2020    Procedure: LUMBAR TRANSFORAMINAL LEFT L5/S1 DIRECT REFERRAL;  Surgeon: Anthony Schaefer MD;  Location: [facility] PAIN MGT;  Service: Pain Management;  Laterality: Left;  NEEDS CONSENT       Objective:   Physical Exam  Vitals and nursing note reviewed.   Constitutional:       Appearance: Normal appearance. He is well-developed.      Comments: overweight   HENT:      Head: Normocephalic and atraumatic.      Right Ear: External ear normal.      Left Ear: External ear normal.   Eyes:      Conjunctiva/sclera: Conjunctivae normal.      Left eye: Left conjunctiva is not injected. No hemorrhage.     Pupils: Pupils are equal, round, and reactive to light.   Neck:      Thyroid: No thyromegaly.      Vascular: No JVD.   Cardiovascular:      Rate and Rhythm: Normal rate and regular rhythm.      Chest Wall: PMI is not displaced.      Pulses: Intact distal pulses.           Carotid pulses are 2+ on the right side and 2+ on the left side.     "   Radial pulses are 2+ on the right side and 2+ on the left side.        Dorsalis pedis pulses are 2+ on the right side and 2+ on the left side.        Posterior tibial pulses are 2+ on the right side and 2+ on the left side.      Heart sounds: Normal heart sounds. No midsystolic click and no opening snap. No murmur heard.     No friction rub. No gallop.      Comments:  Orthostatic BP measurements  Sitting:                            BP  138/76          HR  89  Standing 1 min:                     166/92                 93  Standing 3 min:                     158/97                 92  Standing 5 min:                     154/96                 97  bpm      Pulmonary:      Effort: Pulmonary effort is normal. No respiratory distress.      Breath sounds: Normal breath sounds. No wheezing or rales.   Chest:      Chest wall: No tenderness.   Abdominal:      Palpations: Abdomen is soft. Abdomen is not rigid. There is no hepatomegaly.      Tenderness: There is no guarding.      Comments: Obese abdomen   Musculoskeletal:         General: No tenderness. Normal range of motion.      Cervical back: Neck supple.      Right knee: No swelling.      Left knee: No swelling.      Right lower leg: No swelling.      Left lower leg: No swelling.      Right ankle: No swelling.      Left ankle: No swelling.      Right foot: No swelling.      Left foot: No swelling.   Skin:     General: Skin is warm and dry.      Coloration: Skin is not pale.      Findings: No rash.   Neurological:      General: No focal deficit present.      Mental Status: He is alert and oriented to person, place, and time.      Cranial Nerves: No cranial nerve deficit.      Coordination: Coordination normal.      Deep Tendon Reflexes: Reflexes are normal and symmetric.   Psychiatric:         Mood and Affect: Mood normal.         Behavior: Behavior normal.       BP (!) 154/96   Pulse 97   Ht 5' 11" (1.803 m)   Wt 127.6 kg (281 lb 4.9 oz)   SpO2 95%   BMI 39.23 " "kg/m²       No results found for this or any previous visit.    WBC   Date Value Ref Range Status   10/25/2023 6.7 3.8 - 10.8 Thousand/uL Final     Hematocrit   Date Value Ref Range Status   10/25/2023 42.1 38.5 - 50.0 % Final     Hemoglobin   Date Value Ref Range Status   10/25/2023 13.5 13.2 - 17.1 g/dL Final     Lab Results   Component Value Date     10/25/2023     Lab Results   Component Value Date    CREATININE 1.14 10/25/2023    EGFRNORACEVR 77 10/25/2023    K 3.8 10/25/2023     Lab Results   Component Value Date    BNP <10 10/16/2023            Assessment:    Despite the fact that some of his episodes occurred in atypical circumstances for vasovagal syncope, it is quite likely that is having vasodepressor syncope.  I suspect that he has a hyper vagotonic autonomic profile.  He also seems to have excessive venous pooling.  Today's orthostatic response was hypertensive suggestive of excessive venous pooling.    1. Syncope, unspecified syncope type    2. LAMONT (obstructive sleep apnea)    3. Class 2 obesity due to excess calories without serious comorbidity with body mass index (BMI) of 38.0 to 38.9 in adult    4. Essential hypertension    5. Hyperlipidemia, unspecified hyperlipidemia type        Plan:    Unfortunately, administrative decisions have scheduled our specialized autonomic tilt to be shut down after December 18th.  Therefore full, properly targeted tilt study manipulations we are not available to help us with more specific diagnoses.  I will obtain a Holter monitor which may allow me to determine a cardiac autonomic profile (but not a vascular one) after which, I will recommend some changes in the current antihypertensive medications.    I had a long talk with patient and discussed the various treatment options available. Effective symptom relief and long term management rely heavily on instituting life style changes and "holistic" measures.   Pharmalogical treatment is used as a supplemental " therapy rather than a primary approach.    As to practical measures to avoid initiation of syncopal/near syncopal events:        Avoid sudden standing from a supine or sitting position especially in the morning after awakening.  You might want to move and tense the muscles of your shoulders, neck and lower extremities prior to standing.  This has to be done for a full minute (which seems like a very long time when one is doing boring activities). Sometimes, a short 15 second of exercising the legs could back fire and enhance the likelihood of initiating an event.              Sleep on a bed that is tilted up (place 6 in blocks to raise the head of the bed) helps decrease the dehydration that results from increased renal (kidney) filtration associated with the supine position.            Avoid prolonged still standing (keep tensing in moving the legs while in a line etc.), prolonged car rides without hydration nor lower body activity etc..            Avoid excessive heat, excessively hot showers, alcohol intake etc.        Although they are often impractical and uncomfortable, properly measured lower body garments with sequential counter pressure can be quite helpful.  In general, we recommend starting with knee-high Jobst stockings that provide 35 mm of counter pressure.  >>  Specifically, wear support stockings as follows:     Start with knee highs, toe +/- heel cut outs +/- side zippers.   For best performance,apply in am, after showering, while lying in bed: Massage your legs towards your abdomen then roll the stockings up towards the knees, making sure that the pressure is higher at the ankles than at the shins and knees.  Buy at least 4 pairs and rotate them each day, hand wash, cold water, gentle detergent, drip dry.    In the [facility] area, fitted graduated counter-pressure lower body garments (knee, thigh or waist high stockings, abdominal binders etc) can be obtained at a reduced, affordable  price from:                    # [facility], [address] [place] [address]                                                           Tel:    [phone]       Fax: [phone]                    # [facility]                       #  clinic                                              # [facility]                                                # [facility]                                # [place]                                     In the [place] area:                   # [employer]·  [address]. Tel  [phone]                   # [employer]: [address] · Tel [phone]                   # [facility]: [address] · [phone]    Or on the internet:                     [employer]                    [email or web address]                    [employer] (their own brand: Truform)    As to practical measures to avoid worsened unpleasant symptoms around acute events:      As soon as you recognize symptoms that could lead to a significant dizziness/near syncopal/syncopal event, you should immediately lie down wherever you are supporting herself with the closest wall, sturdy furniture etc. it is not advisable to try and get to the soft couch or bed. The symptoms that you feel in the aftermath will be greatly reduced in duration if you act immediately.  You should thrive to overcome your embarrassment when these events happen in public and react your symptoms rather than to your social environment.     After you get on to a secure spot, stay down and if possible, raise your legs above the level of your head/shoulders and stayed there for 15 to 20 minutes.  Do not allow good Samaritans to try and prop you up.  This will only restart the syncopal reaction.     In most cases, medical help and or  "visits to the emergency room are unnecessary.  Once you recover, simply increase your fluid intake by trying to ingest perhaps a L of water and or your favorite electrolytic drink within the hour after your symptoms.  Be wary the rest of the day and certainly avoid driving because symptoms tend to recur more frequently within the 24 to 48 hours following any syncopal event.    As to the holistic measures for long-term management:     1.Sleep:  Establish an appropriate sleep schedule.  No jet lag sleep.  My estimate is that patient needs at least 8 hours of restful sleep a night/day. This should be allocated fully and patient should aim to go to bed and wake up at the same time each day using an alarm clock to awaken them.  After a month of regular 8  hour nights, if they still awaken only after the alarm rings (and snoozes), they to increase their time allotment by at least 30 minutes and repeat the same process for a month. At the end of each month, the allotment should be similarly increased by 30 more minutes as needed each month until patient starts waking up reliably on their own, beating the alarm clock   Again, the schedule should be the same on both week days and weekends.    As to the turning in routine, patient should avoid using any blue light devices (cell phones, computers, TVs etc) while in bed.    2. Diet:  Diet should favor a high protein caloric intake (meats of course but grains -especially chickpeas, cheeses, greek yogurt are healthy options that are "power packed" with proteins).     Of course, a high salt high fluid intake is recommended (considers supplements such as Pedialyte adult etc.).    3. Exercise:     .After many years of working with patients with orthostatic symptoms, a retiring physical therapist we worked with put together a booklet of exercises recommended for improving lower body venous/vascular health/tone. We will supply a copy. The recommended time commitment for these " "exercises is 20 min a day, 5 times a week.       Other excellent forms of exercise that will be helpful include:          Swimming (lance, competitive style swimming preferably under guidance of a "gentle"  - indoor pool at a [facility] or a [facility] for instance).          Resistance weight training (NOT weightlifting -ie no clean and jerk, clean and press or snatch moves) and low weight dumbbells.     4. Mental health measures:       Orthostatic hypotension often causes significant anxiety and the PTSD like syndrome.  In that case, patients are encouraged to seek professional psychiatric help as needed.        In general however, simple measures of daily relaxation are very helpful in helping reducing symptomatology.  We recommend a daily 30 minutes break of outdoors vegging" soaking up fresh air and sunlight while sipping on a refreshing non alcoholic drink..    Non holistic treatment measures:  Of course, in addition to holistic measures, we can offer pharmacological treatment as needed.  In general, this will be dispensed only in conjunction with holistic measures and generally after after a  workup that includes a 48 hour Holter monitor, a 24 hour collection for sodium, potassium and perhaps tilt studies when indicated.       Orders Placed This Encounter   Procedures    Holter monitor - 48 hour     Standing Status:   Future     Standing Expiration Date:   12/15/2024     Order Specific Question:   Release to patient     Answer:   Immediate     Follow up if symptoms worsen or fail to improve, for post work up.  There are no discontinued medications.  Outpatient Encounter Medications as of 12/15/2023   Medication Sig Dispense Refill    amLODIPine (NORVASC) 10 MG tablet Take 1 tablet (10 mg total) by mouth once daily. 90 tablet 4    DULoxetine (CYMBALTA) 30 MG capsule TAKE 1 CAPSULE BY MOUTH TWICE A  capsule 1    losartan-hydrochlorothiazide 100-12.5 mg (HYZAAR) 100-12.5 mg Tab Take 1 tablet by mouth once " daily. 90 tablet 3    rosuvastatin (CRESTOR) 10 MG tablet TAKE 1 TABLET BY MOUTH EVERY DAY 90 tablet 3     No facility-administered encounter medications on file as of 12/15/2023.     Medication List with Changes/Refills   Current Medications    AMLODIPINE (NORVASC) 10 MG TABLET    Take 1 tablet (10 mg total) by mouth once daily.    DULOXETINE (CYMBALTA) 30 MG CAPSULE    TAKE 1 CAPSULE BY MOUTH TWICE A DAY    LOSARTAN-HYDROCHLOROTHIAZIDE 100-12.5 MG (HYZAAR) 100-12.5 MG TAB    Take 1 tablet by mouth once daily.    ROSUVASTATIN (CRESTOR) 10 MG TABLET    TAKE 1 TABLET BY MOUTH EVERY DAY          This note is at least partially dictated using the M*Modal Fluency Direct word recognition program. There are word recognition mistakes that are occasionally missed on review.

## 2023-12-20 ENCOUNTER — HOSPITAL ENCOUNTER (OUTPATIENT)
Dept: CARDIOLOGY | Facility: HOSPITAL | Age: 53
Discharge: HOME | End: 2023-12-20
Attending: INTERNAL MEDICINE
Payer: COMMERCIAL

## 2023-12-20 DIAGNOSIS — R55 SYNCOPE, UNSPECIFIED SYNCOPE TYPE: ICD-10-CM

## 2023-12-20 PROCEDURE — 93227 XTRNL ECG REC<48 HR R&I: CPT | Mod: ,,, | Performed by: INTERNAL MEDICINE

## 2023-12-20 PROCEDURE — 93227 HOLTER MONITOR - 48 HOUR (CUPID ONLY): ICD-10-PCS | Mod: ,,, | Performed by: INTERNAL MEDICINE

## 2023-12-20 PROCEDURE — 93226 XTRNL ECG REC<48 HR SCAN A/R: CPT

## 2023-12-23 LAB
OHS CV EVENT MONITOR DAY: 0
OHS CV HOLTER LENGTH DECIMAL HOURS: 47.7
OHS CV HOLTER LENGTH HOURS: 47
OHS CV HOLTER LENGTH MINUTES: 42
OHS CV HOLTER SINUS AVERAGE HR: 92
OHS CV HOLTER SINUS MAX HR: 132
OHS CV HOLTER SINUS MIN HR: 54

## 2024-01-03 ENCOUNTER — PATIENT MESSAGE (OUTPATIENT)
Dept: CARDIOLOGY | Facility: CLINIC | Age: 54
End: 2024-01-03
Payer: COMMERCIAL

## 2024-01-04 ENCOUNTER — PATIENT MESSAGE (OUTPATIENT)
Dept: CARDIOLOGY | Facility: CLINIC | Age: 54
End: 2024-01-04
Payer: COMMERCIAL

## 2024-01-04 NOTE — PROGRESS NOTES
See comments below and call patient to discuss.   Please close encounter when done -- no need to route back to me.  Thanks  Fast heart rates , SDNN 99  He probably would benefit from some beta blockade and a decrease in amlodipine dose   >> Decrease amlodipine to 5 mg a day and add Toprol 50 a day

## 2024-01-09 ENCOUNTER — PATIENT MESSAGE (OUTPATIENT)
Dept: CARDIOLOGY | Facility: CLINIC | Age: 54
End: 2024-01-09
Payer: COMMERCIAL

## 2024-01-10 DIAGNOSIS — I10 ESSENTIAL HYPERTENSION: ICD-10-CM

## 2024-01-10 RX ORDER — METOPROLOL SUCCINATE 50 MG/1
50 TABLET, EXTENDED RELEASE ORAL DAILY
Qty: 30 TABLET | Refills: 11 | Status: SHIPPED | OUTPATIENT
Start: 2024-01-10

## 2024-01-10 RX ORDER — AMLODIPINE BESYLATE 5 MG/1
5 TABLET ORAL DAILY
Qty: 30 TABLET | Refills: 11 | Status: SHIPPED | OUTPATIENT
Start: 2024-01-10

## 2024-01-10 RX ORDER — METOPROLOL SUCCINATE 50 MG/1
50 TABLET, EXTENDED RELEASE ORAL DAILY
COMMUNITY
End: 2024-01-10 | Stop reason: SDUPTHER

## 2024-01-10 RX ORDER — AMLODIPINE BESYLATE 5 MG/1
5 TABLET ORAL DAILY
COMMUNITY
End: 2024-01-10 | Stop reason: SDUPTHER

## 2024-01-10 NOTE — TELEPHONE ENCOUNTER
Pt notified pb      ----- Message from Kong Terrell MD sent at 1/3/2024 10:55 PM CST -----  See comments below and call patient to discuss.   Please close encounter when done -- no need to route back to me.  Thanks  Fast heart rates , SDNN 99  He probably would benefit from some beta blockade and a decrease in amlodipine dose   >> Decrease amlodipine to 5 mg a day and add Toprol 50 a day

## 2024-01-10 NOTE — TELEPHONE ENCOUNTER
Pt notified and verbalized understanding to medication changes. Sent scripts to be approved pb    ----- Message from Kong Terrell MD sent at 1/3/2024 10:55 PM CST -----  See comments below and call patient to discuss.   Please close encounter when done -- no need to route back to me.  Thanks  Fast heart rates , SDNN 99  He probably would benefit from some beta blockade and a decrease in amlodipine dose   >> Decrease amlodipine to 5 mg a day and add Toprol 50 a day

## 2024-02-15 RX ORDER — DULOXETIN HYDROCHLORIDE 30 MG/1
CAPSULE, DELAYED RELEASE ORAL
Refills: 0 | OUTPATIENT
Start: 2024-02-15

## 2024-02-28 ENCOUNTER — PATIENT MESSAGE (OUTPATIENT)
Dept: INTERNAL MEDICINE | Facility: CLINIC | Age: 54
End: 2024-02-28
Payer: COMMERCIAL

## 2024-03-04 ENCOUNTER — PATIENT MESSAGE (OUTPATIENT)
Dept: ADMINISTRATIVE | Facility: HOSPITAL | Age: 54
End: 2024-03-04
Payer: COMMERCIAL

## 2024-03-15 DIAGNOSIS — I10 ESSENTIAL HYPERTENSION: ICD-10-CM

## 2024-03-15 RX ORDER — LOSARTAN POTASSIUM AND HYDROCHLOROTHIAZIDE 12.5; 1 MG/1; MG/1
1 TABLET ORAL
Qty: 90 TABLET | Refills: 0 | Status: SHIPPED | OUTPATIENT
Start: 2024-03-15 | End: 2024-04-18 | Stop reason: SDUPTHER

## 2024-03-15 NOTE — TELEPHONE ENCOUNTER
Refill Routing Note   Medication(s) are not appropriate for processing by [facility] for the following reason(s):        Required vitals abnormal    [facility] action(s):  Defer               Appointments  past 12m or future 3m with PCP    Date Provider   Last Visit   10/18/2023 Fredy Rey MD   Next Visit   4/18/2024 Fredy Rey MD   ED visits in past 90 days: 0        Note composed:9:55 AM 03/15/2024

## 2024-03-15 NOTE — TELEPHONE ENCOUNTER
No care due was identified.  [facility] Embedded Care Due Messages. Reference number: [id number].   3/15/2024 12:45:45 AM CDT

## 2024-04-01 DIAGNOSIS — I10 ESSENTIAL HYPERTENSION: ICD-10-CM

## 2024-04-01 RX ORDER — AMLODIPINE BESYLATE 10 MG/1
10 TABLET ORAL
Qty: 90 TABLET | Refills: 4 | Status: SHIPPED | OUTPATIENT
Start: 2024-04-01 | End: 2024-04-18

## 2024-04-18 ENCOUNTER — OFFICE VISIT (OUTPATIENT)
Dept: INTERNAL MEDICINE | Facility: CLINIC | Age: 54
End: 2024-04-18
Payer: COMMERCIAL

## 2024-04-18 VITALS
DIASTOLIC BLOOD PRESSURE: 92 MMHG | OXYGEN SATURATION: 96 % | BODY MASS INDEX: 40.25 KG/M2 | HEIGHT: 71 IN | HEART RATE: 74 BPM | WEIGHT: 287.5 LBS | SYSTOLIC BLOOD PRESSURE: 132 MMHG

## 2024-04-18 DIAGNOSIS — Z00.00 ANNUAL PHYSICAL EXAM: ICD-10-CM

## 2024-04-18 DIAGNOSIS — R90.89 ABNORMAL FINDING ON MRI OF BRAIN: ICD-10-CM

## 2024-04-18 DIAGNOSIS — Z80.0 FAMILY HISTORY OF COLON CANCER IN FATHER: Chronic | ICD-10-CM

## 2024-04-18 DIAGNOSIS — E78.5 HYPERLIPIDEMIA, UNSPECIFIED HYPERLIPIDEMIA TYPE: ICD-10-CM

## 2024-04-18 DIAGNOSIS — N52.9 ERECTILE DYSFUNCTION, UNSPECIFIED ERECTILE DYSFUNCTION TYPE: ICD-10-CM

## 2024-04-18 DIAGNOSIS — E66.09 CLASS 2 OBESITY DUE TO EXCESS CALORIES WITHOUT SERIOUS COMORBIDITY WITH BODY MASS INDEX (BMI) OF 38.0 TO 38.9 IN ADULT: ICD-10-CM

## 2024-04-18 DIAGNOSIS — I10 ESSENTIAL HYPERTENSION: Primary | ICD-10-CM

## 2024-04-18 DIAGNOSIS — Z87.898 HISTORY OF SYNCOPE: Chronic | ICD-10-CM

## 2024-04-18 DIAGNOSIS — G47.33 OSA (OBSTRUCTIVE SLEEP APNEA): ICD-10-CM

## 2024-04-18 PROCEDURE — 3075F SYST BP GE 130 - 139MM HG: CPT | Mod: CPTII,S$GLB,, | Performed by: STUDENT IN AN ORGANIZED HEALTH CARE EDUCATION/TRAINING PROGRAM

## 2024-04-18 PROCEDURE — 3008F BODY MASS INDEX DOCD: CPT | Mod: CPTII,S$GLB,, | Performed by: STUDENT IN AN ORGANIZED HEALTH CARE EDUCATION/TRAINING PROGRAM

## 2024-04-18 PROCEDURE — 1159F MED LIST DOCD IN RCRD: CPT | Mod: CPTII,S$GLB,, | Performed by: STUDENT IN AN ORGANIZED HEALTH CARE EDUCATION/TRAINING PROGRAM

## 2024-04-18 PROCEDURE — 99214 OFFICE O/P EST MOD 30 MIN: CPT | Mod: S$GLB,,, | Performed by: STUDENT IN AN ORGANIZED HEALTH CARE EDUCATION/TRAINING PROGRAM

## 2024-04-18 PROCEDURE — 3079F DIAST BP 80-89 MM HG: CPT | Mod: CPTII,S$GLB,, | Performed by: STUDENT IN AN ORGANIZED HEALTH CARE EDUCATION/TRAINING PROGRAM

## 2024-04-18 PROCEDURE — 99999 PR PBB SHADOW E&M-EST. PATIENT-LVL IV: CPT | Mod: PBBFAC,,, | Performed by: STUDENT IN AN ORGANIZED HEALTH CARE EDUCATION/TRAINING PROGRAM

## 2024-04-18 PROCEDURE — G2211 COMPLEX E/M VISIT ADD ON: HCPCS | Mod: S$GLB,,, | Performed by: STUDENT IN AN ORGANIZED HEALTH CARE EDUCATION/TRAINING PROGRAM

## 2024-04-18 RX ORDER — LOSARTAN POTASSIUM AND HYDROCHLOROTHIAZIDE 12.5; 1 MG/1; MG/1
1 TABLET ORAL DAILY
Qty: 90 TABLET | Refills: 3 | Status: SHIPPED | OUTPATIENT
Start: 2024-04-18

## 2024-04-18 RX ORDER — TADALAFIL 20 MG/1
20 TABLET ORAL
Qty: 10 TABLET | Refills: 11 | Status: SHIPPED | OUTPATIENT
Start: 2024-04-18 | End: 2025-04-18

## 2024-04-18 NOTE — PROGRESS NOTES
[facility]    Subjective:       Patient ID: Bhaskar Stiles is a 53 y.o. male.    Chief Complaint: Hypertension (F/u)    History was obtained from the patient and supplemented through chart review.  This patient is known to me.      HPI:    Patient is a 53 y.o. male w/ pmhx of HTN, HLD, SHANTAL presents for f/u from ED syn cope visit    New grandbaby from 35 yo daughter, also step-daughter in 20's and 13 yo daughter    Another episode of syncope in car with loss of urine,  was out for approx 45 min  Returning to cardiology, also referred back to neuro for any further work-up    Hx of vasovagal syncope 2018, now again 2023, and again feb 2024  Has cardiology appt upcoming again to return  Frustrated because this has been happening since 2017  Seen by cardio Dec, started on toprol 50 with addition of 5 mg amdlodipine instead of 10, continuing losartan-hctz 100-12.5  Seen by Dr. Terrell, Kong    Low libido  Discussed exercise, decreased weight  Will try cilias    Anxiety sometimes with people  hesitant    Htn  losar-HCTZ 100-12.5 mg, amlodipine 5 mg daily, toprol 50 mg  Controlled, borderline high DBP    shantal  Wearing cpap, working on it  Gained weight  Needs to return again sleep me    hld  Cont Crestor 10    Family hx of colon cancer  Performed 9/2022, repeat due in 2027    Morbid Obesity  Counseled weight loss extensively, especially to come off CPAP  Popeyes to a minimium, wheat bread, poor diet- will work to change diet  A1cs normal  Wt Readings from Last 15 Encounters:   04/18/24 130.4 kg (287 lb 7.7 oz)   12/15/23 127.6 kg (281 lb 4.9 oz)   10/30/23 126.4 kg (278 lb 10.6 oz)   10/18/23 125.5 kg (276 lb 10.8 oz)   04/21/23 120.8 kg (266 lb 5.1 oz)   03/06/23 118 kg (260 lb 2.3 oz)   02/06/23 120.2 kg (264 lb 15.9 oz)   01/18/23 120.3 kg (265 lb 3.4 oz)   12/21/22 120.5 kg (265 lb 10.5 oz)   09/28/22 122 kg (268 lb 15.4 oz)   08/10/22 121.1 kg (267 lb)   07/25/22 122.5 kg (270 lb)   07/22/22 122.5  kg (270 lb 1 oz)   07/18/22 122.4 kg (269 lb 13.5 oz)   06/14/22 122 kg (269 lb)       Check labs again    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)  Wife works in healthcare as well    Not addressed    Vision changes reported in the past, outside [facility]  Doc retired  Given [employer] phone number    Myofascial pain, cervical radiculopathy, cervical spondylosis  Cymbalta helps, gets sleepy if taken early in day, advfrenchised to take higher dose in evening  Following with pain management/neurosurg/sports med  Better  In healthy back program  Pt unsure what next steps should be    Lumbar radiculopathy  Right side hip, knee pain  Not issue currently    Medical History  Past Medical History:   Diagnosis Date    Family history of colon cancer in father     Hyperlipidemia     Hypertension     LAMONT (obstructive sleep apnea)     Syncope and collapse        Review of Systems   Respiratory:  Negative for shortness of breath.    Cardiovascular:  Negative for chest pain.   Gastrointestinal:  Negative for abdominal pain.   Neurological:  Positive for syncope.         Surgical hx, family hx, social hx   Have been reviewed       Current Outpatient Medications:     amLODIPine (NORVASC) 5 MG tablet, Take 1 tablet (5 mg total) by mouth once daily., Disp: 30 tablet, Rfl: 11    metoprolol succinate (TOPROL-XL) 50 MG 24 hr tablet, Take 1 tablet (50 mg total) by mouth once daily., Disp: 30 tablet, Rfl: 11    rosuvastatin (CRESTOR) 10 MG tablet, TAKE 1 TABLET BY MOUTH EVERY DAY, Disp: 90 tablet, Rfl: 3    DULoxetine (CYMBALTA) 30 MG capsule, TAKE 1 CAPSULE BY MOUTH TWICE A DAY (Patient not taking: Reported on 4/18/2024), Disp: 180 capsule, Rfl: 1    losartan-hydrochlorothiazide 100-12.5 mg (HYZAAR) 100-12.5 mg Tab, Take 1 tablet by mouth once daily., Disp: 90 tablet, Rfl: 3    tadalafiL (CIALIS) 20 MG Tab, Take 1 tablet (20 mg total) by mouth Every 3 (three) days. As needed for ED, Disp: 10  "tablet, Rfl: 11    Objective:        Body mass index is 40.1 kg/m².  Vitals:    04/18/24 0901 04/18/24 0958   BP: (P) 120/86 (!) 132/92   Pulse: 74    SpO2: 96%    Weight: 130.4 kg (287 lb 7.7 oz)    Height: 5' 11" (1.803 m)    PainSc: 0-No pain          Physical Exam  Vitals and nursing note reviewed.   Constitutional:       General: He is not in acute distress.     Appearance: Normal appearance. He is not ill-appearing.      Comments: Obesity   HENT:      Head: Normocephalic and atraumatic.   Eyes:      General: No scleral icterus.  Cardiovascular:      Rate and Rhythm: Normal rate and regular rhythm.      Heart sounds: Normal heart sounds.   Pulmonary:      Effort: Pulmonary effort is normal.   Abdominal:      General: There is no distension.   Musculoskeletal:         General: No deformity.      Cervical back: Normal range of motion.   Skin:     General: Skin is warm and dry.   Neurological:      Mental Status: He is alert and oriented to person, place, and time.   Psychiatric:         Behavior: Behavior normal.           Lab Results   Component Value Date    WBC 6.7 10/25/2023    HGB 13.5 10/25/2023    HCT 42.1 10/25/2023     10/25/2023    CHOL 151 10/25/2023    TRIG 109 10/25/2023    HDL 42 10/25/2023    ALT 28 10/25/2023    AST 16 10/25/2023     10/25/2023    K 3.8 10/25/2023     10/25/2023    CREATININE 1.14 10/25/2023    BUN 19 10/25/2023    CO2 25 10/25/2023    TSH 1.09 10/25/2023    INR 1.0 06/05/2018    HGBA1C 5.0 10/25/2023       The 10-year ASCVD risk score (Hernán ANN, et al., 2019) is: 9.9%    Values used to calculate the score:      Age: 53 years      Sex: Male      Is Non- : Yes      Diabetic: No      Tobacco smoker: No      Systolic Blood Pressure: 132 mmHg      Is BP treated: Yes      HDL Cholesterol: 42 mg/dL      Total Cholesterol: 151 mg/dL    On crestor 10 mg    (Imaging have been independently reviewed)    Ct head reviewed 10/2023      Assessment: "         1. Essential hypertension    2. Family history of colon cancer in father    3. Class 2 obesity due to excess calories without serious comorbidity with body mass index (BMI) of 38.0 to 38.9 in adult    4. LAMONT (obstructive sleep apnea)    5. History of syncope    6. Abnormal finding on MRI of brain    7. Erectile dysfunction, unspecified erectile dysfunction type    8. Hyperlipidemia, unspecified hyperlipidemia type    9. Annual physical exam                Plan:     Bhaskar was seen today for hypertension.    Diagnoses and all orders for this visit:    Essential hypertension  -     losartan-hydrochlorothiazide 100-12.5 mg (HYZAAR) 100-12.5 mg Tab; Take 1 tablet by mouth once daily.    Family history of colon cancer in father    Class 2 obesity due to excess calories without serious comorbidity with body mass index (BMI) of 38.0 to 38.9 in adult    LAMONT (obstructive sleep apnea)    History of syncope  -     Ambulatory referral/consult to Neurology; Future    Abnormal finding on MRI of brain    Erectile dysfunction, unspecified erectile dysfunction type  -     tadalafiL (CIALIS) 20 MG Tab; Take 1 tablet (20 mg total) by mouth Every 3 (three) days. As needed for ED    Hyperlipidemia, unspecified hyperlipidemia type    Annual physical exam  -     CBC Without Differential; Future  -     Comprehensive Metabolic Panel; Future  -     Hemoglobin A1C; Future  -     Lipid Panel; Future  -     TSH; Future  -     CBC Without Differential  -     Comprehensive Metabolic Panel  -     Hemoglobin A1C  -     Lipid Panel  -     TSH        Pt aware not a full year.  Just  wants to condense appts.      Health Maintenance  - Lipids:   - A1C:   - Colon Ca Screen: 9/2022, due in 2027; fam hx of colon cancer  - Immunizations: vaccinated, needs covid booster, shingles.     Follow up in about 3 months (around 7/18/2024).     [facility] labs    Or sooner prn      34 min were used in chart review, evaluation and counseling of patient,  documentation and review of results on same day of service     Visit today is associated with current or anticipated ongoing medical care related to this patient's single serious condition/complex condition of syncope vs seizure. The patient will return to see me as these issues will be followed longitudinally.      All medications were reviewed including potential side effects and risks/benefits.  Pt was counseled to call back if anything worsens or if questions arise.    Fredy Rey MD  Family Medicine  [facility]  [address]  [address] [place] [id number]  Phone [phone]  Fax [phone]

## 2024-04-25 RX ORDER — DULOXETIN HYDROCHLORIDE 30 MG/1
CAPSULE, DELAYED RELEASE ORAL
Qty: 180 CAPSULE | Refills: 2 | Status: SHIPPED | OUTPATIENT
Start: 2024-04-25

## 2024-05-02 ENCOUNTER — CLINICAL SUPPORT (OUTPATIENT)
Dept: INTERNAL MEDICINE | Facility: CLINIC | Age: 54
End: 2024-05-02
Payer: COMMERCIAL

## 2024-05-02 ENCOUNTER — TELEPHONE (OUTPATIENT)
Dept: INTERNAL MEDICINE | Facility: CLINIC | Age: 54
End: 2024-05-02

## 2024-05-02 VITALS — DIASTOLIC BLOOD PRESSURE: 85 MMHG | SYSTOLIC BLOOD PRESSURE: 125 MMHG

## 2024-05-02 DIAGNOSIS — Z01.30 BP CHECK: Primary | ICD-10-CM

## 2024-05-02 PROCEDURE — 99999 PR PBB SHADOW E&M-EST. PATIENT-LVL I: CPT | Mod: PBBFAC,,,

## 2024-05-02 NOTE — TELEPHONE ENCOUNTER
Bhaskar Stiles 53 y.o. male is here for Blood Pressure check. remote 125/85    Manual Blood pressure reading was  See Above, Pulse See Above. (Checked at the end of the visit)    If high, was it repeated after 15 minutes? no    Pt's Home blood pressure machine read in office NO, Pulse N/A.     Diagnosed with Hypertension yes.    Patient took blood pressure medication today no.  Last dose of blood pressure medication was taken at Takes  all BP medication  at night. Last dose yesterday at 10 PM. Patient took 1. Amlodipine 5 mg daily 2. Losartan-hydrochlorothiazide 100-12.5 mg daily 3. Metoprolol 50 mg daily.     All Medications and OTC medication updated yes    Does patient have record of home blood pressure readings / Blood Pressure Log no.     Does the pt have any complaints today in regards to their blood pressure medication? no. Complains of None. Patient is asymptomatic.     Were you sitting still for 5-10 minutes prior to taking your Blood pressure? yes     Has your blood pressure monitor ever been checked? no When was last time we checked your blood pressure monitor? None    Updated vitals yes    Follow up date is 07/18/24.     Dr. Rey  notified.     Creatinine   Date Value Ref Range Status   10/25/2023 1.14 0.70 - 1.30 mg/dL Final     Sodium   Date Value Ref Range Status   10/25/2023 139 135 - 146 mmol/L Final     Potassium   Date Value Ref Range Status   10/25/2023 3.8 3.5 - 5.3 mmol/L Final

## 2024-05-06 ENCOUNTER — OFFICE VISIT (OUTPATIENT)
Dept: NEUROLOGY | Facility: CLINIC | Age: 54
End: 2024-05-06
Payer: COMMERCIAL

## 2024-05-06 VITALS
HEART RATE: 70 BPM | WEIGHT: 287.69 LBS | SYSTOLIC BLOOD PRESSURE: 126 MMHG | DIASTOLIC BLOOD PRESSURE: 76 MMHG | HEIGHT: 71 IN | BODY MASS INDEX: 40.27 KG/M2

## 2024-05-06 DIAGNOSIS — Z87.898 HISTORY OF SYNCOPE: Chronic | ICD-10-CM

## 2024-05-06 PROCEDURE — 3078F DIAST BP <80 MM HG: CPT | Mod: CPTII,S$GLB,, | Performed by: PSYCHIATRY & NEUROLOGY

## 2024-05-06 PROCEDURE — 99999 PR PBB SHADOW E&M-EST. PATIENT-LVL III: CPT | Mod: PBBFAC,,, | Performed by: PSYCHIATRY & NEUROLOGY

## 2024-05-06 PROCEDURE — 3074F SYST BP LT 130 MM HG: CPT | Mod: CPTII,S$GLB,, | Performed by: PSYCHIATRY & NEUROLOGY

## 2024-05-06 PROCEDURE — 99214 OFFICE O/P EST MOD 30 MIN: CPT | Mod: S$GLB,,, | Performed by: PSYCHIATRY & NEUROLOGY

## 2024-05-06 PROCEDURE — 3008F BODY MASS INDEX DOCD: CPT | Mod: CPTII,S$GLB,, | Performed by: PSYCHIATRY & NEUROLOGY
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
## 2024-05-08 ENCOUNTER — HOSPITAL ENCOUNTER (OUTPATIENT)
Dept: CARDIOLOGY | Facility: HOSPITAL | Age: 54
Discharge: HOME | End: 2024-05-08
Attending: PSYCHIATRY & NEUROLOGY
Payer: COMMERCIAL

## 2024-05-08 DIAGNOSIS — Z87.898 HISTORY OF SYNCOPE: Chronic | ICD-10-CM

## 2024-05-08 LAB
LEFT ARM DIASTOLIC BLOOD PRESSURE: 80 MMHG
LEFT ARM SYSTOLIC BLOOD PRESSURE: 133 MMHG
LEFT CBA DIAS: 19 CM/S
LEFT CBA SYS: 76 CM/S
LEFT CCA DIST DIAS: 22 CM/S
LEFT CCA DIST SYS: 101 CM/S
LEFT CCA MID DIAS: 17 CM/S
LEFT CCA MID SYS: 85 CM/S
LEFT CCA PROX DIAS: 24 CM/S
LEFT CCA PROX SYS: 88 CM/S
LEFT ECA DIAS: 28 CM/S
LEFT ECA SYS: 123 CM/S
LEFT ICA DIST DIAS: 28 CM/S
LEFT ICA DIST SYS: 67 CM/S
LEFT ICA MID DIAS: 24 CM/S
LEFT ICA MID SYS: 61 CM/S
LEFT ICA PROX DIAS: 24 CM/S
LEFT ICA PROX SYS: 66 CM/S
LEFT VERTEBRAL DIAS: 16 CM/S
LEFT VERTEBRAL SYS: 51 CM/S
OHS CV CAROTID RIGHT ICA EDV HIGHEST: 43
OHS CV CAROTID ULTRASOUND LEFT ICA/CCA RATIO: 0.66
OHS CV CAROTID ULTRASOUND RIGHT ICA/CCA RATIO: 1.47
OHS CV PV CAROTID LEFT HIGHEST CCA: 101
OHS CV PV CAROTID LEFT HIGHEST ICA: 67
OHS CV PV CAROTID RIGHT HIGHEST CCA: 86
OHS CV PV CAROTID RIGHT HIGHEST ICA: 100
OHS CV US CAROTID LEFT HIGHEST EDV: 28
RIGHT ARM DIASTOLIC BLOOD PRESSURE: 82 MMHG
RIGHT ARM SYSTOLIC BLOOD PRESSURE: 127 MMHG
RIGHT CBA DIAS: 23 CM/S
RIGHT CBA SYS: 62 CM/S
RIGHT CCA DIST DIAS: 16 CM/S
RIGHT CCA DIST SYS: 68 CM/S
RIGHT CCA MID DIAS: 21 CM/S
RIGHT CCA MID SYS: 76 CM/S
RIGHT CCA PROX DIAS: 16 CM/S
RIGHT CCA PROX SYS: 86 CM/S
RIGHT ECA DIAS: 28 CM/S
RIGHT ECA SYS: 132 CM/S
RIGHT ICA DIST DIAS: 35 CM/S
RIGHT ICA DIST SYS: 90 CM/S
RIGHT ICA MID DIAS: 43 CM/S
RIGHT ICA MID SYS: 100 CM/S
RIGHT ICA PROX DIAS: 31 CM/S
RIGHT ICA PROX SYS: 89 CM/S
RIGHT VERTEBRAL DIAS: 13 CM/S
RIGHT VERTEBRAL SYS: 43 CM/S

## 2024-05-08 PROCEDURE — 93880 EXTRACRANIAL BILAT STUDY: CPT | Mod: 26,,, | Performed by: INTERNAL MEDICINE

## 2024-05-08 PROCEDURE — 93880 EXTRACRANIAL BILAT STUDY: CPT

## 2024-05-13 ENCOUNTER — PATIENT MESSAGE (OUTPATIENT)
Dept: CARDIOLOGY | Facility: CLINIC | Age: 54
End: 2024-05-13
Payer: COMMERCIAL

## 2024-05-16 DIAGNOSIS — R55 SYNCOPE, UNSPECIFIED SYNCOPE TYPE: ICD-10-CM

## 2024-05-16 DIAGNOSIS — Z95.818 STATUS POST PLACEMENT OF IMPLANTABLE LOOP RECORDER: Primary | ICD-10-CM

## 2024-05-16 DIAGNOSIS — G47.33 OSA (OBSTRUCTIVE SLEEP APNEA): ICD-10-CM

## 2024-05-16 DIAGNOSIS — I10 ESSENTIAL HYPERTENSION: ICD-10-CM

## 2024-05-17 ENCOUNTER — OFFICE VISIT (OUTPATIENT)
Dept: CARDIOLOGY | Facility: CLINIC | Age: 54
End: 2024-05-17
Payer: COMMERCIAL

## 2024-05-17 ENCOUNTER — HOSPITAL ENCOUNTER (OUTPATIENT)
Dept: CARDIOLOGY | Facility: HOSPITAL | Age: 54
Discharge: HOME | End: 2024-05-17
Attending: INTERNAL MEDICINE
Payer: COMMERCIAL

## 2024-05-17 VITALS
HEART RATE: 91 BPM | DIASTOLIC BLOOD PRESSURE: 102 MMHG | SYSTOLIC BLOOD PRESSURE: 161 MMHG | WEIGHT: 291.88 LBS | BODY MASS INDEX: 40.86 KG/M2 | HEIGHT: 71 IN

## 2024-05-17 DIAGNOSIS — R55 SYNCOPE, UNSPECIFIED SYNCOPE TYPE: ICD-10-CM

## 2024-05-17 DIAGNOSIS — G47.33 OSA (OBSTRUCTIVE SLEEP APNEA): ICD-10-CM

## 2024-05-17 DIAGNOSIS — I10 ESSENTIAL HYPERTENSION: ICD-10-CM

## 2024-05-17 DIAGNOSIS — E66.01 MORBID OBESITY: ICD-10-CM

## 2024-05-17 DIAGNOSIS — R55 SYNCOPE, UNSPECIFIED SYNCOPE TYPE: Primary | ICD-10-CM

## 2024-05-17 LAB
OHS QRS DURATION: 86 MS
OHS QTC CALCULATION: 437 MS

## 2024-05-17 PROCEDURE — 93010 ELECTROCARDIOGRAM REPORT: CPT | Mod: ,,, | Performed by: INTERNAL MEDICINE

## 2024-05-17 PROCEDURE — 93005 ELECTROCARDIOGRAM TRACING: CPT

## 2024-05-17 PROCEDURE — 3077F SYST BP >= 140 MM HG: CPT | Mod: CPTII,S$GLB,, | Performed by: INTERNAL MEDICINE

## 2024-05-17 PROCEDURE — 99215 OFFICE O/P EST HI 40 MIN: CPT | Mod: S$GLB,,, | Performed by: INTERNAL MEDICINE

## 2024-05-17 PROCEDURE — 99999 PR PBB SHADOW E&M-EST. PATIENT-LVL IV: CPT | Mod: PBBFAC,,, | Performed by: INTERNAL MEDICINE

## 2024-05-17 PROCEDURE — 3008F BODY MASS INDEX DOCD: CPT | Mod: CPTII,S$GLB,, | Performed by: INTERNAL MEDICINE

## 2024-05-17 PROCEDURE — 3080F DIAST BP >= 90 MM HG: CPT | Mod: CPTII,S$GLB,, | Performed by: INTERNAL MEDICINE

## 2024-05-17 NOTE — H&P (VIEW-ONLY)
Subjective:   Patient ID:  Bhaskar Stiles is a 53 y.o. male     Chief complaint:syncope     HPI  New patient to me. (12/16/2023 )  Referred by Dr Edmond for evaluation and management of syncope   --   Background as gleaned from patient's records :     hypertension, hyperlipidemia, and LAMONT on CPap referred after a syncopal episode.      Patient was waiting while wife was having a procedure, became diaphoretic, went to restroom and put cold water on his wrist and then passed out.  Took him to the ED where troponin were negative and ECG was normal (QT not prolonged although computer read it as such).  Has been happening for over 30 years.  Full evaluation in 2018 showed normal ECG, normal echo, normal 30 day event monitor and normal tilt table test.  Notes that he will feel bad when he gets overheated.  A cold shower will prevent syncope.  No dyspnea, chest pain or palpitations preceding syncope. Exercise, including sexual intercourse, will give him presyncopal symptoms     Patient denies any chest discomfort on exertion or at rest.  Patient denies any dyspnea at rest or on exertion, orthopnea, or PND. Patient denies any palpitations or lightheadedness.        Cardiac risk factors: hyperlipidemia, hypertension, obesity, positive family history, sedentary lifestyl  Tilt test in 2018:  1. Baseline values: blood pressure of 117/81 mmHg and heart rate of 68 bpm.   2. Normal response to head up tilt.    3. The maximum heart rate noted was 80 bpm at the 24th minute of tilting.   4. Normal blood pressure response to head up tilt.      Additional details gleaned from today's interview :  On BP meds - amlodipine 10, Hyzaar 100/12.5. never on BBs  He started having syncopal spells since age 19.  He thinks that these happen mostly when he was overheated.  However there were some unusual episodes;   Once, for instance, sometime before 2015, he woke up from sleep feeling lightheaded weak and dizzy and near syncopal.  Another  "event occurred while he was playing basketball-perhaps a little too intensely-but not it is friend feeling extremely exhausted.  Most recent episodes occurred in October and he had urinary incontinence as a result.  He has syncope episodes roughly every 2 years.  He has near syncopal episodes about 4 to 5 times per month.  He handles does by sitting down and I do not see to full syncope.  He gets near-syncope with  pain.  >>   Despite the fact that some of his episodes occurred in atypical circumstances for vasovagal syncope, it is quite likely that he is having vasodepressor syncope.  I suspect that he has a hyper vagotonic autonomic profile.  He also seems to have excessive venous pooling.  Today's orthostatic response was hypertensive suggestive of excessive venous pooling.  >>  Unfortunately, administrative decisions have scheduled our specialized autonomic tilt to be shut down after December 18th.  Therefore full, properly targeted tilt study manipulations will not be  available to help us with more specific diagnoses.  I will obtain a Holter monitor which may allow me to determine a cardiac autonomic profile (but not a vascular one) after which, I will recommend some changes to the current antihypertensive medications.     I had a long talk with patient and discussed the various treatment options available. Effective symptom relief and long term management rely heavily on instituting life style changes and "holistic" measures.   Pharmalogical treatment is used as a supplemental therapy rather than a primary approach.     Update 05/22/2024 :  He had a Holter monitor which was not particularly revealing.  >> sinus rhythm  Fast heart rates , SDNN 99  >>  As a result we decreased amlodipine to 5 mg a day and added Toprol 50 mg a day.    He has had no side effects related to these changes.  However he had 1 more syncopal event.  On that day, he says he was not feeling well and was in his work vehicle seated.  He " reclined the seat.  Next thing he knows is he woke up with what pats.  This was sometime after Brent richards.  He states that he had not participated in the celebrations.  I have reviewed the actual image of the ECG tracing obtained today and it shows NSR with normal intervals. HR is 84 with 1 PVC noted.    Current Outpatient Medications   Medication Sig    amLODIPine (NORVASC) 5 MG tablet Take 1 tablet (5 mg total) by mouth once daily.    DULoxetine (CYMBALTA) 30 MG capsule TAKE 1 CAPSULE BY MOUTH TWICE A DAY    losartan-hydrochlorothiazide 100-12.5 mg (HYZAAR) 100-12.5 mg Tab Take 1 tablet by mouth once daily.    metoprolol succinate (TOPROL-XL) 50 MG 24 hr tablet Take 1 tablet (50 mg total) by mouth once daily.    rosuvastatin (CRESTOR) 10 MG tablet TAKE 1 TABLET BY MOUTH EVERY DAY    tadalafiL (CIALIS) 20 MG Tab Take 1 tablet (20 mg total) by mouth Every 3 (three) days. As needed for ED     No current facility-administered medications for this visit.       Review of Systems     Constitutional: Reviewed  for decreased appetite, weight gain and weight loss.   HENT: Reviewed for nosebleeds.    Eyes:  Reviewed for blurred vision and visual disturbance.   Cardiovascular: Reviewed for chest pain, claudication, cyanosis,dyspnea on exertion, leg swelling, orthopnea,paroxysmal nocturnal dyspnearregular heartbeats, palpitations, near-syncope, and syncope.   Respiratory: Reviewed for cough, shortness of breath, wheezing, sleep disturbances due to breathing and snoring, .    Endocrine: Reviewed for heat intolerance.   Hematologic/Lymphatic: Reviewed for easy bruisability/bleeding.   Skin: Reviewed for rash.   Musculoskeletal: Reviewed for muscle weakness and myalgias.   Gastrointestinal: Reviewed for abdominal pain, anorexia, melena, nausea and vomiting.   Genitourinary: Reviewed for hesitancy, frequency, nocturia and incontinence.   Neurological: Reviewed for excessive daytime sleepiness, dizziness, vertigo, weakness,  headaches, loss of balance and seizures,   Psychiatric/Behavioral:  Reviewed for insomnia, altered mental status, depression, anxiety and nervousness.          Social History     Tobacco Use   Smoking Status Never   Smokeless Tobacco Never        Objective:     Physical Exam  Vitals and nursing note reviewed.   Constitutional:       Appearance: Normal appearance. He is well-developed.      Comments: overweight   HENT:      Head: Normocephalic and atraumatic.      Right Ear: External ear normal.      Left Ear: External ear normal.   Eyes:      Conjunctiva/sclera: Conjunctivae normal.      Left eye: Left conjunctiva is not injected. No hemorrhage.     Pupils: Pupils are equal, round, and reactive to light.   Neck:      Thyroid: No thyromegaly.      Vascular: No JVD.   Cardiovascular:      Rate and Rhythm: Normal rate and regular rhythm.      Chest Wall: PMI is not displaced.      Pulses: Intact distal pulses.           Carotid pulses are 2+ on the right side and 2+ on the left side.       Radial pulses are 2+ on the right side and 2+ on the left side.        Dorsalis pedis pulses are 2+ on the right side and 2+ on the left side.        Posterior tibial pulses are 2+ on the right side and 2+ on the left side.      Heart sounds: Normal heart sounds. No midsystolic click and no opening snap. No murmur heard.     No friction rub. No gallop.      Comments:  Orthostatic BP measurements  Sitting:                            BP  147/97          HR    84  Standing 1 min:                     158/115                 92  Standing 3 min:                     161/103                 92  Standing 5 min:                     161/102                 91  bpm      Pulmonary:      Effort: Pulmonary effort is normal. No respiratory distress.      Breath sounds: Normal breath sounds. No wheezing or rales.   Chest:      Chest wall: No tenderness.   Abdominal:      Palpations: Abdomen is soft. Abdomen is not rigid. There is no hepatomegaly.      " Tenderness: There is no guarding.      Comments: Obese abdomen   Musculoskeletal:         General: No tenderness. Normal range of motion.      Cervical back: Neck supple.      Right knee: No swelling.      Left knee: No swelling.      Right lower leg: No swelling.      Left lower leg: No swelling.      Right ankle: No swelling.      Left ankle: No swelling.      Right foot: No swelling.      Left foot: No swelling.   Skin:     General: Skin is warm and dry.      Coloration: Skin is not pale.      Findings: No rash.   Neurological:      General: No focal deficit present.      Mental Status: He is alert and oriented to person, place, and time.      Cranial Nerves: No cranial nerve deficit.      Coordination: Coordination normal.      Deep Tendon Reflexes: Reflexes are normal and symmetric.   Psychiatric:         Mood and Affect: Mood normal.         Behavior: Behavior normal.       BP (!) 161/102   Pulse 91   Ht 5' 11" (1.803 m)   Wt 132.4 kg (291 lb 14.2 oz)   BMI 40.71 kg/m²       No results found for this or any previous visit.    WBC   Date Value Ref Range Status   10/25/2023 6.7 3.8 - 10.8 Thousand/uL Final     Hematocrit   Date Value Ref Range Status   10/25/2023 42.1 38.5 - 50.0 % Final     Hemoglobin   Date Value Ref Range Status   10/25/2023 13.5 13.2 - 17.1 g/dL Final     Lab Results   Component Value Date     10/25/2023     Lab Results   Component Value Date    CREATININE 1.14 10/25/2023    EGFRNORACEVR 77 10/25/2023    K 3.8 10/25/2023     Lab Results   Component Value Date    BNP <10 10/16/2023            reports no history of alcohol use.  Past Medical History:   Diagnosis Date    Family history of colon cancer in father     Hyperlipidemia     Hypertension     LAMONT (obstructive sleep apnea)     Syncope and collapse      Past Surgical History:   Procedure Laterality Date    CHONDROPLASTY OF KNEE Left 8/22/2019    Procedure: CHONDROPLASTY, KNEE;  Surgeon: Shelbi Hughes MD;  Location: [facility] OR;  " Service: Orthopedics;  Laterality: Left;    COLONOSCOPY N/A 9/16/2022    Procedure: COLONOSCOPY;  Surgeon: RYAN Long MD;  Location: [facility] ENDO ([address] [place] [address]);  Service: Endoscopy;  Laterality: N/A;  fully vaccinated, clears 4 hrs prior-am prep, inst portal-MS    EPIDURAL STEROID INJECTION N/A 3/23/2022    Procedure: INJECTION, STEROID, EPIDURAL, C7-T1 IL;  Surgeon: Anthony Schaefer MD;  Location: [facility] PAIN MGT;  Service: Pain Management;  Laterality: N/A;    EPIDURAL STEROID INJECTION N/A 8/10/2022    Procedure: INJECTION, STEROID, EPIDURAL,  C7-T1 IL CONTRAST;  Surgeon: Anthony Schaefer MD;  Location: [facility] PAIN MGT;  Service: Pain Management;  Laterality: N/A;    HERNIA REPAIR      KNEE ARTHROSCOPY W/ MENISCECTOMY Left 8/22/2019    Procedure: ARTHROSCOPY, KNEE, WITH MEDIAL AND LATERAL MENISCECTOMY;  Surgeon: Shelbi Hughes MD;  Location: [facility] OR;  Service: Orthopedics;  Laterality: Left;    KNEE ARTHROSCOPY W/ PLICA EXCISION Left 8/22/2019    Procedure: EXCISION, MEDIAL PLICA, KNEE, ARTHROSCOPIC;  Surgeon: Shelbi Hughes MD;  Location: [facility] OR;  Service: Orthopedics;  Laterality: Left;    LIPOMA RESECTION      abdomen    REMOVAL OF FOREIGN BODY FROM LOWER EXTREMITY Left 8/22/2019    Procedure: REMOVAL, FOREIGN BODY, LOOSE BODY,  LOWER EXTREMITY;  Surgeon: Shelbi Hughes MD;  Location: [facility] OR;  Service: Orthopedics;  Laterality: Left;    SHOULDER SURGERY      left    SYNOVECTOMY OF KNEE Left 8/22/2019    Procedure: PARTIAL SYNOVECTOMY, KNEE;  Surgeon: Shelbi Hughes MD;  Location: [facility] OR;  Service: Orthopedics;  Laterality: Left;    TILT TABLE TEST N/A 7/9/2018    Procedure: TILT TABLE TEST;  Surgeon: Leonardo Whitfield MD;  Location: [facility] CATH LAB;  Service: Cardiology;  Laterality: N/A;  Syncope, HUT, DM, 3 PREP    TRANSFORAMINAL EPIDURAL INJECTION OF STEROID Right 10/9/2019    Procedure: LUMBAR TRANSFORAMINAL RIGHT L5/S1;  Surgeon: Anthony Schaefer MD;  Location: [facility] PAIN MGT;  Service: Pain Management;   Laterality: Right;  NEEDS CONSENT    TRANSFORAMINAL EPIDURAL INJECTION OF STEROID Left 11/18/2020    Procedure: LUMBAR TRANSFORAMINAL LEFT L5/S1 DIRECT REFERRAL;  Surgeon: Anthony Schaefer MD;  Location: [facility];  Service: Pain Management;  Laterality: Left;  NEEDS CONSENT     Family History   Problem Relation Name Age of Onset    Colon cancer Father         Assessment:    In general, syncope resulting in loss of bladder control carries higher incidence of significant bradycardia.  Although, he had some warning signs, the whole process occurred while he was seated/reclining.  This conduit was the possibility of a bradyarrhythmia.  Further, is bedside orthostatic testing is negative-except for the fact that he is hypertensive.  1. Syncope, unspecified syncope type    2. LAMONT (obstructive sleep apnea)    3. Essential hypertension    4. Morbid obesity        Plan:    We will proceed with an EP study of this is negative he will have a loop recorder implanted.  I have discussed the procedures (EPS, ILR implant) in detail with the patient. I described its benefits and risks. I reviewed alternative therapies and discussed their potential value. The patient was given ample opportunity to express concerns and ask questions and I provided appropriate responses and  answers to such.The patient understands and agrees to proceed.  Consent form was signed  by patient and myself and appropriately witnessed.     Orders Placed This Encounter   Procedures    Adenosine Deaminase, Blood     Standing Status:   Future     Standing Expiration Date:   7/16/2025       No follow-ups on file.    There are no discontinued medications.         Medication List with Changes/Refills   Current Medications    AMLODIPINE (NORVASC) 5 MG TABLET    Take 1 tablet (5 mg total) by mouth once daily.    DULOXETINE (CYMBALTA) 30 MG CAPSULE    TAKE 1 CAPSULE BY MOUTH TWICE A DAY    LOSARTAN-HYDROCHLOROTHIAZIDE 100-12.5 MG (HYZAAR) 100-12.5 MG TAB     Take 1 tablet by mouth once daily.    METOPROLOL SUCCINATE (TOPROL-XL) 50 MG 24 HR TABLET    Take 1 tablet (50 mg total) by mouth once daily.    ROSUVASTATIN (CRESTOR) 10 MG TABLET    TAKE 1 TABLET BY MOUTH EVERY DAY    TADALAFIL (CIALIS) 20 MG TAB    Take 1 tablet (20 mg total) by mouth Every 3 (three) days. As needed for ED       This note is at least partially dictated using the M*Modal Fluency Direct word recognition program. There are word recognition mistakes that are occasionally missed on review.      Pre-visit chart review: 15 min    Face to face time: 25 min, > 50% of which spent in education    I have reviewed the actual images of all relevant ECG, rhythm, holter and long term monitoring tracings available in EPIC, MUSE  and in legacy as well as outside records.   I have reviewed the actual images of all relevant CXRays.   I have directly evaluated all relevant data pertaining to any CIED.     Post visit chart documentation and care coordination: 12 min    Syncope with potentially serious implications was the main problem.

## 2024-05-17 NOTE — PROGRESS NOTES
Subjective:   Patient ID:  Bhaskar Stiles is a 53 y.o. male     Chief complaint:syncope     HPI  New patient to me. (12/16/2023 )  Referred by Dr Edmond for evaluation and management of syncope   --   Background as gleaned from patient's records :     hypertension, hyperlipidemia, and LAMONT on CPap referred after a syncopal episode.      Patient was waiting while wife was having a procedure, became diaphoretic, went to restroom and put cold water on his wrist and then passed out.  Took him to the ED where troponin were negative and ECG was normal (QT not prolonged although computer read it as such).  Has been happening for over 30 years.  Full evaluation in 2018 showed normal ECG, normal echo, normal 30 day event monitor and normal tilt table test.  Notes that he will feel bad when he gets overheated.  A cold shower will prevent syncope.  No dyspnea, chest pain or palpitations preceding syncope. Exercise, including sexual intercourse, will give him presyncopal symptoms     Patient denies any chest discomfort on exertion or at rest.  Patient denies any dyspnea at rest or on exertion, orthopnea, or PND. Patient denies any palpitations or lightheadedness.        Cardiac risk factors: hyperlipidemia, hypertension, obesity, positive family history, sedentary lifestyl  Tilt test in 2018:  1. Baseline values: blood pressure of 117/81 mmHg and heart rate of 68 bpm.   2. Normal response to head up tilt.    3. The maximum heart rate noted was 80 bpm at the 24th minute of tilting.   4. Normal blood pressure response to head up tilt.      Additional details gleaned from today's interview :  On BP meds - amlodipine 10, Hyzaar 100/12.5. never on BBs  He started having syncopal spells since age 19.  He thinks that these happen mostly when he was overheated.  However there were some unusual episodes;   Once, for instance, sometime before 2015, he woke up from sleep feeling lightheaded weak and dizzy and near syncopal.  Another  "event occurred while he was playing basketball-perhaps a little too intensely-but not it is friend feeling extremely exhausted.  Most recent episodes occurred in October and he had urinary incontinence as a result.  He has syncope episodes roughly every 2 years.  He has near syncopal episodes about 4 to 5 times per month.  He handles does by sitting down and I do not see to full syncope.  He gets near-syncope with  pain.  >>   Despite the fact that some of his episodes occurred in atypical circumstances for vasovagal syncope, it is quite likely that he is having vasodepressor syncope.  I suspect that he has a hyper vagotonic autonomic profile.  He also seems to have excessive venous pooling.  Today's orthostatic response was hypertensive suggestive of excessive venous pooling.  >>  Unfortunately, administrative decisions have scheduled our specialized autonomic tilt to be shut down after December 18th.  Therefore full, properly targeted tilt study manipulations will not be  available to help us with more specific diagnoses.  I will obtain a Holter monitor which may allow me to determine a cardiac autonomic profile (but not a vascular one) after which, I will recommend some changes to the current antihypertensive medications.     I had a long talk with patient and discussed the various treatment options available. Effective symptom relief and long term management rely heavily on instituting life style changes and "holistic" measures.   Pharmalogical treatment is used as a supplemental therapy rather than a primary approach.     Update 05/22/2024 :  He had a Holter monitor which was not particularly revealing.  >> sinus rhythm  Fast heart rates , SDNN 99  >>  As a result we decreased amlodipine to 5 mg a day and added Toprol 50 mg a day.    He has had no side effects related to these changes.  However he had 1 more syncopal event.  On that day, he says he was not feeling well and was in his work vehicle seated.  He " reclined the seat.  Next thing he knows is he woke up with what pats.  This was sometime after Brent richards.  He states that he had not participated in the celebrations.  I have reviewed the actual image of the ECG tracing obtained today and it shows NSR with normal intervals. HR is 84 with 1 PVC noted.    Current Outpatient Medications   Medication Sig    amLODIPine (NORVASC) 5 MG tablet Take 1 tablet (5 mg total) by mouth once daily.    DULoxetine (CYMBALTA) 30 MG capsule TAKE 1 CAPSULE BY MOUTH TWICE A DAY    losartan-hydrochlorothiazide 100-12.5 mg (HYZAAR) 100-12.5 mg Tab Take 1 tablet by mouth once daily.    metoprolol succinate (TOPROL-XL) 50 MG 24 hr tablet Take 1 tablet (50 mg total) by mouth once daily.    rosuvastatin (CRESTOR) 10 MG tablet TAKE 1 TABLET BY MOUTH EVERY DAY    tadalafiL (CIALIS) 20 MG Tab Take 1 tablet (20 mg total) by mouth Every 3 (three) days. As needed for ED     No current facility-administered medications for this visit.       Review of Systems     Constitutional: Reviewed  for decreased appetite, weight gain and weight loss.   HENT: Reviewed for nosebleeds.    Eyes:  Reviewed for blurred vision and visual disturbance.   Cardiovascular: Reviewed for chest pain, claudication, cyanosis,dyspnea on exertion, leg swelling, orthopnea,paroxysmal nocturnal dyspnearregular heartbeats, palpitations, near-syncope, and syncope.   Respiratory: Reviewed for cough, shortness of breath, wheezing, sleep disturbances due to breathing and snoring, .    Endocrine: Reviewed for heat intolerance.   Hematologic/Lymphatic: Reviewed for easy bruisability/bleeding.   Skin: Reviewed for rash.   Musculoskeletal: Reviewed for muscle weakness and myalgias.   Gastrointestinal: Reviewed for abdominal pain, anorexia, melena, nausea and vomiting.   Genitourinary: Reviewed for hesitancy, frequency, nocturia and incontinence.   Neurological: Reviewed for excessive daytime sleepiness, dizziness, vertigo, weakness,  headaches, loss of balance and seizures,   Psychiatric/Behavioral:  Reviewed for insomnia, altered mental status, depression, anxiety and nervousness.          Social History     Tobacco Use   Smoking Status Never   Smokeless Tobacco Never        Objective:     Physical Exam  Vitals and nursing note reviewed.   Constitutional:       Appearance: Normal appearance. He is well-developed.      Comments: overweight   HENT:      Head: Normocephalic and atraumatic.      Right Ear: External ear normal.      Left Ear: External ear normal.   Eyes:      Conjunctiva/sclera: Conjunctivae normal.      Left eye: Left conjunctiva is not injected. No hemorrhage.     Pupils: Pupils are equal, round, and reactive to light.   Neck:      Thyroid: No thyromegaly.      Vascular: No JVD.   Cardiovascular:      Rate and Rhythm: Normal rate and regular rhythm.      Chest Wall: PMI is not displaced.      Pulses: Intact distal pulses.           Carotid pulses are 2+ on the right side and 2+ on the left side.       Radial pulses are 2+ on the right side and 2+ on the left side.        Dorsalis pedis pulses are 2+ on the right side and 2+ on the left side.        Posterior tibial pulses are 2+ on the right side and 2+ on the left side.      Heart sounds: Normal heart sounds. No midsystolic click and no opening snap. No murmur heard.     No friction rub. No gallop.      Comments:  Orthostatic BP measurements  Sitting:                            BP  147/97          HR    84  Standing 1 min:                     158/115                 92  Standing 3 min:                     161/103                 92  Standing 5 min:                     161/102                 91  bpm      Pulmonary:      Effort: Pulmonary effort is normal. No respiratory distress.      Breath sounds: Normal breath sounds. No wheezing or rales.   Chest:      Chest wall: No tenderness.   Abdominal:      Palpations: Abdomen is soft. Abdomen is not rigid. There is no hepatomegaly.      " Tenderness: There is no guarding.      Comments: Obese abdomen   Musculoskeletal:         General: No tenderness. Normal range of motion.      Cervical back: Neck supple.      Right knee: No swelling.      Left knee: No swelling.      Right lower leg: No swelling.      Left lower leg: No swelling.      Right ankle: No swelling.      Left ankle: No swelling.      Right foot: No swelling.      Left foot: No swelling.   Skin:     General: Skin is warm and dry.      Coloration: Skin is not pale.      Findings: No rash.   Neurological:      General: No focal deficit present.      Mental Status: He is alert and oriented to person, place, and time.      Cranial Nerves: No cranial nerve deficit.      Coordination: Coordination normal.      Deep Tendon Reflexes: Reflexes are normal and symmetric.   Psychiatric:         Mood and Affect: Mood normal.         Behavior: Behavior normal.       BP (!) 161/102   Pulse 91   Ht 5' 11" (1.803 m)   Wt 132.4 kg (291 lb 14.2 oz)   BMI 40.71 kg/m²       No results found for this or any previous visit.    WBC   Date Value Ref Range Status   10/25/2023 6.7 3.8 - 10.8 Thousand/uL Final     Hematocrit   Date Value Ref Range Status   10/25/2023 42.1 38.5 - 50.0 % Final     Hemoglobin   Date Value Ref Range Status   10/25/2023 13.5 13.2 - 17.1 g/dL Final     Lab Results   Component Value Date     10/25/2023     Lab Results   Component Value Date    CREATININE 1.14 10/25/2023    EGFRNORACEVR 77 10/25/2023    K 3.8 10/25/2023     Lab Results   Component Value Date    BNP <10 10/16/2023            reports no history of alcohol use.  Past Medical History:   Diagnosis Date    Family history of colon cancer in father     Hyperlipidemia     Hypertension     LAMONT (obstructive sleep apnea)     Syncope and collapse      Past Surgical History:   Procedure Laterality Date    CHONDROPLASTY OF KNEE Left 8/22/2019    Procedure: CHONDROPLASTY, KNEE;  Surgeon: Shelbi Hughes MD;  Location: [facility] OR;  " Service: Orthopedics;  Laterality: Left;    COLONOSCOPY N/A 9/16/2022    Procedure: COLONOSCOPY;  Surgeon: RYAN Long MD;  Location: [facility] ENDO ([address] [place] [address]);  Service: Endoscopy;  Laterality: N/A;  fully vaccinated, clears 4 hrs prior-am prep, inst portal-MS    EPIDURAL STEROID INJECTION N/A 3/23/2022    Procedure: INJECTION, STEROID, EPIDURAL, C7-T1 IL;  Surgeon: Anthony Schaefer MD;  Location: [facility] PAIN MGT;  Service: Pain Management;  Laterality: N/A;    EPIDURAL STEROID INJECTION N/A 8/10/2022    Procedure: INJECTION, STEROID, EPIDURAL,  C7-T1 IL CONTRAST;  Surgeon: Anthony Schaefer MD;  Location: [facility] PAIN MGT;  Service: Pain Management;  Laterality: N/A;    HERNIA REPAIR      KNEE ARTHROSCOPY W/ MENISCECTOMY Left 8/22/2019    Procedure: ARTHROSCOPY, KNEE, WITH MEDIAL AND LATERAL MENISCECTOMY;  Surgeon: Shelbi Hughes MD;  Location: [facility] OR;  Service: Orthopedics;  Laterality: Left;    KNEE ARTHROSCOPY W/ PLICA EXCISION Left 8/22/2019    Procedure: EXCISION, MEDIAL PLICA, KNEE, ARTHROSCOPIC;  Surgeon: Shelbi Hughes MD;  Location: [facility] OR;  Service: Orthopedics;  Laterality: Left;    LIPOMA RESECTION      abdomen    REMOVAL OF FOREIGN BODY FROM LOWER EXTREMITY Left 8/22/2019    Procedure: REMOVAL, FOREIGN BODY, LOOSE BODY,  LOWER EXTREMITY;  Surgeon: Shelbi Hughes MD;  Location: [facility] OR;  Service: Orthopedics;  Laterality: Left;    SHOULDER SURGERY      left    SYNOVECTOMY OF KNEE Left 8/22/2019    Procedure: PARTIAL SYNOVECTOMY, KNEE;  Surgeon: Shelbi Hughes MD;  Location: [facility] OR;  Service: Orthopedics;  Laterality: Left;    TILT TABLE TEST N/A 7/9/2018    Procedure: TILT TABLE TEST;  Surgeon: Leonardo Whitfield MD;  Location: [facility] CATH LAB;  Service: Cardiology;  Laterality: N/A;  Syncope, HUT, DM, 3 PREP    TRANSFORAMINAL EPIDURAL INJECTION OF STEROID Right 10/9/2019    Procedure: LUMBAR TRANSFORAMINAL RIGHT L5/S1;  Surgeon: Anthony Schaefer MD;  Location: [facility] PAIN MGT;  Service: Pain Management;   Laterality: Right;  NEEDS CONSENT    TRANSFORAMINAL EPIDURAL INJECTION OF STEROID Left 11/18/2020    Procedure: LUMBAR TRANSFORAMINAL LEFT L5/S1 DIRECT REFERRAL;  Surgeon: Anthony Schaefer MD;  Location: [facility];  Service: Pain Management;  Laterality: Left;  NEEDS CONSENT     Family History   Problem Relation Name Age of Onset    Colon cancer Father         Assessment:    In general, syncope resulting in loss of bladder control carries higher incidence of significant bradycardia.  Although, he had some warning signs, the whole process occurred while he was seated/reclining.  This conduit was the possibility of a bradyarrhythmia.  Further, is bedside orthostatic testing is negative-except for the fact that he is hypertensive.  1. Syncope, unspecified syncope type    2. LAMONT (obstructive sleep apnea)    3. Essential hypertension    4. Morbid obesity        Plan:    We will proceed with an EP study of this is negative he will have a loop recorder implanted.  I have discussed the procedures (EPS, ILR implant) in detail with the patient. I described its benefits and risks. I reviewed alternative therapies and discussed their potential value. The patient was given ample opportunity to express concerns and ask questions and I provided appropriate responses and  answers to such.The patient understands and agrees to proceed.  Consent form was signed  by patient and myself and appropriately witnessed.     Orders Placed This Encounter   Procedures    Adenosine Deaminase, Blood     Standing Status:   Future     Standing Expiration Date:   7/16/2025       No follow-ups on file.    There are no discontinued medications.         Medication List with Changes/Refills   Current Medications    AMLODIPINE (NORVASC) 5 MG TABLET    Take 1 tablet (5 mg total) by mouth once daily.    DULOXETINE (CYMBALTA) 30 MG CAPSULE    TAKE 1 CAPSULE BY MOUTH TWICE A DAY    LOSARTAN-HYDROCHLOROTHIAZIDE 100-12.5 MG (HYZAAR) 100-12.5 MG TAB     Take 1 tablet by mouth once daily.    METOPROLOL SUCCINATE (TOPROL-XL) 50 MG 24 HR TABLET    Take 1 tablet (50 mg total) by mouth once daily.    ROSUVASTATIN (CRESTOR) 10 MG TABLET    TAKE 1 TABLET BY MOUTH EVERY DAY    TADALAFIL (CIALIS) 20 MG TAB    Take 1 tablet (20 mg total) by mouth Every 3 (three) days. As needed for ED       This note is at least partially dictated using the M*Modal Fluency Direct word recognition program. There are word recognition mistakes that are occasionally missed on review.      Pre-visit chart review: 15 min    Face to face time: 25 min, > 50% of which spent in education    I have reviewed the actual images of all relevant ECG, rhythm, holter and long term monitoring tracings available in EPIC, MUSE  and in legacy as well as outside records.   I have reviewed the actual images of all relevant CXRays.   I have directly evaluated all relevant data pertaining to any CIED.     Post visit chart documentation and care coordination: 12 min    Syncope with potentially serious implications was the main problem.

## 2024-05-22 ENCOUNTER — ANESTHESIA EVENT (OUTPATIENT)
Dept: CARDIOLOGY | Facility: HOSPITAL | Age: 54
End: 2024-05-22
Payer: COMMERCIAL

## 2024-05-22 DIAGNOSIS — R55 SYNCOPE, UNSPECIFIED SYNCOPE TYPE: Primary | ICD-10-CM

## 2024-05-22 DIAGNOSIS — R55 SYNCOPAL EPISODES: ICD-10-CM

## 2024-05-22 DIAGNOSIS — I10 ESSENTIAL HYPERTENSION: ICD-10-CM

## 2024-05-22 PROBLEM — E66.01 MORBID OBESITY: Status: ACTIVE | Noted: 2024-05-22

## 2024-05-23 ENCOUNTER — HOSPITAL ENCOUNTER (OUTPATIENT)
Facility: HOSPITAL | Age: 54
Discharge: HOME | End: 2024-05-23
Attending: INTERNAL MEDICINE | Admitting: INTERNAL MEDICINE
Payer: COMMERCIAL

## 2024-05-23 ENCOUNTER — ANESTHESIA (OUTPATIENT)
Dept: CARDIOLOGY | Facility: HOSPITAL | Age: 54
End: 2024-05-23
Payer: COMMERCIAL

## 2024-05-23 DIAGNOSIS — R55 SYNCOPAL EPISODES: ICD-10-CM

## 2024-05-23 DIAGNOSIS — R55 SYNCOPE, UNSPECIFIED SYNCOPE TYPE: ICD-10-CM

## 2024-05-23 DIAGNOSIS — I10 ESSENTIAL HYPERTENSION: ICD-10-CM

## 2024-05-23 LAB
ANION GAP SERPL CALC-SCNC: 12 MMOL/L (ref 8–16)
BASOPHILS # BLD AUTO: 0.04 K/UL (ref 0–0.2)
BASOPHILS NFR BLD: 0.6 % (ref 0–1.9)
BUN SERPL-MCNC: 18 MG/DL (ref 6–20)
CALCIUM SERPL-MCNC: 9.5 MG/DL (ref 8.7–10.5)
CHLORIDE SERPL-SCNC: 108 MMOL/L (ref 95–110)
CO2 SERPL-SCNC: 22 MMOL/L (ref 23–29)
CREAT SERPL-MCNC: 1.2 MG/DL (ref 0.5–1.4)
DIFFERENTIAL METHOD BLD: ABNORMAL
EOSINOPHIL # BLD AUTO: 0.3 K/UL (ref 0–0.5)
EOSINOPHIL NFR BLD: 3.8 % (ref 0–8)
ERYTHROCYTE [DISTWIDTH] IN BLOOD BY AUTOMATED COUNT: 13.6 % (ref 11.5–14.5)
EST. GFR  (NO RACE VARIABLE): >60 ML/MIN/1.73 M^2
GLUCOSE SERPL-MCNC: 114 MG/DL (ref 70–110)
HCT VFR BLD AUTO: 41.8 % (ref 40–54)
HGB BLD-MCNC: 13.3 G/DL (ref 14–18)
IMM GRANULOCYTES # BLD AUTO: 0.04 K/UL (ref 0–0.04)
IMM GRANULOCYTES NFR BLD AUTO: 0.6 % (ref 0–0.5)
LYMPHOCYTES # BLD AUTO: 1.5 K/UL (ref 1–4.8)
LYMPHOCYTES NFR BLD: 20.5 % (ref 18–48)
MCH RBC QN AUTO: 27.1 PG (ref 27–31)
MCHC RBC AUTO-ENTMCNC: 31.8 G/DL (ref 32–36)
MCV RBC AUTO: 85 FL (ref 82–98)
MONOCYTES # BLD AUTO: 0.7 K/UL (ref 0.3–1)
MONOCYTES NFR BLD: 10.2 % (ref 4–15)
NEUTROPHILS # BLD AUTO: 4.6 K/UL (ref 1.8–7.7)
NEUTROPHILS NFR BLD: 64.3 % (ref 38–73)
NRBC BLD-RTO: 0 /100 WBC
OHS QRS DURATION: 88 MS
OHS QTC CALCULATION: 472 MS
PLATELET # BLD AUTO: 247 K/UL (ref 150–450)
PMV BLD AUTO: 10.1 FL (ref 9.2–12.9)
POTASSIUM SERPL-SCNC: 3.9 MMOL/L (ref 3.5–5.1)
RBC # BLD AUTO: 4.9 M/UL (ref 4.6–6.2)
SODIUM SERPL-SCNC: 142 MMOL/L (ref 136–145)
WBC # BLD AUTO: 7.08 K/UL (ref 3.9–12.7)

## 2024-05-23 PROCEDURE — 85025 COMPLETE CBC W/AUTO DIFF WBC: CPT | Performed by: INTERNAL MEDICINE

## 2024-05-23 PROCEDURE — 93620 COMP EP EVL R AT VEN PAC&REC: CPT | Mod: 26,,, | Performed by: INTERNAL MEDICINE

## 2024-05-23 PROCEDURE — 93620 COMP EP EVL R AT VEN PAC&REC: CPT | Performed by: INTERNAL MEDICINE

## 2024-05-23 PROCEDURE — 99214 OFFICE O/P EST MOD 30 MIN: CPT | Mod: ,,, | Performed by: INTERNAL MEDICINE

## 2024-05-23 PROCEDURE — 63600175 PHARM REV CODE 636 W HCPCS: Performed by: NURSE ANESTHETIST, CERTIFIED REGISTERED

## 2024-05-23 PROCEDURE — 25000003 PHARM REV CODE 250: Performed by: INTERNAL MEDICINE

## 2024-05-23 PROCEDURE — C1764 EVENT RECORDER, CARDIAC: HCPCS | Performed by: INTERNAL MEDICINE

## 2024-05-23 PROCEDURE — C1894 INTRO/SHEATH, NON-LASER: HCPCS | Performed by: INTERNAL MEDICINE

## 2024-05-23 PROCEDURE — 25000003 PHARM REV CODE 250: Performed by: NURSE ANESTHETIST, CERTIFIED REGISTERED

## 2024-05-23 PROCEDURE — C1730 CATH, EP, 19 OR FEW ELECT: HCPCS | Performed by: INTERNAL MEDICINE

## 2024-05-23 PROCEDURE — C1769 GUIDE WIRE: HCPCS | Performed by: INTERNAL MEDICINE

## 2024-05-23 PROCEDURE — 93010 ELECTROCARDIOGRAM REPORT: CPT | Mod: ,,, | Performed by: INTERNAL MEDICINE

## 2024-05-23 PROCEDURE — 93005 ELECTROCARDIOGRAM TRACING: CPT | Mod: 59

## 2024-05-23 PROCEDURE — 80048 BASIC METABOLIC PNL TOTAL CA: CPT | Performed by: INTERNAL MEDICINE

## 2024-05-23 PROCEDURE — 33285 INSJ SUBQ CAR RHYTHM MNTR: CPT | Mod: ,,, | Performed by: INTERNAL MEDICINE

## 2024-05-23 PROCEDURE — 37000008 HC ANESTHESIA 1ST 15 MINUTES: Performed by: INTERNAL MEDICINE

## 2024-05-23 PROCEDURE — 33285 INSJ SUBQ CAR RHYTHM MNTR: CPT | Performed by: INTERNAL MEDICINE

## 2024-05-23 PROCEDURE — 37000009 HC ANESTHESIA EA ADD 15 MINS: Performed by: INTERNAL MEDICINE

## 2024-05-23 DEVICE — EL+ INSERTABLE CARDIAC MONITOR
Type: IMPLANTABLE DEVICE | Site: CHEST | Status: FUNCTIONAL
Brand: ASSERT-IQ™

## 2024-05-23 RX ORDER — MIDAZOLAM HYDROCHLORIDE 1 MG/ML
INJECTION INTRAMUSCULAR; INTRAVENOUS
Status: DISCONTINUED | OUTPATIENT
Start: 2024-05-23 | End: 2024-05-23

## 2024-05-23 RX ORDER — EPHEDRINE SULFATE 50 MG/ML
INJECTION, SOLUTION INTRAVENOUS
Status: DISCONTINUED | OUTPATIENT
Start: 2024-05-23 | End: 2024-05-23

## 2024-05-23 RX ORDER — ACETAMINOPHEN 325 MG/1
650 TABLET ORAL EVERY 4 HOURS PRN
Status: DISCONTINUED | OUTPATIENT
Start: 2024-05-23 | End: 2024-05-23 | Stop reason: HOSPADM

## 2024-05-23 RX ORDER — ROCURONIUM BROMIDE 10 MG/ML
INJECTION, SOLUTION INTRAVENOUS
Status: DISCONTINUED | OUTPATIENT
Start: 2024-05-23 | End: 2024-05-23

## 2024-05-23 RX ORDER — LIDOCAINE HYDROCHLORIDE 20 MG/ML
INJECTION INTRAVENOUS
Status: DISCONTINUED | OUTPATIENT
Start: 2024-05-23 | End: 2024-05-23

## 2024-05-23 RX ORDER — PHENYLEPHRINE HYDROCHLORIDE 10 MG/ML
INJECTION INTRAVENOUS
Status: DISCONTINUED | OUTPATIENT
Start: 2024-05-23 | End: 2024-05-23

## 2024-05-23 RX ORDER — PROPOFOL 10 MG/ML
VIAL (ML) INTRAVENOUS
Status: DISCONTINUED | OUTPATIENT
Start: 2024-05-23 | End: 2024-05-23

## 2024-05-23 RX ORDER — ONDANSETRON HYDROCHLORIDE 2 MG/ML
INJECTION, SOLUTION INTRAVENOUS
Status: DISCONTINUED | OUTPATIENT
Start: 2024-05-23 | End: 2024-05-23

## 2024-05-23 RX ORDER — LIDOCAINE HYDROCHLORIDE 20 MG/ML
INJECTION, SOLUTION EPIDURAL; INFILTRATION; INTRACAUDAL; PERINEURAL
Status: DISCONTINUED | OUTPATIENT
Start: 2024-05-23 | End: 2024-05-23 | Stop reason: HOSPADM

## 2024-05-23 RX ORDER — HYDROCODONE BITARTRATE AND ACETAMINOPHEN 5; 325 MG/1; MG/1
1 TABLET ORAL EVERY 4 HOURS PRN
Status: DISCONTINUED | OUTPATIENT
Start: 2024-05-23 | End: 2024-05-23 | Stop reason: HOSPADM

## 2024-05-23 RX ORDER — FENTANYL CITRATE 50 UG/ML
INJECTION, SOLUTION INTRAMUSCULAR; INTRAVENOUS
Status: DISCONTINUED | OUTPATIENT
Start: 2024-05-23 | End: 2024-05-23

## 2024-05-23 RX ADMIN — ROCURONIUM BROMIDE 20 MG: 10 INJECTION, SOLUTION INTRAVENOUS at 09:05

## 2024-05-23 RX ADMIN — MIDAZOLAM HYDROCHLORIDE 2 MG: 1 INJECTION, SOLUTION INTRAMUSCULAR; INTRAVENOUS at 07:05

## 2024-05-23 RX ADMIN — ROCURONIUM BROMIDE 50 MG: 10 INJECTION, SOLUTION INTRAVENOUS at 07:05

## 2024-05-23 RX ADMIN — PHENYLEPHRINE HYDROCHLORIDE 300 MCG: 10 INJECTION INTRAVENOUS at 08:05

## 2024-05-23 RX ADMIN — SUGAMMADEX 200 MG: 100 INJECTION, SOLUTION INTRAVENOUS at 10:05

## 2024-05-23 RX ADMIN — EPHEDRINE SULFATE 30 MG: 50 INJECTION INTRAVENOUS at 08:05

## 2024-05-23 RX ADMIN — LIDOCAINE HYDROCHLORIDE 100 MG: 20 INJECTION INTRAVENOUS at 07:05

## 2024-05-23 RX ADMIN — FENTANYL CITRATE 100 MCG: 50 INJECTION, SOLUTION INTRAMUSCULAR; INTRAVENOUS at 07:05

## 2024-05-23 RX ADMIN — SODIUM CHLORIDE, SODIUM LACTATE, POTASSIUM CHLORIDE, AND CALCIUM CHLORIDE: .6; .31; .03; .02 INJECTION, SOLUTION INTRAVENOUS at 07:05

## 2024-05-23 RX ADMIN — PROPOFOL 150 MG: 10 INJECTION, EMULSION INTRAVENOUS at 07:05

## 2024-05-23 RX ADMIN — EPHEDRINE SULFATE 20 MG: 50 INJECTION INTRAVENOUS at 08:05

## 2024-05-23 RX ADMIN — ONDANSETRON 4 MG: 2 INJECTION INTRAMUSCULAR; INTRAVENOUS at 08:05

## 2024-05-23 RX ADMIN — SODIUM CHLORIDE, SODIUM LACTATE, POTASSIUM CHLORIDE, AND CALCIUM CHLORIDE: .6; .31; .03; .02 INJECTION, SOLUTION INTRAVENOUS at 09:05

## 2024-05-23 NOTE — PLAN OF CARE
Discharged to care of wife to  POV per WC, NADN/ Right Groin dsg CDI/ wife with AVS / pt and wife v/u to all discharge instructions

## 2024-05-23 NOTE — DISCHARGE INSTRUCTIONS
Loop Placement Discharge Instructions    Due to sedation, do not drive for 24 hrs. Have a responsible adult assist you over 8 hrs post sedation.  You may start taking showers 24 hours after your procedure, no tub baths for 1 week.  Avoid heavy lifting for 2 days after your procedure.  Wound care: The dressing over your port site may be removed after 3-4 days unless instructed otherwise. You may gently wash area daily with mild soap and water. Pat dry. Cover with sterile dressing or bandaid for 7 to 10 days or until site is healed.  If a skin adhesive was used, do not use antibiotic ointment as it can break down the adhesive that is serving as a bandage to hold incision closed.  Observe for and report any signs or symptoms of infection including:          fever greater than 100.5                                   redness, swellling or drainage at the site                     red streaking or pain at port site           Nozin Instructions  Goal: the goal of Nozin is to reduce the risk of post-procedural infections by bacteria in the nasal cavity. Think of it as hand  for your nose.    How to use:    1. Shake Nozin bottle well    2. Take a cotton swab and apply 4 drops to one tip    3. Insert cotton tip into one nostril, being sure not to go deeper into nose than tip of the swab.    4. Swab front pocket of nostril 6 times counterclockwise and 6 times clockwise.   5. Take swab out and apply 2 drops to the same cotton tip. Repeat steps 3 and 4 in the other nostril.        Do steps 1-5 twice a day for 7 days.    1. DIET: It is advisable for you to follow a diet that limits the intake of salt, sugar, saturated fats and cholesterol.     2. FOR THE NEXT 24 HOURS:   For the next 8 hours, you should be watched by a responsible adult. This person should make sure your condition is not getting worse.   Don't drink any alcohol for the next 24 hours.  Don't drive, operate dangerous  "machinery, or make important business or personal decisions during the next 24 hours.  Your healthcare provider may tell you not to take any medicine by mouth for pain or sleep in the next 4 hours. These medicines may react with the medicines you were given in the hospital. This could cause a much stronger response than usual.       3. ACTIVITY:                                Day of discharge:             NO vigorous activity, lifting or straining                                                   Day after discharge:         Avoid heavy lifting (up to 10 lbs)                                                                        The day after discharge you may shower, but avoid tub baths for 5 days                                                                         Wash site gently with soap and water        NO powder or lotion to your procedure site.                 Before you shower, remove dressing, apply bandaid if desired                                                                                                                                                                            2nd day after discharge:  Resume normal activities                                                                         Exercise program as instructed      4. WOUND CARE: It is not unusual to have a small amount of bruising appear in the groin area. It is also common to have a tender "knot" develop beneath the skin at the puncture site in the groin. This is scar tissue only and is not a cause for concern or alarm. This tender knot may take several weeks to fully resolve. The bruise will usually spread over several days. If the lump gets bigger, call you doctor immediately.    5. DISCOMFORT: For general discomfort at the puncture site, you may take 1 or 2 Acetaminophen (Tylenol) tablets every 4 hours as needed. (Do not take more than 4000 mg a day)                 6. CALL YOUR HEALTHCARE PROVIDER IF YOU START TO HAVE " "THE FOLLOWING SYMPTOMS:        1. Problems with the affected leg: Pain, discomfort, loss of warmth, numbness or tingling                                                                                                                            2. Problems at the groin site: Bleeding, pain that is sudden/sharp/persistent,                   swelling at site or a change in "lump" size, increased redness or drainage at                     puncture site                                                               3. High fever (101 degrees or higher)       4.  Drowsiness that doesn't get better       5. Weakness or dizziness that doesn't get better            6. Repeated vomiting        7. GO TO  THE EMERGENCY ROOM OR CALL 911 IF YOU HAVE: Chest pains or discomforts not relieved with 3 nitroglycerin doses (sublingual tablets or spray), numbness or severe pain or if your foot or leg becomes cold or discolored or uncontrolled bleeding from site (apply direct pressure above site).                                      Post-op Heart Catheterization    1. DIET: It is advisable for you to follow a diet that limits the intake of salt, sugar, saturated fats and cholesterol.     2. FOR THE NEXT 24 HOURS:   For the next 8 hours, you should be watched by a responsible adult. This person should make sure your condition is not getting worse.   Don't drink any alcohol for the next 24 hours.  Don't drive, operate dangerous machinery, or make important business or personal decisions during the next 24 hours.  Your healthcare provider may tell you not to take any medicine by mouth for pain or sleep in the next 4 hours. These medicines may react with the medicines you were given in the hospital. This could cause a much stronger response than usual.       3. ACTIVITY:                                Day of discharge:             NO vigorous activity, lifting or straining                                                   Day after discharge: " "        Avoid heavy lifting (up to 10 lbs)                                                                        The day after discharge you may shower, but avoid tub baths for 5 days                                                                         Wash site gently with soap and water        NO powder or lotion to your procedure site.                 Before you shower, remove dressing, apply bandaid if desired                                                                                                                                                                            2nd day after discharge:  Resume normal activities                                                                         Exercise program as instructed      4. WOUND CARE: It is not unusual to have a small amount of bruising appear in the groin area. It is also common to have a tender "knot" develop beneath the skin at the puncture site in the groin. This is scar tissue only and is not a cause for concern or alarm. This tender knot may take several weeks to fully resolve. The bruise will usually spread over several days. If the lump gets bigger, call you doctor immediately.    5. DISCOMFORT: For general discomfort at the puncture site, you may take 1 or 2 Acetaminophen (Tylenol) tablets every 4 hours as needed. (Do not take more than 4000 mg a day)                 6. CALL YOUR HEALTHCARE PROVIDER IF YOU START TO HAVE THE FOLLOWING SYMPTOMS:        1. Problems with the affected leg: Pain, discomfort, loss of warmth, numbness or tingling                                                                                                                            2. Problems at the groin site: Bleeding, pain that is sudden/sharp/persistent,                   swelling at site or a change in "lump" size, increased redness or drainage at                     puncture site                                                               3. High " fever (101 degrees or higher)       4.  Drowsiness that doesn't get better       5. Weakness or dizziness that doesn't get better            6. Repeated vomiting        7. GO TO  THE EMERGENCY ROOM OR CALL 911 IF YOU HAVE: Chest pains or discomforts not relieved with 3 nitroglycerin doses (sublingual tablets or spray), numbness or severe pain or if your foot or leg becomes cold or discolored or uncontrolled bleeding from site (apply direct pressure above site).

## 2024-05-23 NOTE — TRANSFER OF CARE
"Anesthesia Transfer of Care Note    Patient: Bhaskar Stiles    Procedure(s) Performed: Procedure(s) (LRB):  EP - diagnostic/possible loop implant (N/A)  Insertion, Implantable Loop Recorder/boston sci (N/A)    Patient location: PACU    Anesthesia Type: general    Transport from OR: Transported from OR on room air with adequate spontaneous ventilation    Post pain: adequate analgesia    Post assessment: no apparent anesthetic complications    Post vital signs: stable    Level of consciousness: awake    Nausea/Vomiting: no nausea/vomiting    Complications: none    Transfer of care protocol was followed      Last vitals: Visit Vitals  BP (!) 158/90 (BP Location: Left arm, Patient Position: Lying)   Pulse 91   Temp 36.8 °C (98.2 °F) (Temporal)   Resp 18   Ht 5' 11" (1.803 m)   Wt 132 kg (291 lb)   SpO2 95%   BMI 40.59 kg/m²     "

## 2024-05-23 NOTE — ANESTHESIA PREPROCEDURE EVALUATION
05/23/2024  Bhaskar Stiles is a 53 y.o., male.      Pre-op Assessment    I have reviewed the Patient Summary Reports.     I have reviewed the Nursing Notes. I have reviewed the NPO Status.   I have reviewed the Medications.     Review of Systems  Anesthesia Hx:  No problems with previous Anesthesia                Social:  Non-Smoker, No Alcohol Use       Hematology/Oncology:  Hematology Normal   Oncology Normal                                   EENT/Dental:  EENT/Dental Normal           Cardiovascular:     Hypertension, well controlled           hyperlipidemia                             Pulmonary:        Sleep Apnea, CPAP                Renal/:  Renal/ Normal                 Musculoskeletal:  Arthritis          Spine Disorders:  Disc disease and Degenerative disease           Neurological:    Neuromuscular Disease, (syncope)                                   Endocrine:        Morbid Obesity / BMI > 40  Dermatological:  Skin Normal    Psych:  Psychiatric Normal                    Physical Exam  General: Well nourished    Airway:  Mallampati: II   Mouth Opening: Normal  TM Distance: Normal  Tongue: Normal  Neck ROM: Normal ROM    Dental:  Intact    Chest/Lungs:  Clear to auscultation    Heart:  Rate: Normal        Anesthesia Plan  Type of Anesthesia, risks & benefits discussed:    Anesthesia Type: Gen ETT  Intra-op Monitoring Plan: Standard ASA Monitors  Induction:  IV  Informed Consent: Informed consent signed with the Patient and all parties understand the risks and agree with anesthesia plan.  All questions answered. Patient consented to blood products? Yes  ASA Score: 3    Ready For Surgery From Anesthesia Perspective.     .

## 2024-05-23 NOTE — ANESTHESIA PROCEDURE NOTES
Intubation    Date/Time: 5/23/2024 7:53 AM    Performed by: Curt Cerda CRNA  Authorized by: Chinedu Manrique MD    Intubation:     Induction:  Intravenous    Intubated:  Postinduction    Mask Ventilation:  Easy mask    Attempts:  1    Attempted By:  CRNA    Method of Intubation:  Direct    Blade:  Magdiel 3    Laryngeal View Grade: Grade IIA - cords partially seen      Difficult Airway Encountered?: No      Complications:  None    Airway Device:  Oral endotracheal tube    Airway Device Size:  7.5    Style/Cuff Inflation:  Cuffed (inflated to minimal occlusive pressure)    Inflation Amount (mL):  8    Tube secured:  22    Secured at:  The lips    Placement Verified By:  Capnometry    Complicating Factors:  None    Findings Post-Intubation:  BS equal bilateral

## 2024-05-23 NOTE — Clinical Note
An incision was made     at the fourth left intercostal interspace. Island Pedicle Flap Text: The defect edges were debeveled with a #15 scalpel blade.  Given the location of the defect, shape of the defect and the proximity to free margins an island pedicle advancement flap was deemed most appropriate.  Using a sterile surgical marker, an appropriate advancement flap was drawn incorporating the defect, outlining the appropriate donor tissue and placing the expected incisions within the relaxed skin tension lines where possible.    The area thus outlined was incised deep to adipose tissue with a #15 scalpel blade.  The skin margins were undermined to an appropriate distance in all directions around the primary defect and laterally outward around the island pedicle utilizing iris scissors.  There was minimal undermining beneath the pedicle flap.

## 2024-05-23 NOTE — INTERVAL H&P NOTE
The patient has been examined and the H&P has been reviewed:    I concur with the findings and no changes have occurred since H&P was written.    Procedure risks, benefits and alternative options discussed and understood by patient/family.    We are planning an EPS for syncope eval and if neg, an ILR implant      There are no hospital problems to display for this patient.

## 2024-05-23 NOTE — Clinical Note
The groin and chest was prepped. The site was prepped with ChloraPrep. The site was clipped. The patient was draped.

## 2024-05-25 NOTE — DISCHARGE SUMMARY
[facility] (Hospital)  Discharge Note  Short Stay    Procedure(s) (LRB):  EP - diagnostic/possible loop implant (N/A)  Insertion, Implantable Loop Recorder/boston sci (N/A)      OUTCOME: Patient tolerated treatment/procedure well without complication and is now ready for discharge.    DISPOSITION: Home or Self Care    FINAL DIAGNOSIS:  syncope, neg EPS, now sp ILR     FOLLOWUP: In clinic    DISCHARGE INSTRUCTIONS:    Discharge Procedure Orders   Diet general        TIME SPENT ON DISCHARGE: 20 minutes

## 2024-05-28 ENCOUNTER — HOSPITAL ENCOUNTER (OUTPATIENT)
Dept: CARDIOLOGY | Facility: HOSPITAL | Age: 54
Discharge: HOME | End: 2024-05-28
Attending: INTERNAL MEDICINE
Payer: COMMERCIAL

## 2024-05-28 DIAGNOSIS — R55 SYNCOPE, UNSPECIFIED SYNCOPE TYPE: ICD-10-CM

## 2024-05-28 DIAGNOSIS — G47.33 OSA (OBSTRUCTIVE SLEEP APNEA): ICD-10-CM

## 2024-05-28 DIAGNOSIS — Z95.818 STATUS POST PLACEMENT OF IMPLANTABLE LOOP RECORDER: ICD-10-CM

## 2024-05-28 DIAGNOSIS — I10 ESSENTIAL HYPERTENSION: ICD-10-CM

## 2024-05-28 LAB
OHS CV DC REMOTE DEVICE TYPE: NORMAL
OHS CV RV PACING PERCENT: 0 %

## 2024-05-30 ENCOUNTER — PATIENT MESSAGE (OUTPATIENT)
Dept: ADMINISTRATIVE | Facility: HOSPITAL | Age: 54
End: 2024-05-30
Payer: COMMERCIAL

## 2024-05-31 VITALS
OXYGEN SATURATION: 95 % | RESPIRATION RATE: 22 BRPM | BODY MASS INDEX: 40.74 KG/M2 | WEIGHT: 291 LBS | TEMPERATURE: 98 F | SYSTOLIC BLOOD PRESSURE: 130 MMHG | DIASTOLIC BLOOD PRESSURE: 77 MMHG | HEART RATE: 93 BPM | HEIGHT: 71 IN

## 2024-05-31 NOTE — ANESTHESIA POSTPROCEDURE EVALUATION
Anesthesia Post Evaluation    Patient: Bhaskar Stiles    Procedure(s) Performed: Procedure(s) (LRB):  EP - diagnostic/possible loop implant (N/A)  Insertion, Implantable Loop Recorder/boston sci (N/A)    Final Anesthesia Type: MAC      [facility]: [facility]  Patient participation: Yes- Able to Participate  Level of consciousness: awake and alert and oriented  Post-procedure vital signs: reviewed and stable  Pain management: adequate  Airway patency: patent  LAMONT mitigation strategies: Multimodal analgesia and Extubation and recovery carried out in lateral, semiupright, or other nonsupine position  PONV status at discharge: No PONV  Anesthetic complications: no      Cardiovascular status: blood pressure returned to baseline and hemodynamically stable  Respiratory status: unassisted and spontaneous ventilation  Hydration status: euvolemic  Follow-up not needed.  Comments: Report given to PACU RN. Hand Off Tool Used. RN given opportunity to ask questions or clarify concerns. No Concerns verbalized. RN was asked if ready to assume care of patient. RN verbally confirmed. Pt. Left in stable condition. SV. Vital Signs Return to Near Baseline. No s/s of distress noted.               Vitals Value Taken Time   /77 05/23/24 1300   Temp 36.5 °C (97.7 °F) 05/23/24 1115   Pulse 93 05/23/24 1300   Resp 22 05/23/24 1300   SpO2 95 % 05/23/24 1300         Event Time   Out of Recovery 11:09:00         Pain/Betty Score: No data recorded

## 2024-06-03 ENCOUNTER — CLINICAL SUPPORT (OUTPATIENT)
Dept: CARDIOLOGY | Facility: HOSPITAL | Age: 54
End: 2024-06-03
Attending: INTERNAL MEDICINE
Payer: COMMERCIAL

## 2024-06-03 ENCOUNTER — CLINICAL SUPPORT (OUTPATIENT)
Dept: CARDIOLOGY | Facility: HOSPITAL | Age: 54
End: 2024-06-03
Payer: COMMERCIAL

## 2024-06-03 DIAGNOSIS — I49.8 OTHER SPECIFIED CARDIAC ARRHYTHMIAS: ICD-10-CM

## 2024-06-03 PROCEDURE — 93298 REM INTERROG DEV EVAL SCRMS: CPT | Mod: 26,,, | Performed by: INTERNAL MEDICINE

## 2024-06-03 PROCEDURE — 93298 REM INTERROG DEV EVAL SCRMS: CPT | Performed by: INTERNAL MEDICINE

## 2024-06-05 ENCOUNTER — PATIENT MESSAGE (OUTPATIENT)
Dept: INTERNAL MEDICINE | Facility: CLINIC | Age: 54
End: 2024-06-05
Payer: COMMERCIAL

## 2024-06-09 LAB — OHS CV DC REMOTE DEVICE TYPE: NORMAL

## 2024-06-13 ENCOUNTER — PATIENT OUTREACH (OUTPATIENT)
Dept: ADMINISTRATIVE | Facility: HOSPITAL | Age: 54
End: 2024-06-13
Payer: COMMERCIAL

## 2024-06-19 ENCOUNTER — PATIENT MESSAGE (OUTPATIENT)
Dept: NEUROLOGY | Facility: CLINIC | Age: 54
End: 2024-06-19
Payer: COMMERCIAL

## 2024-07-04 ENCOUNTER — CLINICAL SUPPORT (OUTPATIENT)
Dept: CARDIOLOGY | Facility: HOSPITAL | Age: 54
End: 2024-07-04
Payer: COMMERCIAL

## 2024-07-04 DIAGNOSIS — I49.8 OTHER SPECIFIED CARDIAC ARRHYTHMIAS: ICD-10-CM

## 2024-07-04 PROCEDURE — 93298 REM INTERROG DEV EVAL SCRMS: CPT | Mod: 26,,, | Performed by: INTERNAL MEDICINE

## 2024-07-05 ENCOUNTER — CLINICAL SUPPORT (OUTPATIENT)
Dept: CARDIOLOGY | Facility: HOSPITAL | Age: 54
End: 2024-07-05
Attending: INTERNAL MEDICINE
Payer: COMMERCIAL

## 2024-07-05 DIAGNOSIS — I49.8 OTHER SPECIFIED CARDIAC ARRHYTHMIAS: ICD-10-CM

## 2024-07-05 PROCEDURE — 93298 REM INTERROG DEV EVAL SCRMS: CPT | Performed by: INTERNAL MEDICINE

## 2024-07-17 PROBLEM — R73.03 PREDIABETES: Status: ACTIVE | Noted: 2024-07-17

## 2024-07-17 LAB — OHS CV DC REMOTE DEVICE TYPE: NORMAL

## 2024-07-17 NOTE — PROGRESS NOTES
[facility]    Subjective:       Patient ID: Bhaskar Stiles is a 53 y.o. male.    Chief Complaint: Hypertension (F/u)    History was obtained from the patient and supplemented through chart review.  This patient is known to me.      HPI:    Patient is a 53 y.o. male w/ pmhx of HTN, HLD, SHANTAL, prediabetes, fam hc colon cancer presents for f/u htn, new prediabetes    Approx a week and a half ago wife had covid   Then pt had sinus issues, pain, no fever  Oxygen level initially 93%, improved 97% in room  Improving slowly    New grandbaby from 35 yo daughter, also step-daughter in 20's and 11 yo daughter    New prediabetes  5.7, declines metformin  Plans to change diet and increase movement  Diet mainly consists of mac and cheese, discussed cauliflower rice, protein pasta, stretch receptors, etc  Encouraged more exercise    Subclinical hyperthyroid  Check additional thyroid studies    Hx of vasovagal syncope 2018, now again 2023, and again feb 2024  Has cardiology appt Dr. Terrell, now with LOOP recorder  Frustrated because this has been happening since 2017  Seen by cardio Dec, started on toprol 50, amlod 5 mg, continuing losartan-hctz 100-12.5  Seen by Dr. Terrell, [place]    Anxiety sometimes with people  hesitant    Htn  losar-HCTZ 100-12.5 mg, amlodipine 5 mg daily, toprol 50 mg  borderline high DBP again, adsked pt to monitor more at home    shantal  Wearing cpap, working on it  Gained weight  Saw [facility] 11/203, f/u appt next week    hld  Cont Crestor 10, stable    Family hx of colon cancer  Performed 9/2022, repeat due in 2027    Morbid Obesity  Counseled weight loss extensively, especially to come off CPAP  Popeyes to a minimium, wheat bread, poor diet- will work to change diet  Lots of mac and cheese  A1cs now prediabetes  Wt Readings from Last 15 Encounters:   07/18/24 131.9 kg (290 lb 12.6 oz)   05/23/24 132 kg (291 lb)   05/17/24 132.4 kg (291 lb 14.2 oz)   05/06/24 130.5 kg (287 lb 11.2 oz)    04/18/24 130.4 kg (287 lb 7.7 oz)   12/15/23 127.6 kg (281 lb 4.9 oz)   10/30/23 126.4 kg (278 lb 10.6 oz)   10/18/23 125.5 kg (276 lb 10.8 oz)   04/21/23 120.8 kg (266 lb 5.1 oz)   03/06/23 118 kg (260 lb 2.3 oz)   02/06/23 120.2 kg (264 lb 15.9 oz)   01/18/23 120.3 kg (265 lb 3.4 oz)   12/21/22 120.5 kg (265 lb 10.5 oz)   09/28/22 122 kg (268 lb 15.4 oz)   08/10/22 121.1 kg (267 lb)       Check labs again    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)  Wife works in healthcare as well    Not addressed    Vision changes reported in the past, outside [facility]  Doc retired  Given [employer] phone number    Myofascial pain, cervical radiculopathy, cervical spondylosis  Cymbalta helps, gets sleepy if taken early in day, advfrenchised to take higher dose in evening  Following with pain management/neurosurg/sports med  Better  In healthy back program  Pt unsure what next steps should be    Lumbar radiculopathy  Right side hip, knee pain  Not issue currently    Medical History  Past Medical History:   Diagnosis Date    Family history of colon cancer in father     Hyperlipidemia     Hypertension     LAMONT (obstructive sleep apnea)     Syncope and collapse        Review of Systems   Respiratory:  Negative for shortness of breath.    Cardiovascular:  Negative for chest pain.   Gastrointestinal:  Negative for abdominal pain.   Neurological:  Positive for syncope.         Surgical hx, family hx, social hx   Have been reviewed       Current Outpatient Medications:     amLODIPine (NORVASC) 5 MG tablet, Take 1 tablet (5 mg total) by mouth once daily., Disp: 30 tablet, Rfl: 11    DULoxetine (CYMBALTA) 30 MG capsule, TAKE 1 CAPSULE BY MOUTH TWICE A DAY, Disp: 180 capsule, Rfl: 2    losartan-hydrochlorothiazide 100-12.5 mg (HYZAAR) 100-12.5 mg Tab, Take 1 tablet by mouth once daily., Disp: 90 tablet, Rfl: 3    metoprolol succinate (TOPROL-XL) 50 MG 24 hr tablet, Take 1 tablet (50 mg  "total) by mouth once daily., Disp: 30 tablet, Rfl: 11    rosuvastatin (CRESTOR) 10 MG tablet, TAKE 1 TABLET BY MOUTH EVERY DAY, Disp: 90 tablet, Rfl: 3    tadalafiL (CIALIS) 20 MG Tab, Take 1 tablet (20 mg total) by mouth Every 3 (three) days. As needed for ED, Disp: 10 tablet, Rfl: 11    Objective:        Body mass index is 40.56 kg/m².  Vitals:    07/18/24 0834 07/18/24 0846   BP: (P) 130/86    Pulse: 87    SpO2: (!) 93% 97%   Weight: 131.9 kg (290 lb 12.6 oz)    Height: 5' 11" (1.803 m)    PainSc: 0-No pain            Physical Exam  Vitals and nursing note reviewed.   Constitutional:       General: He is not in acute distress.     Appearance: Normal appearance. He is not ill-appearing.      Comments: Obesity   HENT:      Head: Normocephalic and atraumatic.   Eyes:      General: No scleral icterus.  Cardiovascular:      Rate and Rhythm: Normal rate and regular rhythm.      Heart sounds: Normal heart sounds.   Pulmonary:      Effort: Pulmonary effort is normal.   Abdominal:      General: There is no distension.   Musculoskeletal:         General: No deformity.      Cervical back: Normal range of motion.   Skin:     General: Skin is warm and dry.   Neurological:      Mental Status: He is alert and oriented to person, place, and time.   Psychiatric:         Behavior: Behavior normal.           Lab Results   Component Value Date    WBC 7.1 07/16/2024    HGB 13.6 07/16/2024    HCT 43.6 07/16/2024     07/16/2024    CHOL 149 07/16/2024    TRIG 89 07/16/2024    HDL 43 07/16/2024    ALT 30 07/16/2024    AST 19 07/16/2024     07/16/2024    K 4.3 07/16/2024     07/16/2024    CREATININE 1.11 07/16/2024    BUN 18 07/16/2024    CO2 27 07/16/2024    TSH 0.30 (L) 07/16/2024    INR 1.0 06/05/2018    HGBA1C 5.7 (H) 07/16/2024       The 10-year ASCVD risk score (Hernán ANN, et al., 2019) is: 9.5%    Values used to calculate the score:      Age: 53 years      Sex: Male      Is Non- : Yes      " Diabetic: No      Tobacco smoker: No      Systolic Blood Pressure: 130 mmHg      Is BP treated: Yes      HDL Cholesterol: 43 mg/dL      Total Cholesterol: 149 mg/dL    On crestor 10 mg    (Imaging have been independently reviewed)    Ct head reviewed 10/2023      Assessment:         1. Family history of colon cancer in father    2. Prediabetes    3. LAMONT (obstructive sleep apnea)    4. History of syncope    5. Class 2 obesity due to excess calories without serious comorbidity with body mass index (BMI) of 38.0 to 38.9 in adult    6. Morbid obesity    7. Essential hypertension    8. Hyperlipidemia, unspecified hyperlipidemia type    9. Subclinical hyperthyroidism    10. COVID-19                  Plan:     Bhaskar was seen today for hypertension.    Diagnoses and all orders for this visit:    Family history of colon cancer in father    Prediabetes  -     Hemoglobin A1C; Future  -     Hemoglobin A1C    LAMONT (obstructive sleep apnea)    History of syncope    Class 2 obesity due to excess calories without serious comorbidity with body mass index (BMI) of 38.0 to 38.9 in adult    Morbid obesity    Essential hypertension    Hyperlipidemia, unspecified hyperlipidemia type    Subclinical hyperthyroidism  -     T4, FREE; Future  -     T3; Future  -     THYROID PEROXIDASE ANTIBODY; Future  -     THYROID STIMULATING IMMUNOGLOBULIN; Future  -     T4, FREE  -     T3  -     THYROID PEROXIDASE ANTIBODY  -     THYROID STIMULATING IMMUNOGLOBULIN    COVID-19          Pt aware not a full year.  Just  wants to condense appts.      Health Maintenance  - Lipids:   - A1C:   - Colon Ca Screen: 9/2022, due in 2027; fam hx of colon cancer  - Immunizations: vaccinated, needs covid booster, shingles.     Follow up in about 3 months (around 10/18/2024) for prediabetes and weight check.     [facility] labs    Or sooner prn      40 min were used in chart review, evaluation and counseling of patient, documentation and review of results on same day of  service     Visit today is associated with current or anticipated ongoing medical care related to this patient's single serious condition/complex condition of syncope, prediabetes, covid, htn, obesity. The patient will return to see me as these issues will be followed longitudinally.      All medications were reviewed including potential side effects and risks/benefits.  Pt was counseled to call back if anything worsens or if questions arise.    Fredy Rey MD  Family Medicine  [facility]  [address] [address]  Phone [phone]  Fax [phone]

## 2024-07-18 ENCOUNTER — OFFICE VISIT (OUTPATIENT)
Dept: INTERNAL MEDICINE | Facility: CLINIC | Age: 54
End: 2024-07-18
Payer: COMMERCIAL

## 2024-07-18 VITALS — OXYGEN SATURATION: 97 % | BODY MASS INDEX: 40.71 KG/M2 | WEIGHT: 290.81 LBS | HEIGHT: 71 IN | HEART RATE: 87 BPM

## 2024-07-18 DIAGNOSIS — E66.09 CLASS 2 OBESITY DUE TO EXCESS CALORIES WITHOUT SERIOUS COMORBIDITY WITH BODY MASS INDEX (BMI) OF 38.0 TO 38.9 IN ADULT: ICD-10-CM

## 2024-07-18 DIAGNOSIS — E78.5 HYPERLIPIDEMIA, UNSPECIFIED HYPERLIPIDEMIA TYPE: ICD-10-CM

## 2024-07-18 DIAGNOSIS — Z87.898 HISTORY OF SYNCOPE: Chronic | ICD-10-CM

## 2024-07-18 DIAGNOSIS — I10 ESSENTIAL HYPERTENSION: ICD-10-CM

## 2024-07-18 DIAGNOSIS — E05.90 SUBCLINICAL HYPERTHYROIDISM: ICD-10-CM

## 2024-07-18 DIAGNOSIS — Z80.0 FAMILY HISTORY OF COLON CANCER IN FATHER: Primary | Chronic | ICD-10-CM

## 2024-07-18 DIAGNOSIS — G47.33 OSA (OBSTRUCTIVE SLEEP APNEA): ICD-10-CM

## 2024-07-18 DIAGNOSIS — E66.01 MORBID OBESITY: ICD-10-CM

## 2024-07-18 DIAGNOSIS — U07.1 COVID-19: ICD-10-CM

## 2024-07-18 DIAGNOSIS — R73.03 PREDIABETES: ICD-10-CM

## 2024-07-18 PROCEDURE — 99999 PR PBB SHADOW E&M-EST. PATIENT-LVL III: CPT | Mod: PBBFAC,,, | Performed by: STUDENT IN AN ORGANIZED HEALTH CARE EDUCATION/TRAINING PROGRAM

## 2024-07-18 PROCEDURE — 1159F MED LIST DOCD IN RCRD: CPT | Mod: CPTII,S$GLB,, | Performed by: STUDENT IN AN ORGANIZED HEALTH CARE EDUCATION/TRAINING PROGRAM

## 2024-07-18 PROCEDURE — G2211 COMPLEX E/M VISIT ADD ON: HCPCS | Mod: S$GLB,,, | Performed by: STUDENT IN AN ORGANIZED HEALTH CARE EDUCATION/TRAINING PROGRAM

## 2024-07-18 PROCEDURE — 99215 OFFICE O/P EST HI 40 MIN: CPT | Mod: S$GLB,,, | Performed by: STUDENT IN AN ORGANIZED HEALTH CARE EDUCATION/TRAINING PROGRAM

## 2024-07-18 PROCEDURE — 3044F HG A1C LEVEL LT 7.0%: CPT | Mod: CPTII,S$GLB,, | Performed by: STUDENT IN AN ORGANIZED HEALTH CARE EDUCATION/TRAINING PROGRAM

## 2024-07-18 PROCEDURE — 3008F BODY MASS INDEX DOCD: CPT | Mod: CPTII,S$GLB,, | Performed by: STUDENT IN AN ORGANIZED HEALTH CARE EDUCATION/TRAINING PROGRAM

## 2024-07-22 NOTE — PROGRESS NOTES
The chief complaint leading to consultation is: sleep problems    Visit type: audiovisual     The patient location is: [place]    15 minutes of total time spent on the encounter, which includes face to face time and non-face to face time preparing to see the patient (eg, review of tests), Obtaining and/or reviewing separately obtained history, Documenting clinical information in the electronic or other health record, Independently interpreting results (not separately reported) and communicating results to the patient/family/caregiver, or Care coordination (not separately reported).     Each patient to whom he or she provides medical services by telemedicine is:  (1) informed of the relationship between the physician and patient and the respective role of any other health care provider with respect to management of the patient; and (2) notified that he or she may decline to receive medical services by telemedicine and may withdraw from such care at any time.      ESTABLISHED PATIENT VISIT      Bhaskar Stiles  is a pleasant 53 y.o. male  with PMH significant for HTN, HLD, DDD, chronic pain, BMI 38+, LAMONT       Here today for: CPAP follow-up     Since last visit:   See interval history in table below      Past Medical History:   Diagnosis Date    Family history of colon cancer in father     Hyperlipidemia     Hypertension     LAMONT (obstructive sleep apnea)     Syncope and collapse      Patient Active Problem List   Diagnosis    Abnormal finding on MRI of brain    History of syncope    Syncope    LAMONT (obstructive sleep apnea)    Chronic pain    Essential hypertension    Family history of colon cancer in father    DDD (degenerative disc disease), cervical    Cervical radiculopathy    Neck pain    Decreased strength    Hyperlipidemia    Class 2 obesity due to excess calories with body mass index (BMI) of 38.0 to 38.9 in adult    Morbid obesity    Prediabetes       Current Outpatient Medications:     amLODIPine (NORVASC) 5 MG  tablet, Take 1 tablet (5 mg total) by mouth once daily., Disp: 30 tablet, Rfl: 11    DULoxetine (CYMBALTA) 30 MG capsule, TAKE 1 CAPSULE BY MOUTH TWICE A DAY, Disp: 180 capsule, Rfl: 2    losartan-hydrochlorothiazide 100-12.5 mg (HYZAAR) 100-12.5 mg Tab, Take 1 tablet by mouth once daily., Disp: 90 tablet, Rfl: 3    metoprolol succinate (TOPROL-XL) 50 MG 24 hr tablet, Take 1 tablet (50 mg total) by mouth once daily., Disp: 30 tablet, Rfl: 11    rosuvastatin (CRESTOR) 10 MG tablet, TAKE 1 TABLET BY MOUTH EVERY DAY, Disp: 90 tablet, Rfl: 3    tadalafiL (CIALIS) 20 MG Tab, Take 1 tablet (20 mg total) by mouth Every 3 (three) days. As needed for ED, Disp: 10 tablet, Rfl: 11     There were no vitals filed for this visit.    Physical Exam:    GEN:   Well-appearing  Psych:  Appropriate affect, demonstrates insight  SKIN:  No rash on the face or bridge of the nose      LABS:   Lab Results   Component Value Date    HGB 13.6 07/16/2024    CO2 27 07/16/2024       RECORDS REVIEWED PREVIOUSLY:    Baseline Sleep Study: 10/05/2018  lb. The overall AHI was 10 and overall RDI was 15. The oxygen max was 88.7 % and % time < 90% SpO2 was 0.2 %. Most likely underestimate of severity    ASSESSMENT      PROBLEM DESCRIPTION/ Sx on Presentation Interval Hx STATUS PLAN    LAMONT   + snoring, + witnessed apneas  + wakes feeling un-refreshed  Dx 2018 AHI 10, RDI 15        PAP history   Dx Study    PAP response    Mask Hybrid FFM   FFM (did not like)  Has not tried nasal mask   DME HME   My Air    CPAP age 12/3/21 (not in AV)   PAP altn    Benefits    PROBS  Smothering with CPAP  Still snoring  Still sleepy during the day      Usage increased significantly since last year, residual AHI 10.5      Since last visit:     Still snoring with CPAP on  Reports wearing most nights    Still sleepy during the day      11/13/23: 21/30 x 5hrs 9mins, 5-11cwp (11cwp), leak (10), AHI 10.5      Feels like he is smothering with CPAP            uncontrolled           PAP PLAN   E min 8 cwp (cont)   I max 16 cwp (cont)   PS/epr    RAMP 6 x auto   Other    Altn.        -will proceed with BIPAP titration due to pressure intolerance with CPAP despite comfortable interface    -order new bipap after study  -trial nasal mask    -discussed possible inspire, but BMI < 40     The patient is using and benefitting from PAP therapy     Daytime Sx   + sleepiness when inactive   ESS 15/24 on intake (reviewed from 9/6/18)     Since last visit:     Still very tired throughout the day  Dozing off driving   Slight improvement     We discussed the risks of driving while sleepy and the importance of avoiding driving (or stopping driving) whenever sleepy.     Nocturia   x 2 per sleep period   Since last visit:            AM Headaches Multiple times weekly, does not last long  Resolves without medication  No longer getting frequent AM headaches      Since last visit:     Frequently in AM     uncontrolled      Other issues:     RTC:  will arrange RTC depending on results of sleep testing

## 2024-07-23 ENCOUNTER — OFFICE VISIT (OUTPATIENT)
Dept: SLEEP MEDICINE | Facility: CLINIC | Age: 54
End: 2024-07-23
Payer: COMMERCIAL

## 2024-07-23 DIAGNOSIS — Z78.9 INTOLERANCE OF CONTINUOUS POSITIVE AIRWAY PRESSURE (CPAP) VENTILATION: ICD-10-CM

## 2024-07-23 DIAGNOSIS — G47.33 OSA (OBSTRUCTIVE SLEEP APNEA): Primary | ICD-10-CM

## 2024-07-23 LAB
HBA1C MFR BLD: 5.6 % OF TOTAL HGB
T3 SERPL-MCNC: 138 NG/DL (ref 76–181)
T4 FREE SERPL-MCNC: 0.8 NG/DL (ref 0.8–1.8)
THYROPEROXIDASE AB SERPL-ACNC: <1 IU/ML
TSI SER-ACNC: NORMAL

## 2024-07-23 PROCEDURE — 99214 OFFICE O/P EST MOD 30 MIN: CPT | Mod: 95,,, | Performed by: INTERNAL MEDICINE

## 2024-07-23 PROCEDURE — 3044F HG A1C LEVEL LT 7.0%: CPT | Mod: CPTII,95,, | Performed by: INTERNAL MEDICINE

## 2024-08-05 ENCOUNTER — CLINICAL SUPPORT (OUTPATIENT)
Dept: CARDIOLOGY | Facility: HOSPITAL | Age: 54
End: 2024-08-05
Attending: INTERNAL MEDICINE
Payer: COMMERCIAL

## 2024-08-05 ENCOUNTER — CLINICAL SUPPORT (OUTPATIENT)
Dept: CARDIOLOGY | Facility: HOSPITAL | Age: 54
End: 2024-08-05
Payer: COMMERCIAL

## 2024-08-05 DIAGNOSIS — I49.8 OTHER SPECIFIED CARDIAC ARRHYTHMIAS: ICD-10-CM

## 2024-08-05 PROCEDURE — 93298 REM INTERROG DEV EVAL SCRMS: CPT | Mod: 26,,, | Performed by: INTERNAL MEDICINE

## 2024-08-05 PROCEDURE — 93298 REM INTERROG DEV EVAL SCRMS: CPT | Performed by: INTERNAL MEDICINE

## 2024-08-08 ENCOUNTER — TELEPHONE (OUTPATIENT)
Dept: SLEEP MEDICINE | Facility: OTHER | Age: 54
End: 2024-08-08
Payer: COMMERCIAL

## 2024-08-10 ENCOUNTER — HOSPITAL ENCOUNTER (OUTPATIENT)
Dept: SLEEP MEDICINE | Facility: OTHER | Age: 54
Discharge: HOME | End: 2024-08-10
Attending: INTERNAL MEDICINE
Payer: COMMERCIAL

## 2024-08-10 DIAGNOSIS — G47.33 OSA (OBSTRUCTIVE SLEEP APNEA): ICD-10-CM

## 2024-08-10 DIAGNOSIS — Z78.9 INTOLERANCE OF CONTINUOUS POSITIVE AIRWAY PRESSURE (CPAP) VENTILATION: ICD-10-CM

## 2024-08-10 PROCEDURE — 95811 POLYSOM 6/>YRS CPAP 4/> PARM: CPT

## 2024-08-11 NOTE — PROGRESS NOTES
Patient set up, procedures explained prior to testing. Disposable leads/sensors used where applicable. BIPAP titration performed using [employer] interface. Post study f/u instructions given, questions answered.

## 2024-08-13 LAB — OHS CV DC REMOTE DEVICE TYPE: NORMAL

## 2024-08-13 NOTE — PROCEDURES
"[facility]    Titration Interpretation Report    Patient Name:  SHARAN MORRISON  MRN#:  [id number]  :  1970  Study Date:  8/10/2024  Referring Provider:  MD JAMAL    The patient is a 53 year old Male who is 5' 11" and weighs 290.0 lbs.  His BMI equals 40.6.  A full night PAP titration was performed.    Polysomnogram Data  A full night polysomnogram recorded the standard physiologic parameters including EEG, EOG, EMG, EKG, nasal and oral airflow.  Respiratory parameters of chest and abdominal movements were recorded with (RIP) Respiratory Inductance Plethsmography.  Oxygen saturation was recorded by pulse oximetry.    Titration Summary  The patient was titrated at pressures ranging from 12/8/0* cm/H20 with supplemental oxygen at - up to 14/10/0* cm/H20 with supplemental oxygen at -.  The last pressure used in the study was 14/10/0* cm/H20 with supplemental oxygen at -.    Sleep Architecture  The total recording time of the polysomnogram was 440.8 minutes.  The total sleep time was 388.5 minutes.  The patient spent 4.4% of total sleep time in Stage N1, 72.7% in Stage N2, 0.0% in Stages N3, and 22.9% in REM.  Sleep latency was 6.7 minutes.  REM latency was 131.5 minutes.  Sleep Efficiency was 88.1%.  Total wake time was 53.0 minutes for a total wake percentage of 10.5%.  Wake after Sleep Onset was 45.5 minutes.    Respiratory Summary  The polysomnogram revealed a presence of - obstructive, 4 central, and - mixed apneas resulting in Total Apnea index of 0.6 events per hour.  There were 7 hypopneas resulting in Total Hypopnea index of 1.1 events per hour.  The combined Apnea/Hypopnea index was 1.7 events per hour.  There were a total of - RERA events resulting in a Respiratory Disturbance Index (RDI) of 1.7 events per hour.     Mean oxygen saturation was 93.7%.  The lowest oxygen saturation during sleep was 90.0%.  Time spent ?88% oxygen saturation was 0.2 minutes (0.1%).    End Tidal CO2 " "during sleep ranged from - to - mmHg. End Tidal CO2 was greater than 50 mmHg for - minutes and greater than 55 mmHg for - minutes.  Transcutaneous CO2 during sleep ranged from - to - mmHg. Transcutaneous CO2 was greater than 50 mmHg for - minutes and greater than 55 mmHg for - minutes.    Limb Movement Activity  There were - limb movements recorded.  Of this total, - were classified as PLMs.  Of the PLMs, - were associated with arousals.  The Limb Movement index was - per hour while the PLM index was - per hour and PLM with arousals index was - per hour.    Cardiac: single lead EKG revealed normal sinus rhythm     PAP titration:    Mask: F&P eson large   BiPAP = 12/8 cwp was partially effective in lateral position in stage N2 and supine N2 sleep  BiPAP = 14/10 cwp was largely effective in supine position in stage N2 sleep  BiPAP = 14/10 cwp was partially effective in lateral position in stage REM sleep      Oxygenation:  At therapeutic levels of PAP therapy, there was no baseline hypoxemia.    Impression:  -obstructive sleep apnea       Recommendations:  -initial BiPAP auto settings EPAP min = 10 cwp, PS = 16 cwp, and IPAP max = 4 cwp.   -EPAP max should be increased to 11 cwp or higher depending on patient tolerance.   -the patient has follow up with Sleep Medicine        Leo Rojas MD    (This Sleep Study was interpreted by a Board Certified Sleep Specialist who conducted an epoch-by-epoch review of the entire raw data recording.)  (The indication for this sleep study was reviewed and deemed appropriate by [employer] Practice Parameters or other reasons by a Board Certified Sleep Specialist.)    [facility]    Titration Report    Patient Name: SHARAN MORRISON Study Date: 8/10/2024   YOB: 1970 MRN #: [id number]   Age: 53 year LONA #: [id number]   Sex: Male Referring Provider: MD JAMAL   Height: 5' 11" Recording Tech: Judith Liang RPS   Weight: 290.0 lbs Scoring Tech: Emir Morrison " RRT RPSGT   BMI: 40.6 Interpreting Physician: -   ESS: - Neck Circumference: -     Study Overview    Lights Off: 09:45:07 PM  Count Index   Lights On: 05:05:54 AM Awakenings: 18 2.8   Time in Bed: 440.8 min. Arousals: 25 3.9   Total Sleep Time: 388.5 min. Apneas & Hypopneas: 11 1.7    Sleep Efficiency: 88.1% Limb Movements: - -   Sleep Latency: 6.7 min. Snores: - -   Wake After Sleep Onset: 45.5 min. Desaturations: 49 7.6    REM Latency from Sleep Onset: 131.5 min. Minimum SpO2 TST: 90.0%      Sleep Architecture   % of Time in Bed  Stages Time (mins) % Sleep Time   Wake 53.0    Stage N1 17.0 4.4%   Stage N2 282.5 72.7%   Stage N3 0.0 0.0%   REM 89.0 22.9%         Arousal Summary     NREM REM Sleep Index   Respiratory Arousals 1 2 3 0.5   PLM Arousals - - - -   Isolated Limb Movement Arousals - - - -   Spontaneous Arousals 13 9 22 3.4   Total 14 11 25 3.9       Limb Movement Summary     Count Index   Isolated Limb Movements - -   Periodic Limb Movements (PLMs) - -   Total Limb Movements - -   Respiratory Summary     By Sleep Stage By Body Position Total    NREM REM Supine Non-Supine    Time (min) 299.5 89.0 194.0 194.5 388.5           Obstructive Apnea - - - - -   Mixed Apnea - - - - -   Central Apnea 4 - 4 - 4   Central Apnea Index 0.8 - 1.4 - 0.8   Total Apneas 4 - 4 - 4   Total Apnea Index 0.8 - 1.2 - 0.6           Total Hypopnea 4 3 1 6 7   Total Hypopnea Index 0.8 2.0 0.3 1.9 1.1           Apnea & Hypopnea 8 3 5 6 11   Apnea & Hypopnea Index 1.6 2.0 1.5 1.9 1.7           RERAs - - - - -   RERA Index - - - - -           RDI 1.6 2.0 1.5 1.9 1.7     Scoring Criteria: Hypopneas scored at 3% desaturation criteria.    Respiratory Event Durations     Apnea Hypopnea    NREM REM NREM REM   Average (seconds) 12.2 - 12.5 13.8   Maximum (seconds) 16.6 - 14.8 14.9       Oxygen Saturation Summary     Wake NREM REM TST Total   Average SpO2 94.3% 93.2% 94.8% 93.6% 93.7%   Minimum SpO2 88.0% 90.0% 91.0% 90.0% 88.0%   Maximum  SpO2 99.0% 98.0% 98.0% 98.0% 99.0%     Oxygen Saturation Distribution    Range (%) Time in range (min) Time in range (%)    90.0 - 100.0 432.6 99.8%   80.0 - 90.0 1.0 0.2%   70.0 - 80.0 - -   60.0 - 70.0 - -   50.0 - 60.0 - -   0.0 - 50.0 - -   Time Spent ?88% SpO2    Range (%) Time in range (min) Time in range (%)   0.0 - 88.0 0.2 0.1%          Count Index   Desaturations 49 7.6      Cardiac Summary     Wake NREM REM Sleep Total   Average Pulse Rate (BPM) 68.0 67.6 63.8 66.7 66.9   Minimum Pulse Rate (BPM) 51.0 52.0 53.0 52.0 51.0   Maximum Pulse Rate (BPM) 93.0 88.0 77.0 88.0 93.0     Pulse Rate Distribution    Range (bpm) Time in range (min) Time in range (%)   0.0 - 40.0 - -   40.0 - 60.0 78.8 18.1%   60.0 - 80.0 343.1 79.0%   80.0 - 100.0 12.3 2.8%   100.0 - 120.0 - -   120.0 - 140.0 - -   140.0 - 200.0 - -     EtCO2 Summary    Stage Min (mmHg) Average (mmHg) Max (mmHg)   Wake - - -   NREM(1+2+3) - - -   REM - - -     Range (mmHg) Time in range (min) Time in range (%)   20.0 - 40.0 - -   40.0 - 50.0 - -   50.0 - 55.0 - -   55.0 - 100.0 - -     TcCO2 Summary    Stage Min (mmHg) Average (mmHg) Max (mmHg)   Wake - - -   NREM(1+2+3) - - -   REM - - -     Range (mmHg) Time in range (min) Time in range (%)   20.0 - 40.0 - -   40.0 - 50.0 - -   50.0 - 55.0 - -   55.0 - 100.0 - -   Excluded data <20.0 & >65.0 441.5 100.0%     Comments    -    Titration Summary    PAP Device PAP Level O2 Level Time (min) TST (min) NREM (min) REM (min) Wake (min) Sleep Eff% OA# CA# MA# Hyp# AHI RERA RDI Min SpO2 SpO2 ?88% (min) Ar. Index   BiLevel 12/8/0 - 16.5 9.5 9.5 0.0 7.0 57.6% - - - - - - - 91.0  0.0 -   BiLevel 14/10/0 - 425.0 379.0 290.0 89.0 46.0 89.2% - 4 - 7 1.7 - 1.7 90.0  0.0 4.0

## 2024-08-24 ENCOUNTER — PATIENT MESSAGE (OUTPATIENT)
Dept: SLEEP MEDICINE | Facility: CLINIC | Age: 54
End: 2024-08-24

## 2024-08-24 DIAGNOSIS — Z78.9 INTOLERANCE OF CONTINUOUS POSITIVE AIRWAY PRESSURE (CPAP) VENTILATION: ICD-10-CM

## 2024-08-24 DIAGNOSIS — G47.33 OSA (OBSTRUCTIVE SLEEP APNEA): Primary | ICD-10-CM

## 2024-08-24 PROCEDURE — 95811 POLYSOM 6/>YRS CPAP 4/> PARM: CPT | Mod: 26,,, | Performed by: INTERNAL MEDICINE

## 2024-08-28 DIAGNOSIS — I10 ESSENTIAL HYPERTENSION: ICD-10-CM

## 2024-08-28 DIAGNOSIS — R55 SYNCOPE, UNSPECIFIED SYNCOPE TYPE: ICD-10-CM

## 2024-08-28 DIAGNOSIS — Z87.898 HISTORY OF SYNCOPE: Primary | Chronic | ICD-10-CM

## 2024-08-30 ENCOUNTER — OFFICE VISIT (OUTPATIENT)
Dept: CARDIOLOGY | Facility: CLINIC | Age: 54
End: 2024-08-30
Payer: COMMERCIAL

## 2024-08-30 ENCOUNTER — CLINICAL SUPPORT (OUTPATIENT)
Dept: CARDIOLOGY | Facility: HOSPITAL | Age: 54
End: 2024-08-30
Attending: INTERNAL MEDICINE
Payer: COMMERCIAL

## 2024-08-30 ENCOUNTER — HOSPITAL ENCOUNTER (OUTPATIENT)
Dept: CARDIOLOGY | Facility: HOSPITAL | Age: 54
Discharge: HOME | End: 2024-08-30
Attending: INTERNAL MEDICINE
Payer: COMMERCIAL

## 2024-08-30 VITALS
HEIGHT: 71 IN | OXYGEN SATURATION: 96 % | WEIGHT: 275.56 LBS | DIASTOLIC BLOOD PRESSURE: 74 MMHG | SYSTOLIC BLOOD PRESSURE: 136 MMHG | BODY MASS INDEX: 38.58 KG/M2 | HEART RATE: 65 BPM

## 2024-08-30 DIAGNOSIS — E66.09 CLASS 2 OBESITY DUE TO EXCESS CALORIES WITHOUT SERIOUS COMORBIDITY WITH BODY MASS INDEX (BMI) OF 38.0 TO 38.9 IN ADULT: ICD-10-CM

## 2024-08-30 DIAGNOSIS — G47.33 OSA (OBSTRUCTIVE SLEEP APNEA): ICD-10-CM

## 2024-08-30 DIAGNOSIS — I10 ESSENTIAL HYPERTENSION: ICD-10-CM

## 2024-08-30 DIAGNOSIS — Z95.818 STATUS POST PLACEMENT OF IMPLANTABLE LOOP RECORDER: ICD-10-CM

## 2024-08-30 DIAGNOSIS — R55 SYNCOPE, UNSPECIFIED SYNCOPE TYPE: ICD-10-CM

## 2024-08-30 DIAGNOSIS — R55 SYNCOPE, UNSPECIFIED SYNCOPE TYPE: Primary | ICD-10-CM

## 2024-08-30 DIAGNOSIS — R55 SYNCOPAL EPISODES: ICD-10-CM

## 2024-08-30 DIAGNOSIS — Z87.898 HISTORY OF SYNCOPE: Chronic | ICD-10-CM

## 2024-08-30 DIAGNOSIS — R73.03 PREDIABETES: ICD-10-CM

## 2024-08-30 DIAGNOSIS — E78.5 HYPERLIPIDEMIA, UNSPECIFIED HYPERLIPIDEMIA TYPE: ICD-10-CM

## 2024-08-30 LAB
OHS QRS DURATION: 92 MS
OHS QTC CALCULATION: 406 MS

## 2024-08-30 PROCEDURE — 93285 PRGRMG DEV EVAL SCRMS IP: CPT | Mod: 26,,, | Performed by: INTERNAL MEDICINE

## 2024-08-30 PROCEDURE — 99999 PR PBB SHADOW E&M-EST. PATIENT-LVL IV: CPT | Mod: PBBFAC,,, | Performed by: INTERNAL MEDICINE

## 2024-08-30 PROCEDURE — 99999 PR PBB SHADOW E&M-EST. PATIENT-LVL II: CPT | Mod: PBBFAC,,,

## 2024-08-30 PROCEDURE — 93010 ELECTROCARDIOGRAM REPORT: CPT | Mod: ,,, | Performed by: INTERNAL MEDICINE

## 2024-08-30 PROCEDURE — 93005 ELECTROCARDIOGRAM TRACING: CPT

## 2024-08-30 NOTE — PROGRESS NOTES
Subjective:   Patient ID:  Bhaskar Stiles is a 53 y.o. male     Chief complaint:  Syncope, ILR    HPI  New patient to me. (12/16/2023 )  Referred by Dr Edmond for evaluation and management of syncope   --   Background as gleaned from patient's records :     hypertension, hyperlipidemia, and LAMONT on CPap referred after a syncopal episode.      Patient was waiting while wife was having a procedure, became diaphoretic, went to restroom and put cold water on his wrist and then passed out.  Took him to the ED where troponin were negative and ECG was normal (QT not prolonged although computer read it as such).  Has been happening for over 30 years.  Full evaluation in 2018 showed normal ECG, normal echo, normal 30 day event monitor and normal tilt table test.  Notes that he will feel bad when he gets overheated.  A cold shower will prevent syncope.  No dyspnea, chest pain or palpitations preceding syncope. Exercise, including sexual intercourse, will give him presyncopal symptoms     Patient denies any chest discomfort on exertion or at rest.  Patient denies any dyspnea at rest or on exertion, orthopnea, or PND. Patient denies any palpitations or lightheadedness.        Cardiac risk factors: hyperlipidemia, hypertension, obesity, positive family history, sedentary lifestyl  Tilt test in 2018:  1. Baseline values: blood pressure of 117/81 mmHg and heart rate of 68 bpm.   2. Normal response to head up tilt.    3. The maximum heart rate noted was 80 bpm at the 24th minute of tilting.   4. Normal blood pressure response to head up tilt.      Additional details gleaned from today's interview :  On BP meds - amlodipine 10, Hyzaar 100/12.5. never on BBs  He started having syncopal spells since age 19.  He thinks that these happen mostly when he was overheated.  However there were some unusual episodes;   Once, for instance, sometime before 2015, he woke up from sleep feeling lightheaded weak and dizzy and near  "syncopal.  Another event occurred while he was playing basketball-perhaps a little too intensely-but not it is friend feeling extremely exhausted.  Most recent episodes occurred in October and he had urinary incontinence as a result.  He has syncope episodes roughly every 2 years.  He has near syncopal episodes about 4 to 5 times per month.  He handles does by sitting down and I do not see to full syncope.  He gets near-syncope with  pain.  >>   Despite the fact that some of his episodes occurred in atypical circumstances for vasovagal syncope, it is quite likely that he is having vasodepressor syncope.  I suspect that he has a hyper vagotonic autonomic profile.  He also seems to have excessive venous pooling.  Today's orthostatic response was hypertensive suggestive of excessive venous pooling.  >>  Unfortunately, administrative decisions have scheduled our specialized autonomic tilt to be shut down after December 18th.  Therefore full, properly targeted tilt study manipulations will not be  available to help us with more specific diagnoses.  I will obtain a Holter monitor which may allow me to determine a cardiac autonomic profile (but not a vascular one) after which, I will recommend some changes to the current antihypertensive medications.     I had a long talk with patient and discussed the various treatment options available. Effective symptom relief and long term management rely heavily on instituting life style changes and "holistic" measures.   Pharmalogical treatment is used as a supplemental therapy rather than a primary approach.     Update 05/22/2024 :  He had a Holter monitor which was not particularly revealing.  >> sinus rhythm  Fast heart rates , SDNN 99  >>  As a result we decreased amlodipine to 5 mg a day and added Toprol 50 mg a day.    He has had no side effects related to these changes.  However he had 1 more syncopal event.  On that day, he says he was not feeling well and was in his work " vehicle seated.  He reclined the seat.  Next thing he knows is he woke up with what pats.  This was sometime after Brent susie.  He states that he had not participated in the celebrations.  I have reviewed the actual image of the ECG tracing obtained today and it shows NSR with normal intervals. HR is 84 with 1 PVC noted.     >>  In general, syncope resulting in loss of bladder control carries higher incidence of significant bradycardia.  Although, he had some warning signs, the whole process occurred while he was seated/reclining.  This conjures the possibility of a bradyarrhythmia.  Further, is bedside orthostatic testing is negative-except for the fact that he is hypertensive.  >>  We will proceed with an EP study of this is negative he will have a loop recorder implanted.     Update 08/30/2024 :  He had the EP study was completely negative.  An ILR was implanted.  So far there has been no recurrent events.  Patient's ILR was fully evaluated today under my direct supervision and real-time feedback.    Summary of findings are as listed below:  Device is in good repair.   The battery is not near ANNABELLE.  The sensing thresholds are favorable with well maintained safety margins.   There were no significant tachy-dysrhythmias noted.  There were no significant Mustapha arrhythmias noted.  Patient did hot tag symptomatic events. .  Recommendation:  Device follow up as per clinic routine with remote and in house checks    Patient has been doing well from a CV point of view w/o c/o undue dyspnea on exertion, orthopnea, PND, leg edema, abdominal swelling chest pain, palpitations, syncope or near-syncope.    Patient's main issue relates to weight loss.  He is trying actively to restore his diet and has started walking.    Current Outpatient Medications   Medication Sig    amLODIPine (NORVASC) 5 MG tablet Take 1 tablet (5 mg total) by mouth once daily.    DULoxetine (CYMBALTA) 30 MG capsule TAKE 1 CAPSULE BY MOUTH TWICE A DAY     losartan-hydrochlorothiazide 100-12.5 mg (HYZAAR) 100-12.5 mg Tab Take 1 tablet by mouth once daily.    metoprolol succinate (TOPROL-XL) 50 MG 24 hr tablet Take 1 tablet (50 mg total) by mouth once daily.    rosuvastatin (CRESTOR) 10 MG tablet TAKE 1 TABLET BY MOUTH EVERY DAY    tadalafiL (CIALIS) 20 MG Tab Take 1 tablet (20 mg total) by mouth Every 3 (three) days. As needed for ED     No current facility-administered medications for this visit.       Review of Systems     Constitutional: Reviewed  for decreased appetite, weight gain and weight loss.   HENT: Reviewed for nosebleeds.    Eyes:  Reviewed for blurred vision and visual disturbance.   Cardiovascular: Reviewed for chest pain, claudication, cyanosis,dyspnea on exertion, leg swelling, orthopnea,paroxysmal nocturnal dyspnearregular heartbeats, palpitations, near-syncope, and syncope.   Respiratory: Reviewed for cough, shortness of breath, wheezing, sleep disturbances due to breathing and snoring, .    Endocrine: Reviewed for heat intolerance.   Hematologic/Lymphatic: Reviewed for easy bruisability/bleeding.   Skin: Reviewed for rash.   Musculoskeletal: Reviewed for muscle weakness and myalgias.   Gastrointestinal: Reviewed for abdominal pain, anorexia, melena, nausea and vomiting.   Genitourinary: Reviewed for hesitancy, frequency, nocturia and incontinence.   Neurological: Reviewed for excessive daytime sleepiness, dizziness, vertigo, weakness, headaches, loss of balance and seizures,   Psychiatric/Behavioral:  Reviewed for insomnia, altered mental status, depression, anxiety and nervousness.       All symptoms reviewed above were negative except for   Daytime sleepiness       Social History     Tobacco Use   Smoking Status Never   Smokeless Tobacco Never        Objective:     Physical Exam  Vitals and nursing note reviewed.   Constitutional:       Appearance: Normal appearance. He is well-developed.      Comments: overweight   HENT:      Head: Normocephalic  and atraumatic.      Right Ear: External ear normal.      Left Ear: External ear normal.   Eyes:      Conjunctiva/sclera: Conjunctivae normal.      Left eye: Left conjunctiva is not injected. No hemorrhage.     Pupils: Pupils are equal, round, and reactive to light.   Neck:      Thyroid: No thyromegaly.      Vascular: No JVD.   Cardiovascular:      Rate and Rhythm: Normal rate and regular rhythm.      Chest Wall: PMI is not displaced.      Pulses: Intact distal pulses.           Carotid pulses are 2+ on the right side and 2+ on the left side.       Radial pulses are 2+ on the right side and 2+ on the left side.        Dorsalis pedis pulses are 2+ on the right side and 2+ on the left side.        Posterior tibial pulses are 2+ on the right side and 2+ on the left side.      Heart sounds: Normal heart sounds. No midsystolic click and no opening snap. No murmur heard.     No friction rub. No gallop.   Pulmonary:      Effort: Pulmonary effort is normal. No respiratory distress.      Breath sounds: Normal breath sounds. No wheezing or rales.   Chest:      Chest wall: No tenderness.      Comments: Device pocket is in excellent repair.  Abdominal:      Palpations: Abdomen is soft. Abdomen is not rigid. There is no hepatomegaly.      Tenderness: There is no abdominal tenderness. There is no guarding.      Comments: Obese abdomen   Musculoskeletal:         General: No tenderness. Normal range of motion.      Cervical back: Neck supple.      Right knee: No swelling.      Left knee: No swelling.      Right lower leg: No swelling.      Left lower leg: No swelling.      Right ankle: No swelling.      Left ankle: No swelling.      Right foot: No swelling.      Left foot: No swelling.   Skin:     General: Skin is warm and dry.      Coloration: Skin is not pale.      Findings: No rash.   Neurological:      General: No focal deficit present.      Mental Status: He is alert and oriented to person, place, and time.      Cranial  "Nerves: No cranial nerve deficit.      Coordination: Coordination normal.      Deep Tendon Reflexes: Reflexes are normal and symmetric.   Psychiatric:         Mood and Affect: Mood normal.         Behavior: Behavior normal.       /74 (BP Location: Left arm, Patient Position: Sitting, BP Method: Medium (Manual))   Pulse 65   Ht 5' 11" (1.803 m)   Wt 125 kg (275 lb 9.2 oz)   SpO2 96%   BMI 38.43 kg/m²       No results found for this or any previous visit.    WBC   Date Value Ref Range Status   07/16/2024 7.1 3.8 - 10.8 Thousand/uL Final     Hematocrit   Date Value Ref Range Status   07/16/2024 43.6 38.5 - 50.0 % Final     Hemoglobin   Date Value Ref Range Status   07/16/2024 13.6 13.2 - 17.1 g/dL Final     Lab Results   Component Value Date     07/16/2024     Lab Results   Component Value Date    CREATININE 1.11 07/16/2024    EGFRNORACEVR 80 07/16/2024    K 4.3 07/16/2024     Lab Results   Component Value Date    BNP <10 10/16/2023            reports no history of alcohol use.  Past Medical History:   Diagnosis Date    Family history of colon cancer in father     Hyperlipidemia     Hypertension     LAMONT (obstructive sleep apnea)     Syncope and collapse      Past Surgical History:   Procedure Laterality Date    CHONDROPLASTY OF KNEE Left 8/22/2019    Procedure: CHONDROPLASTY, KNEE;  Surgeon: Shelbi Hughes MD;  Location: [facility] OR;  Service: Orthopedics;  Laterality: Left;    COLONOSCOPY N/A 9/16/2022    Procedure: COLONOSCOPY;  Surgeon: RYAN Long MD;  Location: [address]);  Service: Endoscopy;  Laterality: N/A;  fully vaccinated, clears 4 hrs prior-am prep, inst portal-MS    DIAGNOSTIC CARDIAC ELECTROPHYSIOLOGY STUDY N/A 5/23/2024    Procedure: EP - diagnostic/possible loop implant;  Surgeon: Kong Terrell MD;  Location: [facility] CATH LAB;  Service: Cardiology;  Laterality: N/A;  possible loop implant [facility] notified    EPIDURAL STEROID INJECTION N/A 3/23/2022    Procedure: " INJECTION, STEROID, EPIDURAL, C7-T1 IL;  Surgeon: Anthony Schaefer MD;  Location: [facility] PAIN MGT;  Service: Pain Management;  Laterality: N/A;    EPIDURAL STEROID INJECTION N/A 8/10/2022    Procedure: INJECTION, STEROID, EPIDURAL,  C7-T1 IL CONTRAST;  Surgeon: Anthony Schaefer MD;  Location: [facility] PAIN MGT;  Service: Pain Management;  Laterality: N/A;    HERNIA REPAIR      INSERTION OF IMPLANTABLE LOOP RECORDER N/A 5/23/2024    Procedure: Insertion, Implantable Loop Recorder/[facility];  Surgeon: Kong Terrell MD;  Location: [facility] CATH LAB;  Service: Cardiology;  Laterality: N/A;    KNEE ARTHROSCOPY W/ MENISCECTOMY Left 8/22/2019    Procedure: ARTHROSCOPY, KNEE, WITH MEDIAL AND LATERAL MENISCECTOMY;  Surgeon: Shelbi Hughes MD;  Location: [facility];  Service: Orthopedics;  Laterality: Left;    KNEE ARTHROSCOPY W/ PLICA EXCISION Left 8/22/2019    Procedure: EXCISION, MEDIAL PLICA, KNEE, ARTHROSCOPIC;  Surgeon: Shelbi Hughes MD;  Location: [facility] OR;  Service: Orthopedics;  Laterality: Left;    LIPOMA RESECTION      abdomen    REMOVAL OF FOREIGN BODY FROM LOWER EXTREMITY Left 8/22/2019    Procedure: REMOVAL, FOREIGN BODY, LOOSE BODY,  LOWER EXTREMITY;  Surgeon: Shelbi Hughes MD;  Location: [facility] OR;  Service: Orthopedics;  Laterality: Left;    SHOULDER SURGERY      left    SYNOVECTOMY OF KNEE Left 8/22/2019    Procedure: PARTIAL SYNOVECTOMY, KNEE;  Surgeon: Shelbi Hughes MD;  Location: [facility] OR;  Service: Orthopedics;  Laterality: Left;    TILT TABLE TEST N/A 7/9/2018    Procedure: TILT TABLE TEST;  Surgeon: Leonardo Whitfield MD;  Location: [facility] CATH LAB;  Service: Cardiology;  Laterality: N/A;  Syncope, HUT, DM, 3 PREP    TRANSFORAMINAL EPIDURAL INJECTION OF STEROID Right 10/9/2019    Procedure: LUMBAR TRANSFORAMINAL RIGHT L5/S1;  Surgeon: Anthony Schaefer MD;  Location: [facility] PAIN MGT;  Service: Pain Management;  Laterality: Right;  NEEDS CONSENT    TRANSFORAMINAL EPIDURAL INJECTION OF STEROID Left 11/18/2020     Procedure: LUMBAR TRANSFORAMINAL LEFT L5/S1 DIRECT REFERRAL;  Surgeon: Anthony Schaefer MD;  Location: [facility];  Service: Pain Management;  Laterality: Left;  NEEDS CONSENT     Family History   Problem Relation Name Age of Onset    Colon cancer Father         Assessment:    Doing very well.  He feels well.  Trying to lose weight.  Blood pressure medications have been told.  Has not had a recurrent syncopal spell since ILR implant.  A negative EP study for evaluation of syncope care is a benign prognosis.  1. Syncope, unspecified syncope type    2. LAMONT (obstructive sleep apnea)    3. Prediabetes    4. Essential hypertension    5. Class 2 obesity due to excess calories without serious comorbidity with body mass index (BMI) of 38.0 to 38.9 in adult    6. Hyperlipidemia, unspecified hyperlipidemia type        Plan:    We discussed the Mediterranean diet as well as plan for aerobic exercise.  I discussed routine device follow up including quarterly to bi-annual device checks for device function as well as yearly follow up in the EP clinic. The patient  was advised to call with any concerns regarding their device. Device clinic follow up as scheduled. RTC 1y        No orders of the defined types were placed in this encounter.      No follow-ups on file.    There are no discontinued medications.         Medication List with Changes/Refills   Current Medications    AMLODIPINE (NORVASC) 5 MG TABLET    Take 1 tablet (5 mg total) by mouth once daily.    DULOXETINE (CYMBALTA) 30 MG CAPSULE    TAKE 1 CAPSULE BY MOUTH TWICE A DAY    LOSARTAN-HYDROCHLOROTHIAZIDE 100-12.5 MG (HYZAAR) 100-12.5 MG TAB    Take 1 tablet by mouth once daily.    METOPROLOL SUCCINATE (TOPROL-XL) 50 MG 24 HR TABLET    Take 1 tablet (50 mg total) by mouth once daily.    ROSUVASTATIN (CRESTOR) 10 MG TABLET    TAKE 1 TABLET BY MOUTH EVERY DAY    TADALAFIL (CIALIS) 20 MG TAB    Take 1 tablet (20 mg total) by mouth Every 3 (three) days. As needed for ED        This note is at least partially dictated using the M*Modal Fluency Direct word recognition program. There are word recognition mistakes that are occasionally missed on review.      Pre-visit chart review: 15 min    Face to face time: 12 min, > 50% of which spent in education    I have reviewed the actual images of all relevant ECG, rhythm, holter and long term monitoring tracings available in EPIC, MUSE  and in legacy as well as outside records.   I have reviewed the actual images of all relevant CXRays.   I have directly evaluated all relevant data pertaining to any CIED.     Post visit chart documentation and care coordination: 12 min

## 2024-08-31 LAB — OHS CV DC REMOTE DEVICE TYPE: NORMAL

## 2024-09-01 LAB
OHS QRS DURATION: 92 MS
OHS QTC CALCULATION: 406 MS

## 2024-09-05 ENCOUNTER — CLINICAL SUPPORT (OUTPATIENT)
Dept: CARDIOLOGY | Facility: HOSPITAL | Age: 54
End: 2024-09-05
Attending: INTERNAL MEDICINE
Payer: COMMERCIAL

## 2024-09-05 DIAGNOSIS — I49.8 OTHER SPECIFIED CARDIAC ARRHYTHMIAS: ICD-10-CM

## 2024-09-05 PROCEDURE — 93298 REM INTERROG DEV EVAL SCRMS: CPT | Performed by: INTERNAL MEDICINE

## 2024-09-05 PROCEDURE — 93298 REM INTERROG DEV EVAL SCRMS: CPT | Mod: 26,,, | Performed by: INTERNAL MEDICINE

## 2024-09-18 LAB — OHS CV DC REMOTE DEVICE TYPE: NORMAL

## 2024-09-23 NOTE — PATIENT INSTRUCTIONS
Sleep Hygiene Practices    1. Try going to bed only when you are drowsy.    ?    2. If you are unable to fall asleep or stay asleep, leave your bedroom and engage in a quiet activity elsewhere. Do not permit yourself to fall asleep outside the bedroom. Return to bed when and only when you are sleepy. Repeat this process as often as necessary throughout night.    3. Maintain regular wake-up time, even on days off work & weekends    4. Use your bedroom for sleep and sex    5. Avoid napping during the daytime. If daytime sleepiness becomes overwhelming, limit nap time to a single nap of less than 1hr, no later than 3pm.    6. Distract your mind. Avoid clock watching. Lying in bed unable to sleep and frustrated needs to be avoided. Try reading or watching a videotape or listening to books on tape. It may be necessary to go into another room to do these.    7. Avoid caffeine within 4-6hrs of bedtime    8. Avoid use of nicotine close to bedtime    9. do not drink alcoholic beverages within 4-6hrs of bedtime    10. While a light snack before bedtime can help promote sound sleep, avoid large meals.    11. Obtain regular exercise, but avoid strenuous exercise within 4hrs of bedtime    12. Minimize light, noise, and extremes in temperature in the bedroom.    13. Precautions: The patient was advised to abstain from driving should they feel sleepy or drowsy.    Will advise to start using nasal saline every day one - two squirt each nostril if that does not work  Use Flonase 1-2 sprays in each nostril at bedtime to help with nasal congestion. You need to use every day for at least a month to determine if it will be beneficial for you. Watch for nose bleeds. If this occurs, stop the medication and call the office.    [phone] OPTION 3 EXT [id number] . Fredy        * This information is provided had been directly obtained from the [employer] wellness booklet on sleep hygiene. ([address] [facility],  [address] [place] [address]     [Arrhythmia/ECG Abnorrmalities] : arrhythmia/ECG abnormalities

## 2024-10-06 ENCOUNTER — CLINICAL SUPPORT (OUTPATIENT)
Dept: CARDIOLOGY | Facility: HOSPITAL | Age: 54
End: 2024-10-06
Attending: INTERNAL MEDICINE
Payer: COMMERCIAL

## 2024-10-06 ENCOUNTER — CLINICAL SUPPORT (OUTPATIENT)
Dept: CARDIOLOGY | Facility: HOSPITAL | Age: 54
End: 2024-10-06
Payer: COMMERCIAL

## 2024-10-06 DIAGNOSIS — I49.8 OTHER SPECIFIED CARDIAC ARRHYTHMIAS: ICD-10-CM

## 2024-10-06 PROCEDURE — 93298 REM INTERROG DEV EVAL SCRMS: CPT | Mod: 26,,, | Performed by: INTERNAL MEDICINE

## 2024-10-06 PROCEDURE — 93298 REM INTERROG DEV EVAL SCRMS: CPT | Performed by: INTERNAL MEDICINE

## 2024-10-08 LAB — OHS CV DC REMOTE DEVICE TYPE: NORMAL

## 2024-10-14 NOTE — LETTER
Patient: Bhaskar Stiles   YOB: 1970   Clinic Number: [id number]   Today's Date: September 25, 2019        Certificate to Return to Work     Bhaskar Carrillo was seen by Michael Tesfaye PA-C on 9/25/2019.    Please excuse Bhaskar from work due to medical issues and severe pain. Hopefully can return to work in 1 week.         If you have any questions or concerns, please feel free to contact the office at [phone].    Thank you.    Michale Tesfaye PA-C        Signature: __________________________________________________       
Vaginal

## 2024-11-06 ENCOUNTER — CLINICAL SUPPORT (OUTPATIENT)
Dept: CARDIOLOGY | Facility: HOSPITAL | Age: 54
End: 2024-11-06

## 2024-11-06 ENCOUNTER — CLINICAL SUPPORT (OUTPATIENT)
Dept: CARDIOLOGY | Facility: HOSPITAL | Age: 54
End: 2024-11-06
Attending: INTERNAL MEDICINE
Payer: COMMERCIAL

## 2024-11-06 DIAGNOSIS — I49.8 OTHER SPECIFIED CARDIAC ARRHYTHMIAS: ICD-10-CM

## 2024-11-06 PROCEDURE — 93298 REM INTERROG DEV EVAL SCRMS: CPT | Mod: 26,,, | Performed by: INTERNAL MEDICINE

## 2024-11-06 PROCEDURE — 93298 REM INTERROG DEV EVAL SCRMS: CPT | Performed by: INTERNAL MEDICINE

## 2024-11-13 ENCOUNTER — OFFICE VISIT (OUTPATIENT)
Dept: INTERNAL MEDICINE | Facility: CLINIC | Age: 54
End: 2024-11-13
Payer: COMMERCIAL

## 2024-11-13 VITALS
BODY MASS INDEX: 39.14 KG/M2 | HEIGHT: 71 IN | WEIGHT: 279.56 LBS | SYSTOLIC BLOOD PRESSURE: 122 MMHG | OXYGEN SATURATION: 93 % | HEART RATE: 87 BPM | DIASTOLIC BLOOD PRESSURE: 82 MMHG

## 2024-11-13 DIAGNOSIS — E78.5 HYPERLIPIDEMIA, UNSPECIFIED HYPERLIPIDEMIA TYPE: ICD-10-CM

## 2024-11-13 DIAGNOSIS — I25.10 ASCVD (ARTERIOSCLEROTIC CARDIOVASCULAR DISEASE): ICD-10-CM

## 2024-11-13 DIAGNOSIS — R73.03 PREDIABETES: ICD-10-CM

## 2024-11-13 DIAGNOSIS — Z00.00 ANNUAL PHYSICAL EXAM: ICD-10-CM

## 2024-11-13 DIAGNOSIS — I10 ESSENTIAL HYPERTENSION: Primary | ICD-10-CM

## 2024-11-13 DIAGNOSIS — M54.12 CERVICAL RADICULOPATHY: ICD-10-CM

## 2024-11-13 PROCEDURE — 3044F HG A1C LEVEL LT 7.0%: CPT | Mod: CPTII,S$GLB,, | Performed by: STUDENT IN AN ORGANIZED HEALTH CARE EDUCATION/TRAINING PROGRAM

## 2024-11-13 PROCEDURE — 3008F BODY MASS INDEX DOCD: CPT | Mod: CPTII,S$GLB,, | Performed by: STUDENT IN AN ORGANIZED HEALTH CARE EDUCATION/TRAINING PROGRAM

## 2024-11-13 PROCEDURE — G2211 COMPLEX E/M VISIT ADD ON: HCPCS | Mod: S$GLB,,, | Performed by: STUDENT IN AN ORGANIZED HEALTH CARE EDUCATION/TRAINING PROGRAM

## 2024-11-13 PROCEDURE — 99999 PR PBB SHADOW E&M-EST. PATIENT-LVL IV: CPT | Mod: PBBFAC,,, | Performed by: STUDENT IN AN ORGANIZED HEALTH CARE EDUCATION/TRAINING PROGRAM

## 2024-11-13 PROCEDURE — 3074F SYST BP LT 130 MM HG: CPT | Mod: CPTII,S$GLB,, | Performed by: STUDENT IN AN ORGANIZED HEALTH CARE EDUCATION/TRAINING PROGRAM

## 2024-11-13 PROCEDURE — 99214 OFFICE O/P EST MOD 30 MIN: CPT | Mod: S$GLB,,, | Performed by: STUDENT IN AN ORGANIZED HEALTH CARE EDUCATION/TRAINING PROGRAM

## 2024-11-13 PROCEDURE — 1159F MED LIST DOCD IN RCRD: CPT | Mod: CPTII,S$GLB,, | Performed by: STUDENT IN AN ORGANIZED HEALTH CARE EDUCATION/TRAINING PROGRAM

## 2024-11-13 PROCEDURE — 3079F DIAST BP 80-89 MM HG: CPT | Mod: CPTII,S$GLB,, | Performed by: STUDENT IN AN ORGANIZED HEALTH CARE EDUCATION/TRAINING PROGRAM

## 2024-11-13 RX ORDER — AMLODIPINE BESYLATE 5 MG/1
5 TABLET ORAL DAILY
Qty: 90 TABLET | Refills: 3 | Status: SHIPPED | OUTPATIENT
Start: 2024-11-13

## 2024-11-13 RX ORDER — METOPROLOL SUCCINATE 50 MG/1
50 TABLET, EXTENDED RELEASE ORAL DAILY
Qty: 90 TABLET | Refills: 3 | Status: SHIPPED | OUTPATIENT
Start: 2024-11-13

## 2024-11-13 RX ORDER — ROSUVASTATIN CALCIUM 10 MG/1
10 TABLET, COATED ORAL NIGHTLY
Qty: 90 TABLET | Refills: 3 | Status: SHIPPED | OUTPATIENT
Start: 2024-11-13

## 2024-11-13 NOTE — PROGRESS NOTES
[facility]    Subjective:       Patient ID: Bhaskar Stiles is a 54 y.o. male.    Chief Complaint: Pre-diabetes and Weight Check    History was obtained from the patient and supplemented through chart review.  This patient is known to me.      HPI:    Patient is a 54 y.o. male w/ pmhx of HTN, HLD, SHANTAL, prediabetes, fam hc colon cancer presents for f/u htn, prediabetes, obesity    Intentional weight loss  Just had labs for blueprint for wellness, will send to us  PSA, A1c completed, otherwise family is unsure  Doesn't eat bread (doesn't buy)  Switched drinks    9 month old born to 35 yo daughter, also step-daughter in 20's and 11 yo daughter    New prediabetes  5.7, declines metformin, repeat for work, will send to me   Plans to change diet and increase movement  Switched from tea and lemonade  Encouraged more exercise    Subclinical hyperthyroid  Recheck TSH in spring, next set of labs    Hx of vasovagal syncope 2018, now again 2023, and again feb 2024  Has cardiology appt Dr. Terrell, now with LOOP recorder  Was frustrated because this has been happening since 2017, now curious about when it will be taken out  Doing well  Seen by cardio Dec, taking toprol 50, amlod 5 mg, continuing losartan-hctz 100-12.5  Seen by Dr. Terrell, [place]    Htn  losar-HCTZ 100-12.5 mg, amlodipine 5 mg daily, toprol 50 mg  borderline high DBP again, high sodium     shantal  Better with BIPAP, needs to get from DME company, given number, will need sleep med appt to f/u  Lost weight    hld  Cont Crestor 10, stable    Family hx of colon cancer  Performed 9/2022, repeat due in 2027    Morbid Obesity  Improved  Hx of popeyes, mac and cheese; used to be [employer] every day, more vegetables now  A1cs now prediabetes  Wt Readings from Last 15 Encounters:   11/13/24 126.8 kg (279 lb 8.7 oz)   08/30/24 125 kg (275 lb 9.2 oz)   07/18/24 131.9 kg (290 lb 12.6 oz)   05/23/24 132 kg (291 lb)   05/17/24 132.4 kg (291 lb 14.2 oz)    05/06/24 130.5 kg (287 lb 11.2 oz)   04/18/24 130.4 kg (287 lb 7.7 oz)   12/15/23 127.6 kg (281 lb 4.9 oz)   10/30/23 126.4 kg (278 lb 10.6 oz)   10/18/23 125.5 kg (276 lb 10.8 oz)   04/21/23 120.8 kg (266 lb 5.1 oz)   03/06/23 118 kg (260 lb 2.3 oz)   02/06/23 120.2 kg (264 lb 15.9 oz)   01/18/23 120.3 kg (265 lb 3.4 oz)   12/21/22 120.5 kg (265 lb 10.5 oz)     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)  Wife works in healthcare as well    Myofascial pain, cervical radiculopathy, cervical spondylosis  Cymbalta helps, Rx from pain management    Lumbar radiculopathy  Right side hip, knee pain  Not issue currently      Not addressed  Vision changes reported in the past, outside [facility]  Doc retired  Given [employer] phone number    Medical History  Past Medical History:   Diagnosis Date    Family history of colon cancer in father     Hyperlipidemia     Hypertension     LAMONT (obstructive sleep apnea)     Syncope and collapse        Review of Systems   Respiratory:  Negative for shortness of breath.    Cardiovascular:  Negative for chest pain.   Gastrointestinal:  Negative for abdominal pain, constipation, diarrhea and vomiting.         Surgical hx, family hx, social hx   Have been reviewed       Current Outpatient Medications:     DULoxetine (CYMBALTA) 30 MG capsule, TAKE 1 CAPSULE BY MOUTH TWICE A DAY, Disp: 180 capsule, Rfl: 2    losartan-hydrochlorothiazide 100-12.5 mg (HYZAAR) 100-12.5 mg Tab, Take 1 tablet by mouth once daily., Disp: 90 tablet, Rfl: 3    tadalafiL (CIALIS) 20 MG Tab, Take 1 tablet (20 mg total) by mouth Every 3 (three) days. As needed for ED, Disp: 10 tablet, Rfl: 11    amLODIPine (NORVASC) 5 MG tablet, Take 1 tablet (5 mg total) by mouth once daily., Disp: 90 tablet, Rfl: 3    metoprolol succinate (TOPROL-XL) 50 MG 24 hr tablet, Take 1 tablet (50 mg total) by mouth once daily., Disp: 90 tablet, Rfl: 3    rosuvastatin (CRESTOR) 10 MG tablet, Take 1  "tablet (10 mg total) by mouth every evening., Disp: 90 tablet, Rfl: 3    Objective:        Body mass index is 38.99 kg/m².  Vitals:    11/13/24 0935 11/13/24 1005   BP: 128/88 122/82   Pulse: 87    SpO2: (!) 93%    Weight: 126.8 kg (279 lb 8.7 oz)    Height: 5' 11" (1.803 m)    PainSc: 0-No pain            Physical Exam  Vitals and nursing note reviewed.   Constitutional:       General: He is not in acute distress.     Appearance: Normal appearance. He is not ill-appearing.      Comments: Obesity   HENT:      Head: Normocephalic and atraumatic.   Eyes:      General: No scleral icterus.  Cardiovascular:      Rate and Rhythm: Normal rate and regular rhythm.      Heart sounds: Normal heart sounds.   Pulmonary:      Effort: Pulmonary effort is normal.   Abdominal:      General: There is no distension.   Musculoskeletal:         General: No deformity.      Cervical back: Normal range of motion.   Skin:     General: Skin is warm and dry.   Neurological:      Mental Status: He is alert and oriented to person, place, and time.   Psychiatric:         Behavior: Behavior normal.           Lab Results   Component Value Date    WBC 7.1 07/16/2024    HGB 13.6 07/16/2024    HCT 43.6 07/16/2024     07/16/2024    CHOL 149 07/16/2024    TRIG 89 07/16/2024    HDL 43 07/16/2024    ALT 30 07/16/2024    AST 19 07/16/2024     07/16/2024    K 4.3 07/16/2024     07/16/2024    CREATININE 1.11 07/16/2024    BUN 18 07/16/2024    CO2 27 07/16/2024    TSH 0.30 (L) 07/16/2024    INR 1.0 06/05/2018    HGBA1C 5.6 07/22/2024       The 10-year ASCVD risk score (Hernán ANN, et al., 2019) is: 8.8%    Values used to calculate the score:      Age: 54 years      Sex: Male      Is Non- : Yes      Diabetic: No      Tobacco smoker: No      Systolic Blood Pressure: 122 mmHg      Is BP treated: Yes      HDL Cholesterol: 43 mg/dL      Total Cholesterol: 149 mg/dL    On crestor 10 mg    (Imaging have been independently " reviewed)    Ct head reviewed 10/2023      Assessment:         1. Essential hypertension    2. ASCVD (arteriosclerotic cardiovascular disease)    3. Hyperlipidemia, unspecified hyperlipidemia type    4. Cervical radiculopathy    5. Prediabetes    6. Annual physical exam                    Plan:     Bhaskar was seen today for pre-diabetes and weight check.    Diagnoses and all orders for this visit:    Essential hypertension  -     metoprolol succinate (TOPROL-XL) 50 MG 24 hr tablet; Take 1 tablet (50 mg total) by mouth once daily.  -     amLODIPine (NORVASC) 5 MG tablet; Take 1 tablet (5 mg total) by mouth once daily.    ASCVD (arteriosclerotic cardiovascular disease)  -     rosuvastatin (CRESTOR) 10 MG tablet; Take 1 tablet (10 mg total) by mouth every evening.    Hyperlipidemia, unspecified hyperlipidemia type  -     rosuvastatin (CRESTOR) 10 MG tablet; Take 1 tablet (10 mg total) by mouth every evening.    Cervical radiculopathy    Prediabetes    Annual physical exam  Comments:  Ordered labs for annual in the spring  Orders:  -     Comprehensive Metabolic Panel; Future  -     Lipid Panel; Future  -     TSH; Future  -     CBC Auto Differential; Future  -     Hemoglobin A1C; Future          Health Maintenance  - Lipids:   - A1C:   - Colon Ca Screen: 9/2022, due in 2027; fam hx of colon cancer  - Immunizations:needs second shingles and next covid    Follow up in about 6 months (around 5/13/2025).     [employer] blueprinted wellness to be sent via portal and reviewed    Or sooner prn      30 min were used in chart review, evaluation and counseling of patient, documentation and review of results on same day of service     Visit today is associated with current or anticipated ongoing medical care related to this patient's single serious condition/complex condition of syncope, prediabetes, covid, htn, obesity. The patient will return to see me as these issues will be followed longitudinally.      All medications were  reviewed including potential side effects and risks/benefits.  Pt was counseled to call back if anything worsens or if questions arise.    Fredy Rey MD  Family Medicine  [facility]  [facility] [address] [address]  Phone [phone]  Fax [phone]

## 2024-11-14 ENCOUNTER — PATIENT MESSAGE (OUTPATIENT)
Dept: INTERNAL MEDICINE | Facility: CLINIC | Age: 54
End: 2024-11-14
Payer: COMMERCIAL

## 2024-12-07 ENCOUNTER — CLINICAL SUPPORT (OUTPATIENT)
Dept: CARDIOLOGY | Facility: HOSPITAL | Age: 54
End: 2024-12-07

## 2024-12-07 ENCOUNTER — CLINICAL SUPPORT (OUTPATIENT)
Dept: CARDIOLOGY | Facility: HOSPITAL | Age: 54
End: 2024-12-07
Attending: INTERNAL MEDICINE
Payer: COMMERCIAL

## 2024-12-07 DIAGNOSIS — I49.8 OTHER SPECIFIED CARDIAC ARRHYTHMIAS: ICD-10-CM

## 2024-12-07 PROCEDURE — 93298 REM INTERROG DEV EVAL SCRMS: CPT | Mod: 26,,, | Performed by: INTERNAL MEDICINE

## 2024-12-07 PROCEDURE — 93298 REM INTERROG DEV EVAL SCRMS: CPT | Performed by: INTERNAL MEDICINE

## 2025-01-07 ENCOUNTER — CLINICAL SUPPORT (OUTPATIENT)
Dept: CARDIOLOGY | Facility: HOSPITAL | Age: 55
End: 2025-01-07
Attending: INTERNAL MEDICINE
Payer: COMMERCIAL

## 2025-01-07 ENCOUNTER — CLINICAL SUPPORT (OUTPATIENT)
Dept: CARDIOLOGY | Facility: HOSPITAL | Age: 55
End: 2025-01-07
Payer: COMMERCIAL

## 2025-01-07 DIAGNOSIS — I49.8 OTHER SPECIFIED CARDIAC ARRHYTHMIAS: ICD-10-CM

## 2025-01-07 PROCEDURE — 93298 REM INTERROG DEV EVAL SCRMS: CPT | Mod: 26,,, | Performed by: INTERNAL MEDICINE

## 2025-01-07 PROCEDURE — 93298 REM INTERROG DEV EVAL SCRMS: CPT | Performed by: INTERNAL MEDICINE

## 2025-01-16 ENCOUNTER — TELEPHONE (OUTPATIENT)
Dept: CARDIOLOGY | Facility: CLINIC | Age: 55
End: 2025-01-16
Payer: COMMERCIAL

## 2025-01-16 NOTE — TELEPHONE ENCOUNTER
Contacted the pt he states he wants to use [facility]gvc to see dr torre. 2nd week of September with device check pb

## 2025-01-25 DIAGNOSIS — M50.30 DDD (DEGENERATIVE DISC DISEASE), CERVICAL: Primary | ICD-10-CM

## 2025-01-27 RX ORDER — DULOXETIN HYDROCHLORIDE 30 MG/1
CAPSULE, DELAYED RELEASE ORAL
Qty: 180 CAPSULE | Refills: 2 | Status: SHIPPED | OUTPATIENT
Start: 2025-01-27

## 2025-01-29 LAB
OHS CV DC REMOTE DEVICE TYPE: NORMAL

## 2025-02-07 ENCOUNTER — CLINICAL SUPPORT (OUTPATIENT)
Dept: CARDIOLOGY | Facility: HOSPITAL | Age: 55
End: 2025-02-07
Payer: COMMERCIAL

## 2025-02-07 ENCOUNTER — CLINICAL SUPPORT (OUTPATIENT)
Dept: CARDIOLOGY | Facility: HOSPITAL | Age: 55
End: 2025-02-07
Attending: INTERNAL MEDICINE
Payer: COMMERCIAL

## 2025-02-07 DIAGNOSIS — I49.8 OTHER SPECIFIED CARDIAC ARRHYTHMIAS: ICD-10-CM

## 2025-02-07 LAB — OHS CV DC REMOTE DEVICE TYPE: NORMAL

## 2025-02-07 PROCEDURE — 93298 REM INTERROG DEV EVAL SCRMS: CPT | Performed by: INTERNAL MEDICINE

## 2025-02-07 PROCEDURE — 93298 REM INTERROG DEV EVAL SCRMS: CPT | Mod: 26,,, | Performed by: INTERNAL MEDICINE

## 2025-03-10 ENCOUNTER — CLINICAL SUPPORT (OUTPATIENT)
Dept: CARDIOLOGY | Facility: HOSPITAL | Age: 55
End: 2025-03-10
Attending: INTERNAL MEDICINE
Payer: COMMERCIAL

## 2025-03-10 ENCOUNTER — CLINICAL SUPPORT (OUTPATIENT)
Dept: CARDIOLOGY | Facility: HOSPITAL | Age: 55
End: 2025-03-10
Payer: COMMERCIAL

## 2025-03-10 DIAGNOSIS — I49.8 OTHER SPECIFIED CARDIAC ARRHYTHMIAS: ICD-10-CM

## 2025-03-10 PROCEDURE — 93298 REM INTERROG DEV EVAL SCRMS: CPT | Performed by: INTERNAL MEDICINE

## 2025-03-10 PROCEDURE — 93298 REM INTERROG DEV EVAL SCRMS: CPT | Mod: 26,,, | Performed by: INTERNAL MEDICINE

## 2025-03-11 LAB — OHS CV DC REMOTE DEVICE TYPE: NORMAL

## 2025-03-13 ENCOUNTER — PATIENT MESSAGE (OUTPATIENT)
Dept: INTERNAL MEDICINE | Facility: CLINIC | Age: 55
End: 2025-03-13
Payer: COMMERCIAL

## 2025-03-15 ENCOUNTER — RESULTS FOLLOW-UP (OUTPATIENT)
Dept: INTERNAL MEDICINE | Facility: CLINIC | Age: 55
End: 2025-03-15

## 2025-04-10 ENCOUNTER — CLINICAL SUPPORT (OUTPATIENT)
Dept: CARDIOLOGY | Facility: HOSPITAL | Age: 55
End: 2025-04-10
Attending: INTERNAL MEDICINE
Payer: COMMERCIAL

## 2025-04-10 ENCOUNTER — CLINICAL SUPPORT (OUTPATIENT)
Dept: CARDIOLOGY | Facility: HOSPITAL | Age: 55
End: 2025-04-10
Payer: COMMERCIAL

## 2025-04-10 DIAGNOSIS — I49.8 OTHER SPECIFIED CARDIAC ARRHYTHMIAS: ICD-10-CM

## 2025-04-10 PROCEDURE — 93298 REM INTERROG DEV EVAL SCRMS: CPT | Mod: 26,,, | Performed by: INTERNAL MEDICINE

## 2025-04-10 PROCEDURE — 93298 REM INTERROG DEV EVAL SCRMS: CPT | Performed by: INTERNAL MEDICINE

## 2025-04-14 LAB — OHS CV DC REMOTE DEVICE TYPE: NORMAL

## 2025-04-22 DIAGNOSIS — I10 ESSENTIAL HYPERTENSION: ICD-10-CM

## 2025-04-22 RX ORDER — LOSARTAN POTASSIUM AND HYDROCHLOROTHIAZIDE 12.5; 1 MG/1; MG/1
1 TABLET ORAL
Qty: 90 TABLET | Refills: 1 | Status: SHIPPED | OUTPATIENT
Start: 2025-04-22

## 2025-04-22 NOTE — TELEPHONE ENCOUNTER
Refill Decision Note   Bhaskar Go  is requesting a refill authorization.    Brief Assessment and Rationale for Refill:   Approve       Medication Therapy Plan:         Comments:     Note composed:12:36 PM 04/22/2025

## 2025-04-22 NOTE — TELEPHONE ENCOUNTER
No care due was identified.  [facility] Embedded Care Due Messages. Reference number: [id number].   4/22/2025 12:22:16 AM CDT

## 2025-05-09 ENCOUNTER — HOSPITAL ENCOUNTER (OUTPATIENT)
Dept: RADIOLOGY | Facility: OTHER | Age: 55
Discharge: HOME | End: 2025-05-09
Payer: COMMERCIAL

## 2025-05-09 ENCOUNTER — RESULTS FOLLOW-UP (OUTPATIENT)
Dept: INTERNAL MEDICINE | Facility: CLINIC | Age: 55
End: 2025-05-09

## 2025-05-09 ENCOUNTER — OFFICE VISIT (OUTPATIENT)
Dept: INTERNAL MEDICINE | Facility: CLINIC | Age: 55
End: 2025-05-09
Payer: COMMERCIAL

## 2025-05-09 VITALS
DIASTOLIC BLOOD PRESSURE: 68 MMHG | HEART RATE: 76 BPM | BODY MASS INDEX: 41.9 KG/M2 | WEIGHT: 299.25 LBS | OXYGEN SATURATION: 95 % | SYSTOLIC BLOOD PRESSURE: 114 MMHG | HEIGHT: 71 IN

## 2025-05-09 DIAGNOSIS — R06.09 DOE (DYSPNEA ON EXERTION): ICD-10-CM

## 2025-05-09 DIAGNOSIS — Z87.898 HISTORY OF SYNCOPE: Chronic | ICD-10-CM

## 2025-05-09 DIAGNOSIS — I10 ESSENTIAL HYPERTENSION: ICD-10-CM

## 2025-05-09 DIAGNOSIS — R60.0 BILATERAL LEG EDEMA: Primary | ICD-10-CM

## 2025-05-09 DIAGNOSIS — M25.471 BILATERAL SWELLING OF FEET AND ANKLES: ICD-10-CM

## 2025-05-09 DIAGNOSIS — E78.5 HYPERLIPIDEMIA, UNSPECIFIED HYPERLIPIDEMIA TYPE: ICD-10-CM

## 2025-05-09 DIAGNOSIS — M25.475 BILATERAL SWELLING OF FEET AND ANKLES: ICD-10-CM

## 2025-05-09 DIAGNOSIS — G47.33 OSA (OBSTRUCTIVE SLEEP APNEA): ICD-10-CM

## 2025-05-09 DIAGNOSIS — E66.01 MORBID OBESITY: ICD-10-CM

## 2025-05-09 DIAGNOSIS — R73.03 PREDIABETES: ICD-10-CM

## 2025-05-09 DIAGNOSIS — M25.472 BILATERAL SWELLING OF FEET AND ANKLES: ICD-10-CM

## 2025-05-09 DIAGNOSIS — M25.474 BILATERAL SWELLING OF FEET AND ANKLES: ICD-10-CM

## 2025-05-09 LAB
OHS QRS DURATION: 84 MS
OHS QTC CALCULATION: 451 MS

## 2025-05-09 PROCEDURE — 93010 ELECTROCARDIOGRAM REPORT: CPT | Mod: S$GLB,,, | Performed by: INTERNAL MEDICINE

## 2025-05-09 PROCEDURE — 71046 X-RAY EXAM CHEST 2 VIEWS: CPT | Mod: TC,FY

## 2025-05-09 PROCEDURE — 93005 ELECTROCARDIOGRAM TRACING: CPT | Mod: S$GLB,,,

## 2025-05-09 PROCEDURE — 71046 X-RAY EXAM CHEST 2 VIEWS: CPT | Mod: 26,,, | Performed by: RADIOLOGY

## 2025-05-09 PROCEDURE — 99999 PR PBB SHADOW E&M-EST. PATIENT-LVL IV: CPT | Mod: PBBFAC,,,

## 2025-05-09 RX ORDER — FUROSEMIDE 20 MG/1
20 TABLET ORAL DAILY
Qty: 3 TABLET | Refills: 0 | Status: SHIPPED | OUTPATIENT
Start: 2025-05-09

## 2025-05-09 NOTE — ASSESSMENT & PLAN NOTE
Lab Results   Component Value Date    HGBA1C 5.6 03/14/2025     Diet controlled. The current medical regimen is effective;  continue present plan and medications.

## 2025-05-09 NOTE — PATIENT INSTRUCTIONS
Great meeting you, just to review visit:    Follow low sodium diet. <2.4g in 24 hours. Elevate your legs when you can. Go to the emergency room for any worsening symptoms including chest pain, shortness of breath, etc.     Like we discussed.   Lasix 20mg daily x 3 days, will make you urinate so don't take before bed.   No change on EKG.   Continue to monitor your blood pressure and take your medication as prescribed.     Pending labs, next steps, consider Echo of heart       Weight reduction: maintain a normal body weight (BMI 19-25)  DASH diet: Consume diet rich in fruits, vegetables, and low-fat dairy products with a reduced content of saturated fat and total fat.   Low-sodium diet: consume <2400mg of sodium per day  Increase physical activity: regular aerobic physical activity (150 min per week)  Limit alcohol consumption: Less than 2 drinks/day in men and less than 1 drink/day in women.   QUIT smoking

## 2025-05-09 NOTE — PROGRESS NOTES
HPI     Chief Complaint:  Chief Complaint   Patient presents with    Edema    Wheezing    darkness around the ankle       Bhaskar Stiles is a 54 y.o. male with multiple medical diagnoses as listed in the medical history and problem list that presents for   Chief Complaint   Patient presents with    Edema    Wheezing    darkness around the ankle   .   Patient is not known to me with his last appointment in this department on 11/13/2024.      HPI    Loop recorder - device check monthly    SOB worse lately, worse with minimal exertion also assoicated wheezing and ankle swelling. No orthonpea but when he gets up to use the restroom has to sit up for a minute    10 lb weight gain since March    Admits eating lots of friend food lately. Doesn't monitor sodium intake. Works night shift and day shift. On feet a lot or sitting. Only elevated his legs at home.     Does report darkened area around ankles and top of feet (not new) but wonders what related to. Denies pain or numbness/tingling.     1+ pitting edema below the knees, bilateral.     F/b cardiology, previous episode of syncope.     LAMONT   -doesn't tolerate CPAP machine, states when he uses for multiple days in a row, causes sinus infections.     No results found for this or any previous visit.    Carotlid US WNL  No recent echo      Assessment & Plan       1. Bilateral leg edema  -1+ pitting edema  -no orthopnea  +dyspnea  No claudication, chest pain or dizziness  Poor diet adherence, education provided, <2.4 g of sodium per day    Encouraged pt to complete labs today but pt requesting labs with [facility], aware they will not come back quickly, strict ED precautions with worsening s/s  Lasix 20mg x 3 days    Pending labs/xray, consider Echo or US  EKG no acute changes  -     Cancel: Comprehensive Metabolic Panel; Future; Expected date: 05/09/2025  -     Cancel: CBC Auto Differential; Future; Expected date: 05/09/2025  -     Cancel: BNP; Future; Expected date:  05/09/2025  -     X-Ray Chest PA And Lateral; Future; Expected date: 05/09/2025  -     IN OFFICE EKG 12-LEAD (to Muse)  -     furosemide (LASIX) 20 MG tablet; Take 1 tablet (20 mg total) by mouth once daily.  Dispense: 3 tablet; Refill: 0  -     CBC Auto Differential; Future; Expected date: 05/09/2025  -     BNP; Future; Expected date: 05/09/2025  -     Comprehensive Metabolic Panel; Future; Expected date: 05/09/2025    2. LOPEZ (dyspnea on exertion)  See above  -     Cancel: Comprehensive Metabolic Panel; Future; Expected date: 05/09/2025  -     Cancel: CBC Auto Differential; Future; Expected date: 05/09/2025  -     Cancel: BNP; Future; Expected date: 05/09/2025  -     X-Ray Chest PA And Lateral; Future; Expected date: 05/09/2025  -     IN OFFICE EKG 12-LEAD (to Muse)  -     furosemide (LASIX) 20 MG tablet; Take 1 tablet (20 mg total) by mouth once daily.  Dispense: 3 tablet; Refill: 0  -     CBC Auto Differential; Future; Expected date: 05/09/2025  -     BNP; Future; Expected date: 05/09/2025  -     Comprehensive Metabolic Panel; Future; Expected date: 05/09/2025    3. Essential hypertension  BP stable. The current medical regimen is effective. Continue medication as prescribed.   -Losartan/hctz  -metoprolol nightly  -amlodipine, discussed common AE of CCB and potential cause of BLE edema  4. History of syncope  Assessment & Plan:  Noted history since adolescent? Loop recorder. F/b cardiology. No recent syncope. Stable, chronic condition.  Will continue to maximize risk factor reduction and adjust medication as needed        5. LAMONT (obstructive sleep apnea)  Assessment & Plan:  -doesn't like CPAP  -stable      6. Morbid obesity    7. Hyperlipidemia, unspecified hyperlipidemia type  Continue statin, stable. Encouraged heart healthy diet.    Lab Results   Component Value Date    CHOL 143 03/14/2025    CHOL 149 07/16/2024    CHOL 151 10/25/2023      Lab Results   Component Value Date    HDL 37 (L) 03/14/2025    HDL 43  07/16/2024    HDL 42 10/25/2023     Lab Results   Component Value Date    LDLCALC 86 03/14/2025    LDLCALC 88 07/16/2024    LDLCALC 88 10/25/2023      Lab Results   Component Value Date    TRIG 106 03/14/2025    TRIG 89 07/16/2024    TRIG 109 10/25/2023     Lab Results   Component Value Date    TOTALCHOLEST 5.7 (H) 02/10/2007     Lab Results   Component Value Date    NONHDLCHOL 106 03/14/2025    NONHDLCHOL 106 07/16/2024    NONHDLCHOL 109 10/25/2023     Lab Results   Component Value Date    CHOLHDL 3.9 03/14/2025    CHOLHDL 3.5 07/16/2024    CHOLHDL 3.6 10/25/2023       8. Prediabetes  Assessment & Plan:  Lab Results   Component Value Date    HGBA1C 5.6 03/14/2025     Diet controlled. The current medical regimen is effective;  continue present plan and medications.              --------------------------------------------      Health Maintenance:  Health Maintenance         Date Due Completion Date    Pneumococcal Vaccines (Age 50+) (1 of 1 - PCV) Never done ---    Shingles Vaccine (2 of 2) 12/24/2022 10/29/2022    COVID-19 Vaccine (4 - 2024-25 season) 09/01/2024 12/29/2021    TETANUS VACCINE 07/27/2025 7/27/2015    Hemoglobin A1c (Prediabetes) 03/14/2026 3/14/2025    Colorectal Cancer Screening 09/16/2027 9/16/2022    Override on 9/29/2017: Done    Lipid Panel 03/14/2030 3/14/2025    RSV Vaccine (Age 60+ and Pregnant patients) (1 - 1-dose 75+ series) 09/18/2046 ---            Health maintenance reviewed    Follow Up:  Follow up if symptoms worsen or fail to improve.    Discussed DDx, condition, and treatment.   Education sent to patient portal/included in after visit summary.  ED precautions given.   Notify provider if symptoms do not resolve or increase in severity.   Patient verbalizes understanding and agrees with plan of care.    Exam     Review of Systems:  (as noted above)  Review of Systems    Physical Exam:   Physical Exam  Vitals reviewed.   Constitutional:       General: He is not in acute distress.      "Appearance: Normal appearance. He is obese. He is not ill-appearing or toxic-appearing.   HENT:      Head: Normocephalic and atraumatic.   Neck:      Vascular: No carotid bruit.   Cardiovascular:      Rate and Rhythm: Normal rate and regular rhythm.      Pulses: Normal pulses.      Heart sounds: Normal heart sounds. No murmur heard.     Comments: No JVD  Pulmonary:      Effort: Pulmonary effort is normal. No respiratory distress.      Breath sounds: Normal breath sounds.   Musculoskeletal:      Cervical back: Normal range of motion. No rigidity.      Right lower le+ Pitting Edema present.      Left lower le+ Pitting Edema present.        Legs:       Comments: Hyperpigmentation-black    Pitting edema-blue   Lymphadenopathy:      Cervical: No cervical adenopathy.   Skin:     General: Skin is warm.      Capillary Refill: Capillary refill takes less than 2 seconds.   Neurological:      General: No focal deficit present.      Mental Status: He is alert and oriented to person, place, and time.   Psychiatric:         Mood and Affect: Mood normal.       Vitals:    25 0837   BP: 114/68   BP Location: Right arm   Patient Position: Sitting   Pulse: 76   SpO2: 95%   Weight: 135.7 kg (299 lb 4.4 oz)   Height: 5' 11" (1.803 m)      Body mass index is 41.74 kg/m².    Lab Results   Component Value Date    WBC 6.5 2025    HGB 13.4 2025    HCT 43.3 2025     2025    CHOL 143 2025    TRIG 106 2025    HDL 37 (L) 2025    ALT 27 2025    AST 19 2025     2025    K 4.3 2025     2025    CREATININE 1.17 2025    BUN 20 2025    CO2 27 2025    TSH 0.61 2025    INR 1.0 2018    HGBA1C 5.6 2025       The 10-year ASCVD risk score (Hernán ANN, et al., 2019) is: 8.1%    Values used to calculate the score:      Age: 54 years      Sex: Male      Is Non- : Yes      Diabetic: No      Tobacco " smoker: No      Systolic Blood Pressure: 114 mmHg      Is BP treated: Yes      HDL Cholesterol: 37 mg/dL      Total Cholesterol: 143 mg/dL    (Imaging have been independently reviewed)    History     Past Medical History:  Past Medical History:   Diagnosis Date    Family history of colon cancer in father     Hyperlipidemia     Hypertension     LAMONT (obstructive sleep apnea)     Syncope and collapse        Past Surgical History:  Past Surgical History:   Procedure Laterality Date    CHONDROPLASTY OF KNEE Left 8/22/2019    Procedure: CHONDROPLASTY, KNEE;  Surgeon: Shelbi Hughes MD;  Location: [facility];  Service: Orthopedics;  Laterality: Left;    COLONOSCOPY N/A 9/16/2022    Procedure: COLONOSCOPY;  Surgeon: RYAN Long MD;  Location: [facility] ENDO (4TH FLR);  Service: Endoscopy;  Laterality: N/A;  fully vaccinated, clears 4 hrs prior-am prep, inst portal-MS    DIAGNOSTIC CARDIAC ELECTROPHYSIOLOGY STUDY N/A 5/23/2024    Procedure: EP - diagnostic/possible loop implant;  Surgeon: Kong Terrell MD;  Location: [facility] CATH LAB;  Service: Cardiology;  Laterality: N/A;  possible loop implant [place] [facility] notified    EPIDURAL STEROID INJECTION N/A 3/23/2022    Procedure: INJECTION, STEROID, EPIDURAL, C7-T1 IL;  Surgeon: Anthony Schaefer MD;  Location: [facility];  Service: Pain Management;  Laterality: N/A;    EPIDURAL STEROID INJECTION N/A 8/10/2022    Procedure: INJECTION, STEROID, EPIDURAL,  C7-T1 IL CONTRAST;  Surgeon: Anthony Schaefer MD;  Location: [facility];  Service: Pain Management;  Laterality: N/A;    HERNIA REPAIR      INSERTION OF IMPLANTABLE LOOP RECORDER N/A 5/23/2024    Procedure: Insertion, Implantable Loop Recorder/[facility] [facility];  Surgeon: Kong Terrell MD;  Location: [facility] CATH LAB;  Service: Cardiology;  Laterality: N/A;    KNEE ARTHROSCOPY W/ MENISCECTOMY Left 8/22/2019    Procedure: ARTHROSCOPY, KNEE, WITH MEDIAL AND LATERAL MENISCECTOMY;  Surgeon: Shelbi Hughes MD;  Location: [facility];   Service: Orthopedics;  Laterality: Left;    KNEE ARTHROSCOPY W/ PLICA EXCISION Left 8/22/2019    Procedure: EXCISION, MEDIAL PLICA, KNEE, ARTHROSCOPIC;  Surgeon: Shelbi Hughes MD;  Location: [facility] [place] [facility];  Service: Orthopedics;  Laterality: Left;    LIPOMA RESECTION      abdomen    REMOVAL OF FOREIGN BODY FROM LOWER EXTREMITY Left 8/22/2019    Procedure: REMOVAL, FOREIGN BODY, LOOSE BODY,  LOWER EXTREMITY;  Surgeon: Shelbi Hughes MD;  Location: [facility] [place] [facility];  Service: Orthopedics;  Laterality: Left;    SHOULDER SURGERY      left    SYNOVECTOMY OF KNEE Left 8/22/2019    Procedure: PARTIAL SYNOVECTOMY, KNEE;  Surgeon: Shelbi Hughes MD;  Location: [facility] [place] [facility];  Service: Orthopedics;  Laterality: Left;    TILT TABLE TEST N/A 7/9/2018    Procedure: TILT TABLE TEST;  Surgeon: Leonardo Whitfield MD;  Location: [facility] CATH LAB;  Service: Cardiology;  Laterality: N/A;  Syncope, HUT, DM, 3 PREP    TRANSFORAMINAL EPIDURAL INJECTION OF STEROID Right 10/9/2019    Procedure: LUMBAR TRANSFORAMINAL RIGHT L5/S1;  Surgeon: Anthony Schaefer MD;  Location: [facility] [place] [facility]IN [facility]GT;  Service: Pain Management;  Laterality: Right;  NEEDS CONSENT    TRANSFORAMINAL EPIDURAL INJECTION OF STEROID Left 11/18/2020    Procedure: LUMBAR TRANSFORAMINAL LEFT L5/S1 DIRECT REFERRAL;  Surgeon: Anthony Schaefer MD;  Location: [facility] [place] [facility]IN [facility]GT;  Service: Pain Management;  Laterality: Left;  NEEDS CONSENT       Social History:  Social History[1]    Family History:  Family History   Problem Relation Name Age of Onset    Colon cancer Father         Allergies and Medications: (updated and reviewed)  Review of patient's allergies indicates:  No Known Allergies  Current Medications[2]    Patient Care Team:  Fredy Rey MD as PCP - General (Family Medicine)  Records, [facility]         - The patient is given an After Visit Summary that lists all medications with directions, allergies, education, orders placed during this encounter and follow-up instructions.      - I  have reviewed the patient's medical information including past medical, family, and social history sections including the medications and allergies.      - We discussed the patient's current medications.     This note was created by combination of typed  and MModal dictation.  Transcription errors may be present.  If there are any questions, please contact me.          This note was generated with the assistance of ambient listening technology. Verbal consent was obtained by the patient and accompanying visitor(s) for the recording of patient appointment to facilitate this note. I attest to having reviewed and edited the generated note for accuracy, though some syntax or spelling errors may persist. Please contact the author of this note for any clarification.         [1]   Social History  Socioeconomic History    Marital status:    Tobacco Use    Smoking status: Never    Smokeless tobacco: Never   Substance and Sexual Activity    Alcohol use: No    Drug use: No     Social Drivers of Health     Financial Resource Strain: Low Risk  (5/2/2024)    Overall Financial Resource Strain (CARDIA)     Difficulty of Paying Living Expenses: Not very hard   Food Insecurity: No Food Insecurity (5/2/2024)    Hunger Vital Sign     Worried About Running Out of Food in the Last Year: Never true     Ran Out of Food in the Last Year: Never true   Transportation Needs: Unknown (5/2/2024)    PRAPARE - Transportation     Lack of Transportation (Medical): Patient declined   Physical Activity: Inactive (5/2/2024)    Exercise Vital Sign     Days of Exercise per Week: 0 days     Minutes of Exercise per Session: 10 min   Stress: No Stress Concern Present (5/2/2024)    [employer] [place] [employer] - Occupational Stress Questionnaire     Feeling of Stress : Not at all   Housing Stability: Unknown (5/2/2024)    Housing Stability Vital Sign     Unable to Pay for Housing in the Last Year: No   [2]   Current Outpatient  Medications   Medication Sig Dispense Refill    amLODIPine (NORVASC) 5 MG tablet Take 1 tablet (5 mg total) by mouth once daily. 90 tablet 3    DULoxetine (CYMBALTA) 30 MG capsule TAKE 1 CAPSULE BY MOUTH TWICE A  capsule 2    losartan-hydrochlorothiazide 100-12.5 mg (HYZAAR) 100-12.5 mg Tab TAKE 1 TABLET BY MOUTH EVERY DAY 90 tablet 1    metoprolol succinate (TOPROL-XL) 50 MG 24 hr tablet Take 1 tablet (50 mg total) by mouth once daily. 90 tablet 3    rosuvastatin (CRESTOR) 10 MG tablet Take 1 tablet (10 mg total) by mouth every evening. 90 tablet 3    furosemide (LASIX) 20 MG tablet Take 1 tablet (20 mg total) by mouth once daily. 3 tablet 0     No current facility-administered medications for this visit.

## 2025-05-09 NOTE — ASSESSMENT & PLAN NOTE
Noted history since adolescent? Loop recorder. F/b cardiology. No recent syncope. Stable, chronic condition.  Will continue to maximize risk factor reduction and adjust medication as needed

## 2025-05-10 LAB — BNP SERPL-MCNC: 15 PG/ML

## 2025-05-11 ENCOUNTER — CLINICAL SUPPORT (OUTPATIENT)
Dept: CARDIOLOGY | Facility: HOSPITAL | Age: 55
End: 2025-05-11
Payer: COMMERCIAL

## 2025-05-11 ENCOUNTER — CLINICAL SUPPORT (OUTPATIENT)
Dept: CARDIOLOGY | Facility: HOSPITAL | Age: 55
End: 2025-05-11
Attending: INTERNAL MEDICINE
Payer: COMMERCIAL

## 2025-05-11 DIAGNOSIS — I49.8 OTHER SPECIFIED CARDIAC ARRHYTHMIAS: ICD-10-CM

## 2025-05-11 PROCEDURE — 93298 REM INTERROG DEV EVAL SCRMS: CPT | Performed by: INTERNAL MEDICINE

## 2025-05-11 PROCEDURE — 93298 REM INTERROG DEV EVAL SCRMS: CPT | Mod: 26,,, | Performed by: INTERNAL MEDICINE

## 2025-05-13 ENCOUNTER — TELEPHONE (OUTPATIENT)
Dept: INTERNAL MEDICINE | Facility: CLINIC | Age: 55
End: 2025-05-13
Payer: COMMERCIAL

## 2025-05-13 NOTE — TELEPHONE ENCOUNTER
LVM to check on pt to see how leg swelling/symptom are. No answer.     CMP/CBC resulted from [facility]..all WNL. Normal potassium, gfr >60  BNP normal

## 2025-05-14 ENCOUNTER — TELEPHONE (OUTPATIENT)
Dept: INTERNAL MEDICINE | Facility: CLINIC | Age: 55
End: 2025-05-14
Payer: COMMERCIAL

## 2025-05-14 DIAGNOSIS — R06.09 DOE (DYSPNEA ON EXERTION): Primary | ICD-10-CM

## 2025-05-14 DIAGNOSIS — M79.89 LEG SWELLING: ICD-10-CM

## 2025-05-14 LAB — OHS CV DC REMOTE DEVICE TYPE: NORMAL

## 2025-05-14 NOTE — TELEPHONE ENCOUNTER
Leg swelling improved. Pitting edema improved, able to see ankle bone    LOPEZ improved since fluid output  Watching sodium intake

## 2025-05-14 NOTE — TELEPHONE ENCOUNTER
----- Message from [employer] sent at 5/13/2025  9:50 AM CDT -----  Type : Patient CallWho Called :  Patient Does the patient know what this is regarding?: Patient is returning a call from provider herself. Would the patient rather a call back or a response via [facility]? [email or web address] Call Back Number: [phone] Information:

## 2025-05-16 ENCOUNTER — PATIENT MESSAGE (OUTPATIENT)
Dept: CARDIOLOGY | Facility: CLINIC | Age: 55
End: 2025-05-16
Payer: COMMERCIAL

## 2025-06-11 ENCOUNTER — CLINICAL SUPPORT (OUTPATIENT)
Dept: CARDIOLOGY | Facility: HOSPITAL | Age: 55
End: 2025-06-11
Payer: COMMERCIAL

## 2025-06-11 ENCOUNTER — CLINICAL SUPPORT (OUTPATIENT)
Dept: CARDIOLOGY | Facility: HOSPITAL | Age: 55
End: 2025-06-11
Attending: INTERNAL MEDICINE
Payer: COMMERCIAL

## 2025-06-11 DIAGNOSIS — I49.8 OTHER SPECIFIED CARDIAC ARRHYTHMIAS: ICD-10-CM

## 2025-06-11 LAB — OHS CV DC REMOTE DEVICE TYPE: NORMAL

## 2025-06-11 PROCEDURE — 93298 REM INTERROG DEV EVAL SCRMS: CPT | Mod: 26,,, | Performed by: INTERNAL MEDICINE

## 2025-06-11 PROCEDURE — 93298 REM INTERROG DEV EVAL SCRMS: CPT | Performed by: INTERNAL MEDICINE

## 2025-07-12 ENCOUNTER — CLINICAL SUPPORT (OUTPATIENT)
Dept: CARDIOLOGY | Facility: HOSPITAL | Age: 55
End: 2025-07-12
Attending: INTERNAL MEDICINE
Payer: COMMERCIAL

## 2025-07-12 ENCOUNTER — CLINICAL SUPPORT (OUTPATIENT)
Dept: CARDIOLOGY | Facility: HOSPITAL | Age: 55
End: 2025-07-12
Payer: COMMERCIAL

## 2025-07-12 DIAGNOSIS — I49.8 OTHER SPECIFIED CARDIAC ARRHYTHMIAS: ICD-10-CM

## 2025-07-12 PROCEDURE — 93298 REM INTERROG DEV EVAL SCRMS: CPT | Performed by: INTERNAL MEDICINE

## 2025-07-12 PROCEDURE — 93298 REM INTERROG DEV EVAL SCRMS: CPT | Mod: 26,,, | Performed by: INTERNAL MEDICINE

## 2025-07-21 LAB — OHS CV DC REMOTE DEVICE TYPE: NORMAL

## 2025-07-30 ENCOUNTER — PATIENT MESSAGE (OUTPATIENT)
Dept: INTERNAL MEDICINE | Facility: CLINIC | Age: 55
End: 2025-07-30

## 2025-07-30 ENCOUNTER — OFFICE VISIT (OUTPATIENT)
Dept: INTERNAL MEDICINE | Facility: CLINIC | Age: 55
End: 2025-07-30
Payer: COMMERCIAL

## 2025-07-30 VITALS
RESPIRATION RATE: 16 BRPM | OXYGEN SATURATION: 95 % | WEIGHT: 299.63 LBS | DIASTOLIC BLOOD PRESSURE: 76 MMHG | SYSTOLIC BLOOD PRESSURE: 130 MMHG | HEART RATE: 76 BPM | HEIGHT: 71 IN | BODY MASS INDEX: 41.95 KG/M2

## 2025-07-30 DIAGNOSIS — M25.562 CHRONIC PAIN OF BOTH KNEES: ICD-10-CM

## 2025-07-30 DIAGNOSIS — G47.33 OSA (OBSTRUCTIVE SLEEP APNEA): ICD-10-CM

## 2025-07-30 DIAGNOSIS — E66.01 MORBID OBESITY: ICD-10-CM

## 2025-07-30 DIAGNOSIS — R73.03 PREDIABETES: ICD-10-CM

## 2025-07-30 DIAGNOSIS — Z23 NEED FOR SHINGLES VACCINE: ICD-10-CM

## 2025-07-30 DIAGNOSIS — G89.29 CHRONIC PAIN OF BOTH KNEES: ICD-10-CM

## 2025-07-30 DIAGNOSIS — M25.561 CHRONIC PAIN OF BOTH KNEES: ICD-10-CM

## 2025-07-30 DIAGNOSIS — E78.5 HYPERLIPIDEMIA, UNSPECIFIED HYPERLIPIDEMIA TYPE: ICD-10-CM

## 2025-07-30 DIAGNOSIS — R60.0 BILATERAL LEG EDEMA: Primary | ICD-10-CM

## 2025-07-30 DIAGNOSIS — I10 ESSENTIAL HYPERTENSION: ICD-10-CM

## 2025-07-30 DIAGNOSIS — Z23 NEED FOR DIPHTHERIA-TETANUS-PERTUSSIS (TDAP) VACCINE: ICD-10-CM

## 2025-07-30 PROCEDURE — 3078F DIAST BP <80 MM HG: CPT | Mod: CPTII,S$GLB,,

## 2025-07-30 PROCEDURE — 3075F SYST BP GE 130 - 139MM HG: CPT | Mod: CPTII,S$GLB,,

## 2025-07-30 PROCEDURE — 1159F MED LIST DOCD IN RCRD: CPT | Mod: CPTII,S$GLB,,

## 2025-07-30 PROCEDURE — 3008F BODY MASS INDEX DOCD: CPT | Mod: CPTII,S$GLB,,

## 2025-07-30 PROCEDURE — 99214 OFFICE O/P EST MOD 30 MIN: CPT | Mod: S$GLB,,,

## 2025-07-30 PROCEDURE — 3044F HG A1C LEVEL LT 7.0%: CPT | Mod: CPTII,S$GLB,,

## 2025-07-30 PROCEDURE — 1160F RVW MEDS BY RX/DR IN RCRD: CPT | Mod: CPTII,S$GLB,,

## 2025-07-30 PROCEDURE — 99999 PR PBB SHADOW E&M-EST. PATIENT-LVL IV: CPT | Mod: PBBFAC,,,

## 2025-07-30 RX ORDER — LOSARTAN POTASSIUM AND HYDROCHLOROTHIAZIDE 25; 100 MG/1; MG/1
1 TABLET ORAL DAILY
Qty: 90 TABLET | Refills: 3 | Status: SHIPPED | OUTPATIENT
Start: 2025-07-30 | End: 2026-07-30

## 2025-07-30 NOTE — ASSESSMENT & PLAN NOTE
BP controlled  -amlodpine 5mg  -losartan 100mg-25mg (increased hctz from 12.5) to help with edema  -metoprolol 50mg daily    DASH diet

## 2025-07-30 NOTE — ASSESSMENT & PLAN NOTE
-crestor 10mg   Encouraged heart healthy diet  Lab Results   Component Value Date    CHOL 143 03/14/2025    CHOL 149 07/16/2024    CHOL 151 10/25/2023      Lab Results   Component Value Date    HDL 37 (L) 03/14/2025    HDL 43 07/16/2024    HDL 42 10/25/2023     Lab Results   Component Value Date    LDLCALC 86 03/14/2025    LDLCALC 88 07/16/2024    LDLCALC 88 10/25/2023      Lab Results   Component Value Date    TRIG 106 03/14/2025    TRIG 89 07/16/2024    TRIG 109 10/25/2023     Lab Results   Component Value Date    TOTALCHOLEST 5.7 (H) 02/10/2007     Lab Results   Component Value Date    NONHDLCHOL 106 03/14/2025    NONHDLCHOL 106 07/16/2024    NONHDLCHOL 109 10/25/2023     Lab Results   Component Value Date    CHOLHDL 3.9 03/14/2025    CHOLHDL 3.5 07/16/2024    CHOLHDL 3.6 10/25/2023

## 2025-07-30 NOTE — ASSESSMENT & PLAN NOTE
Wt Readings from Last 3 Encounters:   07/30/25 0825 135.9 kg (299 lb 9.7 oz)   05/09/25 0837 135.7 kg (299 lb 4.4 oz)   11/13/24 0935 126.8 kg (279 lb 8.7 oz)     Discussed importance of diet/exercise mgmt  Consider GLP or metformin  Discussed [employer] or [employer]

## 2025-07-30 NOTE — ASSESSMENT & PLAN NOTE
Continue CPAP  F/u with sleep medicine as prescribed    Nocturia x 1  Will take losartan-hctz in the morning

## 2025-07-30 NOTE — ASSESSMENT & PLAN NOTE
Lab Results   Component Value Date    HGBA1C 5.6 03/14/2025     Diet controlled. The current medical regimen is effective;  continue present plan and medications.  Will recheck labs per request given weight gain and consideration of GLP

## 2025-07-30 NOTE — PROGRESS NOTES
HPI     Chief Complaint:  Chief Complaint   Patient presents with    Leg Swelling     Mainly L leg        Bhaskar Stiles is a 54 y.o. male with multiple medical diagnoses as listed in the medical history and problem list that presents for   Chief Complaint   Patient presents with    Leg Swelling     Mainly L leg    .   Patient is not known to me with his last appointment in this department on 5/9/2025.      History of Present Illness    CHIEF COMPLAINT:  Bhaskar presents today for bilateral leg swelling, worse in left leg.    LEG SWELLING:  He reports leg swelling present for the past couple of months. Swelling is more pronounced in the left leg, primarily affecting the area from the knee down and currently concentrated around the ankle. He describes his legs as feeling tight, with symptoms persisting in the morning and progressively worsening throughout the day. He experiences some relief upon waking, but swelling increases once standing. He denies associated chest pain or shortness of breath.    WEIGHT MANAGEMENT:  He reports significant weight fluctuations since November. Initial weight was 279 lbs, with current weight at 299 lbs. His weight has been unstable, experiencing weight loss followed by regaining weight, potentially related to medication changes.    SLEEP APNEA:  He recently started CPAP machine use approximately 3-4 weeks ago and has noted improvement in nighttime symptoms since initiating CPAP, including reduction in nocturia from every two hours to once per night. He continues to experience wheezing at night while sleeping. He has been sitting up on the side of the bed during sleep, though this has decreased since starting CPAP. He is interested in obtaining a sleep medicine referral and has not yet been evaluated by a sleep specialist. He denies choking sensations during sleep.    MUSCULOSKELETAL:  He reports bilateral knee pain affecting his ability to walk. Pain increases with ambulation,  particularly after previous left knee arthroscopic procedure. He notes persistent knee discomfort despite medication and pain appears to worsen with increased walking activity. His left knee, which has previously undergone surgical scope, is currently more symptomatic. He denies specific injury mechanism but acknowledges ongoing knee discomfort and functional limitation.         In wife with clinic    Loop recorder - device check monthly     Previously seen in clinic 2 months ago for SOB but declines presently     Continues to eat lots of fried foods. Doesn't monitor sodium intake. Works night shift and day shift. On feet a lot or sitting. Only elevated his legs at home. No exercise, hard because of knee pain. Previous scope, discussed need to f/u with ortho     1+ pitting edema below the knees, bilateral. No pain or redness. Neg Homans      F/b cardiology, previous episode of syncope.      LAMONT   -now tolerating CPAP machine, consistent past 1-2 months  -use to get up to urinate every 2 hours, now only wakes up once per night  -was gasping and having to sleep up now improved  -would like referral sleep medicine    Wheezing  -int, wife notes when sitting up    HTN  -compliant with losartan-hctz 100-12.5  -amlodipine 5mg  -not routinely checking  -metoprolol 50mg daily     Carotlid US WNL  No recent echo     Weight  -prev on metformin  -discussed consideration of GLPs for weight loss or weight loss clinic ([facility])  -no fh of medullary thyroid ca  -no pancreatitis  -needs to work on diet/exercise        Assessment & Plan       ## ADVERSE EFFECT OF CALCIUM-CHANNEL BLOCKERS AND EDEMA:  - Bhaskar is experiencing left leg swelling, mainly around the ankle, persisting for the last couple of months and worsening throughout the day with tightness in the legs.  - This is likely related to amlodipine 5 mg (calcium channel blocker) side effect.  - Previously, swelling involved the whole leg from knee down.  - Recent BNP was  normal, indicating no significant fluid overload, and clinical presentation does not warrant DVT ultrasound.  - Will continue amlodipine 5 mg and metoprolol 50 mg.  - Increasing hydrochlorothiazide to 25 mg (from losartan/HCTZ combination) to address the leg swelling.  - Advised patient about initial increased urination with this dose increase, which typically stabilizes after a few weeks, and the importance of monitoring for potassium loss.  - Instructed to continue wearing compression stockings, elevate legs, monitor sodium intake (keeping below 2.4 grams/24 hours), and stay hydrated.  - BP control is adequate with current regimen.  - Bhaskar should contact the office if experiencing any dizziness.    ## OBSTRUCTIVE SLEEP APNEA:  - Bhaskar has been using CPAP machine for about a month with positive results, including reduced nighttime awakenings and urination.  - Advised to continue using CPAP machine; referral to sleep medicine still under consideration.    ## KNEE PAIN:  - Bhaksar reports persistent knee pain that worsens with walking despite medication use.  - Left knee was previously scoped.    ## WHEEZING:  - Bhaskar's wife reports wheezing at night and while watching TV.    ## PREDIABETES:  - Bhaskar's A1C was 5.6 four months ago, just below pre-diabetes category.  - Discussed potential diabetes with the patient.  - labs to [facility]    ## FOLLOW-UP:  - Scheduled follow up in 2 weeks via portal message with BP readings and update on fluid status.  - Will notify patient when lab results are back if there are any concerns.         1. Bilateral leg edema  -dash diet  -increase hctz  -compression  -elevation  Importance of diet/exercise    No chest pain or SOB  BNP from 2 months ago WNL  -     Comprehensive Metabolic Panel; Future; Expected date: 07/30/2025  -     Hemoglobin A1C; Future; Expected date: 07/30/2025  -     losartan-hydrochlorothiazide 100-25 mg (HYZAAR) 100-25 mg per tablet; Take 1 tablet by mouth once  daily.  Dispense: 90 tablet; Refill: 3    2. Chronic pain of both knees  Consider f/u with ortho for injections  Avoid nsaids    3. Prediabetes  Assessment & Plan:  Lab Results   Component Value Date    HGBA1C 5.6 03/14/2025     Diet controlled. The current medical regimen is effective;  continue present plan and medications.  Will recheck labs per request given weight gain and consideration of GLP        4. LAMONT (obstructive sleep apnea)  Assessment & Plan:  Continue CPAP  F/u with sleep medicine as prescribed    Nocturia x 1  Will take losartan-hctz in the morning    Orders:  -     Ambulatory referral/consult to Sleep Disorders; Future; Expected date: 08/06/2025    5. Essential hypertension  Assessment & Plan:  BP controlled  -amlodpine 5mg  -losartan 100mg-25mg (increased hctz from 12.5) to help with edema  -metoprolol 50mg daily    DASH diet        6. Hyperlipidemia, unspecified hyperlipidemia type  Assessment & Plan:  -crestor 10mg   Encouraged heart healthy diet  Lab Results   Component Value Date    CHOL 143 03/14/2025    CHOL 149 07/16/2024    CHOL 151 10/25/2023      Lab Results   Component Value Date    HDL 37 (L) 03/14/2025    HDL 43 07/16/2024    HDL 42 10/25/2023     Lab Results   Component Value Date    LDLCALC 86 03/14/2025    LDLCALC 88 07/16/2024    LDLCALC 88 10/25/2023      Lab Results   Component Value Date    TRIG 106 03/14/2025    TRIG 89 07/16/2024    TRIG 109 10/25/2023     Lab Results   Component Value Date    TOTALCHOLEST 5.7 (H) 02/10/2007     Lab Results   Component Value Date    NONHDLCHOL 106 03/14/2025    NONHDLCHOL 106 07/16/2024    NONHDLCHOL 109 10/25/2023     Lab Results   Component Value Date    CHOLHDL 3.9 03/14/2025    CHOLHDL 3.5 07/16/2024    CHOLHDL 3.6 10/25/2023         8. Need for shingles vaccine  Advised to complete at local pharmacy    9. Need for diphtheria-tetanus-pertussis (Tdap) vaccine  Advised to complete at local pharmacy    10. Morbid obesity  Assessment &  Plan:  Wt Readings from Last 3 Encounters:   25 0825 135.9 kg (299 lb 9.7 oz)   25 0837 135.7 kg (299 lb 4.4 oz)   24 0935 126.8 kg (279 lb 8.7 oz)     Discussed importance of diet/exercise mgmt  Consider GLP or metformin  Discussed [facility] or [facility]              --------------------------------------------      Health Maintenance:  Health Maintenance         Date Due Completion Date    Pneumococcal Vaccines (Age 50+) (1 of 1 - PCV) Never done ---    Shingles Vaccine (2 of 2) 2022 10/29/2022    COVID-19 Vaccine (4 -  season) 2024    TETANUS VACCINE 2025    Influenza Vaccine (1) 2025 10/31/2024    Hemoglobin A1c (Prediabetes) 2026 3/14/2025    Colorectal Cancer Screening 2027    Override on 2017: Done    Lipid Panel 2030 3/14/2025    RSV Vaccine (Age 60+ and Pregnant patients) (1 - 1-dose 75+ series) 2046 ---              Follow Up:  Follow up in about 2 weeks (around 2025), or if symptoms worsen or fail to improve.  Message in 2 weeks sent via portal     Exam     Review of Systems:  (as noted above)  Review of Systems    Physical Exam:   Physical Exam  Vitals reviewed.   Constitutional:       General: He is not in acute distress.     Appearance: Normal appearance. He is not toxic-appearing.   HENT:      Head: Normocephalic and atraumatic.   Neck:      Vascular: No carotid bruit or JVD.   Cardiovascular:      Rate and Rhythm: Normal rate and regular rhythm.      Pulses: Normal pulses.      Heart sounds: Normal heart sounds. No murmur heard.  Pulmonary:      Effort: Pulmonary effort is normal. No respiratory distress.      Breath sounds: Normal breath sounds. No wheezing.   Musculoskeletal:      Cervical back: Normal range of motion.      Right lower le+ Pitting Edema present.      Left lower le+ Pitting Edema present.   Skin:     General: Skin is warm.      Capillary Refill: Capillary refill  "takes less than 2 seconds.   Neurological:      General: No focal deficit present.      Mental Status: He is alert and oriented to person, place, and time.   Psychiatric:         Mood and Affect: Mood normal.       Vitals:    07/30/25 0825   BP: 130/76   BP Location: Left arm   Patient Position: Sitting   Pulse: 76   Resp: 16   SpO2: 95%   Weight: 135.9 kg (299 lb 9.7 oz)   Height: 5' 11" (1.803 m)      Body mass index is 41.79 kg/m².    Lab Results   Component Value Date    WBC 6.5 03/14/2025    HGB 13.4 03/14/2025    HCT 43.3 03/14/2025     03/14/2025    CHOL 143 03/14/2025    TRIG 106 03/14/2025    HDL 37 (L) 03/14/2025    ALT 27 03/14/2025    AST 19 03/14/2025     03/14/2025    K 4.3 03/14/2025     03/14/2025    CREATININE 1.17 03/14/2025    BUN 20 03/14/2025    CO2 27 03/14/2025    TSH 0.61 03/14/2025    INR 1.0 06/05/2018    HGBA1C 5.6 03/14/2025       The 10-year ASCVD risk score (Hernán ANN, et al., 2019) is: 10.3%    Values used to calculate the score:      Age: 54 years      Sex: Male      Is Non- : Yes      Diabetic: No      Tobacco smoker: No      Systolic Blood Pressure: 130 mmHg      Is BP treated: Yes      HDL Cholesterol: 37 mg/dL      Total Cholesterol: 143 mg/dL    (Imaging have been independently reviewed)    History     Past Medical History:  Past Medical History:   Diagnosis Date    Family history of colon cancer in father     Hyperlipidemia     Hypertension     LAMONT (obstructive sleep apnea)     Syncope and collapse        Past Surgical History:  Past Surgical History:   Procedure Laterality Date    CHONDROPLASTY OF KNEE Left 8/22/2019    Procedure: CHONDROPLASTY, KNEE;  Surgeon: Shelbi Hughes MD;  Location: [facility];  Service: Orthopedics;  Laterality: Left;    COLONOSCOPY N/A 9/16/2022    Procedure: COLONOSCOPY;  Surgeon: RYAN Long MD;  Location: [facility] ([address] [place] [address]);  Service: Endoscopy;  Laterality: N/A;  fully vaccinated, clears 4 hrs " prior-am prep, inst portal-MS    DIAGNOSTIC CARDIAC ELECTROPHYSIOLOGY STUDY N/A 5/23/2024    Procedure: EP - diagnostic/possible loop implant;  Surgeon: Kong Terrell MD;  Location: [facility] CATH LAB;  Service: Cardiology;  Laterality: N/A;  possible loop implant [place] [facility] notified    EPIDURAL STEROID INJECTION N/A 3/23/2022    Procedure: INJECTION, STEROID, EPIDURAL, C7-T1 IL;  Surgeon: Anthony Schaefer MD;  Location: [facility] PAIN MGT;  Service: Pain Management;  Laterality: N/A;    EPIDURAL STEROID INJECTION N/A 8/10/2022    Procedure: INJECTION, STEROID, EPIDURAL,  C7-T1 IL CONTRAST;  Surgeon: Anthony Schaefer MD;  Location: [facility] PAIN MGT;  Service: Pain Management;  Laterality: N/A;    HERNIA REPAIR      INSERTION OF IMPLANTABLE LOOP RECORDER N/A 5/23/2024    Procedure: Insertion, Implantable Loop Recorder/[facility];  Surgeon: Kong Terrell MD;  Location: [facility] CATH LAB;  Service: Cardiology;  Laterality: N/A;    KNEE ARTHROSCOPY W/ MENISCECTOMY Left 8/22/2019    Procedure: ARTHROSCOPY, KNEE, WITH MEDIAL AND LATERAL MENISCECTOMY;  Surgeon: Shelbi Hughes MD;  Location: [facility];  Service: Orthopedics;  Laterality: Left;    KNEE ARTHROSCOPY W/ PLICA EXCISION Left 8/22/2019    Procedure: EXCISION, MEDIAL PLICA, KNEE, ARTHROSCOPIC;  Surgeon: Shelbi Hughes MD;  Location: [facility];  Service: Orthopedics;  Laterality: Left;    LIPOMA RESECTION      abdomen    REMOVAL OF FOREIGN BODY FROM LOWER EXTREMITY Left 8/22/2019    Procedure: REMOVAL, FOREIGN BODY, LOOSE BODY,  LOWER EXTREMITY;  Surgeon: Shelbi Hughes MD;  Location: [facility];  Service: Orthopedics;  Laterality: Left;    SHOULDER SURGERY      left    SYNOVECTOMY OF KNEE Left 8/22/2019    Procedure: PARTIAL SYNOVECTOMY, KNEE;  Surgeon: Shelbi Hughes MD;  Location: [facility];  Service: Orthopedics;  Laterality: Left;    TILT TABLE TEST N/A 7/9/2018    Procedure: TILT TABLE TEST;  Surgeon: Leonardo Whitfield MD;  Location: [facility] CATH LAB;  Service: Cardiology;   Laterality: N/A;  Syncope, HUT, DM, 3 PREP    TRANSFORAMINAL EPIDURAL INJECTION OF STEROID Right 10/9/2019    Procedure: LUMBAR TRANSFORAMINAL RIGHT L5/S1;  Surgeon: Anthony Schaefer MD;  Location: [facility] PAIN MGT;  Service: Pain Management;  Laterality: Right;  NEEDS CONSENT    TRANSFORAMINAL EPIDURAL INJECTION OF STEROID Left 11/18/2020    Procedure: LUMBAR TRANSFORAMINAL LEFT L5/S1 DIRECT REFERRAL;  Surgeon: Anthony Schaefer MD;  Location: [facility] PAIN MGT;  Service: Pain Management;  Laterality: Left;  NEEDS CONSENT       Social History:  Social History[1]    Family History:  Family History   Problem Relation Name Age of Onset    Colon cancer Father         Allergies and Medications: (updated and reviewed)  Review of patient's allergies indicates:  No Known Allergies  Current Medications[2]    Patient Care Team:  Fredy Rey MD as PCP - General (Family Medicine)  Records, [facility]         - The patient is given an After Visit Summary that lists all medications with directions, allergies, education, orders placed during this encounter and follow-up instructions.      - I have reviewed the patient's medical information including past medical, family, and social history sections including the medications and allergies.      - We discussed the patient's current medications.     This note was created by combination of typed  and MModal dictation.  Transcription errors may be present.  If there are any questions, please contact me.            This note was generated with the assistance of ambient listening technology. Verbal consent was obtained by the patient and accompanying visitor(s) for the recording of patient appointment to facilitate this note. I attest to having reviewed and edited the generated note for accuracy, though some syntax or spelling errors may persist. Please contact the author of this note for any clarification.           [1]   Social History  Socioeconomic  History    Marital status:    Tobacco Use    Smoking status: Never    Smokeless tobacco: Never   Substance and Sexual Activity    Alcohol use: No    Drug use: No     Social Drivers of Health     Financial Resource Strain: Low Risk  (5/2/2024)    Overall Financial Resource Strain (CARDIA)     Difficulty of Paying Living Expenses: Not very hard   Food Insecurity: No Food Insecurity (5/2/2024)    Hunger Vital Sign     Worried About Running Out of Food in the Last Year: Never true     Ran Out of Food in the Last Year: Never true   Transportation Needs: Unknown (5/2/2024)    PRAPARE - Transportation     Lack of Transportation (Medical): Patient declined   Physical Activity: Inactive (5/2/2024)    Exercise Vital Sign     Days of Exercise per Week: 0 days     Minutes of Exercise per Session: 10 min   Stress: No Stress Concern Present (5/2/2024)    [employer] - Occupational Stress Questionnaire     Feeling of Stress : Not at all   Housing Stability: Unknown (5/2/2024)    Housing Stability Vital Sign     Unable to Pay for Housing in the Last Year: No   [2]   Current Outpatient Medications   Medication Sig Dispense Refill    amLODIPine (NORVASC) 5 MG tablet Take 1 tablet (5 mg total) by mouth once daily. 90 tablet 3    DULoxetine (CYMBALTA) 30 MG capsule TAKE 1 CAPSULE BY MOUTH TWICE A  capsule 2    metoprolol succinate (TOPROL-XL) 50 MG 24 hr tablet Take 1 tablet (50 mg total) by mouth once daily. 90 tablet 3    rosuvastatin (CRESTOR) 10 MG tablet Take 1 tablet (10 mg total) by mouth every evening. 90 tablet 3    losartan-hydrochlorothiazide 100-25 mg (HYZAAR) 100-25 mg per tablet Take 1 tablet by mouth once daily. 90 tablet 3     No current facility-administered medications for this visit.

## 2025-08-12 ENCOUNTER — CLINICAL SUPPORT (OUTPATIENT)
Dept: CARDIOLOGY | Facility: HOSPITAL | Age: 55
End: 2025-08-12
Payer: COMMERCIAL

## 2025-08-12 ENCOUNTER — CLINICAL SUPPORT (OUTPATIENT)
Dept: CARDIOLOGY | Facility: HOSPITAL | Age: 55
End: 2025-08-12
Attending: INTERNAL MEDICINE
Payer: COMMERCIAL

## 2025-08-12 DIAGNOSIS — I49.8 OTHER SPECIFIED CARDIAC ARRHYTHMIAS: ICD-10-CM

## 2025-08-12 LAB — OHS CV DC REMOTE DEVICE TYPE: NORMAL

## 2025-08-12 PROCEDURE — 93298 REM INTERROG DEV EVAL SCRMS: CPT | Performed by: INTERNAL MEDICINE

## 2025-08-12 PROCEDURE — 93298 REM INTERROG DEV EVAL SCRMS: CPT | Mod: 26,,, | Performed by: INTERNAL MEDICINE

## (undated) DEVICE — GOWN SMART IMP BREATHABLE XXLG

## (undated) DEVICE — ADHESIVE MASTISOL VIAL 48/BX

## (undated) DEVICE — SHEATH INTRODUCER 6FR 11CM

## (undated) DEVICE — PAD CAST SPECIALIST STRL 6

## (undated) DEVICE — GAUZE SPONGE 4X4 12PLY

## (undated) DEVICE — SOL 9P NACL IRR PIC IL

## (undated) DEVICE — SEE MEDLINE ITEM 157169

## (undated) DEVICE — DRESSING XEROFORM 1X8IN

## (undated) DEVICE — SEE MEDLINE ITEM 157117

## (undated) DEVICE — SOL IRR NACL .9% 3000ML

## (undated) DEVICE — BLADE 4.2MM PREBENT ULTRACUT

## (undated) DEVICE — DRESSING AQUACEL AG ADV 3.5X6

## (undated) DEVICE — ADHESIVE DERMABOND ADVANCED

## (undated) DEVICE — GOWN B1 X-LG X-LONG

## (undated) DEVICE — DRESSING LEUKOPLAST FLEX 1X3IN

## (undated) DEVICE — SLEEVE PROTECTIVE 6X9 NON STER

## (undated) DEVICE — SUT 4/0 18IN COATED VICRYL

## (undated) DEVICE — SCALPEL SZ 10 RETRACTABLE

## (undated) DEVICE — PAD ABD 8X10 STERILE

## (undated) DEVICE — GLOVE ORTHO PF SZ 8.5

## (undated) DEVICE — UNDERGLOVES BIOGEL PI SIZE 7.5

## (undated) DEVICE — PAD COLD THERAPY KNEE WRAP ON

## (undated) DEVICE — DRESSING XEROFORM FOIL PK 1X8

## (undated) DEVICE — PAD ELECTRODE STER 1.5X3

## (undated) DEVICE — PROBE ARTHO ENERGY 90 DEG

## (undated) DEVICE — NDL PERC ENTRY 18GA X 9CM

## (undated) DEVICE — CATH QUADRIPOLAR 6FRX120CM

## (undated) DEVICE — CATH DCAPLR STEER LG 6FR 2-5-2

## (undated) DEVICE — APPLICATOR CHLORAPREP ORN 26ML

## (undated) DEVICE — SYR B-D DISP CONTROL 10CC100/C

## (undated) DEVICE — COVER MAYO STAND REINFRCD 30

## (undated) DEVICE — Device

## (undated) DEVICE — PACK HEART CATH BR

## (undated) DEVICE — WIRE GUIDE TEFLON 3CM .035 145

## (undated) DEVICE — KIT SITE-RITE NDL GUIDE 21G

## (undated) DEVICE — PAD DEFIB CADENCE ADULT R2

## (undated) DEVICE — NDL 25G X 5/8 REG BEVEL

## (undated) DEVICE — GLOVE SURGEON SYN PF SZ 9

## (undated) DEVICE — SUT MCRYL PLUS 4-0 PS2 27IN

## (undated) DEVICE — CLOSURE SKIN STERI STRIP 1/2X4

## (undated) DEVICE — TUBE SET INFLOW/OUTFLOW

## (undated) DEVICE — GLOVE BIOGEL SKINSENSE PI 7.0